# Patient Record
Sex: FEMALE | Race: WHITE | NOT HISPANIC OR LATINO | Employment: OTHER | URBAN - METROPOLITAN AREA
[De-identification: names, ages, dates, MRNs, and addresses within clinical notes are randomized per-mention and may not be internally consistent; named-entity substitution may affect disease eponyms.]

---

## 2017-03-06 ENCOUNTER — PATIENT MESSAGE (OUTPATIENT)
Dept: INTERNAL MEDICINE | Facility: CLINIC | Age: 82
End: 2017-03-06

## 2017-03-06 DIAGNOSIS — F02.80 LATE ONSET ALZHEIMER'S DISEASE WITHOUT BEHAVIORAL DISTURBANCE: ICD-10-CM

## 2017-03-06 DIAGNOSIS — G30.1 LATE ONSET ALZHEIMER'S DISEASE WITHOUT BEHAVIORAL DISTURBANCE: ICD-10-CM

## 2017-03-06 DIAGNOSIS — G47.00 INSOMNIA, UNSPECIFIED TYPE: ICD-10-CM

## 2017-03-06 RX ORDER — QUETIAPINE FUMARATE 25 MG/1
TABLET, FILM COATED ORAL
Qty: 90 TABLET | Refills: 1 | Status: SHIPPED | OUTPATIENT
Start: 2017-03-06 | End: 2017-04-13 | Stop reason: SDUPTHER

## 2017-04-12 ENCOUNTER — PATIENT MESSAGE (OUTPATIENT)
Dept: INTERNAL MEDICINE | Facility: CLINIC | Age: 82
End: 2017-04-12

## 2017-04-12 DIAGNOSIS — G47.00 INSOMNIA, UNSPECIFIED TYPE: ICD-10-CM

## 2017-04-12 DIAGNOSIS — F02.80 LATE ONSET ALZHEIMER'S DISEASE WITHOUT BEHAVIORAL DISTURBANCE: ICD-10-CM

## 2017-04-12 DIAGNOSIS — E03.9 HYPOTHYROIDISM, UNSPECIFIED TYPE: Primary | ICD-10-CM

## 2017-04-12 DIAGNOSIS — G30.1 LATE ONSET ALZHEIMER'S DISEASE WITHOUT BEHAVIORAL DISTURBANCE: ICD-10-CM

## 2017-04-13 RX ORDER — QUETIAPINE FUMARATE 25 MG/1
TABLET, FILM COATED ORAL
Qty: 90 TABLET | Refills: 1 | Status: SHIPPED | OUTPATIENT
Start: 2017-04-13 | End: 2017-04-26 | Stop reason: SDUPTHER

## 2017-04-20 ENCOUNTER — LAB VISIT (OUTPATIENT)
Dept: LAB | Facility: HOSPITAL | Age: 82
End: 2017-04-20
Attending: INTERNAL MEDICINE
Payer: MEDICARE

## 2017-04-20 DIAGNOSIS — E03.9 HYPOTHYROIDISM, UNSPECIFIED TYPE: ICD-10-CM

## 2017-04-20 LAB
T4 FREE SERPL-MCNC: 1.06 NG/DL
TSH SERPL DL<=0.005 MIU/L-ACNC: 1.37 UIU/ML

## 2017-04-20 PROCEDURE — 36415 COLL VENOUS BLD VENIPUNCTURE: CPT

## 2017-04-20 PROCEDURE — 84443 ASSAY THYROID STIM HORMONE: CPT

## 2017-04-20 PROCEDURE — 84439 ASSAY OF FREE THYROXINE: CPT

## 2017-04-25 ENCOUNTER — PATIENT MESSAGE (OUTPATIENT)
Dept: INTERNAL MEDICINE | Facility: CLINIC | Age: 82
End: 2017-04-25

## 2017-04-25 DIAGNOSIS — G47.00 INSOMNIA, UNSPECIFIED TYPE: ICD-10-CM

## 2017-04-25 DIAGNOSIS — F02.80 LATE ONSET ALZHEIMER'S DISEASE WITHOUT BEHAVIORAL DISTURBANCE: ICD-10-CM

## 2017-04-25 DIAGNOSIS — G30.1 LATE ONSET ALZHEIMER'S DISEASE WITHOUT BEHAVIORAL DISTURBANCE: ICD-10-CM

## 2017-04-26 RX ORDER — QUETIAPINE FUMARATE 25 MG/1
TABLET, FILM COATED ORAL
Qty: 90 TABLET | Refills: 1 | Status: SHIPPED | OUTPATIENT
Start: 2017-04-26 | End: 2017-09-04 | Stop reason: SDUPTHER

## 2017-04-26 RX ORDER — LEVOTHYROXINE SODIUM 100 UG/1
TABLET ORAL
Qty: 90 TABLET | Refills: 3 | Status: SHIPPED | OUTPATIENT
Start: 2017-04-26 | End: 2017-11-14 | Stop reason: SDUPTHER

## 2017-05-03 ENCOUNTER — OFFICE VISIT (OUTPATIENT)
Dept: INTERNAL MEDICINE | Facility: CLINIC | Age: 82
End: 2017-05-03
Payer: MEDICARE

## 2017-05-03 VITALS
TEMPERATURE: 98 F | HEIGHT: 62 IN | BODY MASS INDEX: 18.38 KG/M2 | HEART RATE: 90 BPM | RESPIRATION RATE: 16 BRPM | WEIGHT: 99.88 LBS | SYSTOLIC BLOOD PRESSURE: 128 MMHG | DIASTOLIC BLOOD PRESSURE: 75 MMHG

## 2017-05-03 DIAGNOSIS — G30.9 ALZHEIMER'S DEMENTIA WITHOUT BEHAVIORAL DISTURBANCE, UNSPECIFIED TIMING OF DEMENTIA ONSET: ICD-10-CM

## 2017-05-03 DIAGNOSIS — F41.9 ANXIETY: ICD-10-CM

## 2017-05-03 DIAGNOSIS — E03.9 HYPOTHYROIDISM, UNSPECIFIED TYPE: Primary | ICD-10-CM

## 2017-05-03 DIAGNOSIS — G47.00 INSOMNIA, UNSPECIFIED TYPE: ICD-10-CM

## 2017-05-03 DIAGNOSIS — R26.81 UNSTEADY GAIT: ICD-10-CM

## 2017-05-03 DIAGNOSIS — I77.9 BILATERAL CAROTID ARTERY DISEASE: ICD-10-CM

## 2017-05-03 DIAGNOSIS — F02.80 ALZHEIMER'S DEMENTIA WITHOUT BEHAVIORAL DISTURBANCE, UNSPECIFIED TIMING OF DEMENTIA ONSET: ICD-10-CM

## 2017-05-03 PROCEDURE — 1159F MED LIST DOCD IN RCRD: CPT | Mod: S$GLB,,, | Performed by: INTERNAL MEDICINE

## 2017-05-03 PROCEDURE — 99499 UNLISTED E&M SERVICE: CPT | Mod: S$GLB,,, | Performed by: INTERNAL MEDICINE

## 2017-05-03 PROCEDURE — 1160F RVW MEDS BY RX/DR IN RCRD: CPT | Mod: S$GLB,,, | Performed by: INTERNAL MEDICINE

## 2017-05-03 PROCEDURE — 99214 OFFICE O/P EST MOD 30 MIN: CPT | Mod: S$GLB,,, | Performed by: INTERNAL MEDICINE

## 2017-05-03 PROCEDURE — 1126F AMNT PAIN NOTED NONE PRSNT: CPT | Mod: S$GLB,,, | Performed by: INTERNAL MEDICINE

## 2017-05-03 PROCEDURE — 1157F ADVNC CARE PLAN IN RCRD: CPT | Mod: S$GLB,,, | Performed by: INTERNAL MEDICINE

## 2017-05-03 PROCEDURE — 99999 PR PBB SHADOW E&M-EST. PATIENT-LVL III: CPT | Mod: PBBFAC,,, | Performed by: INTERNAL MEDICINE

## 2017-05-03 RX ORDER — IMIQUIMOD 12.5 MG/.25G
CREAM TOPICAL
Refills: 2 | COMMUNITY
Start: 2017-02-11 | End: 2018-02-08

## 2017-05-03 RX ORDER — SERTRALINE HYDROCHLORIDE 25 MG/1
25 TABLET, FILM COATED ORAL DAILY
Qty: 90 TABLET | Refills: 3 | Status: SHIPPED | OUTPATIENT
Start: 2017-05-03 | End: 2017-10-18

## 2017-05-03 NOTE — MR AVS SNAPSHOT
[facility]  [address]  [facility] [address]  Phone: [phone]  Fax: [phone]                  Arina Adame   5/3/2017 1:00 PM   Office Visit    Description:  Female : 11/15/1928   Provider:  Zeinab Meier MD   Department:  [facility]           Reason for Visit     Follow-up           Diagnoses this Visit        Comments    Hypothyroidism, unspecified type    -  Primary     Bilateral carotid artery disease         Insomnia, unspecified type         Alzheimer's dementia without behavioral disturbance, unspecified timing of dementia onset         Unsteady gait                To Do List           Goals (5 Years of Data)     None      Follow-Up and Disposition     Return in about 6 months (around 11/3/2017).       These Medications        Disp Refills Start End    sertraline (ZOLOFT) 25 MG tablet 90 tablet 3 5/3/2017 5/3/2018    Take 1 tablet (25 mg total) by mouth once daily. - Oral    [facility] [address] [place] [address] Viktor Oneil 90 Ph #: [phone]         [facility]     [facility] On [facility] Nurse Care Line -  Assistance  Unless otherwise directed by your provider, please contact [facility], our nurse care line that is available for  assistance.     Registered nurses in the [facility] provide: appointment scheduling, clinical advisement, health education, and other advisory services.  Call: [phone] (toll free)               Medications           Message regarding Medications     Verify the changes and/or additions to your medication regime listed below are the same as discussed with your clinician today.  If any of these changes or additions are incorrect, please notify your healthcare provider.        START taking these NEW medications        Refills    sertraline (ZOLOFT) 25 MG tablet 3    Sig: Take 1 tablet (25 mg total) by mouth once daily.    Class:  "Normal    Route: Oral           Verify that the below list of medications is an accurate representation of the medications you are currently taking.  If none reported, the list may be blank. If incorrect, please contact your healthcare provider. Carry this list with you in case of emergency.           Current Medications     atorvastatin (LIPITOR) 40 MG tablet Take 1 tablet (40 mg total) by mouth once daily.    CALCIUM CITRATE/VITAMIN D3 (CITRACAL + D MAXIMUM ORAL) Take 4 tablets by mouth once daily.      cholecalciferol, vitamin D3, (VITAMIN D3) 1,000 unit capsule Take 1,000 Units by mouth once daily.      coenzyme Q10 200 mg capsule Take 200 mg by mouth once daily.    cyanocobalamin (VITAMIN B-12) 500 MCG tablet Take 1,000 mcg by mouth once daily.      donepezil (ARICEPT) 10 MG tablet Take 1 tablet (10 mg total) by mouth every evening.    fish oil-omega-3 fatty acids 300-1,000 mg capsule Take 2 g by mouth once daily.      levothyroxine (SYNTHROID) 100 MCG tablet 1 Tablet Oral Every day except 1/2 tablet on Sundays.    quetiapine (SEROQUEL) 25 MG Tab TAKE 1 TABLET (25 MG TOTAL) BY MOUTH NIGHTLY AS NEEDED.    walker (ULTRA-LIGHT ROLLATOR) Misc 1 each by Misc.(Non-Drug; Combo Route) route once daily.    ZOSTAVAX, PF, 19,400 unit/0.65 mL injection     aspirin (ECOTRIN) 325 MG EC tablet Take 1 tablet (325 mg total) by mouth once daily.    imiquimod (ALDARA) 5 % cream APPLY TO THE AFFECTED AREA  HS FIVE TIMES PER WEEK FOR FOUR WEEKS UTD    sertraline (ZOLOFT) 25 MG tablet Take 1 tablet (25 mg total) by mouth once daily.           Clinical Reference Information           Your Vitals Were     BP Pulse Temp Resp Height Weight    128/75 90 98.1 °F (36.7 °C) 16 5' 2" (1.575 m) 45.3 kg (99 lb 13.9 oz)    BMI                18.27 kg/m2          Blood Pressure          Most Recent Value    BP  128/75      Allergies as of 5/3/2017     Codeine    Iodine    Penicillins      Immunizations Administered on Date of Encounter - " 5/3/2017     None      Orders Placed During Today's Visit      Normal Orders This Visit    SUBSEQUENT HOME HEALTH ORDERS       Instructions    Stop vitamin D, Vitamin E, B12, fish oil. Take 1 multivitamin daily.        Language Assistance Services     ATTENTION: Language assistance services are available, free of charge. Please call [phone].      ATENCIÓN: Si paul carrasquillo, tiene a nagy disposición servicios gratuitos de asistencia lingüística. Llame al [phone].     CHÚ Ý: N?u b?n nói Ti?ng Vi?t, có các d?ch v? h? tr? ngôn ng? mi?n phí dành cho b?n. G?i s? [phone].         [facility] complies with applicable Federal civil rights laws and does not discriminate on the basis of race, color, national origin, age, disability, or sex.

## 2017-05-03 NOTE — PROGRESS NOTES
"Subjective:       Patient ID: Arina Adame is a 88 y.o. female.    Chief Complaint: follow up    HPI   B carotid artery disease - on asa 81 and atorva 40.   US 10/2014 - R 40-59% and L 1-39%  LDL 70.4.    Hypothyroidism - on synthroid 100mcg daily  Insomnia started on seroquel. Pt had some confusion and took an extra synthroid instead of seroquel per daughter. However a few weeks after taking the correct medicine, TFT WNL.   Some memory loss and on aricept 10mg daily.    Increased anxiety. Pt is fidgety. Seroquel 25mg nightly works very well. No falls. No depression. No crying. Lost a lot friends due to age.     Daughter and son in law lives w/ pt. Supposed to walk w/ walker. Unstable on her feet. No falls. She's having issues w/ walking w/ walker.     Urinates ok. Some incontinence. Normal bowel movements. No constipation/diarrhea. No abdominal pain.    Review of Systems  as Above in HPI.     Objective:      Physical Exam    /75  Pulse 90  Temp 98.1 °F (36.7 °C)  Resp 16  Ht 5' 2" (1.575 m)  Wt 45.3 kg (99 lb 13.9 oz)  BMI 18.27 kg/m2    Gen - A+O to self, NAD  HEENT - PERRL, OP clear. MMM.   NECK - no LAD  CV - RRR, no m/r  Chest - CTAB, no wheezing/rhonchi/crackles  Abd - S/NT/ND/+BS  Ext -2 + B radial and 1+ B DP pulses. No LE edema.   MSK - 4-5/5 BUE and BLE m strength. Antalgic gait and walking towards the R. High steps.    Labs reviewed.     Assessment/Plan     Arina was seen today for follow-up.    Diagnoses and all orders for this visit:    Hypothyroidism, unspecified type - cont synthroid. TSH WNL.     Bilateral carotid artery disease - cont atorva 40mg daily.     Insomnia, unspecified type - cont seroquel nightly.     Alzheimer's dementia without behavioral disturbance, unspecified timing of dementia onset - cont aricept 10mg dialy.   -     SUBSEQUENT HOME HEALTH ORDERS    Unsteady gait  -     SUBSEQUENT HOME HEALTH ORDERS    Anxiety   -     sertraline (ZOLOFT) 25 MG tablet; Take 1 tablet " (25 mg total) by mouth once daily.    Stop vitamin D, Vitamin E, B12, fish oil. Take 1 multivitamin daily.   Return in about 6 months (around 11/3/2017).      Zeinab Meier MD  Department of Internal Medicine - [facility]  12:48 PM

## 2017-05-15 ENCOUNTER — TELEPHONE (OUTPATIENT)
Dept: INTERNAL MEDICINE | Facility: CLINIC | Age: 82
End: 2017-05-15

## 2017-05-15 DIAGNOSIS — I10 ESSENTIAL HYPERTENSION, BENIGN: Primary | ICD-10-CM

## 2017-05-15 NOTE — TELEPHONE ENCOUNTER
Received notification of possible interaction for seroquel and donepezil - PT prolongation. Can you call pt to set up an EKG to ensure that her heart rhythm is ok?

## 2017-05-17 ENCOUNTER — HOSPITAL ENCOUNTER (OUTPATIENT)
Dept: CARDIOLOGY | Facility: CLINIC | Age: 82
Discharge: HOME | End: 2017-05-17
Payer: MEDICARE

## 2017-05-17 DIAGNOSIS — I10 ESSENTIAL HYPERTENSION, BENIGN: ICD-10-CM

## 2017-05-17 PROCEDURE — 93000 ELECTROCARDIOGRAM COMPLETE: CPT | Mod: S$GLB,,, | Performed by: INTERNAL MEDICINE

## 2017-06-29 ENCOUNTER — OFFICE VISIT (OUTPATIENT)
Dept: ENDOCRINOLOGY | Facility: CLINIC | Age: 82
End: 2017-06-29
Payer: MEDICARE

## 2017-06-29 VITALS
BODY MASS INDEX: 19.02 KG/M2 | DIASTOLIC BLOOD PRESSURE: 79 MMHG | SYSTOLIC BLOOD PRESSURE: 134 MMHG | HEIGHT: 62 IN | HEART RATE: 98 BPM | WEIGHT: 103.38 LBS

## 2017-06-29 DIAGNOSIS — R73.02 IGT (IMPAIRED GLUCOSE TOLERANCE): ICD-10-CM

## 2017-06-29 DIAGNOSIS — M81.0 SENILE OSTEOPOROSIS: Primary | ICD-10-CM

## 2017-06-29 DIAGNOSIS — E55.9 VITAMIN D DEFICIENCY DISEASE: ICD-10-CM

## 2017-06-29 DIAGNOSIS — E03.9 HYPOTHYROIDISM, UNSPECIFIED TYPE: ICD-10-CM

## 2017-06-29 PROCEDURE — 1126F AMNT PAIN NOTED NONE PRSNT: CPT | Mod: S$GLB,,, | Performed by: INTERNAL MEDICINE

## 2017-06-29 PROCEDURE — 1157F ADVNC CARE PLAN IN RCRD: CPT | Mod: S$GLB,,, | Performed by: INTERNAL MEDICINE

## 2017-06-29 PROCEDURE — 99214 OFFICE O/P EST MOD 30 MIN: CPT | Mod: S$GLB,,, | Performed by: INTERNAL MEDICINE

## 2017-06-29 PROCEDURE — 1159F MED LIST DOCD IN RCRD: CPT | Mod: S$GLB,,, | Performed by: INTERNAL MEDICINE

## 2017-06-29 PROCEDURE — 99999 PR PBB SHADOW E&M-EST. PATIENT-LVL IV: CPT | Mod: PBBFAC,,, | Performed by: INTERNAL MEDICINE

## 2017-06-29 NOTE — PROGRESS NOTES
Chief Complaint: Osteoporosis      HPI:  Arina Adame is 88 y.o. female with Hypothyroidism, Dementia, HLD and IGT here today for Osteoporosis follow up     She is a prior patient of Dr. Coronado and BETY Andrade with last documented office visit in 02/2016  This is the patient's initial visit with me today     She has known Osteoporosis  Treated in the past with Fosamax   Most recently on Reclast  Received infusions in 07/2013, 03/2015 and 03/2016  Tolerated well, without side effects or complications     She denies recent falls or fractures     Taking calcium and vitamin D as prescribed     Denies history of kidney stones or kidney disease   Denies diarrhea or malabsorption     Last BMD from 02/2015:   Impression     Osteoporosis of the total hip. There is no significant change in BMD when compared to previous study done in 2012.     For her Hypothyroidism:   She is taking Levothyroxine 100 mcg daily   Reports taking every morning with water. She waits approx 30 minutes before eating or taking other medications     Denies constipation, palpitations, dry skin, heat or cold intolerance       Review of Systems   Constitutional: Negative for unexpected weight change.   Eyes: Negative for visual disturbance.   Respiratory: Negative for shortness of breath.    Cardiovascular: Negative for chest pain.   Gastrointestinal: Negative for abdominal pain.   Musculoskeletal: Negative for arthralgias.   Skin: Negative for wound.   Neurological: Negative for headaches.   Hematological: Does not bruise/bleed easily.   Psychiatric/Behavioral: Negative for sleep disturbance.     Physical Exam   Constitutional: No distress.   Thin female    Neck: No thyromegaly present.   Cardiovascular: Normal rate.    Pulmonary/Chest: Effort normal.   Abdominal: Soft.   Musculoskeletal: She exhibits no edema.   Lymphadenopathy:     She has no cervical adenopathy.   Neurological: She is alert.   Psychiatric: She has a normal mood and affect.  Her behavior is normal.   Nursing note and vitals reviewed.    Labs:  Lab Results   Component Value Date     11/30/2016    K 3.5 11/30/2016     11/30/2016    CO2 31 (H) 11/30/2016     (H) 11/30/2016    BUN 12 11/30/2016    CREATININE 0.6 11/30/2016    CALCIUM 9.5 11/30/2016    PROT 7.2 11/30/2016    ALBUMIN 3.4 (L) 11/30/2016    BILITOT 0.7 11/30/2016    ALKPHOS 115 11/30/2016    AST 15 11/30/2016    ALT 8 (L) 11/30/2016    ANIONGAP 6 (L) 11/30/2016    ESTGFRAFRICA >60.0 11/30/2016    EGFRNONAA >60.0 11/30/2016     Lab Results   Component Value Date    GHGEQUZF66NT 62 11/30/2016     Assessment and Plan:  1. Senile osteoporosis  -Stressed the importance of weight bearing exercises to help prevent fractures   -Patient is encouraged to continue exercise program as tolerated  -Initiate fall safety and fall precautions   -Continue calcium and vitamin D supplements ( 2758-5820 mg daily of calcium and 800-1000 units daily of vitamin D ) - dietary sources of calcium are preferred  -Pharmacological therapy is recommended for patients with osteopenia if the 10 year probability of a hip fracture is >3% and 10 year probability of other major osteoporotic fractures is >20%.   -Repeat DEXA due   -     DXA Bone Density Spine And Hip; Future; Expected date: 06/29/2017    2. Hypothyroidism, unspecified type  - clinically and biochemically euthyroid   - continue Levothyroxine 100 mcg daily   - avoid exogenous hyperthyroidism as this can accelerate bone loss and increase risk of CV complications     3. IGT (impaired glucose tolerance)  - stable   - continue dietary and lifestyle modifications   - recommend periodic A1c     4. Vitamin D deficiency disease  - stable   - continue supplements

## 2017-08-04 ENCOUNTER — OFFICE VISIT (OUTPATIENT)
Dept: OPHTHALMOLOGY | Facility: CLINIC | Age: 82
End: 2017-08-04
Payer: MEDICARE

## 2017-08-04 DIAGNOSIS — H04.123 DRY EYE SYNDROME, BILATERAL: ICD-10-CM

## 2017-08-04 DIAGNOSIS — H43.812 VITREOUS DETACHMENT, LEFT: ICD-10-CM

## 2017-08-04 DIAGNOSIS — H35.372 EPIRETINAL MEMBRANE, LEFT: Primary | ICD-10-CM

## 2017-08-04 DIAGNOSIS — I10 ESSENTIAL HYPERTENSION: ICD-10-CM

## 2017-08-04 DIAGNOSIS — H52.7 REFRACTIVE ERROR: ICD-10-CM

## 2017-08-04 DIAGNOSIS — Z96.1 PSEUDOPHAKIA: ICD-10-CM

## 2017-08-04 DIAGNOSIS — D31.31 CHOROIDAL NEVUS OF RIGHT EYE: ICD-10-CM

## 2017-08-04 PROCEDURE — 92014 COMPRE OPH EXAM EST PT 1/>: CPT | Mod: S$GLB,,, | Performed by: OPHTHALMOLOGY

## 2017-08-04 PROCEDURE — 99999 PR PBB SHADOW E&M-EST. PATIENT-LVL II: CPT | Mod: PBBFAC,,, | Performed by: OPHTHALMOLOGY

## 2017-08-04 PROCEDURE — 99499 UNLISTED E&M SERVICE: CPT | Mod: S$GLB,,, | Performed by: OPHTHALMOLOGY

## 2017-08-04 NOTE — PROGRESS NOTES
Subjective:       Patient ID: Arina Adame is a 88 y.o. female.    Chief Complaint: Epiretinal membrane (ERM, left )    HPI  Review of Systems    Objective:      Physical Exam    Assessment:       1. 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 Dry eye syndrome, bilateral    4. Vitreous detachment, left    5. Essential hypertension    6. Refractive error - Both Eyes    7. Pseudophakia - Both Eyes        Plan:       ERM OS-Mild, stable.  C. Nevus OD-Stable.  TAMMI-Doing well.  PVD OS-Stable.  HTN-No retinopathy OU.  RE-Pt does not want MRx.        AT's.  Control HTN.  RTC 1 yr.

## 2017-09-04 DIAGNOSIS — G30.1 LATE ONSET ALZHEIMER'S DISEASE WITHOUT BEHAVIORAL DISTURBANCE: ICD-10-CM

## 2017-09-04 DIAGNOSIS — G47.00 INSOMNIA, UNSPECIFIED TYPE: ICD-10-CM

## 2017-09-04 DIAGNOSIS — F02.80 LATE ONSET ALZHEIMER'S DISEASE WITHOUT BEHAVIORAL DISTURBANCE: ICD-10-CM

## 2017-09-06 RX ORDER — QUETIAPINE FUMARATE 25 MG/1
TABLET, FILM COATED ORAL
Qty: 90 TABLET | Refills: 0 | Status: SHIPPED | OUTPATIENT
Start: 2017-09-06 | End: 2017-11-08 | Stop reason: SDUPTHER

## 2017-10-04 ENCOUNTER — TELEPHONE (OUTPATIENT)
Dept: OPHTHALMOLOGY | Facility: CLINIC | Age: 82
End: 2017-10-04

## 2017-10-06 ENCOUNTER — OFFICE VISIT (OUTPATIENT)
Dept: OPHTHALMOLOGY | Facility: CLINIC | Age: 82
End: 2017-10-06
Payer: MEDICARE

## 2017-10-06 DIAGNOSIS — H35.61 SUBRETINAL HEMORRHAGE OF RIGHT EYE: Primary | ICD-10-CM

## 2017-10-06 DIAGNOSIS — H43.11 VITREOUS HEMORRHAGE OF RIGHT EYE: ICD-10-CM

## 2017-10-06 PROCEDURE — 92014 COMPRE OPH EXAM EST PT 1/>: CPT | Mod: S$GLB,,, | Performed by: OPHTHALMOLOGY

## 2017-10-06 PROCEDURE — 99499 UNLISTED E&M SERVICE: CPT | Mod: S$GLB,,, | Performed by: OPHTHALMOLOGY

## 2017-10-06 PROCEDURE — 99999 PR PBB SHADOW E&M-EST. PATIENT-LVL II: CPT | Mod: PBBFAC,,, | Performed by: OPHTHALMOLOGY

## 2017-10-06 NOTE — PROGRESS NOTES
Subjective:       Patient ID: Arina Adame is a 88 y.o. female.    Chief Complaint: Spots and/or Floaters    HPI  Review of Systems    Objective:      Physical Exam    Assessment:       1. Subretinal hemorrhage of right eye    2. Vitreous hemorrhage of right eye        Plan:       Subretinal/vitreous hem OD-Causing floaters & red streak across vision OD. Needs Retina eval/tx.          Refer to Dr Coronel for eval/tx on Monday, 10/9/17.

## 2017-10-09 ENCOUNTER — INITIAL CONSULT (OUTPATIENT)
Dept: OPHTHALMOLOGY | Facility: CLINIC | Age: 82
End: 2017-10-09
Payer: MEDICARE

## 2017-10-09 ENCOUNTER — OFFICE VISIT (OUTPATIENT)
Dept: FAMILY MEDICINE | Facility: CLINIC | Age: 82
End: 2017-10-09
Payer: MEDICARE

## 2017-10-09 ENCOUNTER — HOSPITAL ENCOUNTER (OUTPATIENT)
Dept: RADIOLOGY | Facility: HOSPITAL | Age: 82
Discharge: HOME | End: 2017-10-09
Attending: NURSE PRACTITIONER
Payer: MEDICARE

## 2017-10-09 ENCOUNTER — PATIENT MESSAGE (OUTPATIENT)
Dept: FAMILY MEDICINE | Facility: CLINIC | Age: 82
End: 2017-10-09

## 2017-10-09 VITALS — DIASTOLIC BLOOD PRESSURE: 75 MMHG | HEART RATE: 66 BPM | SYSTOLIC BLOOD PRESSURE: 173 MMHG

## 2017-10-09 VITALS — HEIGHT: 62 IN | BODY MASS INDEX: 19.02 KG/M2 | WEIGHT: 103.38 LBS

## 2017-10-09 DIAGNOSIS — H35.372 EPIRETINAL MEMBRANE (ERM) OF LEFT EYE: ICD-10-CM

## 2017-10-09 DIAGNOSIS — H35.3211 EXUDATIVE AGE-RELATED MACULAR DEGENERATION OF RIGHT EYE WITH ACTIVE CHOROIDAL NEOVASCULARIZATION: Primary | ICD-10-CM
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
## 2017-10-09 DIAGNOSIS — H43.11 VITREOUS HEMORRHAGE OF RIGHT EYE: ICD-10-CM

## 2017-10-09 DIAGNOSIS — M25.551 ACUTE RIGHT HIP PAIN: Primary | ICD-10-CM

## 2017-10-09 DIAGNOSIS — M25.551 PAIN OF RIGHT HIP JOINT: ICD-10-CM

## 2017-10-09 DIAGNOSIS — H43.812 POSTERIOR VITREOUS DETACHMENT OF LEFT EYE: ICD-10-CM

## 2017-10-09 PROCEDURE — 92134 CPTRZ OPH DX IMG PST SGM RTA: CPT | Mod: S$GLB,,, | Performed by: OPHTHALMOLOGY

## 2017-10-09 PROCEDURE — 99999 PR PBB SHADOW E&M-EST. PATIENT-LVL II: CPT | Mod: PBBFAC,,, | Performed by: OPHTHALMOLOGY

## 2017-10-09 PROCEDURE — 92014 COMPRE OPH EXAM EST PT 1/>: CPT | Mod: 25,S$GLB,, | Performed by: OPHTHALMOLOGY

## 2017-10-09 PROCEDURE — 99214 OFFICE O/P EST MOD 30 MIN: CPT | Mod: S$GLB,,, | Performed by: NURSE PRACTITIONER

## 2017-10-09 PROCEDURE — 73502 X-RAY EXAM HIP UNI 2-3 VIEWS: CPT | Mod: TC,PO,RT

## 2017-10-09 PROCEDURE — 73502 X-RAY EXAM HIP UNI 2-3 VIEWS: CPT | Mod: 26,RT,, | Performed by: RADIOLOGY

## 2017-10-09 PROCEDURE — 67028 INJECTION EYE DRUG: CPT | Mod: RT,S$GLB,, | Performed by: OPHTHALMOLOGY

## 2017-10-09 PROCEDURE — 99499 UNLISTED E&M SERVICE: CPT | Mod: S$GLB,,, | Performed by: OPHTHALMOLOGY

## 2017-10-09 PROCEDURE — 92225 PR SPECIAL EYE EXAM, INITIAL: CPT | Mod: 25,LT,S$GLB, | Performed by: OPHTHALMOLOGY

## 2017-10-09 PROCEDURE — 99999 PR PBB SHADOW E&M-EST. PATIENT-LVL III: CPT | Mod: PBBFAC,,, | Performed by: NURSE PRACTITIONER

## 2017-10-09 RX ADMIN — Medication 1.25 MG: at 11:10

## 2017-10-09 NOTE — PATIENT INSTRUCTIONS
POST INTRAVITREAL INJECTION PATIENT INSTRUCTIONS   Below are some guidelines for what to expect after your treatment and additional care instructions.    You may experience mild discomfort in your eye after the treatment. Usually your eye feels better within 24 to 48 hours after the procedure.      You have been given drops that numb the surface of your eye. DO NOT rub or touch your eye. DO NOT wear contacts until numbness goes away.      You may experience small spots that appear in your field of vision. These are usually caused by an air bubble or from the medication. It takes a few hours or days for these to go away.      The use of sunglasses will help reduce light sensitivity and glare.      DO NOT swim or put sink water (tap water) in your eyes for at least 4 hours after the injection.      You may get a gritty, burning, irritating or stinging feeling in the injected eye as a result of the antiseptic used. Use artificial tears or eye lubricant to reduce the sensation. These are available over-the-counter from your local pharmacy. If you already have some at home, be sure to check the expiration date and to avoid contaminating your eye. A cool pack over your eye brow above the injected eye may also relieve discomfort.     Call us right away or go to the Emergency Department if you have a dramatic decrease in vision or extreme pain in the eye.   [facility] [phone]   Item: [id number]   Revised: 09/2015         Controlling High Blood Pressure  High blood pressure (hypertension) is often called the silent killer. This is because many people who have it dont know it. High blood pressure is defined as 140/90 mm Hg or higher. Know your blood pressure and remember to check it regularly. Doing so can save your life. Here are some things you can do to help control your blood pressure.    Choose heart-healthy foods  · Select low-salt, low-fat foods. Limit sodium intake to 2,400 mg per day or  the amount suggested by your healthcare provider.  · Limit canned, dried, cured, packaged, and fast foods. These can contain a lot of salt.  · Eat 8 to 10 servings of fruits and vegetables every day.  · Choose lean meats, fish, or chicken.  · Eat whole-grain pasta, brown rice, and beans.  · Eat 2 to 3 servings of low-fat or fat-free dairy products.  · Ask your doctor about the DASH eating plan. This plan helps reduce blood pressure.  · When you go to a restaurant, ask that your meal be prepared with no added salt.  Maintain a healthy weight  · Ask your healthcare provider how many calories to eat a day. Then stick to that number.  · Ask your healthcare provider what weight range is healthiest for you. If you are overweight, a weight loss of only 3% to 5% of your body weight can help lower blood pressure. Generally, a good weight loss goal is to lose 10% of your body weight in a year.  · Limit snacks and sweets.  · Get regular exercise.  Get up and get active  · Choose activities you enjoy. Find ones you can do with friends or family. This includes bicycling, dancing, walking, and jogging.  · Park farther away from building entrances.  · Use stairs instead of the elevator.  · When you can, walk or bike instead of driving.  · [place] leaves, garden, or do household repairs.  · Be active at a moderate to vigorous level of physical activity for at least 40 minutes for a minimum of 3 to 4 days a week.   Manage stress  · Make time to relax and enjoy life. Find time to laugh.  · Communicate your concerns with your loved ones and your healthcare provider.  · Visit with family and friends, and keep up with hobbies.  Limit alcohol and quit smoking  · Men should have no more than 2 drinks per day.  · Women should have no more than 1 drink per day.  · Talk with your healthcare provider about quitting smoking. Smoking significantly increases your risk for heart disease and stroke. Ask your healthcare provider about community  smoking cessation programs and other options.  Medicines  If lifestyle changes arent enough, your healthcare provider may prescribe high blood pressure medicine. Take all medicines as prescribed. If you have any questions about your medicines, ask your healthcare provider before stopping or changing them.

## 2017-10-09 NOTE — PROGRESS NOTES
HPI     Subretinal/vitreous hem OD-Causing floaters & red streak across vision   OD. per Dr. Pastor.  Pt notes onset of floaters and spot OD last week.    Floaters are reddish.  No flashes.  Va normal OS.  No pain/redness/f/f        OTC gtts PRN   (-)Flashes (+)Floaters black at night and red in bright lights  (+)Photophobia  (+)Glare    Hemoglobin A1C       Date                     Value               Ref Range             Status                11/30/2016               5.4                 4.5 - 6.2 %           Final                 02/09/2015               5.9                 4.5 - 6.2 %           Final                 06/24/2013               5.9                 4.0 - 6.2 %           Final            ----------      Last edited by Boone Coronel MD on 10/10/2017  8:25 PM. (History)        [facility] SDOCT:   OD: good quality, SRH in superior macula  OS: good quality, ERM with min distoriton, no fluid      Assessment /Plan     For exam results, see Encounter Report.    Exudative age-related macular degeneration of right eye with active choroidal neovascularization  -     Prior Authorization Order  -     Posterior Segment OCT Retina-Both eyes  -     Posterior Segment OCT Retina-Both eyes; Future    Epiretinal membrane (ERM) of left eye    Other orders  -     bevacizumab (AVASTIN) 2.5 mg/0.10 mL 1.25 mg; Inject 0.05 mLs (1.25 mg total) into the eye one time.    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  Heme too dense to get useful FA today  Recommend Avastin OD today  RBA discussed with patient and daughter.  They agree    Risks, benefits, and alternatives to treatment were discussed in detail with the patient, including bleeding/infection (endophthalmitis)/etc.  The patient voiced understanding and wished to proceed with the procedure.  See separate consent form.    Injection Procedure Note:  Diagnosis: Wet AMD Right Eye    Topical Proparacaine drop placed then topical 10% Betadine swabs to lids, lashes, and conjunctiva.  Sterile  gloves used, and sterile lid speculum placed.  10% Betadine swabbed at injection site again prior to injection.  Avastin 1.25mg in 0.05cc Injected at 7:00 position 3.5-4mm posterior to the limbus.  Complications: None  Va at least CF at 5 feet post injection.  Retina, ONH, IOP normal after injection.    Return to clinic in 1 month for reevaluation, OCT, and possible injection depending upon findings.  Patient should return immediately PRN.  Retinal Detachment and Endophthalmitis precautions given.    Will get FA when blood clearing

## 2017-10-09 NOTE — PROGRESS NOTES
History of Present Illness   Arina Adame is a 88 y.o. woman with medical history as listed below who presents today for evaluation after a trip and fall which occurred two days prior to appointment. She states she got tangled in the bed skirt and fell in to the mattress. She did not hit her head and denies LOC. She complains of right hip pain. Pain has improved over the past two days with rest, ice, and pain medication. She is able to bear weight without difficulty. She would like an x-ray to ensure there is no fracture present. She has no additional complaints and is otherwise healthy on today's visit.      Past Medical History:   Diagnosis Date    Alzheimer disease     Arthritis     B12 nutritional deficiency 8/6/2012    Carotid arterial disease 8/27/2013    Cataract     Colon cancer     Elevated blood pressure (not hypertension) 3/17/2014    Epiretinal membrane     Essential hypertension 3/16/2016    Hearing loss 3/17/2014    History of colon cancer 5/28/2015    [place] (hard of hearing)     Hypothyroidism 11/21/2012    IGT (impaired glucose tolerance) 2/11/2015    Macrocytosis 11/21/2012    Nevus of choroid     Osteoporosis, 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       Past Surgical History:   Procedure Laterality Date    BREAST SURGERY      CATARACT EXTRACTION W/  INTRAOCULAR LENS IMPLANT Bilateral n/a    OU    COLECTOMY      DENTAL SURGERY      right ankle surgery         Social History     Social History    Marital status:      Spouse name: N/A    Number of children: N/A    Years of education: N/A     Social History Main Topics    Smoking status: Former Smoker     Quit date: 6/24/1970    Smokeless tobacco: Former User    Alcohol use No    Drug use: No    Sexual activity: Not Asked     Other Topics Concern    None     Social History Narrative    Daughter lives with her.        Family History   Problem Relation Age of Onset  "   Hip fracture Mother     Thyroid disease Sister      Thyroidectomy    Diabetes Neg Hx     Amblyopia Neg Hx     Blindness Neg Hx     Cataracts Neg Hx     Glaucoma Neg Hx     Macular degeneration Neg Hx     Retinal detachment Neg Hx     Strabismus Neg Hx        Review of Systems  Review of Systems   Musculoskeletal: Positive for falls and joint pain. Negative for back pain.   Neurological: Negative for dizziness and loss of consciousness.     A complete review of systems was otherwise negative.    Physical Exam  Ht 5' 2" (1.575 m)   Wt 46.9 kg (103 lb 6.3 oz)   BMI 18.91 kg/m²   General appearance: alert, appears stated age, cooperative and no distress  Extremities: extremities normal, atraumatic, no cyanosis or edema  Pulses: 2+ and symmetric  Skin: Skin color, texture, turgor normal. No rashes or lesions  Neurologic: Grossly normal  Musculoskeletal: There is full ROM to right hip, knee, and ankle. There is no obvious deformity, shortening, or rotation or RLE. There is no joint crepitus or tenderness to palpation.    Assessment/Plan  Fall, initial encounter  Will obtain x-ray for further evaluation.  Continue to rest and use Tylenol for pain.  RTC PRN for worsening or persistent symptoms.  Will refer to orthopedic as necessary.  Follow-up with PCP as scheduled in one month.  -     X-Ray Hip 2 View Right; Future; Expected date: 10/09/2017    Pain of right hip joint  As above.  -     X-Ray Hip 2 View Right; Future; Expected date: 10/09/2017    Patient and daughter have verbalized understanding and are in agreement with plan of care.  Return if symptoms worsen or fail to improve.  "

## 2017-10-09 NOTE — LETTER
October 10, 2017      Edmar Pastor MD  [address] [place] [address]           [facility]  [address]  [place] [address]  Phone: [phone]  Fax: [phone]          Patient: Arina Adame   MR Number: [id number]   YOB: 1928   Date of Visit: 10/9/2017       Dear Dr. Edmar Pastor:    Thank you for referring Arina Adame to me for evaluation. Attached you will find relevant portions of my assessment and plan of care.    If you have questions, please do not hesitate to call me. I look forward to following Arina Adame along with you.    Sincerely,    Boone Coronel MD    Enclosure  CC:  No Recipients    If you would like to receive this communication electronically, please contact [email or web address] or [phone] to request more information on [employer] access.    For providers and/or their staff who would like to refer a patient to [facility], please contact us through our one-stop-shop provider referral line, [facility], at [phone].    If you feel you have received this communication in error or would no longer like to receive these types of communications, please e-mail [email or web address]

## 2017-10-09 NOTE — PATIENT INSTRUCTIONS
"  Preventing Falls: Are You At Risk of Falling?     Ask for help to reduce risk of falling in your home.     As you get older, you're not as steady on your feet as you once were. And you may have health problems you didn't have when you were younger. So, it's not surprising that older people are more likely to trip and fall. Falling can be very serious. It can change your overall health and quality of life. That's why it's important to be aware of your own risk of falling.  The dangers of falling  Falls are one of the main causes of injury in people over age 65. An older person who falls may take longer to get better than a younger person. And, after a fall, an older person is more likely to have problems that don't go away. So, preventing falls can help you avoid serious health problems.  Are you at risk of falling?  Answer these questions to rate your level of risk.  · Are you a woman?  · Have you fallen or stumbled in the last year?  · Are you over age 65?  · Are you ever dizzy or lightheaded with standing?  · Do you have a hard time getting in and out of the bathtub or on and off the toilet?  · Do you lean on objects to help you get around? Or do you use a cane or walker?  · Do you have vision or hearing problems? For example, do you need new glasses or hearing aids?  · Do you have 2 or more long-lasting (chronic) medical conditions?  · Do you take 3 or more medicines?  · Have you felt depressed recently?  · Have you had more trouble with your memory in recent months?  · Are there hazards in your home that might cause you to fall, such as loose rugs or poor lighting?  · Do you have a pet that jumps on you or might trip you?  · Have you stopped getting regular exercise?  · Do you have diabetes?   · Do you have a neurologic disease, such as Parkinson or Alzheimer disease?   · Do you drink alcohol?  · Do you wear athletic shoes or slippers, or go barefoot at home?  You can help prevent falls  If you answered "yes" " to any of the above questions, you should take steps to reduce your risk of a fall. Monitoring health conditions and keeping walkways in your home free of clutter are just 2 ways. Changing is sometimes easier said than done. But keep in mind that even small changes can make you less likely to fall.  The fear of falling  It's normal to be scared of falling, especially if you've fallen before. But being afraid can actually make you more likely to fall. This is because:  · Fear might cause you to become less active. Being less active can lead to a loss of strength and balance.  · Fear can lead to isolation from others, depression, or the use of more medicines or alcohol. And all these things make falling even more likely.  To break the cycle, learn more about ways to avoid falling. As you take control, you may find yourself feeling less afraid.   Date Last Reviewed: 6/12/2015  © 6969-9006 [employer]. [address] [place] [address], [place] [address]. All rights reserved. This information is not intended as a substitute for professional medical care. Always follow your healthcare professional's instructions.

## 2017-10-10 ENCOUNTER — TELEPHONE (OUTPATIENT)
Dept: FAMILY MEDICINE | Facility: CLINIC | Age: 82
End: 2017-10-10

## 2017-10-16 ENCOUNTER — PATIENT MESSAGE (OUTPATIENT)
Dept: INTERNAL MEDICINE | Facility: CLINIC | Age: 82
End: 2017-10-16

## 2017-10-17 ENCOUNTER — PATIENT MESSAGE (OUTPATIENT)
Dept: INTERNAL MEDICINE | Facility: CLINIC | Age: 82
End: 2017-10-17

## 2017-10-17 NOTE — TELEPHONE ENCOUNTER
Please call patient to see if she can come in tomorrow on Wednesday at 10 AM.  She is tentatively scheduled.  If she cannot make it, please cancel appointment and schedule another with patient.

## 2017-10-18 ENCOUNTER — HOSPITAL ENCOUNTER (OUTPATIENT)
Dept: RADIOLOGY | Facility: HOSPITAL | Age: 82
Discharge: HOME | End: 2017-10-18
Attending: INTERNAL MEDICINE
Payer: MEDICARE

## 2017-10-18 ENCOUNTER — IMMUNIZATION (OUTPATIENT)
Dept: INTERNAL MEDICINE | Facility: CLINIC | Age: 82
End: 2017-10-18
Payer: MEDICARE

## 2017-10-18 ENCOUNTER — OFFICE VISIT (OUTPATIENT)
Dept: INTERNAL MEDICINE | Facility: CLINIC | Age: 82
End: 2017-10-18
Payer: MEDICARE

## 2017-10-18 VITALS
SYSTOLIC BLOOD PRESSURE: 126 MMHG | TEMPERATURE: 98 F | HEIGHT: 62 IN | RESPIRATION RATE: 15 BRPM | HEART RATE: 103 BPM | DIASTOLIC BLOOD PRESSURE: 78 MMHG

## 2017-10-18 DIAGNOSIS — M25.551 PAIN OF BOTH HIP JOINTS: ICD-10-CM

## 2017-10-18 DIAGNOSIS — F03.90 DEMENTIA WITHOUT BEHAVIORAL DISTURBANCE, UNSPECIFIED DEMENTIA TYPE: ICD-10-CM

## 2017-10-18 DIAGNOSIS — E03.9 HYPOTHYROIDISM, UNSPECIFIED TYPE: ICD-10-CM

## 2017-10-18 DIAGNOSIS — M25.552 PAIN OF BOTH HIP JOINTS: ICD-10-CM

## 2017-10-18 DIAGNOSIS — R29.6 MULTIPLE FALLS: ICD-10-CM

## 2017-10-18 DIAGNOSIS — R32 URINARY INCONTINENCE, UNSPECIFIED TYPE: ICD-10-CM

## 2017-10-18 DIAGNOSIS — R82.90 FOUL SMELLING URINE: ICD-10-CM

## 2017-10-18 DIAGNOSIS — M25.561 ACUTE PAIN OF RIGHT KNEE: ICD-10-CM

## 2017-10-18 DIAGNOSIS — S32.599A OTHER SPECIFIED FRACTURE OF UNSPECIFIED PUBIS, INITIAL ENCOUNTER FOR CLOSED FRACTURE: ICD-10-CM

## 2017-10-18 DIAGNOSIS — R29.6 MULTIPLE FALLS: Primary | ICD-10-CM

## 2017-10-18 DIAGNOSIS — F41.9 ANXIETY: ICD-10-CM

## 2017-10-18 LAB
BACTERIA #/AREA URNS AUTO: ABNORMAL /HPF
BILIRUB UR QL STRIP: NEGATIVE
CLARITY UR REFRACT.AUTO: ABNORMAL
COLOR UR AUTO: ABNORMAL
GLUCOSE UR QL STRIP: NEGATIVE
HGB UR QL STRIP: NEGATIVE
HYALINE CASTS UR QL AUTO: 0 /LPF
KETONES UR QL STRIP: ABNORMAL
LEUKOCYTE ESTERASE UR QL STRIP: ABNORMAL
MICROSCOPIC COMMENT: ABNORMAL
NITRITE UR QL STRIP: NEGATIVE
PH UR STRIP: 6 [PH] (ref 5–8)
PROT UR QL STRIP: ABNORMAL
RBC #/AREA URNS AUTO: 0 /HPF (ref 0–4)
SP GR UR STRIP: 1.02 (ref 1–1.03)
SQUAMOUS #/AREA URNS AUTO: 0 /HPF
URN SPEC COLLECT METH UR: ABNORMAL
UROBILINOGEN UR STRIP-ACNC: 2 EU/DL
WBC #/AREA URNS AUTO: 42 /HPF (ref 0–5)

## 2017-10-18 PROCEDURE — 72080 X-RAY EXAM THORACOLMB 2/> VW: CPT | Mod: 26,,, | Performed by: RADIOLOGY

## 2017-10-18 PROCEDURE — 90662 IIV NO PRSV INCREASED AG IM: CPT | Mod: S$GLB,,, | Performed by: INTERNAL MEDICINE

## 2017-10-18 PROCEDURE — 72050 X-RAY EXAM NECK SPINE 4/5VWS: CPT | Mod: 26,,, | Performed by: RADIOLOGY

## 2017-10-18 PROCEDURE — 73562 X-RAY EXAM OF KNEE 3: CPT | Mod: TC,50

## 2017-10-18 PROCEDURE — 87086 URINE CULTURE/COLONY COUNT: CPT

## 2017-10-18 PROCEDURE — 99999 PR PBB SHADOW E&M-EST. PATIENT-LVL V: CPT | Mod: PBBFAC,,, | Performed by: INTERNAL MEDICINE

## 2017-10-18 PROCEDURE — 73521 X-RAY EXAM HIPS BI 2 VIEWS: CPT | Mod: TC

## 2017-10-18 PROCEDURE — 72220 X-RAY EXAM SACRUM TAILBONE: CPT | Mod: TC

## 2017-10-18 PROCEDURE — 81001 URINALYSIS AUTO W/SCOPE: CPT

## 2017-10-18 PROCEDURE — 99499 UNLISTED E&M SERVICE: CPT | Mod: S$GLB,,, | Performed by: INTERNAL MEDICINE

## 2017-10-18 PROCEDURE — 72050 X-RAY EXAM NECK SPINE 4/5VWS: CPT | Mod: TC

## 2017-10-18 PROCEDURE — 73562 X-RAY EXAM OF KNEE 3: CPT | Mod: 26,50,, | Performed by: RADIOLOGY

## 2017-10-18 PROCEDURE — 72220 X-RAY EXAM SACRUM TAILBONE: CPT | Mod: 26,,, | Performed by: RADIOLOGY

## 2017-10-18 PROCEDURE — 72080 X-RAY EXAM THORACOLMB 2/> VW: CPT | Mod: TC

## 2017-10-18 PROCEDURE — G0008 ADMIN INFLUENZA VIRUS VAC: HCPCS | Mod: S$GLB,,, | Performed by: INTERNAL MEDICINE

## 2017-10-18 PROCEDURE — 87088 URINE BACTERIA CULTURE: CPT

## 2017-10-18 PROCEDURE — 73521 X-RAY EXAM HIPS BI 2 VIEWS: CPT | Mod: 26,,, | Performed by: RADIOLOGY

## 2017-10-18 PROCEDURE — 99215 OFFICE O/P EST HI 40 MIN: CPT | Mod: S$GLB,,, | Performed by: INTERNAL MEDICINE

## 2017-10-18 RX ORDER — SERTRALINE HYDROCHLORIDE 50 MG/1
50 TABLET, FILM COATED ORAL DAILY
Qty: 90 TABLET | Refills: 3 | Status: SHIPPED | OUTPATIENT
Start: 2017-10-18 | End: 2018-03-05

## 2017-10-18 NOTE — PROGRESS NOTES
"Subjective:       Patient ID: Arina Adame is a 88 y.o. female.    Chief Complaint: Extremity Weakness and Fall (several, last night back and tail bone pain)    HPI urgent  1.5 weeks ago, pt became tangled in the bed skirt, tripped and fell into the mattress and then slowly slid down 10/6/17.   Went to get eye exam and found to have subretinal hemorrhage and vitreous hemorrhage of R eye.   Sent to  to be evaluated.   Hip xr 10/9/17 - osteophytes, nondisplaced fx difficult to exclude in this setting. Age-indeterminate fractures of the R sup and inf pubic rami. Possible involvement of the medial R acetabulum. Advanced degenerative changes at the hip joints.   Saw Dr. 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  Wants to be evaluated for HH.     Still has floaters on right side but better.     Heater got tangled in toilet paper yesterday and fell right onto the tailbone and lower back. This was painful.   No SOB/palpitations/lightheadedness/CP and none prior to fall. Reports just tripped.     Pain throughout spine and the R knee, hip. Able to bare a little bit of weight. Walks w/ walker.    Has dementia and does not drive or get out of the house on her own. No changes in personality or mentation since fall but over declining over long periods of time.     Strong odor in urine per daughter.    Has some anxiety per daughter in law.     Review of Systems  as above in HPI.     Objective:      Physical Exam    /78   Pulse 103   Temp 97.8 °F (36.6 °C) (Oral)   Resp 15   Ht 5' 2" (1.575 m)     Gen - A+O to self, NAD  HEENT - PERRL, Op clear. MMM. NCAT.   Neck - no LAD.   CV - RRR  Chest - CTAB, no wheezing  Abd - S/NT/ND/+BS  Ext - 2+ B radial pulses. No LE edema.   MSK - pain on palpation throughout her entire spine and hips and R knee. Small bruise by the T spine and R knee. Bruise by the L hand. Full hand . Unable to flex R hip due to pain. 5/5 B knee flexion and " extension and L hip flexion.   Skin - as above. No open lesions noted on exam.    XR reviewed myself and w/ pt.     Assessment/Plan     Arina was seen today for extremity weakness and fall.    Diagnoses and all orders for this visit:    Multiple falls  -     X-Ray Hips Bilateral 2 View Incl AP Pelvis; Future  -     X-Ray Cervical Spine Complete 5 view; Future  -     X-Ray Thoracolumbar Spine AP Lateral; Future  -     SUBSEQUENT HOME HEALTH ORDERS  -     Ambulatory referral to Orthopedics  -     X-ray Knee Ortho Bilateral; Future  -     X-Ray Sacrum And Coccyx; Future    Pain of both hip joints  -     X-Ray Hips Bilateral 2 View Incl AP Pelvis; Future  -     SUBSEQUENT HOME HEALTH ORDERS  -     Ambulatory referral to Orthopedics    Dementia without behavioral disturbance, unspecified dementia type  -     SUBSEQUENT HOME HEALTH ORDERS  -     Comprehensive metabolic panel; Future  -     TSH; Future    Urinary incontinence, unspecified type  -     Urinalysis Microscopic  -     Urinalysis  -     Urine culture    Foul smelling urine  -     Comprehensive metabolic panel; Future  -     Urinalysis Microscopic  -     Urinalysis  -     Urine culture    Hypothyroidism, unspecified type - cont synthroid.  -     TSH; Future    Other specified fracture of unspecified pubis, initial encounter for closed fracture  -     Ambulatory referral to Orthopedics    BMI less than 19,adult  -     CBC auto differential; Future    Acute pain of right knee  -     X-ray Knee Ortho Bilateral; Future    Anxiety - inc zoloft to 50mg daily.   -     sertraline (ZOLOFT) 50 MG tablet; Take 1 tablet (50 mg total) by mouth once daily.    flu vaccine today.   Return if symptoms worsen or fail to improve.      Zeinab Meier MD  Department of Internal Medicine - [facility]  10:07 AM    Subsequent Home Health Orders    Current Medications:  Current Outpatient Prescriptions:  atorvastatin (LIPITOR) 40 MG tablet, Take 1 tablet (40 mg total) by mouth once  daily., Disp: 90 tablet, Rfl: 3  CALCIUM CITRATE/VITAMIN D3 (CITRACAL + D MAXIMUM ORAL), Take 4 tablets by mouth once daily.  , Disp: , Rfl:   donepezil (ARICEPT) 10 MG tablet, Take 1 tablet (10 mg total) by mouth every evening., Disp: 90 tablet, Rfl: 3  imiquimod (ALDARA) 5 % cream, APPLY TO THE AFFECTED AREA  HS FIVE TIMES PER WEEK FOR FOUR WEEKS UTD, Disp: , Rfl: 2  levothyroxine (SYNTHROID) 100 MCG tablet, 1 Tablet Oral Every day except 1/2 tablet on Sundays., Disp: 90 tablet, Rfl: 3  quetiapine (SEROQUEL) 25 MG Tab, TAKE 1 TABLET (25 MG TOTAL) BY MOUTH NIGHTLY AS NEEDED., Disp: 90 tablet, Rfl: 0  sertraline (ZOLOFT) 25 MG tablet, Take 1 tablet (25 mg total) by mouth once daily., Disp: 90 tablet, Rfl: 3  walker (ULTRA-LIGHT ROLLATOR) Misc, 1 each by Misc.(Non-Drug; Combo Route) route once daily., Disp: 1 each, Rfl: 0  aspirin (ECOTRIN) 325 MG EC tablet, Take 1 tablet (325 mg total) by mouth once daily., Disp: 30 tablet, Rfl: 11  cholecalciferol, vitamin D3, (VITAMIN D3) 1,000 unit capsule, Take 1,000 Units by mouth once daily.  , Disp: , Rfl:   cyanocobalamin (VITAMIN B-12) 500 MCG tablet, Take 1,000 mcg by mouth once daily.  , Disp: , Rfl:     No current facility-administered medications for this visit.       Nursing:   N/A    Diet:   cardiac diet    Activities:   activity as tolerated    Labs:  N/A    Referrals/ Consults  Physical Therapy to evaluate and treat. Evaluate for home safety and equipment needs; Establish/upgrade home exercise program. Perform / instruct on therapeutic exercises, gait training, transfer training, and Range of Motion.    Home Health Aide:  Nursing Weekly and Physical Therapy Twice weekly

## 2017-10-19 DIAGNOSIS — N30.00 ACUTE CYSTITIS WITHOUT HEMATURIA: Primary | ICD-10-CM

## 2017-10-19 DIAGNOSIS — R74.8 ELEVATED ALKALINE PHOSPHATASE LEVEL: ICD-10-CM

## 2017-10-19 LAB — BACTERIA UR CULT: NORMAL

## 2017-10-19 RX ORDER — DOXYCYCLINE 100 MG/1
100 CAPSULE ORAL 2 TIMES DAILY
Qty: 14 CAPSULE | Refills: 0 | Status: SHIPPED | OUTPATIENT
Start: 2017-10-19 | End: 2017-10-26

## 2017-10-19 RX ORDER — CIPROFLOXACIN 500 MG/1
500 TABLET ORAL EVERY 12 HOURS
Qty: 14 TABLET | Refills: 0 | Status: SHIPPED | OUTPATIENT
Start: 2017-10-19 | End: 2017-10-19 | Stop reason: SDUPTHER

## 2017-10-19 RX ORDER — CIPROFLOXACIN 500 MG/1
500 TABLET ORAL EVERY 12 HOURS
Qty: 14 TABLET | Refills: 0 | Status: SHIPPED | OUTPATIENT
Start: 2017-10-19 | End: 2017-10-19

## 2017-10-19 NOTE — TELEPHONE ENCOUNTER
Informed pt results and recommendations. Pt verbalized understanding. Labs scheduled. Pt would like rx sent [facility].

## 2017-10-19 NOTE — TELEPHONE ENCOUNTER
Please call and the patient know that her potassium is mildly low again.  I recommend a high potassium diet.  Alkaline phosphatase, one of the enzymes that is reflective of bone or liver dysfunction, is mildly elevated.  I would like to recheck this along with another enzyme called GGT to further evaluate this.  TSH is mildly high, which usually reflects that she may not be on a high enough dosage of Synthroid.  However as it is just very mildly off, I am not going to change the dosage of Synthroid at this time but recheck it at the next visit.    White blood cell count is mildly elevated.  Urine does show a sign of urinary tract infection.  I am going to send in Cipro 500 mg twice daily.  Culture is still pending and antibiotics may change depending on that.  After the course of antibiotics, let's recheck the blood count in a week to make sure it returns to normal and not keep increasing.    All the labs can be done next week. Please help in scheduling. Thanks!

## 2017-10-27 ENCOUNTER — LAB VISIT (OUTPATIENT)
Dept: LAB | Facility: HOSPITAL | Age: 82
End: 2017-10-27
Attending: INTERNAL MEDICINE
Payer: MEDICARE

## 2017-10-27 ENCOUNTER — OFFICE VISIT (OUTPATIENT)
Dept: ORTHOPEDICS | Facility: CLINIC | Age: 82
End: 2017-10-27
Payer: MEDICARE

## 2017-10-27 VITALS
DIASTOLIC BLOOD PRESSURE: 83 MMHG | HEART RATE: 83 BPM | HEIGHT: 62 IN | WEIGHT: 103.38 LBS | SYSTOLIC BLOOD PRESSURE: 122 MMHG | BODY MASS INDEX: 19.02 KG/M2

## 2017-10-27 DIAGNOSIS — N30.00 ACUTE CYSTITIS WITHOUT HEMATURIA: ICD-10-CM

## 2017-10-27 DIAGNOSIS — R74.8 ELEVATED ALKALINE PHOSPHATASE LEVEL: ICD-10-CM

## 2017-10-27 DIAGNOSIS — S32.810A CLOSED PELVIC RING FRACTURE, INITIAL ENCOUNTER: Primary | ICD-10-CM

## 2017-10-27 LAB
ALBUMIN SERPL BCP-MCNC: 3.1 G/DL
ALP SERPL-CCNC: 220 U/L
ALT SERPL W/O P-5'-P-CCNC: 19 U/L
AST SERPL-CCNC: 25 U/L
BASOPHILS # BLD AUTO: 0.03 K/UL
BASOPHILS NFR BLD: 0.2 %
BILIRUB DIRECT SERPL-MCNC: 0.2 MG/DL
BILIRUB SERPL-MCNC: 0.5 MG/DL
DIFFERENTIAL METHOD: ABNORMAL
EOSINOPHIL # BLD AUTO: 0.2 K/UL
EOSINOPHIL NFR BLD: 1.5 %
ERYTHROCYTE [DISTWIDTH] IN BLOOD BY AUTOMATED COUNT: 13.6 %
GGT SERPL-CCNC: 21 U/L
HCT VFR BLD AUTO: 37.9 %
HGB BLD-MCNC: 12.3 G/DL
IMM GRANULOCYTES # BLD AUTO: 0.07 K/UL
IMM GRANULOCYTES NFR BLD AUTO: 0.6 %
LYMPHOCYTES # BLD AUTO: 1.8 K/UL
LYMPHOCYTES NFR BLD: 14.1 %
MCH RBC QN AUTO: 31.5 PG
MCHC RBC AUTO-ENTMCNC: 32.5 G/DL
MCV RBC AUTO: 97 FL
MONOCYTES # BLD AUTO: 0.9 K/UL
MONOCYTES NFR BLD: 7.5 %
NEUTROPHILS # BLD AUTO: 9.4 K/UL
NEUTROPHILS NFR BLD: 76.1 %
NRBC BLD-RTO: 0 /100 WBC
PLATELET # BLD AUTO: 300 K/UL
PMV BLD AUTO: 10 FL
PROT SERPL-MCNC: 7.2 G/DL
RBC # BLD AUTO: 3.91 M/UL
WBC # BLD AUTO: 12.4 K/UL

## 2017-10-27 PROCEDURE — 85025 COMPLETE CBC W/AUTO DIFF WBC: CPT

## 2017-10-27 PROCEDURE — 99203 OFFICE O/P NEW LOW 30 MIN: CPT | Mod: S$GLB,,, | Performed by: PHYSICIAN ASSISTANT

## 2017-10-27 PROCEDURE — 99999 PR PBB SHADOW E&M-EST. PATIENT-LVL III: CPT | Mod: PBBFAC,,, | Performed by: PHYSICIAN ASSISTANT

## 2017-10-27 PROCEDURE — 80076 HEPATIC FUNCTION PANEL: CPT

## 2017-10-27 PROCEDURE — 82977 ASSAY OF GGT: CPT

## 2017-10-27 PROCEDURE — 36415 COLL VENOUS BLD VENIPUNCTURE: CPT

## 2017-10-27 PROCEDURE — 99499 UNLISTED E&M SERVICE: CPT | Mod: S$GLB,,, | Performed by: PHYSICIAN ASSISTANT

## 2017-10-27 NOTE — PROGRESS NOTES
SUBJECTIVE:     Chief Complaint & History of Present Illness:  Arina Adame is a New patient 88 y.o. female who is seen here today with a complaint of    Chief Complaint   Patient presents with    Right Hip - Pain    .  Patient's here today for continuing care and evaluation of a nondisplaced right pelvic ring fracture initially suffered approximately 10/18/2017.  She is currently receiving physical therapy at home for ambulation core strengthening and gait stability.  She has suffered multiple falls in the past several weeks and there was some concern for her stability.  Review of all x-rays of the hips knees lumbar and cervical spines demonstrate no other evidence of fracture she does have a significant degenerative joint disease in multiple joints as well as demineralization consistent with osteoporosis  On a scale of 1-10, with 10 being worst pain imaginable, he rates this pain as 3 on good days and 4 on bad days.  she describes the pain as sore and aching.      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       Past Surgical History:   Procedure Laterality Date    BREAST SURGERY      CATARACT EXTRACTION W/  INTRAOCULAR LENS IMPLANT Bilateral n/a    OU    COLECTOMY      DENTAL SURGERY      right ankle surgery         Family History   Problem Relation Age of Onset    Hip fracture Mother     Thyroid disease Sister       Thyroidectomy    Diabetes Neg Hx     Amblyopia Neg Hx     Blindness Neg Hx     Cataracts Neg Hx     Glaucoma Neg Hx     Macular degeneration Neg Hx     Retinal detachment Neg Hx     Strabismus Neg Hx        Review of patient's allergies indicates:   Allergen Reactions    Codeine      Other reaction(s): Unknown    Iodine      Other reaction(s): Unknown    Penicillins      Other reaction(s): Unknown         Current Outpatient Prescriptions:     aspirin (ECOTRIN) 325 MG EC tablet, Take 1 tablet (325 mg total) by mouth once daily., Disp: 30 tablet, Rfl: 11    atorvastatin (LIPITOR) 40 MG tablet, Take 1 tablet (40 mg total) by mouth once daily., Disp: 90 tablet, Rfl: 3    CALCIUM CITRATE/VITAMIN D3 (CITRACAL + D MAXIMUM ORAL), Take 4 tablets by mouth once daily.  , Disp: , Rfl:     cholecalciferol, vitamin D3, (VITAMIN D3) 1,000 unit capsule, Take 1,000 Units by mouth once daily.  , Disp: , Rfl:     cyanocobalamin (VITAMIN B-12) 500 MCG tablet, Take 1,000 mcg by mouth once daily.  , Disp: , Rfl:     donepezil (ARICEPT) 10 MG tablet, Take 1 tablet (10 mg total) by mouth every evening., Disp: 90 tablet, Rfl: 3    imiquimod (ALDARA) 5 % cream, APPLY TO THE AFFECTED AREA  HS FIVE TIMES PER WEEK FOR FOUR WEEKS UTD, Disp: , Rfl: 2    levothyroxine (SYNTHROID) 100 MCG tablet, 1 Tablet Oral Every day except 1/2 tablet on Sundays., Disp: 90 tablet, Rfl: 3    quetiapine (SEROQUEL) 25 MG Tab, TAKE 1 TABLET (25 MG TOTAL) BY MOUTH NIGHTLY AS NEEDED., Disp: 90 tablet, Rfl: 0    sertraline (ZOLOFT) 50 MG tablet, Take 1 tablet (50 mg total) by mouth once daily., Disp: 90 tablet, Rfl: 3    walker (ULTRA-LIGHT ROLLATOR) Misc, 1 each by Misc.(Non-Drug; Combo Route) route once daily., Disp: 1 each, Rfl: 0    Review of Systems:  ROS:  Constitutional: no fever or chills  Eyes: no visual changes  ENT: no nasal congestion or sore throat  Respiratory: no cough or shortness of breath  Cardiovascular: no chest pain or  "palpitations, Positive CAD  Gastrointestinal: no nausea or vomiting, tolerating diet  Genitourinary: no hematuria or dysuria  Integument/Breast: no rash or pruritis  Hematologic/Lymphatic: no easy bruising or lymphadenopathy  Musculoskeletal: no arthralgias or myalgias  Neurological: no seizures or tremors, Positive for Alzheimer's, dementia  Behavioral/Psych: no auditory or visual hallucinations  Endocrine: no heat or cold intolerance      PE:  /83 (BP Location: Right arm, Patient Position: Sitting)   Pulse 83   Ht 5' 2" (1.575 m)   Wt 46.9 kg (103 lb 6.3 oz)   BMI 18.91 kg/m²   General: Pleasant, cooperative, NAD   HEENT: NCAT, sclera nonicteric   Lungs: Respirations are equal and unlabored.   Abdomen: Soft and non-tender.  CV: 2+ bilateral upper and lower extremity pulses.   Skin: Intact throughout LE with no rashes, erythema, or lesions  Extremities: No LE edema, NVI lower extremities        Hip Exam:   rightpositives: decreased ROM and pain with rotation and negatives: no tenderness  no pain with heel impact    RADIOGRAPHS:  X-rays from previous visit reviewed by me today demonstrate nondisplaced fracture of the right pelvic ring inferior in good alignment H difficult to determine.  Review of x-rays from hips and knees demonstrate moderate arthritic changes throughout the joint with significant joint space loss osteophytic spurring and sclerotic changes throughout    ASSESSMENT/PLAN:     Plan: We discussed with the patient at length all the different treatment options available for arthrosis of the hip including anti-inflammatories, acetaminophen, rest, ice, lower extremity strengthening exercise, occasional cortisone injections for temporary relief, and finally total hip arthroplasty.   Continue her home physical therapy with emphasis on gait stability and core strengthening  Weightbearing as tolerated  Follow-up when necessary  "

## 2017-10-27 NOTE — LETTER
October 27, 2017      Zeinab Mieer MD  [address]  [facility] [address]           [facility]  [address], [address]  [facility] [address]  Phone: [phone]          Patient: Arina Adame   MR Number: [id number]   YOB: 1928   Date of Visit: 10/27/2017       Dear Dr. Zeinab Meier:    Thank you for referring Arina Adame to me for evaluation. Attached you will find relevant portions of my assessment and plan of care.    If you have questions, please do not hesitate to call me. I look forward to following Arina Adame along with you.    Sincerely,    Abundio Davenport PA-C    Enclosure  CC:  No Recipients    If you would like to receive this communication electronically, please contact [email or web address] or [phone] to request more information on [facility] access.    For providers and/or their staff who would like to refer a patient to [facility], please contact us through our one-stop-shop provider referral line, [facility], at [phone].    If you feel you have received this communication in error or would no longer like to receive these types of communications, please e-mail [email or web address]

## 2017-10-30 ENCOUNTER — PATIENT MESSAGE (OUTPATIENT)
Dept: INTERNAL MEDICINE | Facility: CLINIC | Age: 82
End: 2017-10-30

## 2017-10-30 ENCOUNTER — TELEPHONE (OUTPATIENT)
Dept: INTERNAL MEDICINE | Facility: CLINIC | Age: 82
End: 2017-10-30

## 2017-10-30 DIAGNOSIS — F41.9 ANXIETY: Primary | ICD-10-CM

## 2017-10-30 RX ORDER — ALPRAZOLAM 0.25 MG/1
TABLET ORAL
Qty: 15 TABLET | Refills: 0 | Status: SHIPPED | OUTPATIENT
Start: 2017-10-30 | End: 2017-11-08

## 2017-10-31 ENCOUNTER — TELEPHONE (OUTPATIENT)
Dept: INTERNAL MEDICINE | Facility: CLINIC | Age: 82
End: 2017-10-31

## 2017-10-31 NOTE — TELEPHONE ENCOUNTER
----- Message from Eleuterio Martinez sent at 10/31/2017  9:02 AM CDT -----  Contact: Teresita/ [facility]/ [phone] ph  The nurse states that she was given verbal wound care orders on yesterday and needs to know the name of the person that gave the verbal orders.  Please call and advise.    Thank you

## 2017-11-07 ENCOUNTER — TELEPHONE (OUTPATIENT)
Dept: INTERNAL MEDICINE | Facility: CLINIC | Age: 82
End: 2017-11-07

## 2017-11-07 NOTE — TELEPHONE ENCOUNTER
----- Message from Teri Martins sent at 11/7/2017 11:07 AM CST -----  Contact: [facility]/Mark [phone]  Mark with [facility] is requesting to put Tegaderm over the Mepore.    Thank you

## 2017-11-07 NOTE — TELEPHONE ENCOUNTER
I'm a little confused. Why is tegaderm needed over mepore dressing? Is the mepore dressing not an adhesive also?

## 2017-11-08 ENCOUNTER — OFFICE VISIT (OUTPATIENT)
Dept: INTERNAL MEDICINE | Facility: CLINIC | Age: 82
End: 2017-11-08
Payer: MEDICARE

## 2017-11-08 VITALS
SYSTOLIC BLOOD PRESSURE: 116 MMHG | RESPIRATION RATE: 16 BRPM | TEMPERATURE: 98 F | HEIGHT: 62 IN | DIASTOLIC BLOOD PRESSURE: 80 MMHG | HEART RATE: 69 BPM

## 2017-11-08 DIAGNOSIS — F41.9 ANXIETY: ICD-10-CM

## 2017-11-08 DIAGNOSIS — E03.9 HYPOTHYROIDISM, UNSPECIFIED TYPE: ICD-10-CM

## 2017-11-08 DIAGNOSIS — R82.90 FOUL SMELLING URINE: ICD-10-CM

## 2017-11-08 DIAGNOSIS — R29.6 MULTIPLE FALLS: ICD-10-CM

## 2017-11-08 DIAGNOSIS — R06.09 DOE (DYSPNEA ON EXERTION): Primary | ICD-10-CM

## 2017-11-08 DIAGNOSIS — E87.6 HYPOKALEMIA: ICD-10-CM

## 2017-11-08 DIAGNOSIS — G23.8 BASAL GANGLIA DEGENERATION WITH CALCIFICATION: ICD-10-CM

## 2017-11-08 DIAGNOSIS — L89.151 PRESSURE ULCER OF SACRAL REGION, STAGE 1: ICD-10-CM

## 2017-11-08 DIAGNOSIS — B35.1 ONYCHOMYCOSIS: ICD-10-CM

## 2017-11-08 DIAGNOSIS — F02.80 LATE ONSET ALZHEIMER'S DISEASE WITHOUT BEHAVIORAL DISTURBANCE: ICD-10-CM

## 2017-11-08 DIAGNOSIS — G30.1 LATE ONSET ALZHEIMER'S DISEASE WITHOUT BEHAVIORAL DISTURBANCE: ICD-10-CM

## 2017-11-08 DIAGNOSIS — J34.89 RHINORRHEA: ICD-10-CM

## 2017-11-08 DIAGNOSIS — G47.00 INSOMNIA, UNSPECIFIED TYPE: ICD-10-CM

## 2017-11-08 LAB
BACTERIA #/AREA URNS AUTO: ABNORMAL /HPF
BILIRUB UR QL STRIP: NEGATIVE
CLARITY UR REFRACT.AUTO: ABNORMAL
COLOR UR AUTO: ABNORMAL
GLUCOSE UR QL STRIP: NEGATIVE
HGB UR QL STRIP: NEGATIVE
KETONES UR QL STRIP: NEGATIVE
LEUKOCYTE ESTERASE UR QL STRIP: ABNORMAL
MICROSCOPIC COMMENT: ABNORMAL
NITRITE UR QL STRIP: POSITIVE
PH UR STRIP: 7 [PH] (ref 5–8)
PROT UR QL STRIP: NEGATIVE
RBC #/AREA URNS AUTO: 3 /HPF (ref 0–4)
SP GR UR STRIP: 1.02 (ref 1–1.03)
SQUAMOUS #/AREA URNS AUTO: 1 /HPF
URN SPEC COLLECT METH UR: ABNORMAL
UROBILINOGEN UR STRIP-ACNC: NEGATIVE EU/DL
WBC #/AREA URNS AUTO: 66 /HPF (ref 0–5)
YEAST UR QL AUTO: ABNORMAL

## 2017-11-08 PROCEDURE — 87088 URINE BACTERIA CULTURE: CPT

## 2017-11-08 PROCEDURE — 87077 CULTURE AEROBIC IDENTIFY: CPT

## 2017-11-08 PROCEDURE — 87186 SC STD MICRODIL/AGAR DIL: CPT

## 2017-11-08 PROCEDURE — 87086 URINE CULTURE/COLONY COUNT: CPT

## 2017-11-08 PROCEDURE — 99214 OFFICE O/P EST MOD 30 MIN: CPT | Mod: S$GLB,,, | Performed by: INTERNAL MEDICINE

## 2017-11-08 PROCEDURE — 99499 UNLISTED E&M SERVICE: CPT | Mod: S$GLB,,, | Performed by: INTERNAL MEDICINE

## 2017-11-08 PROCEDURE — 99999 PR PBB SHADOW E&M-EST. PATIENT-LVL IV: CPT | Mod: PBBFAC,,, | Performed by: INTERNAL MEDICINE

## 2017-11-08 PROCEDURE — 81001 URINALYSIS AUTO W/SCOPE: CPT

## 2017-11-08 RX ORDER — AZELASTINE 1 MG/ML
1 SPRAY, METERED NASAL 2 TIMES DAILY
Qty: 30 ML | Refills: 0 | Status: ON HOLD | OUTPATIENT
Start: 2017-11-08 | End: 2018-05-28

## 2017-11-08 RX ORDER — QUETIAPINE FUMARATE 25 MG/1
TABLET, FILM COATED ORAL
Qty: 90 TABLET | Refills: 0
Start: 2017-11-08 | End: 2018-02-14 | Stop reason: SDUPTHER

## 2017-11-08 NOTE — PROGRESS NOTES
"Subjective:       Patient ID: Arina Adame is a 88 y.o. female.    Chief Complaint: Hip Pain; Dementia; and Fall    HPI  Anxiety - started on zoloft 25mg daily in May. When seen in Oct, increased zoloft to 50mg daily and occasional xanax 1/2 of 0.25mg daily prn - plan is not to continue xanax but only while zoloft is uptitrated. Discussed risks of falls. Would not recommend for long term due to h/o falls. Insomnia and on seroquel 25mg nightly.  Didn't do well on xanax - 1/2 tab made her too "loopy".  Has a bedside commode, which is improving her using the restroom.     Had several falls in Oct and w/ multiple joint pains. Seen in ortho. Cont home PT.  W/ HH, she's getting some SOB w/ little exertion. New w/in the last yr per daughter.  Pt and daughter declines stress test or further imaging since they wouldn't not want any kind of procedures including cath done.     Insomnia - on seroquel 25mg nightly, which made her sleep through urination. Had incontinence at night. 12.5mg allows her to sleep at night but able to wake up to urinate.     Hypothyroidism. TSH 10/18/17 was 4.152. On synthroid 100mcg daily.    CT head 4/8/16 - Bilateral basal ganglia calcifications are identified    Review of Systems   Constitutional: Positive for activity change (getting HH). Negative for unexpected weight change.   HENT: Positive for rhinorrhea. Negative for hearing loss and trouble swallowing.    Eyes: Negative for discharge and visual disturbance.   Respiratory: Negative for chest tightness and wheezing.    Cardiovascular: Negative for chest pain and palpitations.   Gastrointestinal: Negative for blood in stool, constipation, diarrhea and vomiting.   Endocrine: Positive for polyuria. Negative for polydipsia.   Genitourinary: Negative for difficulty urinating, dysuria, hematuria and menstrual problem.   Musculoskeletal: Positive for arthralgias. Negative for joint swelling and neck pain.   Neurological: Positive for weakness. " "Negative for headaches.   Psychiatric/Behavioral: Positive for dysphoric mood. Negative for confusion.         Objective:      Physical Exam    /80   Pulse 69   Temp 97.7 °F (36.5 °C) (Oral)   Resp 16   Ht 5' 2" (1.575 m)     Gen - A+O, NAD  HEENT- PERRL, OP clear. MMM.   Neck - no LAD   CV - RRR  CTAB - occ crackles of bases.  Abd - S/NT/ND/+BS  Ext - 2+ B radial and DP pulses. No LE edema. Onychomycosis/ingrown toenails.  Skin - scab under the L nostril. Stage 1 sacral pressure ulcer B buttocks by crevice - L scabbed dime size and R w/ redness. No drainage.   MSK - gait improved though still w/ some weakness when walking.     Labs reviewed.    Assessment/Plan     Arina was seen today for hip pain, dementia and fall.    Diagnoses and all orders for this visit:    LAIRD (dyspnea on exertion) - discussed further work up. Does not want CXR or stress test. Does not want any procedures done including no LHC even if there's a blockage. Knows risks including death assoc w/ CAD.     Anxiety - cont zoloft 50mg daily. Stop xanax. Couldn't luisa 1/2 of 0.25mg-->confusion. seroquel nightly to help w/ sleep    Multiple falls - cont working w/ PT at home. Daughter feels pt is making progress.    Late onset Alzheimer's disease without behavioral disturbance  -     QUEtiapine (SEROQUEL) 25 MG Tab; TAKE 1/2 TABLET (12.5 MG TOTAL) BY MOUTH NIGHTLY AS NEEDED.    Insomnia, unspecified type  -     QUEtiapine (SEROQUEL) 25 MG Tab; TAKE 1/2 TABLET (12.5 MG TOTAL) BY MOUTH NIGHTLY AS NEEDED.    Onychomycosis  -     Ambulatory Referral to Podiatry    Foul smelling urine  -     Urinalysis  -     Urinalysis Microscopic  -     Urine culture    Rhinorrhea  -     azelastine (ASTELIN) 137 mcg (0.1 %) nasal spray; 1 spray (137 mcg total) by Nasal route 2 (two) times daily.    Pressure ulcer of sacral region, stage 1 - cont mepore and tegaderm (tegaderm b/c the mepore was not staying in place and other dressings pull on the skin. "     Hypokalemia  -     Basic metabolic panel; Future    Hypothyroidism, unspecified type  -     TSH; Future    Basal ganglia degeneration with calcification - home PT as discussed.     Return in about 3 months (around 2/8/2018).      Zeinab Meier MD  Department of Internal Medicine - [facility]  10:21 AM

## 2017-11-08 NOTE — TELEPHONE ENCOUNTER
duoderm is tearing the skin and mepore is lifting and not adhering to pt skin, tegaderm is placed over mepore to keep in place and is not causing skin tear or lifting , per Dr Meier verbal ok given to CHANTE regalado/ [facility]

## 2017-11-09 ENCOUNTER — TELEPHONE (OUTPATIENT)
Dept: INTERNAL MEDICINE | Facility: CLINIC | Age: 82
End: 2017-11-09

## 2017-11-09 DIAGNOSIS — I10 BENIGN ESSENTIAL HYPERTENSION: Primary | ICD-10-CM

## 2017-11-09 NOTE — TELEPHONE ENCOUNTER
Please call and let patient know that her urinalysis does show a urinary tract infection.  Urine culture is pending.  As she is not having any symptoms, I am going to wait till the urine culture returns to see if there is a predominant bacteria first before treating.

## 2017-11-09 NOTE — TELEPHONE ENCOUNTER
Spoke with pt's daughter and informed of results. Also need new order for BMP and TSH, pt's daughter stated that they waited a while before finding out about checking in and decided to leave after waiting 45 minutes. Appt was checked in and not allowing me to reschedule from it. Please advise.

## 2017-11-10 ENCOUNTER — TELEPHONE (OUTPATIENT)
Dept: INTERNAL MEDICINE | Facility: CLINIC | Age: 82
End: 2017-11-10

## 2017-11-10 DIAGNOSIS — N30.00 ACUTE CYSTITIS WITHOUT HEMATURIA: Primary | ICD-10-CM

## 2017-11-10 RX ORDER — CIPROFLOXACIN 500 MG/1
500 TABLET ORAL EVERY 12 HOURS
Qty: 14 TABLET | Refills: 0 | Status: SHIPPED | OUTPATIENT
Start: 2017-11-10 | End: 2017-11-17

## 2017-11-10 NOTE — TELEPHONE ENCOUNTER
Please call and let pt know: Urine is growing out bacteria. Will send in cipro 500mg BID x 7 days. Will wait for final culture. She can hold her cholesterol medicine, atorvastatin, while on cipro.

## 2017-11-11 LAB — BACTERIA UR CULT: NORMAL

## 2017-11-13 ENCOUNTER — LAB VISIT (OUTPATIENT)
Dept: LAB | Facility: HOSPITAL | Age: 82
End: 2017-11-13
Attending: INTERNAL MEDICINE
Payer: MEDICARE

## 2017-11-13 DIAGNOSIS — I10 BENIGN ESSENTIAL HYPERTENSION: ICD-10-CM

## 2017-11-13 LAB
ANION GAP SERPL CALC-SCNC: 6 MMOL/L
BUN SERPL-MCNC: 16 MG/DL
CALCIUM SERPL-MCNC: 9.4 MG/DL
CHLORIDE SERPL-SCNC: 106 MMOL/L
CO2 SERPL-SCNC: 30 MMOL/L
CREAT SERPL-MCNC: 0.8 MG/DL
EST. GFR  (AFRICAN AMERICAN): >60 ML/MIN/1.73 M^2
EST. GFR  (NON AFRICAN AMERICAN): >60 ML/MIN/1.73 M^2
GLUCOSE SERPL-MCNC: 112 MG/DL
POTASSIUM SERPL-SCNC: 4.1 MMOL/L
SODIUM SERPL-SCNC: 142 MMOL/L
T4 FREE SERPL-MCNC: 0.95 NG/DL
TSH SERPL DL<=0.005 MIU/L-ACNC: 7.49 UIU/ML

## 2017-11-13 PROCEDURE — 84443 ASSAY THYROID STIM HORMONE: CPT

## 2017-11-13 PROCEDURE — 84439 ASSAY OF FREE THYROXINE: CPT

## 2017-11-13 PROCEDURE — 80048 BASIC METABOLIC PNL TOTAL CA: CPT

## 2017-11-13 PROCEDURE — 36415 COLL VENOUS BLD VENIPUNCTURE: CPT

## 2017-11-14 ENCOUNTER — TELEPHONE (OUTPATIENT)
Dept: INTERNAL MEDICINE | Facility: CLINIC | Age: 82
End: 2017-11-14

## 2017-11-14 RX ORDER — LEVOTHYROXINE SODIUM 100 UG/1
TABLET ORAL
Qty: 90 TABLET | Refills: 3
Start: 2017-11-14 | End: 2018-02-08 | Stop reason: SDUPTHER

## 2017-11-14 NOTE — TELEPHONE ENCOUNTER
Please call and let her know that her thyroid level is still off. Please resume taking synthroid 100mcg daily and do not 1/2 tab on Sundays. Potassium is sufficient at this time.

## 2017-11-16 ENCOUNTER — PROCEDURE VISIT (OUTPATIENT)
Dept: OPHTHALMOLOGY | Facility: CLINIC | Age: 82
End: 2017-11-16
Payer: MEDICARE

## 2017-11-16 VITALS — SYSTOLIC BLOOD PRESSURE: 126 MMHG | DIASTOLIC BLOOD PRESSURE: 71 MMHG

## 2017-11-16 DIAGNOSIS — H35.3211 EXUDATIVE AGE-RELATED MACULAR DEGENERATION OF RIGHT EYE WITH ACTIVE CHOROIDAL NEOVASCULARIZATION: Primary | ICD-10-CM

## 2017-11-16 PROCEDURE — 99499 UNLISTED E&M SERVICE: CPT | Mod: S$GLB,,, | Performed by: OPHTHALMOLOGY

## 2017-11-16 PROCEDURE — 92134 CPTRZ OPH DX IMG PST SGM RTA: CPT | Mod: S$GLB,,, | Performed by: OPHTHALMOLOGY

## 2017-11-16 PROCEDURE — 67028 INJECTION EYE DRUG: CPT | Mod: RT,S$GLB,, | Performed by: OPHTHALMOLOGY

## 2017-11-16 RX ADMIN — Medication 1.25 MG: at 12:11

## 2017-11-16 NOTE — PROGRESS NOTES
"HPI     DLS 10/9/17 Dr. Coronel    Pt states she is still seeing "stuff flying and she can't quite catch the   spot" OD. Sees a light like an oil lamp at night when she turns out the   lights.      Acuity overall stable.  No pain/redness.  No retina-type f/f.           Last edited by Boone Coronel MD on 11/16/2017  6:08 PM. (History)        Fovea nl    Assessment /Plan     For exam results, see Encounter Report.    Exudative age-related macular degeneration of right eye with active choroidal neovascularization  -     Prior Authorization Order  -     Posterior Segment OCT Retina-Both eyes; Future  -     Posterior Segment OCT Retina-Both eyes  -     Prior Authorization Order  -     Cancel: Posterior Segment OCT Retina-Both eyes  -     Posterior Segment OCT Retina-Both eyes; Future    Other orders  -     bevacizumab (AVASTIN) 2.5 mg/0.10 mL 1.25 mg; Inject 0.05 mLs (1.25 mg total) into the eye one time.  -     bevacizumab (AVASTIN) 2.5 mg/0.10 mL 1.25 mg; Inject 0.05 mLs (1.25 mg total) into the eye one time.  -     Flourescein Angiography - OU - Both Eyes; Future      [facility] SDOCT:   OD: good quality, SRH in superior macula-retreating, improved minimally since October scan.  No fluid in central macula  OS: good quality, ERM with min distoriton, no fluid, stable since October scan      Assessment /Plan     For exam results, see Encounter Report.    Exudative age-related macular degeneration of right eye with active choroidal neovascularization  -     Prior Authorization Order  -     Posterior Segment OCT Retina-Both eyes  -     Posterior Segment OCT Retina-Both eyes; Future    Epiretinal membrane (ERM) of left eye    Other orders  -     bevacizumab (AVASTIN) 2.5 mg/0.10 mL 1.25 mg; Inject 0.05 mLs (1.25 mg total) into the eye one time.    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  Heme too dense to get useful FA again today  Recommend Avastin OD today  RBA discussed with patient and daughter.  They agree    Risks, benefits, and " alternatives to treatment were discussed in detail with the patient, including bleeding/infection (endophthalmitis)/etc.  The patient voiced understanding and wished to proceed with the procedure.  See separate consent form.    Injection Procedure Note:  Diagnosis: Wet AMD Right Eye    Topical Proparacaine drop placed then topical 10% Betadine swabs to lids, lashes, and conjunctiva.  Sterile gloves used, and sterile lid speculum placed.  10% Betadine swabbed at injection site again prior to injection.  Avastin 1.25mg in 0.05cc Injected at 7:00 position 3.5-4mm posterior to the limbus.  Complications: None  Va at least CF at 5 feet post injection.  Retina, ONH, IOP normal after injection.    Return to clinic in 1 month for reevaluation, OCT, and possible injection depending upon findings.  Patient should return immediately PRN.  Retinal Detachment and Endophthalmitis precautions given.    RTC in one month for FA transit OD to r/o JAYLA if blood clears enough

## 2017-11-16 NOTE — PATIENT INSTRUCTIONS
POST INTRAVITREAL INJECTION PATIENT INSTRUCTIONS   Below are some guidelines for what to expect after your treatment and additional care instructions.    You may experience mild discomfort in your eye after the treatment. Usually your eye feels better within 24 to 48 hours after the procedure.      You have been given drops that numb the surface of your eye. DO NOT rub or touch your eye. DO NOT wear contacts until numbness goes away.      You may experience small spots that appear in your field of vision. These are usually caused by an air bubble or from the medication. It takes a few hours or days for these to go away.      The use of sunglasses will help reduce light sensitivity and glare.      DO NOT swim or put sink water (tap water) in your eyes for at least 4 hours after the injection.      You may get a gritty, burning, irritating or stinging feeling in the injected eye as a result of the antiseptic used. Use artificial tears or eye lubricant to reduce the sensation. These are available over-the-counter from your local pharmacy. If you already have some at home, be sure to check the expiration date and to avoid contaminating your eye. A cool pack over your eye brow above the injected eye may also relieve discomfort.     Call us right away or go to the Emergency Department if you have a dramatic decrease in vision or extreme pain in the eye.   [facility] [phone]   Item: [id number]   Revised: 09/2015

## 2017-12-18 ENCOUNTER — PROCEDURE VISIT (OUTPATIENT)
Dept: OPHTHALMOLOGY | Facility: CLINIC | Age: 82
End: 2017-12-18
Payer: MEDICARE

## 2017-12-18 VITALS — SYSTOLIC BLOOD PRESSURE: 131 MMHG | HEART RATE: 85 BPM | DIASTOLIC BLOOD PRESSURE: 79 MMHG

## 2017-12-18 DIAGNOSIS — H35.3211 EXUDATIVE AGE-RELATED MACULAR DEGENERATION OF RIGHT EYE WITH ACTIVE CHOROIDAL NEOVASCULARIZATION: ICD-10-CM

## 2017-12-18 PROCEDURE — 99499 UNLISTED E&M SERVICE: CPT | Mod: S$GLB,,, | Performed by: OPHTHALMOLOGY

## 2017-12-18 PROCEDURE — 92235 FLUORESCEIN ANGRPH MLTIFRAME: CPT | Mod: S$GLB,,, | Performed by: OPHTHALMOLOGY

## 2017-12-18 PROCEDURE — 92012 INTRM OPH EXAM EST PATIENT: CPT | Mod: S$GLB,,, | Performed by: OPHTHALMOLOGY

## 2017-12-18 PROCEDURE — 92134 CPTRZ OPH DX IMG PST SGM RTA: CPT | Mod: S$GLB,,, | Performed by: OPHTHALMOLOGY

## 2017-12-18 NOTE — PATIENT INSTRUCTIONS
Fluorescein Angiogram procedure explained to pt. FA handout attached to AVS below.    Fluorescein Angiography  Fluorescein angiography is an eye test. It is done to look at the back of your eye, including:  · The blood vessels in your eye  · The layer of tissue at the back of your eye (the retina)  · The center of your retina (the macula)  · The optic nerve  This test can diagnose diseases found in these areas. It can also diagnose other conditions that affect these areas. To do this test, a dye called fluorescein is shot (injected) into your arm. The dye goes into your bloodstream and up into the blood vessels in your eyes. A special camera is then used to take images (angiograms) of your eyes.     A fluorescein angiogram of the retina   Getting ready for your test  Tell your healthcare provider if you:  · Are pregnant or think you may be pregnant  · Are breastfeeding  · Have a history of severe allergic reactions, including to X-ray dye or other medicines  · Have kidney problems  Tell your provider about any medicines you are taking. You may need to stop taking all or some of these before the test. This includes:  · All prescription medicines  · Over-the-counter medicines such as aspirin or ibuprofen  · Street drugs  · Herbs, vitamins, and other supplements  You should arrange for an adult family member or friend to drive you home after your test. Your vision will be blurry for up to 12 hours.  Follow any other instructions from your healthcare provider.  During your test  · You are given eye drops to enlarge (dilate) your pupils.  · You then sit in front of a special camera. You place your chin on the chin rest and look into the camera.  · Images are taken of your eyes, one eye at a time.  · Fluorescein dye is then injected into your arm. The lights in the room are turned off. You may have mild nausea. You may have a warm feeling in your arm or upper body. Tell your provider if your skin feels itchy or if you  are having trouble breathing. If so, you could be having an allergic reaction to the dye.  · More pictures of your eyes are taken over 15 to 30 minutes. The camera shines a bright light into your eyes. Try to keep your head still and your eyes open.  · When enough images have been taken, the test is over.  After your test  Your vision will be blurry for up to 4 to 12 hours. This is because of your dilated pupils. Your eye will be more sensitive to light for up to 12 hours. You may want to wear sunglasses during this time. Do not drive if your vision is very blurry. You may also find it uncomfortable to read. Your skin may look yellow for a few hours. This is from the dye. Your urine will be bright yellow or orange for 24 to 48 hours after the test.     Risks and possible complications  All procedures have some risks. Possible risks of fluorescein angiography include:  · Upset stomach (nausea) and vomiting  · Leaking dye around the injection site that causes pain and swelling  · Metallic taste in your mouth  · Infection at injection site  · Allergic reaction to the dye  · Dry mouth or too much saliva  · Faster heart rate  · Sweating  · Lower back pain

## 2017-12-18 NOTE — PROGRESS NOTES
Fundus Photos  OD: clear media, ONH s/p, vessels atten and tortuous superiorly, ST arcade SRH (thin red and thicker ochre)  OS: clear media, ONH s/p, vessels sl atten, ERM    FA:  OD: brisk, symmetric transit, ST blockage by SR heme, hyperfl spots ST arcade, SN vessel tortuosity, few scattered aneurysm-like hyperfl  OS:  few scattered aneurysm-like hyperfl  Nl ONH fl OU      Assessment /Plan     For exam results, see Encounter Report.    Exudative age-related macular degeneration of right eye with active choroidal neovascularization  -     Flourescein Angiography - OU - Both Eyes  -     Posterior Segment OCT Retina-Both eyes      No definite CNVM leakage on FA.  Has significant heme still.  Features of FA and location and minimal other AMD findings are more suggestive of JAYLA at this point.  Recommend close observation.  RBA vs inj discussed.   Pt agrees.    Amsler given and instructed.  RTC 3 wks, sooner PRN

## 2017-12-19 DIAGNOSIS — E78.2 MIXED HYPERLIPIDEMIA: ICD-10-CM

## 2017-12-19 RX ORDER — ATORVASTATIN CALCIUM 40 MG/1
40 TABLET, FILM COATED ORAL DAILY
Qty: 90 TABLET | Refills: 3 | Status: SHIPPED | OUTPATIENT
Start: 2017-12-19

## 2018-01-03 ENCOUNTER — TELEPHONE (OUTPATIENT)
Dept: INTERNAL MEDICINE | Facility: CLINIC | Age: 83
End: 2018-01-03

## 2018-01-03 NOTE — TELEPHONE ENCOUNTER
She can take the 9:20 slot on 1/10/18, which is a hosp follow up slot. That's the only time I have available.

## 2018-01-03 NOTE — TELEPHONE ENCOUNTER
----- Message from Ирина Miller sent at 1/3/2018  3:03 PM CST -----  Contact: Obdulia, pts daughter  Obdulia has an appt next week on Jan 10th.  She would like for her mother to be seen on the same day for a fall.  Pt is bruised on her shoulder and chest.      Obdulia can be reached at [phone]

## 2018-01-04 NOTE — TELEPHONE ENCOUNTER
Looks like that slot's already taken. Let Ms. Steiner know I don't have anything on that day but I have something on the 11th at 9:20am.

## 2018-01-04 NOTE — TELEPHONE ENCOUNTER
Spoke to pt's daughter she said her mother is doing fine   Everything is good right now appreciates the call but will follow up if anything else is needed

## 2018-01-11 ENCOUNTER — OFFICE VISIT (OUTPATIENT)
Dept: OPHTHALMOLOGY | Facility: CLINIC | Age: 83
End: 2018-01-11
Payer: MEDICARE

## 2018-01-11 VITALS — SYSTOLIC BLOOD PRESSURE: 145 MMHG | HEART RATE: 86 BPM | DIASTOLIC BLOOD PRESSURE: 96 MMHG

## 2018-01-11 DIAGNOSIS — D31.31 CHOROIDAL NEVUS OF RIGHT EYE: ICD-10-CM

## 2018-01-11 DIAGNOSIS — H43.813 PVD (POSTERIOR VITREOUS DETACHMENT), BOTH EYES: Primary | ICD-10-CM

## 2018-01-11 DIAGNOSIS — H35.372 EPIRETINAL MEMBRANE (ERM) OF LEFT EYE: ICD-10-CM

## 2018-01-11 DIAGNOSIS — H35.3211 EXUDATIVE AGE-RELATED MACULAR DEGENERATION OF RIGHT EYE WITH ACTIVE CHOROIDAL NEOVASCULARIZATION: ICD-10-CM

## 2018-01-11 PROCEDURE — 92134 CPTRZ OPH DX IMG PST SGM RTA: CPT | Mod: S$GLB,,, | Performed by: OPHTHALMOLOGY

## 2018-01-11 PROCEDURE — 92226 PR SPECIAL EYE EXAM, SUBSEQUENT: CPT | Mod: 50,S$GLB,, | Performed by: OPHTHALMOLOGY

## 2018-01-11 PROCEDURE — 92014 COMPRE OPH EXAM EST PT 1/>: CPT | Mod: S$GLB,,, | Performed by: OPHTHALMOLOGY

## 2018-01-11 PROCEDURE — 99999 PR PBB SHADOW E&M-EST. PATIENT-LVL III: CPT | Mod: PBBFAC,,, | Performed by: OPHTHALMOLOGY

## 2018-01-11 PROCEDURE — 99499 UNLISTED E&M SERVICE: CPT | Mod: S$GLB,,, | Performed by: OPHTHALMOLOGY

## 2018-01-11 RX ORDER — SERTRALINE HYDROCHLORIDE 25 MG/1
TABLET, FILM COATED ORAL
COMMUNITY
Start: 2018-01-05 | End: 2018-02-08

## 2018-01-16 NOTE — PROGRESS NOTES
HPI     DLS- 12/18/2017 Dr. Coronel    Pt states vision about the same (good OU) , still has floaters flying   around and occ flashes OD also.  No pain OU.  OS without symptoms.  Pt   started having a nose bleed while in the waiting area.  Pressure and cool   compress applied. Stopped after 5 mins.    Exudative age-related macular degeneration of OD with active choroidal   neovascularization   OD Avastin x 3 (12/18/2017)     Epiretinal membrane (ERM) of left eye    Last edited by Boone Coronel MD on 1/16/2018  5:45 AM. (History)        OD: good quality, SRH in superior macula-retreating, improved minimally since 12/18/17 scan.  No fluid in central macula  OS: good quality, ERM with min distoriton, no fluid, stable since October scan      Assessment [place] SDOCT:     /Plan     For exam results, see Encounter Report.    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  SRH is decreasing in size and consolidating.  Features of FA and location and minimal other AMD findings are more suggestive of JAYLA at this point.  Recommend close observation.  RBA vs inj discussed.    ERM OS with min traction and good Va -observe    Symptoms of f/f OD with PVD OU.  No ret breaks.  RD precautions and follow    CN OD-appears stable with benign features-follow    RTC 2 months, sooner PRN

## 2018-02-01 ENCOUNTER — TELEPHONE (OUTPATIENT)
Dept: INTERNAL MEDICINE | Facility: CLINIC | Age: 83
End: 2018-02-01

## 2018-02-01 NOTE — TELEPHONE ENCOUNTER
----- Message from Kayleigh Sheikh sent at 2/1/2018  3:48 PM CST -----  Contact: Patient [phone]  Patient as an appt on 02/08/2018. Wants to know if you have something later on in the day. Takes her awhile together in the morning.    Please call and advise.    Thank You

## 2018-02-08 ENCOUNTER — OFFICE VISIT (OUTPATIENT)
Dept: INTERNAL MEDICINE | Facility: CLINIC | Age: 83
End: 2018-02-08
Payer: MEDICARE

## 2018-02-08 ENCOUNTER — LAB VISIT (OUTPATIENT)
Dept: LAB | Facility: HOSPITAL | Age: 83
End: 2018-02-08
Attending: INTERNAL MEDICINE
Payer: MEDICARE

## 2018-02-08 ENCOUNTER — TELEPHONE (OUTPATIENT)
Dept: INTERNAL MEDICINE | Facility: CLINIC | Age: 83
End: 2018-02-08

## 2018-02-08 VITALS
DIASTOLIC BLOOD PRESSURE: 76 MMHG | BODY MASS INDEX: 20.8 KG/M2 | SYSTOLIC BLOOD PRESSURE: 120 MMHG | HEIGHT: 62 IN | RESPIRATION RATE: 16 BRPM | TEMPERATURE: 98 F | HEART RATE: 76 BPM | WEIGHT: 113 LBS

## 2018-02-08 DIAGNOSIS — D72.828 GRANULOCYTOSIS: ICD-10-CM

## 2018-02-08 DIAGNOSIS — R19.7 DIARRHEA, UNSPECIFIED TYPE: Primary | ICD-10-CM

## 2018-02-08 DIAGNOSIS — D72.828 GRANULOCYTOSIS: Primary | ICD-10-CM

## 2018-02-08 DIAGNOSIS — R29.6 FREQUENT FALLS: ICD-10-CM

## 2018-02-08 DIAGNOSIS — E03.9 HYPOTHYROIDISM, UNSPECIFIED TYPE: ICD-10-CM

## 2018-02-08 DIAGNOSIS — I77.9 BILATERAL CAROTID ARTERY DISEASE: ICD-10-CM

## 2018-02-08 DIAGNOSIS — R82.90 FOUL SMELLING URINE: ICD-10-CM

## 2018-02-08 PROBLEM — H43.11 VITREOUS HEMORRHAGE OF RIGHT EYE: Status: RESOLVED | Noted: 2017-10-06 | Resolved: 2018-02-08

## 2018-02-08 LAB
BASOPHILS # BLD AUTO: 0.02 K/UL
BASOPHILS NFR BLD: 0.2 %
DIFFERENTIAL METHOD: ABNORMAL
EOSINOPHIL # BLD AUTO: 0.2 K/UL
EOSINOPHIL NFR BLD: 1.2 %
ERYTHROCYTE [DISTWIDTH] IN BLOOD BY AUTOMATED COUNT: 14.2 %
HCT VFR BLD AUTO: 41 %
HGB BLD-MCNC: 12.9 G/DL
LYMPHOCYTES # BLD AUTO: 1.7 K/UL
LYMPHOCYTES NFR BLD: 12.9 %
MCH RBC QN AUTO: 31.1 PG
MCHC RBC AUTO-ENTMCNC: 31.5 G/DL
MCV RBC AUTO: 99 FL
MONOCYTES # BLD AUTO: 1 K/UL
MONOCYTES NFR BLD: 7.7 %
NEUTROPHILS # BLD AUTO: 10 K/UL
NEUTROPHILS NFR BLD: 77.8 %
PLATELET # BLD AUTO: 239 K/UL
PMV BLD AUTO: 11.3 FL
RBC # BLD AUTO: 4.15 M/UL
T4 FREE SERPL-MCNC: 0.89 NG/DL
TSH SERPL DL<=0.005 MIU/L-ACNC: 5.79 UIU/ML
WBC # BLD AUTO: 12.87 K/UL

## 2018-02-08 PROCEDURE — 36415 COLL VENOUS BLD VENIPUNCTURE: CPT

## 2018-02-08 PROCEDURE — 1159F MED LIST DOCD IN RCRD: CPT | Mod: S$GLB,,, | Performed by: INTERNAL MEDICINE

## 2018-02-08 PROCEDURE — 84439 ASSAY OF FREE THYROXINE: CPT

## 2018-02-08 PROCEDURE — 84443 ASSAY THYROID STIM HORMONE: CPT

## 2018-02-08 PROCEDURE — 99214 OFFICE O/P EST MOD 30 MIN: CPT | Mod: S$GLB,,, | Performed by: INTERNAL MEDICINE

## 2018-02-08 PROCEDURE — 99499 UNLISTED E&M SERVICE: CPT | Mod: S$GLB,,, | Performed by: INTERNAL MEDICINE

## 2018-02-08 PROCEDURE — 1126F AMNT PAIN NOTED NONE PRSNT: CPT | Mod: S$GLB,,, | Performed by: INTERNAL MEDICINE

## 2018-02-08 PROCEDURE — 85025 COMPLETE CBC W/AUTO DIFF WBC: CPT

## 2018-02-08 PROCEDURE — 99999 PR PBB SHADOW E&M-EST. PATIENT-LVL III: CPT | Mod: PBBFAC,,, | Performed by: INTERNAL MEDICINE

## 2018-02-08 PROCEDURE — 3008F BODY MASS INDEX DOCD: CPT | Mod: S$GLB,,, | Performed by: INTERNAL MEDICINE

## 2018-02-08 PROCEDURE — 99213 OFFICE O/P EST LOW 20 MIN: CPT | Performed by: INTERNAL MEDICINE

## 2018-02-08 RX ORDER — LEVOTHYROXINE SODIUM 100 UG/1
TABLET ORAL
Qty: 90 TABLET | Refills: 3
Start: 2018-02-08 | End: 2018-02-14 | Stop reason: SDUPTHER

## 2018-02-08 NOTE — TELEPHONE ENCOUNTER
Please call and let pt know that TSH is still mildly elevated. I'm going to increase her synthroid from 100mcg daily to 100mcg daily except 1.5 pills on Sundays.     Also her white blood cell count is still elevated and the number of granulocytes, a type of white blood cell, is elevated. I'd like for her to be evaluated in hematology (Dr. Enrique Fry) to see if further work up is warranted.

## 2018-02-08 NOTE — PROGRESS NOTES
"Subjective:       Patient ID: Arina Adame is a 89 y.o. female.    Chief Complaint: Dementia    HPI   Accompanied by daughter, Obdulia  Constipation-->metamucil-->loose bowels (soft and mushy but not watery). Diet consist of home cooked meals - starch, protein, carb but not a lot of veggies. Takes Lactaid if she eats ice cream. Off of metamucil for a week but she still had loose bowels this morning. Has BM about 4-5 times a day. No abdominal pain, nausea/vomiting. Good appetite. Weight gain from last visit. No SOB/CP.    Sacral pressure ulcer has resolved.     Insomnia - reports seroquel 1/2 pill is makes her sleep too much. She doesn't wake up to use the bathroom if she needs to so she ends up going on herself. Reports the 1/4 does not work for her and she stays awake. Wears diaper at night.     B Carotid artery disease - 40-50% R and 1-39% L. On atorvastatin 40mg daily.     C/O her urine is having a strong odor again. Pt sometimes sits in her full diaper for a while. Daughter encourages pt to go use the restroom but pt sometimes doesn't do it. No fevers/chills.     Had a fall a mo ago, hit the night stand. Currently has home PT. Denies any pain currently.     Chronically w/ dementia and on aricept 10mg nightly. Not helping. Stable. Daughter seem overwhelmed but multiple discussions already about possibly putting pt in nursing home but daughter reports that she nor pt would want to do that. She does have POA. Recommended pt to find POA and bring it to the next visit so that we can have it on file.  Anxiety is better controlled on zoloft 50mg daily. No longer on benzo 2/2 history of frequent falls. Walks w/ walker allt he time. Has bedside commode.     Review of Systems  as above in HPI.     Objective:      Physical Exam    /76   Pulse 76   Temp 97.8 °F (36.6 °C) (Oral)   Resp 16   Ht 5' 2" (1.575 m)   Wt 51.2 kg (112 lb 15.8 oz)   BMI 20.67 kg/m²     Gen - A+O, NAD  HEENT - PERRL, OP clear. Dry mucous " membrane. TM normal.   Neck - no LAD  CV - RRR  Chest - CTAB, no wheezing/rhonchi  Abd - S/NT/ND/+BS  Ext -2 + B radial pulses. Palp B DP. No LE edema.   MSK - no spinal tenderness to palpation. Pt c/o pain on palpation of the R hip however on second attempt, no pain. Pt is able to stand from sitting in wheelchair and antalgic gait w/ help/walker. No pain in hips when walking.     Previous labs reviewed.    Assessment/Plan     Arina was seen today for dementia.    Diagnoses and all orders for this visit:    Diarrhea, unspecified type - loose stools (not watery) 4-5x/day. Daughter feels metamucil worse (previously was working). Recommended to avoid dairy products, artificial sweeteners and fatty foods. High fiber diet. Try to keep a food diary.  -     Occult blood x 1, stool; Future  -     CULTURE, STOOL; Future  -     WBC, Stool; Future    Hypothyroidism, unspecified type - cont synthroid. Given increased loose stools, will repeat TSH.   -     TSH; Future    Bilateral carotid artery disease - cont atorvastatin 40mg daily.    Foul smelling urine  -     Urinalysis Microscopic; Future  -     Urine culture; Future  -     Urinalysis; Future    Granulocytosis  -     CBC auto differential; Future    Frequent falls - has shower chair, bedside commode and walks w/ walker (although sometimes forget). Cont home PT. Daughter feels is helping.     Follow-up in about 4 months (around 6/8/2018).      Zeinab Meier MD  Department of Internal Medicine - [facility]  10:06 AM

## 2018-02-14 ENCOUNTER — LAB VISIT (OUTPATIENT)
Dept: LAB | Facility: HOSPITAL | Age: 83
End: 2018-02-14
Attending: INTERNAL MEDICINE
Payer: MEDICARE

## 2018-02-14 DIAGNOSIS — R19.7 DIARRHEA, UNSPECIFIED TYPE: ICD-10-CM

## 2018-02-14 DIAGNOSIS — N30.00 ACUTE CYSTITIS WITHOUT HEMATURIA: Primary | ICD-10-CM

## 2018-02-14 DIAGNOSIS — G30.1 LATE ONSET ALZHEIMER'S DISEASE WITHOUT BEHAVIORAL DISTURBANCE: ICD-10-CM

## 2018-02-14 DIAGNOSIS — F02.80 LATE ONSET ALZHEIMER'S DISEASE WITHOUT BEHAVIORAL DISTURBANCE: ICD-10-CM

## 2018-02-14 DIAGNOSIS — G47.00 INSOMNIA, UNSPECIFIED TYPE: ICD-10-CM

## 2018-02-14 LAB — WBC #/AREA STL HPF: NORMAL /[HPF]

## 2018-02-14 PROCEDURE — 87045 FECES CULTURE AEROBIC BACT: CPT

## 2018-02-14 PROCEDURE — 87427 SHIGA-LIKE TOXIN AG IA: CPT | Mod: 59

## 2018-02-14 PROCEDURE — 82272 OCCULT BLD FECES 1-3 TESTS: CPT

## 2018-02-14 PROCEDURE — 89055 LEUKOCYTE ASSESSMENT FECAL: CPT

## 2018-02-14 PROCEDURE — 87046 STOOL CULTR AEROBIC BACT EA: CPT | Mod: 59

## 2018-02-14 RX ORDER — QUETIAPINE FUMARATE 25 MG/1
TABLET, FILM COATED ORAL
Qty: 90 TABLET | Refills: 0 | Status: SHIPPED | OUTPATIENT
Start: 2018-02-14 | End: 2018-03-05 | Stop reason: SDUPTHER

## 2018-02-14 RX ORDER — CIPROFLOXACIN 250 MG/1
250 TABLET, FILM COATED ORAL 2 TIMES DAILY
Qty: 10 TABLET | Refills: 0 | Status: SHIPPED | OUTPATIENT
Start: 2018-02-14 | End: 2018-02-19

## 2018-02-14 RX ORDER — SERTRALINE HYDROCHLORIDE 25 MG/1
25 TABLET, FILM COATED ORAL DAILY
Qty: 90 TABLET | Refills: 0 | Status: SHIPPED | OUTPATIENT
Start: 2018-02-14 | End: 2018-03-05 | Stop reason: SDUPTHER

## 2018-02-14 RX ORDER — LEVOTHYROXINE SODIUM 100 UG/1
TABLET ORAL
Qty: 90 TABLET | Refills: 0 | Status: SHIPPED | OUTPATIENT
Start: 2018-02-14 | End: 2018-03-05 | Stop reason: SDUPTHER

## 2018-02-14 RX ORDER — CIPROFLOXACIN 250 MG/1
250 TABLET, FILM COATED ORAL 2 TIMES DAILY
Qty: 10 TABLET | Refills: 0 | Status: SHIPPED | OUTPATIENT
Start: 2018-02-14 | End: 2018-02-14 | Stop reason: SDUPTHER

## 2018-02-14 RX ORDER — DONEPEZIL HYDROCHLORIDE 10 MG/1
10 TABLET, FILM COATED ORAL NIGHTLY
Qty: 90 TABLET | Refills: 0 | Status: SHIPPED | OUTPATIENT
Start: 2018-02-14 | End: 2018-03-05 | Stop reason: SDUPTHER

## 2018-02-14 NOTE — TELEPHONE ENCOUNTER
Please call pt's caregiver. Her urine shows a likely UTI. I would like to start her on an antibiotic. Will prescribe ciprofloxacin twice a day x 5 days. Sent to [facility].   Please hold the donepezil while she is on this medication as they can have a possible interaction. Resume the donepezil once the antibiotic is finished.

## 2018-02-15 LAB
E COLI SXT1 STL QL IA: NEGATIVE
E COLI SXT2 STL QL IA: NEGATIVE
OB PNL STL: NEGATIVE

## 2018-02-17 LAB — BACTERIA STL CULT: NORMAL

## 2018-02-20 ENCOUNTER — TELEPHONE (OUTPATIENT)
Dept: HEMATOLOGY/ONCOLOGY | Facility: CLINIC | Age: 83
End: 2018-02-20

## 2018-03-05 ENCOUNTER — PATIENT MESSAGE (OUTPATIENT)
Dept: INTERNAL MEDICINE | Facility: CLINIC | Age: 83
End: 2018-03-05

## 2018-03-05 DIAGNOSIS — G30.1 LATE ONSET ALZHEIMER'S DISEASE WITHOUT BEHAVIORAL DISTURBANCE: ICD-10-CM

## 2018-03-05 DIAGNOSIS — F02.80 LATE ONSET ALZHEIMER'S DISEASE WITHOUT BEHAVIORAL DISTURBANCE: ICD-10-CM

## 2018-03-05 DIAGNOSIS — G47.00 INSOMNIA, UNSPECIFIED TYPE: ICD-10-CM

## 2018-03-05 RX ORDER — LEVOTHYROXINE SODIUM 100 UG/1
TABLET ORAL
Qty: 90 TABLET | Refills: 3 | Status: ON HOLD | OUTPATIENT
Start: 2018-03-05 | End: 2018-05-29 | Stop reason: SDUPTHER

## 2018-03-05 RX ORDER — SERTRALINE HYDROCHLORIDE 25 MG/1
25 TABLET, FILM COATED ORAL DAILY
Qty: 90 TABLET | Refills: 3 | Status: ON HOLD | OUTPATIENT
Start: 2018-03-05 | End: 2018-05-28

## 2018-03-05 RX ORDER — QUETIAPINE FUMARATE 25 MG/1
TABLET, FILM COATED ORAL
Qty: 90 TABLET | Refills: 3 | Status: SHIPPED | OUTPATIENT
Start: 2018-03-05 | End: 2018-04-30 | Stop reason: SDUPTHER

## 2018-03-05 RX ORDER — DONEPEZIL HYDROCHLORIDE 10 MG/1
10 TABLET, FILM COATED ORAL NIGHTLY
Qty: 90 TABLET | Refills: 3 | Status: ON HOLD | OUTPATIENT
Start: 2018-03-05 | End: 2018-05-29 | Stop reason: SDUPTHER

## 2018-03-05 NOTE — TELEPHONE ENCOUNTER
This one is the correct message:  Dear Dr. Meier,     Below is a list of prescription that I will need refills on for Mom.   Thank You,    Obdulia Renteria     All of these go to [facility] except the last one goes to [facility].     Prescription #               Name of Prescription              Refills Left    Tell Date   [id number]                   Donepezil 10 MG TAB                     0             [id number]                  Quetiapine 25 MG TAB.                  1             02/14/2019   [id number]                   Levothyroxine 100 MCG TAB       0       [facility]   [id number]            Sertraline 25MG TAB.                    0

## 2018-03-06 ENCOUNTER — INITIAL CONSULT (OUTPATIENT)
Dept: HEMATOLOGY/ONCOLOGY | Facility: CLINIC | Age: 83
End: 2018-03-06
Payer: MEDICARE

## 2018-03-06 ENCOUNTER — LAB VISIT (OUTPATIENT)
Dept: LAB | Facility: HOSPITAL | Age: 83
End: 2018-03-06
Attending: INTERNAL MEDICINE
Payer: MEDICARE

## 2018-03-06 VITALS
HEART RATE: 99 BPM | TEMPERATURE: 98 F | RESPIRATION RATE: 16 BRPM | DIASTOLIC BLOOD PRESSURE: 74 MMHG | SYSTOLIC BLOOD PRESSURE: 135 MMHG | OXYGEN SATURATION: 96 % | HEIGHT: 62 IN

## 2018-03-06 DIAGNOSIS — L89.151 DECUBITUS ULCER OF SACRAL REGION, STAGE 1: ICD-10-CM

## 2018-03-06 DIAGNOSIS — N39.0 RECURRENT UTI: ICD-10-CM

## 2018-03-06 DIAGNOSIS — D72.829 LEUKOCYTOSIS, UNSPECIFIED TYPE: ICD-10-CM

## 2018-03-06 DIAGNOSIS — D72.829 LEUKOCYTOSIS, UNSPECIFIED TYPE: Primary | ICD-10-CM

## 2018-03-06 LAB
BASOPHILS # BLD AUTO: 0.04 K/UL
BASOPHILS NFR BLD: 0.3 %
DIFFERENTIAL METHOD: ABNORMAL
EOSINOPHIL # BLD AUTO: 0.3 K/UL
EOSINOPHIL NFR BLD: 1.8 %
ERYTHROCYTE [DISTWIDTH] IN BLOOD BY AUTOMATED COUNT: 13.5 %
HCT VFR BLD AUTO: 39.1 %
HGB BLD-MCNC: 12.7 G/DL
IMM GRANULOCYTES # BLD AUTO: 0.09 K/UL
IMM GRANULOCYTES NFR BLD AUTO: 0.6 %
LYMPHOCYTES # BLD AUTO: 1.9 K/UL
LYMPHOCYTES NFR BLD: 13.4 %
MCH RBC QN AUTO: 31.4 PG
MCHC RBC AUTO-ENTMCNC: 32.5 G/DL
MCV RBC AUTO: 97 FL
MONOCYTES # BLD AUTO: 1.3 K/UL
MONOCYTES NFR BLD: 9.3 %
NEUTROPHILS # BLD AUTO: 10.6 K/UL
NEUTROPHILS NFR BLD: 74.6 %
NRBC BLD-RTO: 0 /100 WBC
PLATELET # BLD AUTO: 252 K/UL
PMV BLD AUTO: 10.2 FL
RBC # BLD AUTO: 4.04 M/UL
WBC # BLD AUTO: 14.22 K/UL

## 2018-03-06 PROCEDURE — 99499 UNLISTED E&M SERVICE: CPT | Mod: S$GLB,,, | Performed by: INTERNAL MEDICINE

## 2018-03-06 PROCEDURE — 85025 COMPLETE CBC W/AUTO DIFF WBC: CPT

## 2018-03-06 PROCEDURE — 85060 BLOOD SMEAR INTERPRETATION: CPT | Mod: ,,, | Performed by: PATHOLOGY

## 2018-03-06 PROCEDURE — 99999 PR PBB SHADOW E&M-EST. PATIENT-LVL III: CPT | Mod: PBBFAC,,, | Performed by: INTERNAL MEDICINE

## 2018-03-06 PROCEDURE — 36415 COLL VENOUS BLD VENIPUNCTURE: CPT

## 2018-03-06 PROCEDURE — 99204 OFFICE O/P NEW MOD 45 MIN: CPT | Mod: S$GLB,,, | Performed by: INTERNAL MEDICINE

## 2018-03-06 NOTE — LETTER
March 8, 2018      Zeinab Mieer MD  [address]  [facility] [address]           [facility]  [address]  [facility] [address]  Phone: [phone]          Patient: Arina Adame   MR Number: [id number]   YOB: 1928   Date of Visit: 3/6/2018       Dear Dr. Zeinab Meier:    Thank you for referring Arina Adame to me for evaluation. Attached you will find relevant portions of my assessment and plan of care.    If you have questions, please do not hesitate to call me. I look forward to following Arina Adame along with you.    Sincerely,    Enrique Fry MD    Enclosure  CC:  No Recipients    If you would like to receive this communication electronically, please contact [email or web address] or [phone] to request more information on [employer] Link access.    For providers and/or their staff who would like to refer a patient to [facility], please contact us through our one-stop-shop provider referral line, [facility], at [phone].    If you feel you have received this communication in error or would no longer like to receive these types of communications, please e-mail [email or web address]

## 2018-03-07 LAB
PATH REV BLD -IMP: NORMAL
PATH REV BLD -IMP: NORMAL

## 2018-03-08 PROBLEM — D72.829 LEUKOCYTOSIS: Status: ACTIVE | Noted: 2018-03-08

## 2018-03-08 NOTE — ASSESSMENT & PLAN NOTE
Ms. Adame has leukocytosis that appears to be an inflammatory response to any of a number of potential inflammatory causes for her. She has had recurrent issues with UTIs lately that may have caused leukocytosis. She additionally has a current Stage I sacral ulcer, with redness over the sacrum. She has had issues with this in the past.    She has a history of colon cancer, but that was resected long ago. There is no current indication for additional evaluation.     Her peripheral blood smear was reviewed by pathology and was normal. This is unlikely to be a myeloproliferative process. Bone marrow biopsy is not indicated (and likely would be inconsistent with patient and her family's preferences, which is not to be aggressive in evaluations).     Other causes are considered; however, these are less likely, particularly as other hematologic indices are normal.

## 2018-03-08 NOTE — PROGRESS NOTES
HEMATOLOGIC MALIGNANCIES CONSULT NOTE    IDENTIFYING STATEMENT   Arina Melton) is a 89 y.o. female with a  of 11/15/1928 from [place], referred by Dr. Zeinab Meier for evaluation of neutrophilia.     HISTORY OF PRESENT ILLNESS:      Ms. Adame is 89, has dementia, carotid artery disease, HTN, history of colon CA, hypothyroidism, is wheelchair bound, and recurrent UTIs and decubitus ulcers, and is referred for evaluation of neutrophilia on recent CBCs.    Ms. Adame is chronically ill and disabled. She is basically always in a wheelchair. Her daughter brings her in and cares for much of her needs at home. She was last seen by her PCP, Dr. Meier, on 18, where she was found to have UTI and recently completed a course of antibiotics for this. During that visit, her CBC was checked. She had absolute leukocytosis of 12.87 with neutrophilia of 10. Upon review of her CBC, she had leukocytosis on two out of the three most recent lab draws and neutrophilia for all of the three most recent draws. This was a change in comparison with prior CBCs from  and earlier. She was therefore referred to hematology clinic for further evaluation.    Ms. Adame feels okay. She has difficulty recalling details of specific events due to her dementia. Her daughter provides most of the history. She states that she is well, but that she has a lot of trouble with recurrent UTIs. She also has had sacral ulcers in the past, which resolved, but she is getting erythema again on the sacrum. She otherwise has no fever or chills. No lymphadenopathy. No night sweats.     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     Social History Main Topics    Smoking status: Former Smoker     Quit date: 6/24/1970    Smokeless tobacco: Former User    Alcohol use No    Drug use: No    Sexual activity: Not on file     Other Topics Concern    Not on file     Social History Narrative    Daughter lives with her.          MEDICATIONS:     Current Outpatient Prescriptions on File Prior to Visit   Medication Sig Dispense Refill    atorvastatin (LIPITOR) 40 MG tablet TAKE 1 TABLET (40 MG TOTAL) BY MOUTH ONCE DAILY. 90 tablet 3    azelastine (ASTELIN) 137 mcg (0.1 %) nasal spray 1 spray (137 mcg total) by Nasal route 2 (two) times daily. 30 mL 0    CALCIUM CITRATE/VITAMIN D3 (CITRACAL + D MAXIMUM ORAL) Take 1 tablet by mouth once daily.       cholecalciferol, vitamin D3, (VITAMIN D3) 1,000 unit capsule Take 1,000 Units by mouth once daily.        cyanocobalamin (VITAMIN B-12) 500 MCG tablet Take 1,000 mcg by mouth once daily.        donepezil (ARICEPT) 10 MG tablet Take 1 tablet (10 mg total) by mouth every evening. 90 tablet 3    levothyroxine (SYNTHROID) 100 MCG tablet 1 Tablet Oral every morning except 1.5 pills on Sundays. 90 tablet 3    QUEtiapine (SEROQUEL) 25 MG Tab TAKE 1/2  TABLET (12.5 MG TOTAL) BY MOUTH NIGHTLY AS NEEDED. 90 tablet 3    sertraline (ZOLOFT) 25 MG tablet Take 1 tablet (25 mg total) by mouth once daily. 90 tablet 3    walker (ULTRA-LIGHT ROLLATOR) Misc 1 each by Misc.(Non-Drug; Combo Route) route once daily. 1 each 0    aspirin (ECOTRIN) 325 MG EC tablet Take 1 tablet (325 mg total) by mouth once daily. 30 tablet 11     Current Facility-Administered Medications on File Prior to Visit   Medication Dose Route Frequency Provider Last Rate Last Dose    bevacizumab (AVASTIN) 2.5 mg/0.10 mL 1.25 mg  1.25 mg Intraocular 1 time in Clinic/[facility] Faheem Ferguson MD           ALLERGIES:   Review of patient's allergies indicates:   Allergen Reactions    Codeine      Other reaction(s): Unknown    Iodine      Other reaction(s): Unknown    Penicillins      Other reaction(s): Unknown        ROS:       Review of Systems   Constitutional: Positive for fatigue. Negative for diaphoresis, fever and unexpected weight change.        Overall weakness and wheelchair bound. Also has insomnia.   HENT:   Negative for lump/mass and sore throat.    Eyes: Negative for icterus.   Respiratory: Negative for cough and shortness of breath.    Cardiovascular: Negative for chest pain and palpitations.   Gastrointestinal: Negative for abdominal distention, constipation, diarrhea, nausea and vomiting.   Genitourinary: Positive for dysuria. Negative for frequency.         Foul smelling urine frequently though not currently   Musculoskeletal: Positive for gait problem. Negative for arthralgias and myalgias.   Skin: Negative for rash.        Developing redness on the site of prior pressure ulcers   Neurological: Positive for gait problem. Negative for dizziness and headaches.        Dementia   Hematological: Negative for adenopathy. Does not bruise/bleed easily.   Psychiatric/Behavioral: The patient is not nervous/anxious.        PHYSICAL EXAM:  Vitals:    03/06/18 1253   BP: 135/74   Pulse: 99   Resp: 16  "  Temp: 98.1 °F (36.7 °C)   TempSrc: Oral   SpO2: 96%   Height: 5' 2" (1.575 m)   PainSc: 0-No pain       Physical Exam   Constitutional: She is oriented to person, place, and time. She appears well-developed and well-nourished. No distress.   Comfortable. Seated in a wheelchair.    HENT:   Head: Normocephalic and atraumatic.   Mouth/Throat: Mucous membranes are normal. No oral lesions.   Eyes: Conjunctivae are normal.   Neck: No thyromegaly present.   Cardiovascular: Normal rate, regular rhythm and normal heart sounds.    No murmur heard.  Pulmonary/Chest: Breath sounds normal. She has no wheezes. She has no rales.   Abdominal: Soft. She exhibits no distension and no mass. There is no splenomegaly or hepatomegaly. There is no tenderness.   Genitourinary:   Genitourinary Comments: There is redness and erythema over the sacrum. No ulceration.    Lymphadenopathy:     She has no cervical adenopathy.        Right cervical: No deep cervical adenopathy present.       Left cervical: No deep cervical adenopathy present.     She has no axillary adenopathy.        Right: No inguinal adenopathy present.        Left: No inguinal adenopathy present.   Neurological: She is alert and oriented to person, place, and time. She has normal strength and normal reflexes. No cranial nerve deficit. Coordination normal.   Skin: No rash noted.       LAB:   Results for orders placed or performed in visit on 02/14/18   Urine culture   Result Value Ref Range    Urine Culture, Routine KLEBSIELLA PNEUMONIAE  >100,000 cfu/ml          Susceptibility    Klebsiella pneumoniae - CULTURE, URINE     Amp/Sulbactam <=8/4 Sensitive mcg/mL     Amox/K Clav'ate <=8/4 Sensitive mcg/mL     Ceftriaxone <=8 Sensitive mcg/mL     Cefazolin <=8 Sensitive mcg/mL     Ciprofloxacin <=1 Sensitive mcg/mL     Cefepime <=8 Sensitive mcg/mL     Ertapenem <=2 Sensitive mcg/mL     Nitrofurantoin 64 Intermediate mcg/mL     Gentamicin <=4 Sensitive mcg/mL     Meropenem <=4 " Sensitive mcg/mL     Piperacillin/Tazo <=16 Sensitive mcg/mL     Trimeth/Sulfa <=2/38 Sensitive mcg/mL     Tetracycline <=4 Sensitive mcg/mL     Tobramycin <=4 Sensitive mcg/mL   Urinalysis Microscopic   Result Value Ref Range    RBC, UA 3 0 - 4 /hpf    WBC, UA 87 (H) 0 - 5 /hpf    Bacteria, UA Moderate (A) None-Occ /hpf    Squam Epithel, UA 7 /hpf    Ca Oxalate [place], UA Many (A) None-Moderate    Microscopic Comment SEE COMMENT    Urinalysis   Result Value Ref Range    Specimen UA Urine, Clean Catch     Color, UA Danika Yellow, Straw, Danika    Appearance, UA Cloudy (A) Clear    pH, UA 5.0 5.0 - 8.0    Specific Gravity, UA 1.030 1.005 - 1.030    Protein, UA Negative Negative    Glucose, UA Negative Negative    Ketones, UA Negative Negative    Bilirubin (UA) Negative Negative    Occult Blood UA Negative Negative    Nitrite, UA Positive (A) Negative    Urobilinogen, UA Negative <2.0 EU/dL    Leukocytes, UA 3+ (A) Negative       PROBLEMS ASSESSED THIS VISIT:    1. Leukocytosis, unspecified type    2. Decubitus ulcer of sacral region, stage 1    3. Recurrent UTI        IMPRESSION:    1. Leukocytosis, likely secondary to inflammatory or infectious response    2. Stage I sacral ulcer  3. Recurrent UTI  4. Dementia  5. Hearing loss  6. Carotid artery disease  7. HTN  8. Hypothyroidism  9. Osteoporosis    PLAN:       Leukocytosis  Ms. Adame has leukocytosis that appears to be an inflammatory response to any of a number of potential inflammatory causes for her. She has had recurrent issues with UTIs lately that may have caused leukocytosis. She additionally has a current Stage I sacral ulcer, with redness over the sacrum. She has had issues with this in the past.    She has a history of colon cancer, but that was resected long ago. There is no current indication for additional evaluation.     Her peripheral blood smear was reviewed by pathology and was normal. This is unlikely to be a myeloproliferative process. Bone marrow  biopsy is not indicated (and likely would be inconsistent with patient and her family's preferences, which is not to be aggressive in evaluations).     Other causes are considered; however, these are less likely, particularly as other hematologic indices are normal.    Follow-up as needed only. She requires no specific follow-up in hematology clinic.     Enrique Fry MD  Hematology and Stem Cell Transplant

## 2018-03-14 ENCOUNTER — OFFICE VISIT (OUTPATIENT)
Dept: OPHTHALMOLOGY | Facility: CLINIC | Age: 83
End: 2018-03-14
Payer: MEDICARE

## 2018-03-14 DIAGNOSIS — D31.31 CHOROIDAL NEVUS OF RIGHT EYE: ICD-10-CM

## 2018-03-14 DIAGNOSIS — H35.372 EPIRETINAL MEMBRANE (ERM) OF LEFT EYE: ICD-10-CM

## 2018-03-14 DIAGNOSIS — H43.813 PVD (POSTERIOR VITREOUS DETACHMENT), BOTH EYES: ICD-10-CM

## 2018-03-14 DIAGNOSIS — H35.61 SUBRETINAL HEMORRHAGE OF RIGHT EYE: Primary | ICD-10-CM

## 2018-03-14 PROCEDURE — 92012 INTRM OPH EXAM EST PATIENT: CPT | Mod: S$GLB,,, | Performed by: OPHTHALMOLOGY

## 2018-03-14 PROCEDURE — 92134 CPTRZ OPH DX IMG PST SGM RTA: CPT | Mod: S$GLB,,, | Performed by: OPHTHALMOLOGY

## 2018-03-14 PROCEDURE — 99999 PR PBB SHADOW E&M-EST. PATIENT-LVL II: CPT | Mod: PBBFAC,,, | Performed by: OPHTHALMOLOGY

## 2018-03-14 PROCEDURE — 92226 PR SPECIAL EYE EXAM, SUBSEQUENT: CPT | Mod: 50,S$GLB,, | Performed by: OPHTHALMOLOGY

## 2018-03-14 PROCEDURE — 99499 UNLISTED E&M SERVICE: CPT | Mod: S$GLB,,, | Performed by: OPHTHALMOLOGY

## 2018-03-14 NOTE — PROGRESS NOTES
HPI     2 mo f/u / OCT  DLS- 01/11/2018 Dr. 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 (History)       [place] SDOCT:     OD: good quality, no IRF/SRF, improved since 1/11/18 scan.  No fluid in central macula  OS: good quality, ERM with min distoriton, no fluid, stable since 1/11/18 scan      Assessment /Plan     For exam results, see Encounter Report.    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  SRH is decreasing in size and consolidating.  Features of FA and location and minimal other AMD findings are more suggestive of JAYLA at this point.  Has now evolved into atrophic scar..    ERM OS with min traction and good Va -observe    Symptoms of f/f OD with PVD OU.  No ret breaks.  RD precautions and follow    CN OD-appears stable with benign features-follow    F/u Dr Montemayor as planned (yearly), RTC with me PRN

## 2018-03-19 ENCOUNTER — PES CALL (OUTPATIENT)
Dept: ADMINISTRATIVE | Facility: CLINIC | Age: 83
End: 2018-03-19

## 2018-04-26 ENCOUNTER — PATIENT MESSAGE (OUTPATIENT)
Dept: INTERNAL MEDICINE | Facility: CLINIC | Age: 83
End: 2018-04-26

## 2018-04-29 ENCOUNTER — PATIENT MESSAGE (OUTPATIENT)
Dept: INTERNAL MEDICINE | Facility: CLINIC | Age: 83
End: 2018-04-29

## 2018-04-29 DIAGNOSIS — G30.1 LATE ONSET ALZHEIMER'S DISEASE WITHOUT BEHAVIORAL DISTURBANCE: ICD-10-CM

## 2018-04-29 DIAGNOSIS — F02.80 LATE ONSET ALZHEIMER'S DISEASE WITHOUT BEHAVIORAL DISTURBANCE: ICD-10-CM

## 2018-04-29 DIAGNOSIS — G47.00 INSOMNIA, UNSPECIFIED TYPE: ICD-10-CM

## 2018-04-30 ENCOUNTER — PATIENT MESSAGE (OUTPATIENT)
Dept: INTERNAL MEDICINE | Facility: CLINIC | Age: 83
End: 2018-04-30

## 2018-04-30 RX ORDER — QUETIAPINE FUMARATE 25 MG/1
TABLET, FILM COATED ORAL
Qty: 90 TABLET | Refills: 3 | Status: SHIPPED | OUTPATIENT
Start: 2018-04-30

## 2018-05-24 ENCOUNTER — PATIENT MESSAGE (OUTPATIENT)
Dept: INTERNAL MEDICINE | Facility: CLINIC | Age: 83
End: 2018-05-24

## 2018-05-26 ENCOUNTER — HOSPITAL ENCOUNTER (INPATIENT)
Facility: HOSPITAL | Age: 83
LOS: 6 days | Discharge: HOME HEALTH | DRG: 373 | End: 2018-06-01
Attending: EMERGENCY MEDICINE | Admitting: HOSPITALIST
Payer: MEDICARE

## 2018-05-26 ENCOUNTER — NURSE TRIAGE (OUTPATIENT)
Dept: ADMINISTRATIVE | Facility: CLINIC | Age: 83
End: 2018-05-26

## 2018-05-26 DIAGNOSIS — A49.8 CLOSTRIDIUM DIFFICILE INFECTION: Primary | ICD-10-CM

## 2018-05-26 DIAGNOSIS — D72.829 LEUKOCYTOSIS, UNSPECIFIED TYPE: ICD-10-CM

## 2018-05-26 DIAGNOSIS — R19.7 DIARRHEA OF PRESUMED INFECTIOUS ORIGIN: ICD-10-CM

## 2018-05-26 PROBLEM — R23.9 ALTERATION IN SKIN INTEGRITY: Status: ACTIVE | Noted: 2018-05-26

## 2018-05-26 PROBLEM — F41.9 ANXIETY: Status: ACTIVE | Noted: 2018-05-26

## 2018-05-26 LAB
ALBUMIN SERPL BCP-MCNC: 3 G/DL
ALP SERPL-CCNC: 108 U/L
ALT SERPL W/O P-5'-P-CCNC: 10 U/L
ANION GAP SERPL CALC-SCNC: 11 MMOL/L
AST SERPL-CCNC: 18 U/L
BASOPHILS # BLD AUTO: 0.05 K/UL
BASOPHILS NFR BLD: 0.2 %
BILIRUB SERPL-MCNC: 1 MG/DL
BUN SERPL-MCNC: 16 MG/DL
C DIFF GDH STL QL: POSITIVE
C DIFF TOX A+B STL QL IA: POSITIVE
CALCIUM SERPL-MCNC: 9.4 MG/DL
CHLORIDE SERPL-SCNC: 105 MMOL/L
CO2 SERPL-SCNC: 27 MMOL/L
CREAT SERPL-MCNC: 0.8 MG/DL
DIFFERENTIAL METHOD: ABNORMAL
EOSINOPHIL # BLD AUTO: 0.3 K/UL
EOSINOPHIL NFR BLD: 1.6 %
ERYTHROCYTE [DISTWIDTH] IN BLOOD BY AUTOMATED COUNT: 14.2 %
EST. GFR  (AFRICAN AMERICAN): >60 ML/MIN/1.73 M^2
EST. GFR  (NON AFRICAN AMERICAN): >60 ML/MIN/1.73 M^2
ESTIMATED AVG GLUCOSE: 105 MG/DL
GLUCOSE SERPL-MCNC: 88 MG/DL
HBA1C MFR BLD HPLC: 5.3 %
HCT VFR BLD AUTO: 40.1 %
HGB BLD-MCNC: 12.5 G/DL
IMM GRANULOCYTES # BLD AUTO: 0.12 K/UL
IMM GRANULOCYTES NFR BLD AUTO: 0.6 %
LACTATE SERPL-SCNC: 1.3 MMOL/L
LIPASE SERPL-CCNC: 6 U/L
LYMPHOCYTES # BLD AUTO: 1.8 K/UL
LYMPHOCYTES NFR BLD: 8.8 %
MAGNESIUM SERPL-MCNC: 2 MG/DL
MCH RBC QN AUTO: 30.6 PG
MCHC RBC AUTO-ENTMCNC: 31.2 G/DL
MCV RBC AUTO: 98 FL
MONOCYTES # BLD AUTO: 1.6 K/UL
MONOCYTES NFR BLD: 7.7 %
NEUTROPHILS # BLD AUTO: 16.4 K/UL
NEUTROPHILS NFR BLD: 81.1 %
NRBC BLD-RTO: 0 /100 WBC
PHOSPHATE SERPL-MCNC: 2.5 MG/DL
PLATELET # BLD AUTO: 234 K/UL
PMV BLD AUTO: 10.8 FL
POTASSIUM SERPL-SCNC: 3.2 MMOL/L
PROT SERPL-MCNC: 7 G/DL
RBC # BLD AUTO: 4.08 M/UL
SODIUM SERPL-SCNC: 143 MMOL/L
T4 FREE SERPL-MCNC: 0.98 NG/DL
TSH SERPL DL<=0.005 MIU/L-ACNC: 7.63 UIU/ML
WBC # BLD AUTO: 20.22 K/UL

## 2018-05-26 PROCEDURE — 99222 1ST HOSP IP/OBS MODERATE 55: CPT | Mod: AI,GC,, | Performed by: HOSPITALIST

## 2018-05-26 PROCEDURE — 87046 STOOL CULTR AEROBIC BACT EA: CPT

## 2018-05-26 PROCEDURE — 87045 FECES CULTURE AEROBIC BACT: CPT

## 2018-05-26 PROCEDURE — 84439 ASSAY OF FREE THYROXINE: CPT

## 2018-05-26 PROCEDURE — 96360 HYDRATION IV INFUSION INIT: CPT

## 2018-05-26 PROCEDURE — 63600175 PHARM REV CODE 636 W HCPCS: Performed by: STUDENT IN AN ORGANIZED HEALTH CARE EDUCATION/TRAINING PROGRAM

## 2018-05-26 PROCEDURE — 87040 BLOOD CULTURE FOR BACTERIA: CPT | Mod: 59

## 2018-05-26 PROCEDURE — 83735 ASSAY OF MAGNESIUM: CPT

## 2018-05-26 PROCEDURE — 25000003 PHARM REV CODE 250: Performed by: STUDENT IN AN ORGANIZED HEALTH CARE EDUCATION/TRAINING PROGRAM

## 2018-05-26 PROCEDURE — 87449 NOS EACH ORGANISM AG IA: CPT

## 2018-05-26 PROCEDURE — 83036 HEMOGLOBIN GLYCOSYLATED A1C: CPT

## 2018-05-26 PROCEDURE — 84443 ASSAY THYROID STIM HORMONE: CPT

## 2018-05-26 PROCEDURE — 85025 COMPLETE CBC W/AUTO DIFF WBC: CPT

## 2018-05-26 PROCEDURE — 84100 ASSAY OF PHOSPHORUS: CPT

## 2018-05-26 PROCEDURE — 83605 ASSAY OF LACTIC ACID: CPT

## 2018-05-26 PROCEDURE — 80053 COMPREHEN METABOLIC PANEL: CPT

## 2018-05-26 PROCEDURE — 83690 ASSAY OF LIPASE: CPT

## 2018-05-26 PROCEDURE — 99283 EMERGENCY DEPT VISIT LOW MDM: CPT | Mod: ,,,

## 2018-05-26 PROCEDURE — 11000001 HC ACUTE MED/SURG PRIVATE ROOM

## 2018-05-26 PROCEDURE — 96372 THER/PROPH/DIAG INJ SC/IM: CPT

## 2018-05-26 PROCEDURE — 99285 EMERGENCY DEPT VISIT HI MDM: CPT | Mod: 25

## 2018-05-26 PROCEDURE — 25000003 PHARM REV CODE 250

## 2018-05-26 PROCEDURE — 94761 N-INVAS EAR/PLS OXIMETRY MLT: CPT

## 2018-05-26 PROCEDURE — 87427 SHIGA-LIKE TOXIN AG IA: CPT | Mod: 59

## 2018-05-26 RX ORDER — QUETIAPINE FUMARATE 25 MG/1
25 TABLET, FILM COATED ORAL NIGHTLY PRN
Status: DISCONTINUED | OUTPATIENT
Start: 2018-05-26 | End: 2018-06-01 | Stop reason: HOSPADM

## 2018-05-26 RX ORDER — ACETAMINOPHEN 325 MG/1
650 TABLET ORAL EVERY 8 HOURS PRN
Status: DISCONTINUED | OUTPATIENT
Start: 2018-05-26 | End: 2018-06-01 | Stop reason: HOSPADM

## 2018-05-26 RX ORDER — ASPIRIN 325 MG
325 TABLET, DELAYED RELEASE (ENTERIC COATED) ORAL DAILY
Status: DISCONTINUED | OUTPATIENT
Start: 2018-05-27 | End: 2018-06-01 | Stop reason: HOSPADM

## 2018-05-26 RX ORDER — CALCIUM CARBONATE/VITAMIN D3 250-3.125
1 TABLET ORAL
Status: DISCONTINUED | OUTPATIENT
Start: 2018-05-27 | End: 2018-05-26

## 2018-05-26 RX ORDER — CHOLECALCIFEROL (VITAMIN D3) 25 MCG
1000 TABLET ORAL DAILY
Status: DISCONTINUED | OUTPATIENT
Start: 2018-05-27 | End: 2018-06-01 | Stop reason: HOSPADM

## 2018-05-26 RX ORDER — SERTRALINE HYDROCHLORIDE 25 MG/1
25 TABLET, FILM COATED ORAL DAILY
Status: DISCONTINUED | OUTPATIENT
Start: 2018-05-27 | End: 2018-05-28

## 2018-05-26 RX ORDER — GLUCAGON 1 MG
1 KIT INJECTION
Status: DISCONTINUED | OUTPATIENT
Start: 2018-05-26 | End: 2018-06-01 | Stop reason: HOSPADM

## 2018-05-26 RX ORDER — IBUPROFEN 200 MG
24 TABLET ORAL
Status: DISCONTINUED | OUTPATIENT
Start: 2018-05-26 | End: 2018-06-01 | Stop reason: HOSPADM

## 2018-05-26 RX ORDER — SODIUM CHLORIDE 0.9 % (FLUSH) 0.9 %
5 SYRINGE (ML) INJECTION
Status: DISCONTINUED | OUTPATIENT
Start: 2018-05-26 | End: 2018-06-01 | Stop reason: HOSPADM

## 2018-05-26 RX ORDER — METRONIDAZOLE 500 MG/1
500 TABLET ORAL EVERY 8 HOURS
Status: DISCONTINUED | OUTPATIENT
Start: 2018-05-26 | End: 2018-05-26

## 2018-05-26 RX ORDER — AZELASTINE 1 MG/ML
1 SPRAY, METERED NASAL 2 TIMES DAILY
Status: DISCONTINUED | OUTPATIENT
Start: 2018-05-26 | End: 2018-05-28

## 2018-05-26 RX ORDER — ENOXAPARIN SODIUM 100 MG/ML
40 INJECTION SUBCUTANEOUS EVERY 24 HOURS
Status: DISCONTINUED | OUTPATIENT
Start: 2018-05-26 | End: 2018-06-01 | Stop reason: HOSPADM

## 2018-05-26 RX ORDER — HYDROCODONE BITARTRATE AND ACETAMINOPHEN 5; 325 MG/1; MG/1
1 TABLET ORAL EVERY 4 HOURS PRN
Status: DISCONTINUED | OUTPATIENT
Start: 2018-05-26 | End: 2018-05-27

## 2018-05-26 RX ORDER — OXYCODONE HYDROCHLORIDE 5 MG/1
15 TABLET ORAL EVERY 4 HOURS PRN
Status: DISCONTINUED | OUTPATIENT
Start: 2018-05-26 | End: 2018-05-27

## 2018-05-26 RX ORDER — ONDANSETRON 8 MG/1
8 TABLET, ORALLY DISINTEGRATING ORAL EVERY 8 HOURS PRN
Status: DISCONTINUED | OUTPATIENT
Start: 2018-05-26 | End: 2018-05-26 | Stop reason: SDUPTHER

## 2018-05-26 RX ORDER — ACETAMINOPHEN 325 MG/1
650 TABLET ORAL EVERY 4 HOURS PRN
Status: DISCONTINUED | OUTPATIENT
Start: 2018-05-26 | End: 2018-06-01 | Stop reason: HOSPADM

## 2018-05-26 RX ORDER — CYANOCOBALAMIN (VITAMIN B-12) 250 MCG
1000 TABLET ORAL DAILY
Status: DISCONTINUED | OUTPATIENT
Start: 2018-05-27 | End: 2018-06-01 | Stop reason: HOSPADM

## 2018-05-26 RX ORDER — RAMELTEON 8 MG/1
8 TABLET ORAL NIGHTLY PRN
Status: DISCONTINUED | OUTPATIENT
Start: 2018-05-26 | End: 2018-06-01 | Stop reason: HOSPADM

## 2018-05-26 RX ORDER — LEVOTHYROXINE SODIUM 100 UG/1
100 TABLET ORAL
Status: DISCONTINUED | OUTPATIENT
Start: 2018-05-27 | End: 2018-06-01 | Stop reason: HOSPADM

## 2018-05-26 RX ORDER — DONEPEZIL HYDROCHLORIDE 5 MG/1
10 TABLET, FILM COATED ORAL NIGHTLY
Status: DISCONTINUED | OUTPATIENT
Start: 2018-05-26 | End: 2018-06-01 | Stop reason: HOSPADM

## 2018-05-26 RX ORDER — ATORVASTATIN CALCIUM 20 MG/1
40 TABLET, FILM COATED ORAL DAILY
Status: DISCONTINUED | OUTPATIENT
Start: 2018-05-27 | End: 2018-06-01 | Stop reason: HOSPADM

## 2018-05-26 RX ORDER — ONDANSETRON 8 MG/1
8 TABLET, ORALLY DISINTEGRATING ORAL EVERY 8 HOURS PRN
Status: DISCONTINUED | OUTPATIENT
Start: 2018-05-26 | End: 2018-06-01 | Stop reason: HOSPADM

## 2018-05-26 RX ORDER — IBUPROFEN 200 MG
16 TABLET ORAL
Status: DISCONTINUED | OUTPATIENT
Start: 2018-05-26 | End: 2018-06-01 | Stop reason: HOSPADM

## 2018-05-26 RX ORDER — INSULIN ASPART 100 [IU]/ML
0-5 INJECTION, SOLUTION INTRAVENOUS; SUBCUTANEOUS
Status: DISCONTINUED | OUTPATIENT
Start: 2018-05-26 | End: 2018-06-01 | Stop reason: HOSPADM

## 2018-05-26 RX ADMIN — DONEPEZIL HYDROCHLORIDE 10 MG: 10 TABLET ORAL at 08:05

## 2018-05-26 RX ADMIN — Medication 125 MG: at 11:05

## 2018-05-26 RX ADMIN — METRONIDAZOLE 500 MG: 500 TABLET ORAL at 05:05

## 2018-05-26 RX ADMIN — SODIUM CHLORIDE 1000 ML: 0.9 INJECTION, SOLUTION INTRAVENOUS at 03:05

## 2018-05-26 RX ADMIN — QUETIAPINE FUMARATE 25 MG: 25 TABLET, FILM COATED ORAL at 11:05

## 2018-05-26 RX ADMIN — AZELASTINE HYDROCHLORIDE 137 MCG: 137 SPRAY, METERED NASAL at 11:05

## 2018-05-26 RX ADMIN — Medication 125 MG: at 05:05

## 2018-05-26 RX ADMIN — ENOXAPARIN SODIUM 40 MG: 100 INJECTION SUBCUTANEOUS at 08:05

## 2018-05-26 NOTE — HOSPITAL COURSE
Patient admitted to IM-1 for new onset diarrhea and leukocytosis w/ presumed Dx of C.Diff. Patient w/ positive C.diff Tox and antigen; started Tx w/ PO vancomycin solution Q6 125; diarrhea and leukocytosis are both improving. Patient w/ worsening diarrhea on 05/28 so will delay discharge until improved and bulked stools.  Morning of 5/29, patient doing much better with no BM.  Patient was ready for discharge but daughter stated that she cannot care for her at home at this moment, thus consult to [facility] SNF placed and CM aware. Patient was denied by [facility] for SNF placement on 06/01 and will be discharged home w/ H/H PT/OT/Wound care as well as a hospital bed per daughter's request.

## 2018-05-26 NOTE — ED PROVIDER NOTES
Encounter Date: 5/26/2018       History     Chief Complaint   Patient presents with    Diarrhea     watery diarrhea x 2-3 days and vomiting this morning.  Blood in stool this morning.       HPI  Review of patient's allergies indicates:   Allergen Reactions    Codeine      Other reaction(s): Unknown    Iodine      Other reaction(s): Unknown    Penicillins      Other reaction(s): Unknown     Past Medical History:   Diagnosis Date    Alzheimer disease     Arthritis     B12 nutritional deficiency 8/6/2012    Carotid arterial disease 8/27/2013    Cataract     Colon cancer     Elevated blood pressure (not hypertension) 3/17/2014    Epiretinal membrane     Essential hypertension 3/16/2016    Hearing loss 3/17/2014    History of colon cancer 5/28/2015    [place] (hard of hearing)     Hypothyroidism 11/21/2012    IGT (impaired glucose tolerance) 2/11/2015    Macrocytosis 11/21/2012    Nevus of choroid     Osteoporosis, 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     Past Surgical History:   Procedure Laterality Date    BREAST SURGERY      CATARACT EXTRACTION W/  INTRAOCULAR LENS IMPLANT Bilateral n/a    OU    COLECTOMY      DENTAL SURGERY      right ankle surgery       Family History   Problem Relation Age of Onset    Hip fracture Mother     Thyroid disease Sister         Thyroidectomy    Diabetes Neg Hx     Amblyopia Neg Hx     Blindness Neg Hx     Cataracts Neg Hx     Glaucoma Neg Hx     Macular degeneration Neg Hx     Retinal detachment Neg Hx     Strabismus Neg Hx      Social History   Substance Use Topics    Smoking status: Former Smoker     Quit date: 6/24/1970    Smokeless tobacco: Former User    Alcohol use No     Review of Systems    Physical Exam     Initial Vitals [05/26/18 1435]   BP Pulse Resp Temp SpO2   (!) 142/79 101 18 97.2 °F (36.2 °C) (!) 94 %      MAP       100         Physical Exam    ED Course   Procedures  Labs Reviewed    CBC W/ AUTO DIFFERENTIAL - Abnormal; Notable for the following:        Result Value    WBC 20.22 (*)     MCHC 31.2 (*)     Immature Granulocytes 0.6 (*)     Gran # (ANC) 16.4 (*)     Immature Grans (Abs) 0.12 (*)     Mono # 1.6 (*)     Gran% 81.1 (*)     Lymph% 8.8 (*)     All other components within normal limits   COMPREHENSIVE METABOLIC PANEL - Abnormal; Notable for the following:     Potassium 3.2 (*)     Albumin 3.0 (*)     All other components within normal limits   PHOSPHORUS - Abnormal; Notable for the following:     Phosphorus 2.5 (*)     All other components within normal limits   CLOSTRIDIUM DIFFICILE   CULTURE, STOOL   ENTEROHEMORRHAGIC E.COLI   CULTURE, BLOOD   CULTURE, BLOOD   LIPASE   MAGNESIUM   LACTIC ACID, PLASMA   STOOL EXAM-OVA,CYSTS,PARASITES                          Attending Attestation:   Physician Attestation Statement for Resident:  As the supervising MD   Physician Attestation Statement: I have personally seen and examined this patient.   I agree with the above history. -: Daughter with hx of cdif. Had 2-3 days of copious diarrhea. Reduced po intake.   -: Nontender abd.   -: Sx better with ivf. Unspecified allergy to iodine. Noted pancolitis. Will admit with possible colitis and to observe the marked leukocytosis and consider repeat ct with contrast if sx fail to improve. Pending cdif results.                        Clinical Impression:   The encounter diagnosis was Diarrhea of presumed infectious origin.

## 2018-05-26 NOTE — HPI
Patient is an 89 y.o. W/ past medical history significant for dementia, carotid artery disease, HTN, remote Hx of colon CA s/p partial distal colectomy and reanastomosis( Klebsiella, E.coli, Strep.Viridans) , hypothyroidism, recurrent UTIs and decubitus ulcers as well as recent evaluation by Heme/Onc for neutrophilia presents to the ED w/ daughter for evaluation of new onset loose BM w/ streaks of blood. Per daughter the symptoms started on 23rd, when she noted patient having brown bowel movements w/ some noted mucous and blood in the toilet bowl. The stools proceeded to become more loose and she had been having about 5-7 BM for about 2 days. Denies any abdominal pain or discomfort, although per daughter, the patient has decreased pain awareness.  Patient also had an episode of NBNB emesis x 1 time 2 days PTA after consuming some pizza and a cookie before bed.   Patient is primarily wheelchair bound. Her daughter brings her in and cares for much of her needs at home.  Patient without lymphadenopathy, fevers/chills at home, denies chest pain, palpitations, dysuria, recent travel, changes in her diet, consumption of raw/uncooked foods or recent Abx use. Patient's daughter states that she has completed a course of ciprofloxacin for UTI/malodorous urine about 3 months ago (Klebsiella).

## 2018-05-26 NOTE — ED NOTES
Pt lying in bed comfortably with no complaints at present. Caretaker/daughter bedside and aware of plan of care. Beto Francois bedside. Will continue to monitor.

## 2018-05-26 NOTE — ED PROVIDER NOTES
Encounter Date: 5/26/2018       History     Chief Complaint   Patient presents with    Diarrhea     watery diarrhea x 2-3 days and vomiting this morning.  Blood in stool this morning.       89 y.o. female with medical history of osteoporosis, hypothyroidism, Alzheimer's and HTN presents to the ED for diarrhea x 2-3 days. Reports 5-7 episodes/day. Patient's daughter states that it smells similar to when she had C diff. She had one episode of vomiting and currently denies being nauseated. Denies recent hospitalization, antibiotic use. Lives at home with daughter and son in law.          Review of patient's allergies indicates:   Allergen Reactions    Codeine      Other reaction(s): Unknown    Iodine      Other reaction(s): Unknown    Penicillins      Other reaction(s): Unknown     Past Medical History:   Diagnosis Date    Alzheimer disease     Arthritis     B12 nutritional deficiency 8/6/2012    Carotid arterial disease 8/27/2013    Cataract     Colon cancer     Elevated blood pressure (not hypertension) 3/17/2014    Epiretinal membrane     Essential hypertension 3/16/2016    Hearing loss 3/17/2014    History of colon cancer 5/28/2015    [place] (hard of hearing)     Hypothyroidism 11/21/2012    IGT (impaired glucose tolerance) 2/11/2015    Macrocytosis 11/21/2012    Nevus of choroid     Osteoporosis, 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     Past Surgical History:   Procedure Laterality Date    BREAST SURGERY      CATARACT EXTRACTION W/  INTRAOCULAR LENS IMPLANT Bilateral n/a    OU    COLECTOMY      DENTAL SURGERY      right ankle surgery       Family History   Problem Relation Age of Onset    Hip fracture Mother     Thyroid disease Sister         Thyroidectomy    Diabetes Neg Hx     Amblyopia Neg Hx     Blindness Neg Hx     Cataracts Neg Hx     Glaucoma Neg Hx     Macular degeneration Neg Hx     Retinal detachment Neg Hx     Strabismus  Neg Hx      Social History   Substance Use Topics    Smoking status: Former Smoker     Quit date: 6/24/1970    Smokeless tobacco: Former User    Alcohol use No     Review of Systems   Constitutional: Positive for fatigue. Negative for chills, diaphoresis and fever.   HENT: Negative for congestion and sore throat.    Eyes: Negative for visual disturbance.   Respiratory: Negative for cough and shortness of breath.    Cardiovascular: Negative for chest pain.   Gastrointestinal: Positive for diarrhea and vomiting. Negative for abdominal pain and nausea.   Genitourinary: Negative for dysuria and flank pain.   Musculoskeletal: Negative for back pain and myalgias.   Skin: Negative for rash.   Neurological: Negative for syncope, weakness, light-headedness and headaches.   Hematological: Does not bruise/bleed easily.   Psychiatric/Behavioral: The patient is not nervous/anxious.        Physical Exam     Initial Vitals [05/26/18 1435]   BP Pulse Resp Temp SpO2   (!) 142/79 101 18 97.2 °F (36.2 °C) (!) 94 %      MAP       100         Physical Exam    Vitals reviewed.  Constitutional: Vital signs are normal. She appears well-developed and well-nourished. She is not diaphoretic. No distress.   HENT:   Head: Normocephalic and atraumatic.   Nose: Nose normal.   Mouth/Throat: Oropharynx is clear and moist.   Eyes: Conjunctivae and lids are normal. Pupils are equal, round, and reactive to light. Lids are everted and swept, no foreign bodies found.   Neck: Trachea normal and normal range of motion. Neck supple.   Cardiovascular: Normal rate, intact distal pulses and normal pulses.   Pulmonary/Chest: She has no wheezes. She has no rales.   Abdominal: Soft. Normal appearance and bowel sounds are normal. There is no tenderness. There is no rebound and no guarding.   Genitourinary:   Genitourinary Comments: Light yellow/ brown, foul smelling stool   Musculoskeletal: She exhibits no edema.   Neurological: She is alert and oriented to  person, place, and time. No sensory deficit.   Skin: Skin is warm. Capillary refill takes less than 2 seconds. No cyanosis.   Psychiatric: She has a normal mood and affect.         ED Course   Procedures  Labs Reviewed   CBC W/ AUTO DIFFERENTIAL - Abnormal; Notable for the following:        Result Value    WBC 20.22 (*)     MCHC 31.2 (*)     Immature Granulocytes 0.6 (*)     Gran # (ANC) 16.4 (*)     Immature Grans (Abs) 0.12 (*)     Mono # 1.6 (*)     Gran% 81.1 (*)     Lymph% 8.8 (*)     All other components within normal limits   COMPREHENSIVE METABOLIC PANEL - Abnormal; Notable for the following:     Potassium 3.2 (*)     Albumin 3.0 (*)     All other components within normal limits   PHOSPHORUS - Abnormal; Notable for the following:     Phosphorus 2.5 (*)     All other components within normal limits   CLOSTRIDIUM DIFFICILE   CULTURE, STOOL   ENTEROHEMORRHAGIC E.COLI   CULTURE, BLOOD   CULTURE, BLOOD   LIPASE   MAGNESIUM   LACTIC ACID, PLASMA   STOOL EXAM-OVA,CYSTS,PARASITES        Imaging Results          CT Abdomen Pelvis  Without Contrast (In process)  Result time 05/26/18 17:03:28   Procedure changed from CT Abdomen Pelvis With Contrast                    Medical Decision Making:   History:   Old Medical Records: I decided to obtain old medical records.  Old Records Summarized: records from clinic visits.  Initial Assessment:   89 y.o. female with medical history of osteoporosis, hypothyroidism, Alzheimer's and HTN presents to the ED for diarrhea x 2-3 days. 5-8 episodes/day. +nausea, vomiting  Differential Diagnosis:   DDX includes but is not limited to C diff, dehydration, AMADA, gastroenteritis, UTI, electrolyte abnormality.   Clinical Tests:   Lab Tests: Ordered and Reviewed  ED Management:  Will get labs, do C diff isolation precautions and give IVF.     WBC 20.22, will add on lactic acid and cultures. Will hold off on starting antibiotics until confirm that she has C diff. Adding on CT abdomen pelvis,  unable to do contrast 2/2 unknown allergy. Lactate 1.3. Will admit patient for suspected C diff and give oral flagyl 500mg TID. Will continue to hydrate patient.     I have discussed the treatment and management of this patient with my supervisory physician, and we agree on the plan of care.                         Clinical Impression:   The encounter diagnosis was Diarrhea of presumed infectious origin.    Disposition:   Disposition: Admitted  Condition: Stable                        Priscila Luna PA-C  05/26/18 2156

## 2018-05-26 NOTE — ED TRIAGE NOTES
Pt came in via personal transportation with family. Per daughter pt had 5 episodes of diarrhea within the last 3 days. Pt denies any dizziness, cp or being SOB. Per daughter pt have not been able to keep anything down since last night, 1 episode of vomiting.

## 2018-05-26 NOTE — ASSESSMENT & PLAN NOTE
- Patient w/ new onset diarrhea ( 6-7x/day) for about  3-4 days  - Noted blood streaks per daughter  - On admission pt w/ leukocytosis of 20.22  - Afebrile at home and on admission  - Stool studies as well as C.diff ordered and pending  - Contact precautions in place  - Vancomycin PO started  - CT abdomen/pelvis w/ noted pancolitis

## 2018-05-26 NOTE — ED NOTES
Pt resting in bed. No distress noted. RR even and unlabored. Bed in low locked position. Family bedside.  Side rails up x2. Call bell within reach.

## 2018-05-26 NOTE — ED NOTES
Patient identifiers verified and correct for Arina Adame    C/C: Diarrhea  APPEARANCE: awake and alert in NAD. Pt have hx of alzheimer's. Disoriented to time, place, situation.   SKIN: warm, dry and intact. Redness noted to sacral area. Bandages to back of both heels (skin tears from shoes, per daughter)  MUSCULOSKELETAL: Patient moving all extremities spontaneously, no obvious swelling or deformities noted. Ambulates with walker.  RESPIRATORY: no shortness of breathe.  CARDIAC: heart tones normal Pt is tachycardic.; 2+ distal pulses; no peripheral edema  ABDOMEN: S/ND/NT, normoactive bowel sounds present in all four quadrants. Yellow/mucousy stool.   : Incontinence of urine and stool. Denies nausea.  Neurologic: AAO x 2; follows commands equal strength in all extremities; denies numbness/tingling.

## 2018-05-26 NOTE — SUBJECTIVE & OBJECTIVE
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       Past Surgical History:   Procedure Laterality Date    BREAST SURGERY      CATARACT EXTRACTION W/  INTRAOCULAR LENS IMPLANT Bilateral n/a    OU    COLECTOMY      DENTAL SURGERY      right ankle surgery         Review of patient's allergies indicates:   Allergen Reactions    Codeine      Other reaction(s): Unknown    Iodine      Other reaction(s): Unknown    Penicillins      Other reaction(s): Unknown       Current Facility-Administered Medications on File Prior to Encounter   Medication    bevacizumab (AVASTIN) 2.5 mg/0.10 mL 1.25 mg     Current Outpatient Prescriptions on File Prior to Encounter   Medication Sig    aspirin (ECOTRIN) 325 MG EC tablet Take 1 tablet (325 mg total) by mouth once daily.    atorvastatin (LIPITOR) 40 MG tablet TAKE 1 TABLET (40 MG TOTAL) BY MOUTH ONCE DAILY.    CALCIUM CITRATE/VITAMIN D3 (CITRACAL + D MAXIMUM ORAL) Take 1 tablet by mouth once daily.     cholecalciferol, vitamin D3, (VITAMIN D3) 1,000 unit capsule Take 1,000 Units by mouth once daily.      cyanocobalamin (VITAMIN B-12) 500 MCG tablet Take 1,000 mcg by mouth once daily.      levothyroxine (SYNTHROID) 100 MCG tablet 1 Tablet Oral every morning except 1.5 pills on Sundays.    sertraline (ZOLOFT) 25 MG tablet Take 1 tablet (25 mg total) by mouth once daily.    azelastine (ASTELIN) 137  mcg (0.1 %) nasal spray 1 spray (137 mcg total) by Nasal route 2 (two) times daily.    donepezil (ARICEPT) 10 MG tablet Take 1 tablet (10 mg total) by mouth every evening.    QUEtiapine (SEROQUEL) 25 MG Tab TAKE 1 TABLET (25 MG TOTAL) BY MOUTH NIGHTLY AS NEEDED.    walker (ULTRA-LIGHT ROLLATOR) Misc 1 each by Misc.(Non-Drug; Combo Route) route once daily.     Family History     Problem Relation (Age of Onset)    Hip fracture Mother    Thyroid disease Sister        Social History Main Topics    Smoking status: Former Smoker     Quit date: 6/24/1970    Smokeless tobacco: Former User    Alcohol use No    Drug use: No    Sexual activity: Not on file     Review of Systems   Constitutional: Negative for activity change, appetite change, chills, diaphoresis, fatigue and fever.   HENT: Positive for rhinorrhea.    Respiratory: Negative for cough, choking, shortness of breath, wheezing and stridor.    Cardiovascular: Negative for chest pain, palpitations and leg swelling.   Gastrointestinal: Positive for blood in stool, diarrhea and vomiting. Negative for abdominal distention and abdominal pain.   Endocrine: Negative.    Genitourinary: Negative.    Musculoskeletal: Negative.    Skin: Positive for pallor and wound.   Allergic/Immunologic: Negative.    Neurological: Negative.    Hematological: Negative.    Psychiatric/Behavioral: Positive for confusion (2/2 Dementia ).     Objective:     Vital Signs (Most Recent):  Temp: 97.4 °F (36.3 °C) (05/26/18 1632)  Pulse: 97 (05/26/18 1744)  Resp: 16 (05/26/18 1742)  BP: (!) 140/81 (05/26/18 1747)  SpO2: 95 % (05/26/18 1804) Vital Signs (24h Range):  Temp:  [97.2 °F (36.2 °C)-97.4 °F (36.3 °C)] 97.4 °F (36.3 °C)  Pulse:  [] 97  Resp:  [16-18] 16  SpO2:  [94 %-95 %] 95 %  BP: (132-161)/(76-85) 140/81     Weight: 49.9 kg (110 lb)  Body mass index is 20.12 kg/m².    Physical Exam   Constitutional: She appears well-developed and well-nourished.   Pleasant, mildly confused  elderly female in no acute distress   HENT:   Head: Normocephalic and atraumatic.   Mouth/Throat: Oropharynx is clear and moist.   Dental work noted   Eyes: Conjunctivae and EOM are normal.   Neck: Normal range of motion. Neck supple. No JVD present.   Cardiovascular: Normal rate and regular rhythm.    No murmur heard.  Pulmonary/Chest: Effort normal and breath sounds normal. She has no wheezes. She has no rales.   Abdominal: Soft. Bowel sounds are normal. She exhibits no distension. There is no tenderness.   Musculoskeletal: Normal range of motion. She exhibits no edema, tenderness or deformity.   Neurological: She is alert.   Aware that she is in the hospital and what year it is. Recognizes family at bedside and converses appropriately w/ some mild delay and word finding difficulties. Patient aware that she is here for episodes of diarrhea but denies any other symptoms.    Skin: Skin is warm. Capillary refill takes less than 2 seconds. No rash noted. There is pallor.   Wounds noted on b/l heels, s/p friction injury on the back of shoes as described by daughter   Psychiatric: She has a normal mood and affect.         CRANIAL NERVES     CN III, IV, VI   Extraocular motions are normal.        Significant Labs:   A1C: No results for input(s): HGBA1C in the last 4320 hours.  ABGs: No results for input(s): PH, PCO2, HCO3, POCSATURATED, BE, TOTALHB, COHB, METHB, O2HB, POCFIO2 in the last 48 hours.  Bilirubin:   Recent Labs  Lab 05/26/18  1512   BILITOT 1.0     Blood Culture: No results for input(s): LABBLOO in the last 48 hours.  BMP:   Recent Labs  Lab 05/26/18  1512   GLU 88      K 3.2*      CO2 27   BUN 16   CREATININE 0.8   CALCIUM 9.4   MG 2.0     CBC:   Recent Labs  Lab 05/26/18  1512   WBC 20.22*   HGB 12.5   HCT 40.1        CMP:   Recent Labs  Lab 05/26/18  1512      K 3.2*      CO2 27   GLU 88   BUN 16   CREATININE 0.8   CALCIUM 9.4   PROT 7.0   ALBUMIN 3.0*   BILITOT 1.0   ALKPHOS  108   AST 18   ALT 10   ANIONGAP 11   EGFRNONAA >60.0     Cardiac Markers: No results for input(s): CKMB, MYOGLOBIN, BNP, TROPISTAT in the last 48 hours.  Coagulation: No results for input(s): PT, INR, APTT in the last 48 hours.  Lactic Acid:   Recent Labs  Lab 05/26/18  1539   LACTATE 1.3     Lipase:   Recent Labs  Lab 05/26/18  1512   LIPASE 6     Lipid Panel: No results for input(s): CHOL, HDL, LDLCALC, TRIG, CHOLHDL in the last 48 hours.  Magnesium:   Recent Labs  Lab 05/26/18  1512   MG 2.0     Pathology Results  (Last 10 years)    None        POCT Glucose: No results for input(s): POCTGLUCOSE in the last 48 hours.  Prealbumin: No results for input(s): PREALBUMIN in the last 48 hours.  Respiratory Culture: No results for input(s): GSRESP, RESPIRATORYC in the last 48 hours.  Troponin: No results for input(s): TROPONINI in the last 48 hours.  TSH:   Recent Labs  Lab 02/08/18  1050   TSH 5.791*     Urine Culture: No results for input(s): LABURIN in the last 48 hours.  Urine Studies: No results for input(s): COLORU, APPEARANCEUA, PHUR, SPECGRAV, PROTEINUA, GLUCUA, KETONESU, BILIRUBINUA, OCCULTUA, NITRITE, UROBILINOGEN, LEUKOCYTESUR, RBCUA, WBCUA, BACTERIA, SQUAMEPITHEL, HYALINECASTS in the last 48 hours.    Invalid input(s): WRIGHTSUR  All pertinent labs within the past 24 hours have been reviewed.    Significant Imaging: I have reviewed all pertinent imaging results/findings within the past 24 hours.   CT ABDOMEN AND PELVIS  In this patient with a history of colon cancer status post partial distal colectomy and reanastomosis, CT findings are compatible with a moderate severity pancolitis.  5.5 cm left adnexal cyst.  Further evaluation with pelvic ultrasound is recommended.  Cholelithiasis.  Compression deformity of the T11 vertebral body, unchanged.  Remote fracture of the right inferior pubic ramus.  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

## 2018-05-27 PROBLEM — E87.6 HYPOKALEMIA: Status: ACTIVE | Noted: 2018-05-27

## 2018-05-27 LAB
ALBUMIN SERPL BCP-MCNC: 2.6 G/DL
ALP SERPL-CCNC: 102 U/L
ALT SERPL W/O P-5'-P-CCNC: 9 U/L
ANION GAP SERPL CALC-SCNC: 11 MMOL/L
AST SERPL-CCNC: 19 U/L
BASOPHILS # BLD AUTO: 0.06 K/UL
BASOPHILS NFR BLD: 0.4 %
BILIRUB SERPL-MCNC: 0.8 MG/DL
BUN SERPL-MCNC: 9 MG/DL
CALCIUM SERPL-MCNC: 8.6 MG/DL
CHLORIDE SERPL-SCNC: 105 MMOL/L
CO2 SERPL-SCNC: 24 MMOL/L
CREAT SERPL-MCNC: 0.7 MG/DL
DIFFERENTIAL METHOD: ABNORMAL
EOSINOPHIL # BLD AUTO: 0.4 K/UL
EOSINOPHIL NFR BLD: 2.6 %
ERYTHROCYTE [DISTWIDTH] IN BLOOD BY AUTOMATED COUNT: 13.9 %
EST. GFR  (AFRICAN AMERICAN): >60 ML/MIN/1.73 M^2
EST. GFR  (NON AFRICAN AMERICAN): >60 ML/MIN/1.73 M^2
GLUCOSE SERPL-MCNC: 79 MG/DL
HCT VFR BLD AUTO: 36.2 %
HGB BLD-MCNC: 11.6 G/DL
IMM GRANULOCYTES # BLD AUTO: 0.13 K/UL
IMM GRANULOCYTES NFR BLD AUTO: 0.8 %
LYMPHOCYTES # BLD AUTO: 1.7 K/UL
LYMPHOCYTES NFR BLD: 10.2 %
MAGNESIUM SERPL-MCNC: 1.7 MG/DL
MCH RBC QN AUTO: 30.9 PG
MCHC RBC AUTO-ENTMCNC: 32 G/DL
MCV RBC AUTO: 96 FL
MONOCYTES # BLD AUTO: 1.2 K/UL
MONOCYTES NFR BLD: 7.3 %
NEUTROPHILS # BLD AUTO: 13.2 K/UL
NEUTROPHILS NFR BLD: 78.7 %
NRBC BLD-RTO: 0 /100 WBC
PHOSPHATE SERPL-MCNC: 2.8 MG/DL
PLATELET # BLD AUTO: 239 K/UL
PMV BLD AUTO: 10.9 FL
POTASSIUM SERPL-SCNC: 2.7 MMOL/L
PROT SERPL-MCNC: 6.1 G/DL
RBC # BLD AUTO: 3.76 M/UL
SODIUM SERPL-SCNC: 140 MMOL/L
VIT B12 SERPL-MCNC: 990 PG/ML
WBC # BLD AUTO: 16.71 K/UL

## 2018-05-27 PROCEDURE — 84100 ASSAY OF PHOSPHORUS: CPT

## 2018-05-27 PROCEDURE — 87209 SMEAR COMPLEX STAIN: CPT

## 2018-05-27 PROCEDURE — 63600175 PHARM REV CODE 636 W HCPCS: Performed by: STUDENT IN AN ORGANIZED HEALTH CARE EDUCATION/TRAINING PROGRAM

## 2018-05-27 PROCEDURE — 83735 ASSAY OF MAGNESIUM: CPT

## 2018-05-27 PROCEDURE — 85025 COMPLETE CBC W/AUTO DIFF WBC: CPT

## 2018-05-27 PROCEDURE — 36415 COLL VENOUS BLD VENIPUNCTURE: CPT

## 2018-05-27 PROCEDURE — 25000003 PHARM REV CODE 250: Performed by: STUDENT IN AN ORGANIZED HEALTH CARE EDUCATION/TRAINING PROGRAM

## 2018-05-27 PROCEDURE — 11000001 HC ACUTE MED/SURG PRIVATE ROOM

## 2018-05-27 PROCEDURE — 94761 N-INVAS EAR/PLS OXIMETRY MLT: CPT

## 2018-05-27 PROCEDURE — 80053 COMPREHEN METABOLIC PANEL: CPT

## 2018-05-27 PROCEDURE — 82607 VITAMIN B-12: CPT

## 2018-05-27 PROCEDURE — 97161 PT EVAL LOW COMPLEX 20 MIN: CPT

## 2018-05-27 RX ORDER — POTASSIUM CHLORIDE 7.45 MG/ML
10 INJECTION INTRAVENOUS
Status: COMPLETED | OUTPATIENT
Start: 2018-05-27 | End: 2018-05-27

## 2018-05-27 RX ADMIN — ATORVASTATIN CALCIUM 40 MG: 20 TABLET, FILM COATED ORAL at 08:05

## 2018-05-27 RX ADMIN — POTASSIUM BICARBONATE 50 MEQ: 25 TABLET, EFFERVESCENT ORAL at 11:05

## 2018-05-27 RX ADMIN — Medication 125 MG: at 05:05

## 2018-05-27 RX ADMIN — AZELASTINE HYDROCHLORIDE 137 MCG: 137 SPRAY, METERED NASAL at 08:05

## 2018-05-27 RX ADMIN — POTASSIUM CHLORIDE 10 MEQ: 7.46 INJECTION, SOLUTION INTRAVENOUS at 08:05

## 2018-05-27 RX ADMIN — Medication 125 MG: at 11:05

## 2018-05-27 RX ADMIN — ASPIRIN 325 MG: 325 TABLET, DELAYED RELEASE ORAL at 08:05

## 2018-05-27 RX ADMIN — POTASSIUM BICARBONATE 50 MEQ: 25 TABLET, EFFERVESCENT ORAL at 08:05

## 2018-05-27 RX ADMIN — POTASSIUM CHLORIDE 10 MEQ: 7.46 INJECTION, SOLUTION INTRAVENOUS at 11:05

## 2018-05-27 RX ADMIN — SERTRALINE HYDROCHLORIDE 25 MG: 25 TABLET ORAL at 08:05

## 2018-05-27 RX ADMIN — Medication 1000 UNITS: at 08:05

## 2018-05-27 RX ADMIN — LEVOTHYROXINE SODIUM 100 MCG: 100 TABLET ORAL at 05:05

## 2018-05-27 RX ADMIN — ENOXAPARIN SODIUM 40 MG: 100 INJECTION SUBCUTANEOUS at 05:05

## 2018-05-27 RX ADMIN — CYANOCOBALAMIN TAB 250 MCG 1000 MCG: 250 TAB at 08:05

## 2018-05-27 RX ADMIN — QUETIAPINE FUMARATE 25 MG: 25 TABLET, FILM COATED ORAL at 08:05

## 2018-05-27 RX ADMIN — DONEPEZIL HYDROCHLORIDE 10 MG: 10 TABLET ORAL at 08:05

## 2018-05-27 NOTE — ASSESSMENT & PLAN NOTE
- PT/OT consulted  - Patient mostly wheelchair bound although, per daughter, she is able to ambulate w/ balance belt at times  - Previously w/ [facility] PT/OT

## 2018-05-27 NOTE — PT/OT/SLP EVAL
Physical Therapy Evaluation    Patient Name:  Arina Adame   MRN:  [id number]    Recommendations:     Discharge Recommendations:  home with home health (24hr A)   Discharge Equipment Recommendations: none   Barriers to discharge: Decreased caregiver support; pt will require 24hr assist upon d/c     Assessment:     Arina Adame is a 89 y.o. female admitted with a medical diagnosis of Diarrhea of presumed infectious origin.  She presents with the following impairments/functional limitations:  weakness, gait instability, impaired endurance, impaired balance, decreased lower extremity function, decreased safety awareness, impaired functional mobilty. Pt performed bed mobility mod A and transfers min A. Pt took 1 side step EOB mod A without AD. Pt will benefit from skilled PT to improve deficits and increase overall functional mobility.     Rehab Prognosis:  Good; patient would benefit from acute skilled PT services to address these deficits and reach maximum level of function.      Recent Surgery: * No surgery found *      Plan:     During this hospitalization, patient to be seen 4 x/week to address the above listed problems via gait training, therapeutic activities, therapeutic exercises, neuromuscular re-education, wheelchair management/training  · Plan of Care Expires:  06/27/18   Plan of Care Reviewed with: patient    Subjective     Communicated with RN prior to session.  Patient found supine in bed upon PT entry to room, agreeable to evaluation.      Chief Complaint: NA  Patient comments/goals: return home  Pain/Comfort:  · Pain Rating 1: 0/10  · Pain Rating Post-Intervention 1: 0/10    Living Environment:  Pt questionable historian. Pt lives with daughter and son-in-law in 1-story house without JUSTINE. Pt reports amb with RW with S and assist with ADLs. Pt reports she is rarely home alone. Pt reports her daughter and son-in-law are able to provide assist.   Prior to admission, patients level of function was  mod (I) with amb.  Patient has the following equipment: walker, rolling, wheelchair, shower chair.  DME owned (not currently used): none.  Upon discharge, patient will have assistance from family.    Objective:     Patient found with: peripheral IV, telemetry     General Precautions: Standard, fall, special contact   Orthopedic Precautions:N/A   Braces: N/A     Exams:  · Cognitive Exam:  Patient is oriented to Person and follows 100% of 1-story commands   · Gross Motor Coordination:  WFL  · Postural Exam:  Patient presented with the following abnormalities:    · -       Rounded shoulders  · Sensation:    · -       Intact  light/touch B LE  · Skin Integrity/Edema:      · -       Skin integrity: Visible skin intact  · RLE ROM: WFL  · RLE Strength: WFL  · LLE ROM: WFL  · LLE Strength: WFL    Functional Mobility:  Bed Mobility:     · Supine to Sit: moderate assistance  · Sit to Supine: moderate assistance    Transfers:     · Sit to Stand:  minimum assistance with no AD; x2 trials    Gait: 1 side step EOB mod A without AD    AM-PAC 6 CLICK MOBILITY  Total Score:12       Therapeutic Activities and Exercises:  Pt sat EOB with CGA/min A with post lean.  Pt performed seated scooting with min A.   Pt educated on:  -role of PT/POC    Patient left with bed in chair position with all lines intact, call button in reach and RN' notified.    GOALS:    Physical Therapy Goals        Problem: Physical Therapy Goal    Goal Priority Disciplines Outcome Goal Variances Interventions   Physical Therapy Goal     PT/OT, PT Ongoing (interventions implemented as appropriate)     Description:  Goals to be met by: 2018     Patient will increase functional independence with mobility by performin. Supine to sit with Contact Guard Assistance  2. Sit to supine with Contact Guard Assistance  3. Sit to stand transfer with Contact Guard Assistance  4. Bed to chair transfer with Minimal Assistance  5. Gait  x 20 feet with Minimal Assistance  using Rolling Walker.   6. Lower extremity exercise program x15 reps per handout, with supervision                      History:     Past Medical History:   Diagnosis Date    Alzheimer disease     Arthritis     B12 nutritional deficiency 8/6/2012    Carotid arterial disease 8/27/2013    Cataract     Colon cancer     Elevated blood pressure (not hypertension) 3/17/2014    Epiretinal membrane     Essential hypertension 3/16/2016    Hearing loss 3/17/2014    History of colon cancer 5/28/2015    [place] (hard of hearing)     Hypothyroidism 11/21/2012    IGT (impaired glucose tolerance) 2/11/2015    Macrocytosis 11/21/2012    Nevus of choroid     Osteoporosis, 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       Past Surgical History:   Procedure Laterality Date    BREAST SURGERY      CATARACT EXTRACTION W/  INTRAOCULAR LENS IMPLANT Bilateral n/a    OU    COLECTOMY      DENTAL SURGERY      right ankle surgery         Clinical Decision Making:     History  Co-morbidities and personal factors that may impact the plan of care Examination  Body Structures and Functions, activity limitations and participation restrictions that may impact the plan of care Clinical Presentation   Decision Making/ Complexity Score   Co-morbidities:   [] Time since onset of injury / illness / exacerbation  [x] Status of current condition  [x]Patient's cognitive status and safety concerns    [] Multiple Medical Problems (see med hx)  Personal Factors:   [] Patient's age  [] Prior Level of function   [x] Patient's home situation (environment and family support)  [] Patient's level of motivation  [] Expected progression of patient      HISTORY:(criteria)    [] 99022 - no personal factors/history    [] 61098 - has 1-2 personal factor/comorbidity     [x] 93934 - has >3 personal factor/comorbidity     Body Regions:  [x] Objective examination findings  [] Head     []  Neck  [x] Trunk   [] Upper  Extremity  [x] Lower Extremity    Body Systems:  [x] For communication ability, affect, cognition, language, and learning style: the assessment of the ability to make needs known, consciousness, orientation (person, place, and time), expected emotional /behavioral responses, and learning preferences (eg, learning barriers, education  needs)  [x] For the neuromuscular system: a general assessment of gross coordinated movement (eg, balance, gait, locomotion, transfers, and transitions) and motor function  (motor control and motor learning)  [x] For the musculoskeletal system: the assessment of gross symmetry, gross range of motion, gross strength, height, and weight  [] For the integumentary system: the assessment of pliability(texture), presence of scar formation, skin color, and skin integrity  [] For cardiovascular/pulmonary system: the assessment of heart rate, respiratory rate, blood pressure, and edema     Activity limitations:    [] Patient's cognitive status and saf ety concerns          [] Status of current condition      [] Weight bearing restriction  [] Cardiopulmunary Restriction    Participation Restrictions:   [] Goals and goal agreement with the patient     [] Rehab potential (prognosis) and probable outcome      Examination of Body System: (criteria)    [] 71616 - addressing 1-2 elements    [] 07444 - addressing a total of 3 or more elements     [x] 81973 -  Addressing a total of 4 or more elements         Clinical Presentation: (criteria)  Stable - 13207     On examination of body system using standardized tests and measures patient presents with 1-2 elements from any of the following: body structures and functions, activity limitations, and/or participation restrictions.  Leading to a clinical presentation that is considered stable and/or uncomplicated                              Clinical Decision Making  (Eval Complexity):  Low- 22979     Time Tracking:     PT Received On: 05/27/18  PT Start Time:  1027     PT Stop Time: 1045  PT Total Time (min): 18 min     Billable Minutes: Evaluation 18      CHELSEY MATOS, PT  05/27/2018

## 2018-05-27 NOTE — PROGRESS NOTES
[facility] Hospital Medicine  Progress Note    Patient Name: Arina Adame  MRN: [id number]  Patient Class: IP- Inpatient   Admission Date: 5/26/2018  Length of Stay: 1 days  Attending Physician: Carine Beaver MD  Primary Care Provider: Zeinab Meier MD    [facility] Medicine Team: [facility]OSP MED 1 Cris Calvo MD    Subjective:     Principal Problem:Diarrhea of presumed infectious origin    HPI:  Patient is an 89 y.o. W/ past medical history significant for dementia, carotid artery disease, HTN, remote Hx of colon CA s/p partial distal colectomy and reanastomosis( Klebsiella, E.coli, Strep.Viridans) , hypothyroidism, recurrent UTIs and decubitus ulcers as well as recent evaluation by Heme/Onc for neutrophilia presents to the ED w/ daughter for evaluation of new onset loose BM w/ streaks of blood. Per daughter the symptoms started on 23rd, when she noted patient having brown bowel movements w/ some noted mucous and blood in the toilet bowl. The stools proceeded to become more loose and she had been having about 5-7 BM for about 2 days. Denies any abdominal pain or discomfort, although per daughter, the patient has decreased pain awareness.  Patient also had an episode of NBNB emesis x 1 time 2 days PTA after consuming some pizza and a cookie before bed.   Patient is primarily wheelchair bound. Her daughter brings her in and cares for much of her needs at home.  Patient without lymphadenopathy, fevers/chills at home, denies chest pain, palpitations, dysuria, recent travel, changes in her diet, consumption of raw/uncooked foods or recent Abx use. Patient's daughter states that she has completed a course of ciprofloxacin for UTI/malodorous urine about 3 months ago (Klebsiella).     Hospital Course:  Patient admitted to -1 for new onset diarrhea and leukocytosis w/ presumed Dx of C.Diff. Patient w/ positive C.diff Tox and antigen; started Tx w/ PO vancomycin solution Q6 125; diarrhea and  leukocytosis are both improving     Interval History: Patient doing well this morning, essentially without any complaints. Responding to vancomycin well, with only 1 episode of charted loose BM overnight. Tolerating PO diet well and does not appear dehydrated.   No other complaints at this time.     Review of Systems   Constitutional: Negative for activity change, appetite change, chills, diaphoresis, fatigue and fever.   HENT: Positive for rhinorrhea.    Respiratory: Negative for cough, choking, shortness of breath, wheezing and stridor.    Cardiovascular: Negative for chest pain, palpitations and leg swelling.   Gastrointestinal: Positive for blood in stool, diarrhea and vomiting. Negative for abdominal distention and abdominal pain.   Endocrine: Negative.    Genitourinary: Negative.    Musculoskeletal: Negative.    Skin: Positive for pallor and wound.   Allergic/Immunologic: Negative.    Neurological: Negative.    Hematological: Negative.    Psychiatric/Behavioral: Positive for confusion (2/2 Dementia ).     Objective:     Vital Signs (Most Recent):  Temp: 98.5 °F (36.9 °C) (05/27/18 1243)  Pulse: 94 (05/27/18 1243)  Resp: 20 (05/27/18 1243)  BP: 129/87 (05/27/18 1243)  SpO2: 95 % (05/27/18 1243) Vital Signs (24h Range):  Temp:  [97.4 °F (36.3 °C)-98.5 °F (36.9 °C)] 98.5 °F (36.9 °C)  Pulse:  [] 94  Resp:  [13-20] 20  SpO2:  [93 %-97 %] 95 %  BP: (112-161)/(66-88) 129/87     Weight: 48.9 kg (107 lb 14.4 oz)  Body mass index is 19.74 kg/m².    Physical Exam   Constitutional: She appears well-developed and well-nourished.   Pleasant, mildly confused elderly female in no acute distress   HENT:   Head: Normocephalic and atraumatic.   Mouth/Throat: Oropharynx is clear and moist.   Dental work noted   Eyes: Conjunctivae and EOM are normal.   Neck: Normal range of motion. Neck supple. No JVD present.   Cardiovascular: Normal rate and regular rhythm.    No murmur heard.  Pulmonary/Chest: Effort normal and breath  sounds normal. She has no wheezes. She has no rales.   Abdominal: Soft. Bowel sounds are normal. She exhibits no distension. There is no tenderness.   Musculoskeletal: Normal range of motion. She exhibits no edema, tenderness or deformity.   Neurological: She is alert.   Aware that she is in the hospital and what year it is. Recognizes family at bedside and converses appropriately w/ some mild delay and word finding difficulties. Patient aware that she is here for episodes of diarrhea but denies any other symptoms.    Skin: Skin is warm. Capillary refill takes less than 2 seconds. No rash noted. There is pallor.   Wounds noted on b/l heels, s/p friction injury on the back of shoes as described by daughter   Psychiatric: She has a normal mood and affect.         CRANIAL NERVES     CN III, IV, VI   Extraocular motions are normal.        Significant Labs:   A1C:     Recent Labs  Lab 05/26/18  1512   HGBA1C 5.3     ABGs: No results for input(s): PH, PCO2, HCO3, POCSATURATED, BE, TOTALHB, COHB, METHB, O2HB, POCFIO2 in the last 48 hours.  Bilirubin:     Recent Labs  Lab 05/26/18  1512 05/27/18  0651   BILITOT 1.0 0.8     Blood Culture:     Recent Labs  Lab 05/26/18  1539 05/26/18  1634   LABBLOO No Growth to date No Growth to date     BMP:     Recent Labs  Lab 05/27/18  0651   GLU 79      K 2.7*      CO2 24   BUN 9   CREATININE 0.7   CALCIUM 8.6*   MG 1.7     CBC:     Recent Labs  Lab 05/26/18  1512 05/27/18  0651   WBC 20.22* 16.71*   HGB 12.5 11.6*   HCT 40.1 36.2*    239     CMP:     Recent Labs  Lab 05/26/18  1512 05/27/18  0651    140   K 3.2* 2.7*    105   CO2 27 24   GLU 88 79   BUN 16 9   CREATININE 0.8 0.7   CALCIUM 9.4 8.6*   PROT 7.0 6.1   ALBUMIN 3.0* 2.6*   BILITOT 1.0 0.8   ALKPHOS 108 102   AST 18 19   ALT 10 9*   ANIONGAP 11 11   EGFRNONAA >60.0 >60.0     Cardiac Markers: No results for input(s): CKMB, MYOGLOBIN, BNP, TROPISTAT in the last 48 hours.  Coagulation: No results  for input(s): PT, INR, APTT in the last 48 hours.  Lactic Acid:     Recent Labs  Lab 05/26/18  1539   LACTATE 1.3     Lipase:     Recent Labs  Lab 05/26/18  1512   LIPASE 6     Lipid Panel: No results for input(s): CHOL, HDL, LDLCALC, TRIG, CHOLHDL in the last 48 hours.  Magnesium:     Recent Labs  Lab 05/26/18  1512 05/27/18  0651   MG 2.0 1.7     Pathology Results  (Last 10 years)    None        POCT Glucose: No results for input(s): POCTGLUCOSE in the last 48 hours.  Prealbumin: No results for input(s): PREALBUMIN in the last 48 hours.  Respiratory Culture: No results for input(s): GSRESP, RESPIRATORYC in the last 48 hours.  Troponin: No results for input(s): TROPONINI in the last 48 hours.  TSH:     Recent Labs  Lab 05/26/18  1512   TSH 7.626*     Urine Culture: No results for input(s): LABURIN in the last 48 hours.  Urine Studies: No results for input(s): COLORU, APPEARANCEUA, PHUR, SPECGRAV, PROTEINUA, GLUCUA, KETONESU, BILIRUBINUA, OCCULTUA, NITRITE, UROBILINOGEN, LEUKOCYTESUR, RBCUA, WBCUA, BACTERIA, SQUAMEPITHEL, HYALINECASTS in the last 48 hours.    Invalid input(s): WRIGHTSUR  All pertinent labs within the past 24 hours have been reviewed.    Significant Imaging: I have reviewed all pertinent imaging results/findings within the past 24 hours.   CT ABDOMEN AND PELVIS  In this patient with a history of colon cancer status post partial distal colectomy and reanastomosis, CT findings are compatible with a moderate severity pancolitis.  5.5 cm left adnexal cyst.  Further evaluation with pelvic ultrasound is recommended.  Cholelithiasis.  Compression deformity of the T11 vertebral body, unchanged.  Remote fracture of the right inferior pubic ramus.  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    Assessment/Plan:      * Diarrhea of presumed infectious origin    - Patient w/ new onset diarrhea ( 6-7x/day) for about  3-4 days  - Noted blood streaks per daughter  - On admission pt w/  leukocytosis of 20.22  - Afebrile at home and on admission  - Cdiff toxin and antigen positive  - Stool Cx and E.coli 0157 in process  - Bcx w/ NGTD  - Contact precautions in place  - Vancomycin PO started; responding well   - CT abdomen/pelvis w/ noted pancolitis          Hypokalemia    - K 2.7 this AM  - Replaced w/ 20 mEq IV and 50 mEq of K-lyte x 2 PO  - Will monitor w/ daily labs  - Patient asymptomatic           Alteration in skin integrity    - Sacral pressure injury noted  - b/l Heel wounds likely 2/2 friction w/ footwear           Anxiety    - Continue Zoloft           Leukocytosis    - WBC 20.22 on admission; improving on current therapy to 16.71 this AM  - Has been evaluated for Leukocytosi by Dr. Fry in the past w/ low suspicion of myeloproliferative disorder  - Patient w/ new onset diarrhea  2/2 C.diff  - See Diarrhea         Decreased mobility    - PT/OT consulted  - Patient mostly wheelchair bound although, per daughter, she is able to ambulate w/ balance belt at times  - Previously w/ H/H PT/OT           History of colon cancer    - Remote Hx of Colon cancer s/p resection  - Stable         Dementia    - Alzheimer per daughter at bedside  - Continue home Donepezil  - Delirium precautions  - Fall precautions in place  - Seroquel QHS PRN          Carotid arterial disease    - 9/2014 b/l US carotids - 40-59% stenosis of the right internal carotid artery with homogeneous plaque   and 1 - 39% stenosis of the left internal carotid artery with mild homogeneous plaque  - Continue ASA and Statin for stroke prevention         Hypothyroidism    - Continue home synthroid  - TSH 7,626        Vitamin D deficiency disease    - Continue home Vit D supplementation           Memory loss    - See dementia             VTE Risk Mitigation         Ordered     enoxaparin injection 40 mg  Daily      05/26/18 1904     IP VTE HIGH RISK PATIENT  Once      05/26/18 1958     Place GERARD hose  Until discontinued      05/26/18 1958      Place sequential compression device  Until discontinued      05/26/18 8338              Cris Calvo MD  Department of Hospital Medicine   [facility]

## 2018-05-27 NOTE — ED NOTES
Called to give report and was told by  that nurse was in the middle of report and I should be expecting a call back shortly.

## 2018-05-27 NOTE — ASSESSMENT & PLAN NOTE
- Alzheimer per daughter at bedside  - Continue home Donepezil  - Delirium precautions  - Fall precautions in place  - Seroquel QHS PRN

## 2018-05-27 NOTE — ED NOTES
Pt. Resting in bed in NAD. RR e/u. Continuous O2 monitoring in progress. VS being monitoring continuously per MD orders. Explanation of care/wait provided. Bed in low, locked position with rails up and call bell in reach. Will continue to monitor.

## 2018-05-27 NOTE — H&P
[facility] Hospital Medicine  History & Physical    Patient Name: Arina Adame  MRN: [id number]  Admission Date: 5/26/2018  Attending Physician: Carine Beaver   Primary Care Provider: Zeinab Meier MD    [facility] Medicine Team: [facility] MED 1 Cris Calvo MD     Patient information was obtained from patient, relative(s), past medical records and ER records.     Subjective:     Principal Problem:Diarrhea of presumed infectious origin    Chief Complaint:   Chief Complaint   Patient presents with    Diarrhea     watery diarrhea x 2-3 days and vomiting this morning.  Blood in stool this morning.          HPI: Patient is an 89 y.o. W/ past medical history significant for dementia, carotid artery disease, HTN, remote Hx of colon CA s/p partial distal colectomy and reanastomosis( Klebsiella, E.coli, Strep.Viridans) , hypothyroidism, recurrent UTIs and decubitus ulcers as well as recent evaluation by Heme/Onc for neutrophilia presents to the ED w/ daughter for evaluation of new onset loose BM w/ streaks of blood. Per daughter the symptoms started on 23rd, when she noted patient having brown bowel movements w/ some noted mucous and blood in the toilet bowl. The stools proceeded to become more loose and she had been having about 5-7 BM for about 2 days. Denies any abdominal pain or discomfort, although per daughter, the patient has decreased pain awareness.  Patient also had an episode of NBNB emesis x 1 time 2 days PTA after consuming some pizza and a cookie before bed.   Patient is primarily wheelchair bound. Her daughter brings her in and cares for much of her needs at home.  Patient without lymphadenopathy, fevers/chills at home, denies chest pain, palpitations, dysuria, recent travel, changes in her diet, consumption of raw/uncooked foods or recent Abx use. Patient's daughter states that she has completed a course of ciprofloxacin for UTI/malodorous urine about 3 months ago (Klebsiella).     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       Past Surgical History:   Procedure Laterality Date    BREAST SURGERY      CATARACT EXTRACTION W/  INTRAOCULAR LENS IMPLANT Bilateral n/a    OU    COLECTOMY      DENTAL SURGERY      right ankle surgery         Review of patient's allergies indicates:   Allergen Reactions    Codeine      Other reaction(s): Unknown    Iodine      Other reaction(s): Unknown    Penicillins      Other reaction(s): Unknown       Current Facility-Administered Medications on File Prior to Encounter   Medication    bevacizumab (AVASTIN) 2.5 mg/0.10 mL 1.25 mg     Current Outpatient Prescriptions on File Prior to Encounter   Medication Sig    aspirin (ECOTRIN) 325 MG EC tablet Take 1 tablet (325 mg total) by mouth once daily.    atorvastatin (LIPITOR) 40 MG tablet TAKE 1 TABLET (40 MG TOTAL) BY MOUTH ONCE DAILY.    CALCIUM CITRATE/VITAMIN D3 (CITRACAL + D MAXIMUM ORAL) Take 1 tablet by mouth once daily.     cholecalciferol, vitamin D3, (VITAMIN D3) 1,000 unit capsule Take 1,000 Units by mouth once daily.      cyanocobalamin (VITAMIN B-12) 500 MCG tablet Take 1,000 mcg by mouth once daily.      levothyroxine (SYNTHROID) 100 MCG tablet 1 Tablet Oral every morning except 1.5 pills on Sundays.    sertraline (ZOLOFT) 25 MG tablet Take 1 tablet (25 mg total) by mouth once daily.    azelastine (ASTELIN) 137 mcg  (0.1 %) nasal spray 1 spray (137 mcg total) by Nasal route 2 (two) times daily.    donepezil (ARICEPT) 10 MG tablet Take 1 tablet (10 mg total) by mouth every evening.    QUEtiapine (SEROQUEL) 25 MG Tab TAKE 1 TABLET (25 MG TOTAL) BY MOUTH NIGHTLY AS NEEDED.    walker (ULTRA-LIGHT ROLLATOR) Misc 1 each by Misc.(Non-Drug; Combo Route) route once daily.     Family History     Problem Relation (Age of Onset)    Hip fracture Mother    Thyroid disease Sister        Social History Main Topics    Smoking status: Former Smoker     Quit date: 6/24/1970    Smokeless tobacco: Former User    Alcohol use No    Drug use: No    Sexual activity: Not on file     Review of Systems   Constitutional: Negative for activity change, appetite change, chills, diaphoresis, fatigue and fever.   HENT: Positive for rhinorrhea.    Respiratory: Negative for cough, choking, shortness of breath, wheezing and stridor.    Cardiovascular: Negative for chest pain, palpitations and leg swelling.   Gastrointestinal: Positive for blood in stool, diarrhea and vomiting. Negative for abdominal distention and abdominal pain.   Endocrine: Negative.    Genitourinary: Negative.    Musculoskeletal: Negative.    Skin: Positive for pallor and wound.   Allergic/Immunologic: Negative.    Neurological: Negative.    Hematological: Negative.    Psychiatric/Behavioral: Positive for confusion (2/2 Dementia ).     Objective:     Vital Signs (Most Recent):  Temp: 97.4 °F (36.3 °C) (05/26/18 1632)  Pulse: 97 (05/26/18 1744)  Resp: 16 (05/26/18 1742)  BP: (!) 140/81 (05/26/18 1747)  SpO2: 95 % (05/26/18 1804) Vital Signs (24h Range):  Temp:  [97.2 °F (36.2 °C)-97.4 °F (36.3 °C)] 97.4 °F (36.3 °C)  Pulse:  [] 97  Resp:  [16-18] 16  SpO2:  [94 %-95 %] 95 %  BP: (132-161)/(76-85) 140/81     Weight: 49.9 kg (110 lb)  Body mass index is 20.12 kg/m².    Physical Exam   Constitutional: She appears well-developed and well-nourished.   Pleasant, mildly confused elderly  female in no acute distress   HENT:   Head: Normocephalic and atraumatic.   Mouth/Throat: Oropharynx is clear and moist.   Dental work noted   Eyes: Conjunctivae and EOM are normal.   Neck: Normal range of motion. Neck supple. No JVD present.   Cardiovascular: Normal rate and regular rhythm.    No murmur heard.  Pulmonary/Chest: Effort normal and breath sounds normal. She has no wheezes. She has no rales.   Abdominal: Soft. Bowel sounds are normal. She exhibits no distension. There is no tenderness.   Musculoskeletal: Normal range of motion. She exhibits no edema, tenderness or deformity.   Neurological: She is alert.   Aware that she is in the hospital and what year it is. Recognizes family at bedside and converses appropriately w/ some mild delay and word finding difficulties. Patient aware that she is here for episodes of diarrhea but denies any other symptoms.    Skin: Skin is warm. Capillary refill takes less than 2 seconds. No rash noted. There is pallor.   Wounds noted on b/l heels, s/p friction injury on the back of shoes as described by daughter   Psychiatric: She has a normal mood and affect.         CRANIAL NERVES     CN III, IV, VI   Extraocular motions are normal.        Significant Labs:   A1C: No results for input(s): HGBA1C in the last 4320 hours.  ABGs: No results for input(s): PH, PCO2, HCO3, POCSATURATED, BE, TOTALHB, COHB, METHB, O2HB, POCFIO2 in the last 48 hours.  Bilirubin:   Recent Labs  Lab 05/26/18  1512   BILITOT 1.0     Blood Culture: No results for input(s): LABBLOO in the last 48 hours.  BMP:   Recent Labs  Lab 05/26/18  1512   GLU 88      K 3.2*      CO2 27   BUN 16   CREATININE 0.8   CALCIUM 9.4   MG 2.0     CBC:   Recent Labs  Lab 05/26/18  1512   WBC 20.22*   HGB 12.5   HCT 40.1        CMP:   Recent Labs  Lab 05/26/18  1512      K 3.2*      CO2 27   GLU 88   BUN 16   CREATININE 0.8   CALCIUM 9.4   PROT 7.0   ALBUMIN 3.0*   BILITOT 1.0   ALKPHOS 108    AST 18   ALT 10   ANIONGAP 11   EGFRNONAA >60.0     Cardiac Markers: No results for input(s): CKMB, MYOGLOBIN, BNP, TROPISTAT in the last 48 hours.  Coagulation: No results for input(s): PT, INR, APTT in the last 48 hours.  Lactic Acid:   Recent Labs  Lab 05/26/18  1539   LACTATE 1.3     Lipase:   Recent Labs  Lab 05/26/18  1512   LIPASE 6     Lipid Panel: No results for input(s): CHOL, HDL, LDLCALC, TRIG, CHOLHDL in the last 48 hours.  Magnesium:   Recent Labs  Lab 05/26/18  1512   MG 2.0     Pathology Results  (Last 10 years)    None        POCT Glucose: No results for input(s): POCTGLUCOSE in the last 48 hours.  Prealbumin: No results for input(s): PREALBUMIN in the last 48 hours.  Respiratory Culture: No results for input(s): GSRESP, RESPIRATORYC in the last 48 hours.  Troponin: No results for input(s): TROPONINI in the last 48 hours.  TSH:   Recent Labs  Lab 02/08/18  1050   TSH 5.791*     Urine Culture: No results for input(s): LABURIN in the last 48 hours.  Urine Studies: No results for input(s): COLORU, APPEARANCEUA, PHUR, SPECGRAV, PROTEINUA, GLUCUA, KETONESU, BILIRUBINUA, OCCULTUA, NITRITE, UROBILINOGEN, LEUKOCYTESUR, RBCUA, WBCUA, BACTERIA, SQUAMEPITHEL, HYALINECASTS in the last 48 hours.    Invalid input(s): WRIGHTSUR  All pertinent labs within the past 24 hours have been reviewed.    Significant Imaging: I have reviewed all pertinent imaging results/findings within the past 24 hours.   CT ABDOMEN AND PELVIS  In this patient with a history of colon cancer status post partial distal colectomy and reanastomosis, CT findings are compatible with a moderate severity pancolitis.  5.5 cm left adnexal cyst.  Further evaluation with pelvic ultrasound is recommended.  Cholelithiasis.  Compression deformity of the T11 vertebral body, unchanged.  Remote fracture of the right inferior pubic ramus.  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    Assessment/Plan:     * Diarrhea of presumed  infectious origin    - Patient w/ new onset diarrhea ( 6-7x/day) for about  3-4 days  - Noted blood streaks per daughter  - On admission pt w/ leukocytosis of 20.22  - Afebrile at home and on admission  - Stool studies as well as C.diff ordered and pending  - Contact precautions in place  - Vancomycin PO started  - CT abdomen/pelvis w/ noted pancolitis          Alteration in skin integrity    - Sacral pressure injury noted  - b/l Heel wounds likely 2/2 friction w/ footwear           Anxiety    - Continue Zoloft           Leukocytosis    - WBC 20.22 on admission  - Has been evaluated for Leukocytosi by Dr. Fry in the past w/ low suspicion of myeloproliferative disorder  - Patient w/ new onset diarrhea so likely the elevation is 2/2 infectious etiology  - See Diarrhea         Decreased mobility    - PT/OT consulted  - Patient mostly wheelchair bound although, per daughter, she is able to ambulate w/ balance belt at times  - Previously w/ H/H PT/OT           History of colon cancer    - Remote Hx of Colon cancer s/p resection  - Stable         Dementia    - Alzheimer per daughter at bedside  - Continue home Donepezil  - Delirium precautions  - Fall precautions in place  - Seroquel QHS PRN          Carotid arterial disease    - 9/2014 b/l US carotids - 40-59% stenosis of the right internal carotid artery with homogeneous plaque   and 1 - 39% stenosis of the left internal carotid artery with mild homogeneous plaque  - Continue ASA and Statin for stroke prevention         Hypothyroidism    - Continue home synthroid  - TSH/T4 ordered         Vitamin D deficiency disease    - Continue home Vit D supplementation           Memory loss    - See dementia             VTE Risk Mitigation         Ordered     enoxaparin injection 40 mg  Daily      05/26/18 1904     Place sequential compression device  Until discontinued      05/26/18 1904     IP VTE HIGH RISK PATIENT  Once      05/26/18 1904             Cris Calvo  MD  Department of Hospital Medicine   [facility]

## 2018-05-27 NOTE — PLAN OF CARE
Problem: Physical Therapy Goal  Goal: Physical Therapy Goal  Goals to be met by: 2018     Patient will increase functional independence with mobility by performin. Supine to sit with Contact Guard Assistance  2. Sit to supine with Contact Guard Assistance  3. Sit to stand transfer with Contact Guard Assistance  4. Bed to chair transfer with Minimal Assistance  5. Gait  x 20 feet with Minimal Assistance using Rolling Walker.   6. Lower extremity exercise program x15 reps per handout, with supervision    Outcome: Ongoing (interventions implemented as appropriate)  Pt evaluation complete.     CHELSEY MATOS, PT  2018

## 2018-05-27 NOTE — ASSESSMENT & PLAN NOTE
- WBC 20.22 on admission; improving on current therapy to 16.71 this AM  - Has been evaluated for Leukocytosi by Dr. Fyr in the past w/ low suspicion of myeloproliferative disorder  - Patient w/ new onset diarrhea  2/2 C.diff  - See Diarrhea

## 2018-05-27 NOTE — ASSESSMENT & PLAN NOTE
- K 2.7 this AM  - Replaced w/ 20 mEq IV and 50 mEq of K-lyte x 2 PO  - Will monitor w/ daily labs  - Patient asymptomatic

## 2018-05-27 NOTE — SUBJECTIVE & OBJECTIVE
Interval History: Patient doing well this morning, essentially without any complaints. Responding to vancomycin well, with only 1 episode of charted loose BM overnight. Tolerating PO diet well and does not appear dehydrated.   No other complaints at this time.     Review of Systems   Constitutional: Negative for activity change, appetite change, chills, diaphoresis, fatigue and fever.   HENT: Positive for rhinorrhea.    Respiratory: Negative for cough, choking, shortness of breath, wheezing and stridor.    Cardiovascular: Negative for chest pain, palpitations and leg swelling.   Gastrointestinal: Positive for blood in stool, diarrhea and vomiting. Negative for abdominal distention and abdominal pain.   Endocrine: Negative.    Genitourinary: Negative.    Musculoskeletal: Negative.    Skin: Positive for pallor and wound.   Allergic/Immunologic: Negative.    Neurological: Negative.    Hematological: Negative.    Psychiatric/Behavioral: Positive for confusion (2/2 Dementia ).     Objective:     Vital Signs (Most Recent):  Temp: 98.5 °F (36.9 °C) (05/27/18 1243)  Pulse: 94 (05/27/18 1243)  Resp: 20 (05/27/18 1243)  BP: 129/87 (05/27/18 1243)  SpO2: 95 % (05/27/18 1243) Vital Signs (24h Range):  Temp:  [97.4 °F (36.3 °C)-98.5 °F (36.9 °C)] 98.5 °F (36.9 °C)  Pulse:  [] 94  Resp:  [13-20] 20  SpO2:  [93 %-97 %] 95 %  BP: (112-161)/(66-88) 129/87     Weight: 48.9 kg (107 lb 14.4 oz)  Body mass index is 19.74 kg/m².    Physical Exam   Constitutional: She appears well-developed and well-nourished.   Pleasant, mildly confused elderly female in no acute distress   HENT:   Head: Normocephalic and atraumatic.   Mouth/Throat: Oropharynx is clear and moist.   Dental work noted   Eyes: Conjunctivae and EOM are normal.   Neck: Normal range of motion. Neck supple. No JVD present.   Cardiovascular: Normal rate and regular rhythm.    No murmur heard.  Pulmonary/Chest: Effort normal and breath sounds normal. She has no wheezes. She  has no rales.   Abdominal: Soft. Bowel sounds are normal. She exhibits no distension. There is no tenderness.   Musculoskeletal: Normal range of motion. She exhibits no edema, tenderness or deformity.   Neurological: She is alert.   Aware that she is in the hospital and what year it is. Recognizes family at bedside and converses appropriately w/ some mild delay and word finding difficulties. Patient aware that she is here for episodes of diarrhea but denies any other symptoms.    Skin: Skin is warm. Capillary refill takes less than 2 seconds. No rash noted. There is pallor.   Wounds noted on b/l heels, s/p friction injury on the back of shoes as described by daughter   Psychiatric: She has a normal mood and affect.         CRANIAL NERVES     CN III, IV, VI   Extraocular motions are normal.        Significant Labs:   A1C:     Recent Labs  Lab 05/26/18  1512   HGBA1C 5.3     ABGs: No results for input(s): PH, PCO2, HCO3, POCSATURATED, BE, TOTALHB, COHB, METHB, O2HB, POCFIO2 in the last 48 hours.  Bilirubin:     Recent Labs  Lab 05/26/18  1512 05/27/18  0651   BILITOT 1.0 0.8     Blood Culture:     Recent Labs  Lab 05/26/18  1539 05/26/18  1634   LABBLOO No Growth to date No Growth to date     BMP:     Recent Labs  Lab 05/27/18  0651   GLU 79      K 2.7*      CO2 24   BUN 9   CREATININE 0.7   CALCIUM 8.6*   MG 1.7     CBC:     Recent Labs  Lab 05/26/18  1512 05/27/18  0651   WBC 20.22* 16.71*   HGB 12.5 11.6*   HCT 40.1 36.2*    239     CMP:     Recent Labs  Lab 05/26/18  1512 05/27/18  0651    140   K 3.2* 2.7*    105   CO2 27 24   GLU 88 79   BUN 16 9   CREATININE 0.8 0.7   CALCIUM 9.4 8.6*   PROT 7.0 6.1   ALBUMIN 3.0* 2.6*   BILITOT 1.0 0.8   ALKPHOS 108 102   AST 18 19   ALT 10 9*   ANIONGAP 11 11   EGFRNONAA >60.0 >60.0     Cardiac Markers: No results for input(s): CKMB, MYOGLOBIN, BNP, TROPISTAT in the last 48 hours.  Coagulation: No results for input(s): PT, INR, APTT in the last  48 hours.  Lactic Acid:     Recent Labs  Lab 05/26/18  1539   LACTATE 1.3     Lipase:     Recent Labs  Lab 05/26/18  1512   LIPASE 6     Lipid Panel: No results for input(s): CHOL, HDL, LDLCALC, TRIG, CHOLHDL in the last 48 hours.  Magnesium:     Recent Labs  Lab 05/26/18  1512 05/27/18  0651   MG 2.0 1.7     Pathology Results  (Last 10 years)    None        POCT Glucose: No results for input(s): POCTGLUCOSE in the last 48 hours.  Prealbumin: No results for input(s): PREALBUMIN in the last 48 hours.  Respiratory Culture: No results for input(s): GSRESP, RESPIRATORYC in the last 48 hours.  Troponin: No results for input(s): TROPONINI in the last 48 hours.  TSH:     Recent Labs  Lab 05/26/18  1512   TSH 7.626*     Urine Culture: No results for input(s): LABURIN in the last 48 hours.  Urine Studies: No results for input(s): COLORU, APPEARANCEUA, PHUR, SPECGRAV, PROTEINUA, GLUCUA, KETONESU, BILIRUBINUA, OCCULTUA, NITRITE, UROBILINOGEN, LEUKOCYTESUR, RBCUA, WBCUA, BACTERIA, SQUAMEPITHEL, HYALINECASTS in the last 48 hours.    Invalid input(s): WRIGHTSUR  All pertinent labs within the past 24 hours have been reviewed.    Significant Imaging: I have reviewed all pertinent imaging results/findings within the past 24 hours.   CT ABDOMEN AND PELVIS  In this patient with a history of colon cancer status post partial distal colectomy and reanastomosis, CT findings are compatible with a moderate severity pancolitis.  5.5 cm left adnexal cyst.  Further evaluation with pelvic ultrasound is recommended.  Cholelithiasis.  Compression deformity of the T11 vertebral body, unchanged.  Remote fracture of the right inferior pubic ramus.  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

## 2018-05-27 NOTE — ASSESSMENT & PLAN NOTE
- 9/2014 b/l US carotids - 40-59% stenosis of the right internal carotid artery with homogeneous plaque   and 1 - 39% stenosis of the left internal carotid artery with mild homogeneous plaque  - Continue ASA and Statin for stroke prevention

## 2018-05-27 NOTE — ASSESSMENT & PLAN NOTE
- Patient w/ new onset diarrhea ( 6-7x/day) for about  3-4 days  - Noted blood streaks per daughter  - On admission pt w/ leukocytosis of 20.22  - Afebrile at home and on admission  - Cdiff toxin and antigen positive  - Stool Cx and E.coli 0157 in process  - Bcx w/ NGTD  - Contact precautions in place  - Vancomycin PO started; responding well   - CT abdomen/pelvis w/ noted pancolitis

## 2018-05-27 NOTE — ASSESSMENT & PLAN NOTE
- WBC 20.22 on admission  - Has been evaluated for Leukocytosi by Dr. Fry in the past w/ low suspicion of myeloproliferative disorder  - Patient w/ new onset diarrhea so likely the elevation is 2/2 infectious etiology  - See Diarrhea

## 2018-05-28 PROBLEM — Z29.9 PREVENTIVE MEASURE: Status: ACTIVE | Noted: 2018-05-28

## 2018-05-28 PROBLEM — A49.8 CLOSTRIDIUM DIFFICILE INFECTION: Status: ACTIVE | Noted: 2018-05-28

## 2018-05-28 LAB
ALBUMIN SERPL BCP-MCNC: 2.6 G/DL
ALP SERPL-CCNC: 103 U/L
ALT SERPL W/O P-5'-P-CCNC: 10 U/L
ANION GAP SERPL CALC-SCNC: 9 MMOL/L
AST SERPL-CCNC: 17 U/L
BACTERIA STL CULT: NORMAL
BASOPHILS # BLD AUTO: 0.06 K/UL
BASOPHILS NFR BLD: 0.4 %
BILIRUB SERPL-MCNC: 0.7 MG/DL
BUN SERPL-MCNC: 12 MG/DL
CALCIUM SERPL-MCNC: 9.2 MG/DL
CHLORIDE SERPL-SCNC: 106 MMOL/L
CO2 SERPL-SCNC: 27 MMOL/L
CREAT SERPL-MCNC: 0.8 MG/DL
DIFFERENTIAL METHOD: ABNORMAL
E COLI SXT1 STL QL IA: NEGATIVE
E COLI SXT2 STL QL IA: NEGATIVE
EOSINOPHIL # BLD AUTO: 0.4 K/UL
EOSINOPHIL NFR BLD: 3.2 %
ERYTHROCYTE [DISTWIDTH] IN BLOOD BY AUTOMATED COUNT: 13.9 %
EST. GFR  (AFRICAN AMERICAN): >60 ML/MIN/1.73 M^2
EST. GFR  (NON AFRICAN AMERICAN): >60 ML/MIN/1.73 M^2
GLUCOSE SERPL-MCNC: 90 MG/DL
HCT VFR BLD AUTO: 36.8 %
HGB BLD-MCNC: 11.8 G/DL
IMM GRANULOCYTES # BLD AUTO: 0.19 K/UL
IMM GRANULOCYTES NFR BLD AUTO: 1.4 %
LYMPHOCYTES # BLD AUTO: 1.5 K/UL
LYMPHOCYTES NFR BLD: 11.1 %
MAGNESIUM SERPL-MCNC: 1.7 MG/DL
MCH RBC QN AUTO: 30.7 PG
MCHC RBC AUTO-ENTMCNC: 32.1 G/DL
MCV RBC AUTO: 96 FL
MONOCYTES # BLD AUTO: 1.2 K/UL
MONOCYTES NFR BLD: 8.7 %
NEUTROPHILS # BLD AUTO: 10.3 K/UL
NEUTROPHILS NFR BLD: 75.2 %
NRBC BLD-RTO: 0 /100 WBC
PHOSPHATE SERPL-MCNC: 2.5 MG/DL
PLATELET # BLD AUTO: 257 K/UL
PMV BLD AUTO: 11.1 FL
POTASSIUM SERPL-SCNC: 3.6 MMOL/L
PROT SERPL-MCNC: 6 G/DL
RBC # BLD AUTO: 3.84 M/UL
SODIUM SERPL-SCNC: 142 MMOL/L
WBC # BLD AUTO: 13.72 K/UL

## 2018-05-28 PROCEDURE — 85025 COMPLETE CBC W/AUTO DIFF WBC: CPT

## 2018-05-28 PROCEDURE — 25000003 PHARM REV CODE 250: Performed by: STUDENT IN AN ORGANIZED HEALTH CARE EDUCATION/TRAINING PROGRAM

## 2018-05-28 PROCEDURE — 97166 OT EVAL MOD COMPLEX 45 MIN: CPT

## 2018-05-28 PROCEDURE — 63600175 PHARM REV CODE 636 W HCPCS: Performed by: STUDENT IN AN ORGANIZED HEALTH CARE EDUCATION/TRAINING PROGRAM

## 2018-05-28 PROCEDURE — 99232 SBSQ HOSP IP/OBS MODERATE 35: CPT | Mod: GC,,, | Performed by: HOSPITALIST

## 2018-05-28 PROCEDURE — 36415 COLL VENOUS BLD VENIPUNCTURE: CPT

## 2018-05-28 PROCEDURE — 80053 COMPREHEN METABOLIC PANEL: CPT

## 2018-05-28 PROCEDURE — 84100 ASSAY OF PHOSPHORUS: CPT

## 2018-05-28 PROCEDURE — 11000001 HC ACUTE MED/SURG PRIVATE ROOM

## 2018-05-28 PROCEDURE — 83735 ASSAY OF MAGNESIUM: CPT

## 2018-05-28 RX ORDER — ALPRAZOLAM 0.25 MG/1
TABLET ORAL
COMMUNITY
End: 2018-06-22

## 2018-05-28 RX ORDER — VANCOMYCIN HYDROCHLORIDE 125 MG/1
125 CAPSULE ORAL 4 TIMES DAILY
Qty: 32 CAPSULE | Refills: 0 | Status: SHIPPED | OUTPATIENT
Start: 2018-05-28 | End: 2018-05-28 | Stop reason: HOSPADM

## 2018-05-28 RX ORDER — SODIUM,POTASSIUM PHOSPHATES 280-250MG
1 POWDER IN PACKET (EA) ORAL ONCE
Status: COMPLETED | OUTPATIENT
Start: 2018-05-28 | End: 2018-05-28

## 2018-05-28 RX ORDER — MULTIVITAMIN
1 TABLET ORAL DAILY
COMMUNITY

## 2018-05-28 RX ADMIN — Medication 125 MG: at 01:05

## 2018-05-28 RX ADMIN — Medication 125 MG: at 12:05

## 2018-05-28 RX ADMIN — ASPIRIN 325 MG: 325 TABLET, DELAYED RELEASE ORAL at 09:05

## 2018-05-28 RX ADMIN — ENOXAPARIN SODIUM 40 MG: 100 INJECTION SUBCUTANEOUS at 06:05

## 2018-05-28 RX ADMIN — POTASSIUM & SODIUM PHOSPHATES POWDER PACK 280-160-250 MG 1 PACKET: 280-160-250 PACK at 12:05

## 2018-05-28 RX ADMIN — Medication 125 MG: at 11:05

## 2018-05-28 RX ADMIN — RAMELTEON 8 MG: 8 TABLET, FILM COATED ORAL at 09:05

## 2018-05-28 RX ADMIN — LEVOTHYROXINE SODIUM 100 MCG: 100 TABLET ORAL at 05:05

## 2018-05-28 RX ADMIN — SERTRALINE HYDROCHLORIDE 25 MG: 25 TABLET ORAL at 09:05

## 2018-05-28 RX ADMIN — Medication 125 MG: at 05:05

## 2018-05-28 RX ADMIN — ATORVASTATIN CALCIUM 40 MG: 20 TABLET, FILM COATED ORAL at 09:05

## 2018-05-28 RX ADMIN — CYANOCOBALAMIN TAB 250 MCG 1000 MCG: 250 TAB at 09:05

## 2018-05-28 RX ADMIN — DONEPEZIL HYDROCHLORIDE 10 MG: 10 TABLET ORAL at 09:05

## 2018-05-28 RX ADMIN — Medication 125 MG: at 06:05

## 2018-05-28 RX ADMIN — QUETIAPINE FUMARATE 25 MG: 25 TABLET, FILM COATED ORAL at 09:05

## 2018-05-28 RX ADMIN — Medication 1000 UNITS: at 09:05

## 2018-05-28 NOTE — CONSULTS
Wound care consult received for skin assessment of sacrum and heels.  Pt presents with OT at bedside who assisted with pt care.  Noted bilateral heels and sacrum/right buttocks with blanchable redness that was relieved when offloaded. Siddhartha score 13    Recommendations:  Pressure injury prevention interventions to include-   -NO diapers  -Critic-aid Clear to sacrum/buttocks  -JOSR bed surface  -Repositioning every 2 hours   -Heel foam dressings    Wound care nurse to follow prn  [id number]

## 2018-05-28 NOTE — ASSESSMENT & PLAN NOTE
- K 2.7 this AM; 3.6 this AM; resolved  - Replaced w/ 20 mEq IV and 50 mEq of K-lyte x 2 PO  - Will monitor w/ daily labs  - Patient asymptomatic

## 2018-05-28 NOTE — SUBJECTIVE & OBJECTIVE
Interval History: Patient doing well in the morning w/ no complaints, improved diarrhea and no abdominal discomfort. Planned for discharge w/ H/H orders. Patient w/ worsening, perfuse diarrhea in the PM and patient's daughter is uncomfortable w/patient's discharge this evening as symptoms are worsening. Pt will remain inpatient until improvement/resolution of symptoms.    Review of Systems   Constitutional: Negative for activity change, appetite change, chills, diaphoresis, fatigue and fever.   HENT: Positive for rhinorrhea.    Respiratory: Negative for cough, choking, shortness of breath, wheezing and stridor.    Cardiovascular: Negative for chest pain, palpitations and leg swelling.   Gastrointestinal: Positive for blood in stool, diarrhea and vomiting. Negative for abdominal distention and abdominal pain.   Endocrine: Negative.    Genitourinary: Negative.    Musculoskeletal: Negative.    Skin: Positive for pallor and wound.   Allergic/Immunologic: Negative.    Neurological: Negative.    Hematological: Negative.    Psychiatric/Behavioral: Positive for confusion (2/2 Dementia ).     Objective:     Vital Signs (Most Recent):  Temp: 98.2 °F (36.8 °C) (05/28/18 1642)  Pulse: 90 (05/28/18 1150)  Resp: 18 (05/28/18 1150)  BP: 132/82 (05/28/18 1150)  SpO2: (!) 94 % (05/28/18 1150) Vital Signs (24h Range):  Temp:  [97.3 °F (36.3 °C)-98.5 °F (36.9 °C)] 98.2 °F (36.8 °C)  Pulse:  [] 90  Resp:  [13-23] 18  SpO2:  [94 %-95 %] 94 %  BP: (105-147)/(72-99) 132/82     Weight: 48.9 kg (107 lb 14.4 oz)  Body mass index is 19.74 kg/m².    Physical Exam   Constitutional: She appears well-developed and well-nourished.   Pleasant, mildly confused elderly female in no acute distress   HENT:   Head: Normocephalic and atraumatic.   Mouth/Throat: Oropharynx is clear and moist.   Dental work noted   Eyes: Conjunctivae and EOM are normal.   Neck: Normal range of motion. Neck supple. No JVD present.   Cardiovascular: Normal rate and  regular rhythm.    No murmur heard.  Pulmonary/Chest: Effort normal and breath sounds normal. She has no wheezes. She has no rales.   Abdominal: Soft. Bowel sounds are normal. She exhibits no distension. There is no tenderness.   Musculoskeletal: Normal range of motion. She exhibits no edema, tenderness or deformity.   Neurological: She is alert.   Aware that she is in the hospital and what year it is. Recognizes family at bedside and converses appropriately w/ some mild delay and word finding difficulties. Patient aware that she is here for episodes of diarrhea but denies any other symptoms.    Skin: Skin is warm. Capillary refill takes less than 2 seconds. No rash noted. There is pallor.   Wounds noted on b/l heels, s/p friction injury on the back of shoes as described by daughter   Psychiatric: She has a normal mood and affect.         CRANIAL NERVES     CN III, IV, VI   Extraocular motions are normal.        Significant Labs:   A1C:     Recent Labs  Lab 05/26/18  1512   HGBA1C 5.3     ABGs: No results for input(s): PH, PCO2, HCO3, POCSATURATED, BE, TOTALHB, COHB, METHB, O2HB, POCFIO2 in the last 48 hours.  Bilirubin:     Recent Labs  Lab 05/26/18  1512 05/27/18  0651 05/28/18  0635   BILITOT 1.0 0.8 0.7     Blood Culture:   No results for input(s): LABBLOO in the last 48 hours.  BMP:     Recent Labs  Lab 05/28/18  0635   GLU 90      K 3.6      CO2 27   BUN 12   CREATININE 0.8   CALCIUM 9.2   MG 1.7     CBC:     Recent Labs  Lab 05/27/18  0651 05/28/18  0635   WBC 16.71* 13.72*   HGB 11.6* 11.8*   HCT 36.2* 36.8*    257     CMP:     Recent Labs  Lab 05/27/18  0651 05/28/18  0635    142   K 2.7* 3.6    106   CO2 24 27   GLU 79 90   BUN 9 12   CREATININE 0.7 0.8   CALCIUM 8.6* 9.2   PROT 6.1 6.0   ALBUMIN 2.6* 2.6*   BILITOT 0.8 0.7   ALKPHOS 102 103   AST 19 17   ALT 9* 10   ANIONGAP 11 9   EGFRNONAA >60.0 >60.0     Cardiac Markers: No results for input(s): CKMB, MYOGLOBIN, BNP,  TROPISTAT in the last 48 hours.  Coagulation: No results for input(s): PT, INR, APTT in the last 48 hours.  Lactic Acid:   No results for input(s): LACTATE in the last 48 hours.  Lipase:   No results for input(s): LIPASE in the last 48 hours.  Lipid Panel: No results for input(s): CHOL, HDL, LDLCALC, TRIG, CHOLHDL in the last 48 hours.  Magnesium:     Recent Labs  Lab 05/27/18  0651 05/28/18  0635   MG 1.7 1.7     Pathology Results  (Last 10 years)    None        POCT Glucose: No results for input(s): POCTGLUCOSE in the last 48 hours.  Prealbumin: No results for input(s): PREALBUMIN in the last 48 hours.  Respiratory Culture: No results for input(s): GSRESP, RESPIRATORYC in the last 48 hours.  Troponin: No results for input(s): TROPONINI in the last 48 hours.  TSH:     Recent Labs  Lab 05/26/18  1512   TSH 7.626*     Urine Culture: No results for input(s): LABURIN in the last 48 hours.  Urine Studies: No results for input(s): COLORU, APPEARANCEUA, PHUR, SPECGRAV, PROTEINUA, GLUCUA, KETONESU, BILIRUBINUA, OCCULTUA, NITRITE, UROBILINOGEN, LEUKOCYTESUR, RBCUA, WBCUA, BACTERIA, SQUAMEPITHEL, HYALINECASTS in the last 48 hours.    Invalid input(s): WRIGHTSUR  All pertinent labs within the past 24 hours have been reviewed.    Significant Imaging: I have reviewed all pertinent imaging results/findings within the past 24 hours.   CT ABDOMEN AND PELVIS  In this patient with a history of colon cancer status post partial distal colectomy and reanastomosis, CT findings are compatible with a moderate severity pancolitis.  5.5 cm left adnexal cyst.  Further evaluation with pelvic ultrasound is recommended.  Cholelithiasis.  Compression deformity of the T11 vertebral body, unchanged.  Remote fracture of the right inferior pubic ramus.  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

## 2018-05-28 NOTE — ASSESSMENT & PLAN NOTE
- WBC 20.22 on admission; improving on current therapy to 13.72 this AM  - Has been evaluated for Leukocytosi by Dr. Fry in the past w/ low suspicion of myeloproliferative disorder  - Patient w/ new onset diarrhea  2/2 C.diff  - See Diarrhea

## 2018-05-28 NOTE — PROGRESS NOTES
[facility] Hospital Medicine  Progress Note    Patient Name: Arina Adame  MRN: [id number]  Patient Class: IP- Inpatient   Admission Date: 5/26/2018  Length of Stay: 2 days  Attending Physician: Carine Beaver MD  Primary Care Provider: Zeinab Meier MD    [facility] Medicine Team: [facility] HOSP MED 1 Cris Calvo MD    Subjective:     Principal Problem:Clostridium difficile infection    HPI:  Patient is an 89 y.o. W/ past medical history significant for dementia, carotid artery disease, HTN, remote Hx of colon CA s/p partial distal colectomy and reanastomosis( Klebsiella, E.coli, Strep.Viridans) , hypothyroidism, recurrent UTIs and decubitus ulcers as well as recent evaluation by Heme/Onc for neutrophilia presents to the ED w/ daughter for evaluation of new onset loose BM w/ streaks of blood. Per daughter the symptoms started on 23rd, when she noted patient having brown bowel movements w/ some noted mucous and blood in the toilet bowl. The stools proceeded to become more loose and she had been having about 5-7 BM for about 2 days. Denies any abdominal pain or discomfort, although per daughter, the patient has decreased pain awareness.  Patient also had an episode of NBNB emesis x 1 time 2 days PTA after consuming some pizza and a cookie before bed.   Patient is primarily wheelchair bound. Her daughter brings her in and cares for much of her needs at home.  Patient without lymphadenopathy, fevers/chills at home, denies chest pain, palpitations, dysuria, recent travel, changes in her diet, consumption of raw/uncooked foods or recent Abx use. Patient's daughter states that she has completed a course of ciprofloxacin for UTI/malodorous urine about 3 months ago (Klebsiella).     Hospital Course:  Patient admitted to IM-1 for new onset diarrhea and leukocytosis w/ presumed Dx of C.Diff. Patient w/ positive C.diff Tox and antigen; started Tx w/ PO vancomycin solution Q6 125; diarrhea and leukocytosis are  both improving. Patient w/ worsening diarrhea on 05/28 so will delay discharge until improved and bulked stools.    Interval History: Patient doing well in the morning w/ no complaints, improved diarrhea and no abdominal discomfort. Planned for discharge w/ H/H orders. Patient w/ worsening, perfuse diarrhea in the PM and patient's daughter is uncomfortable w/patient's discharge this evening as symptoms are worsening. Pt will remain inpatient until improvement/resolution of symptoms.    Review of Systems   Constitutional: Negative for activity change, appetite change, chills, diaphoresis, fatigue and fever.   HENT: Positive for rhinorrhea.    Respiratory: Negative for cough, choking, shortness of breath, wheezing and stridor.    Cardiovascular: Negative for chest pain, palpitations and leg swelling.   Gastrointestinal: Positive for blood in stool, diarrhea and vomiting. Negative for abdominal distention and abdominal pain.   Endocrine: Negative.    Genitourinary: Negative.    Musculoskeletal: Negative.    Skin: Positive for pallor and wound.   Allergic/Immunologic: Negative.    Neurological: Negative.    Hematological: Negative.    Psychiatric/Behavioral: Positive for confusion (2/2 Dementia ).     Objective:     Vital Signs (Most Recent):  Temp: 98.2 °F (36.8 °C) (05/28/18 1642)  Pulse: 90 (05/28/18 1150)  Resp: 18 (05/28/18 1150)  BP: 132/82 (05/28/18 1150)  SpO2: (!) 94 % (05/28/18 1150) Vital Signs (24h Range):  Temp:  [97.3 °F (36.3 °C)-98.5 °F (36.9 °C)] 98.2 °F (36.8 °C)  Pulse:  [] 90  Resp:  [13-23] 18  SpO2:  [94 %-95 %] 94 %  BP: (105-147)/(72-99) 132/82     Weight: 48.9 kg (107 lb 14.4 oz)  Body mass index is 19.74 kg/m².    Physical Exam   Constitutional: She appears well-developed and well-nourished.   Pleasant, mildly confused elderly female in no acute distress   HENT:   Head: Normocephalic and atraumatic.   Mouth/Throat: Oropharynx is clear and moist.   Dental work noted   Eyes: Conjunctivae  and EOM are normal.   Neck: Normal range of motion. Neck supple. No JVD present.   Cardiovascular: Normal rate and regular rhythm.    No murmur heard.  Pulmonary/Chest: Effort normal and breath sounds normal. She has no wheezes. She has no rales.   Abdominal: Soft. Bowel sounds are normal. She exhibits no distension. There is no tenderness.   Musculoskeletal: Normal range of motion. She exhibits no edema, tenderness or deformity.   Neurological: She is alert.   Aware that she is in the hospital and what year it is. Recognizes family at bedside and converses appropriately w/ some mild delay and word finding difficulties. Patient aware that she is here for episodes of diarrhea but denies any other symptoms.    Skin: Skin is warm. Capillary refill takes less than 2 seconds. No rash noted. There is pallor.   Wounds noted on b/l heels, s/p friction injury on the back of shoes as described by daughter   Psychiatric: She has a normal mood and affect.         CRANIAL NERVES     CN III, IV, VI   Extraocular motions are normal.        Significant Labs:   A1C:     Recent Labs  Lab 05/26/18  1512   HGBA1C 5.3     ABGs: No results for input(s): PH, PCO2, HCO3, POCSATURATED, BE, TOTALHB, COHB, METHB, O2HB, POCFIO2 in the last 48 hours.  Bilirubin:     Recent Labs  Lab 05/26/18  1512 05/27/18  0651 05/28/18  0635   BILITOT 1.0 0.8 0.7     Blood Culture:   No results for input(s): LABBLOO in the last 48 hours.  BMP:     Recent Labs  Lab 05/28/18  0635   GLU 90      K 3.6      CO2 27   BUN 12   CREATININE 0.8   CALCIUM 9.2   MG 1.7     CBC:     Recent Labs  Lab 05/27/18  0651 05/28/18  0635   WBC 16.71* 13.72*   HGB 11.6* 11.8*   HCT 36.2* 36.8*    257     CMP:     Recent Labs  Lab 05/27/18  0651 05/28/18  0635    142   K 2.7* 3.6    106   CO2 24 27   GLU 79 90   BUN 9 12   CREATININE 0.7 0.8   CALCIUM 8.6* 9.2   PROT 6.1 6.0   ALBUMIN 2.6* 2.6*   BILITOT 0.8 0.7   ALKPHOS 102 103   AST 19 17   ALT 9*  10   ANIONGAP 11 9   EGFRNONAA >60.0 >60.0     Cardiac Markers: No results for input(s): CKMB, MYOGLOBIN, BNP, TROPISTAT in the last 48 hours.  Coagulation: No results for input(s): PT, INR, APTT in the last 48 hours.  Lactic Acid:   No results for input(s): LACTATE in the last 48 hours.  Lipase:   No results for input(s): LIPASE in the last 48 hours.  Lipid Panel: No results for input(s): CHOL, HDL, LDLCALC, TRIG, CHOLHDL in the last 48 hours.  Magnesium:     Recent Labs  Lab 05/27/18  0651 05/28/18  0635   MG 1.7 1.7     Pathology Results  (Last 10 years)    None        POCT Glucose: No results for input(s): POCTGLUCOSE in the last 48 hours.  Prealbumin: No results for input(s): PREALBUMIN in the last 48 hours.  Respiratory Culture: No results for input(s): GSRESP, RESPIRATORYC in the last 48 hours.  Troponin: No results for input(s): TROPONINI in the last 48 hours.  TSH:     Recent Labs  Lab 05/26/18  1512   TSH 7.626*     Urine Culture: No results for input(s): LABURIN in the last 48 hours.  Urine Studies: No results for input(s): COLORU, APPEARANCEUA, PHUR, SPECGRAV, PROTEINUA, GLUCUA, KETONESU, BILIRUBINUA, OCCULTUA, NITRITE, UROBILINOGEN, LEUKOCYTESUR, RBCUA, WBCUA, BACTERIA, SQUAMEPITHEL, HYALINECASTS in the last 48 hours.    Invalid input(s): WRIGHTSUR  All pertinent labs within the past 24 hours have been reviewed.    Significant Imaging: I have reviewed all pertinent imaging results/findings within the past 24 hours.   CT ABDOMEN AND PELVIS  In this patient with a history of colon cancer status post partial distal colectomy and reanastomosis, CT findings are compatible with a moderate severity pancolitis.  5.5 cm left adnexal cyst.  Further evaluation with pelvic ultrasound is recommended.  Cholelithiasis.  Compression deformity of the T11 vertebral body, unchanged.  Remote fracture of the right inferior pubic ramus.  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    Assessment/Plan:       * Clostridium difficile infection    - Patient w/ new onset diarrhea ( 6-7x/day) for about  3-4 days  - Noted blood streaks per daughter  - On admission pt w/ leukocytosis of 20.22  - Afebrile at home and on admission  - Cdiff toxin and antigen positive  - Stool Cx and E.coli 0157 in process  - Bcx w/ NGTD  - Contact precautions in place  - Vancomycin PO started; responding well   - CT abdomen/pelvis w/ noted pancolitis  - Worsening diarrhea noted at the end of the shift by the nursing staff; patient is not ready for discharge at this time; will re-evaluate patient for later discharge date          Preventive measure    PER WOUND CARE TEAM   Pressure injury prevention interventions to include:  -NO diapers  -Critic-aid Clear to sacrum/buttocks  -JOSR bed surface  -Repositioning every 2 hours  -Heel foam dressings          Hypokalemia    - K 2.7 this AM; 3.6 this AM; resolved  - Replaced w/ 20 mEq IV and 50 mEq of K-lyte x 2 PO  - Will monitor w/ daily labs  - Patient asymptomatic         Alteration in skin integrity    - Sacral pressure injury noted  - b/l Heel wounds likely 2/2 friction w/ footwear   - Wound care consulted; appreciate recommendations         Anxiety    - Continue Zoloft           Diarrhea of presumed infectious origin    - See C.Diff          Leukocytosis    - WBC 20.22 on admission; improving on current therapy to 13.72 this AM  - Has been evaluated for Leukocytosi by Dr. Fyr in the past w/ low suspicion of myeloproliferative disorder  - Patient w/ new onset diarrhea  2/2 C.diff  - See Diarrhea         Decreased mobility    - PT/OT consulted - recommendation for H/H  - Patient mostly wheelchair bound although, per daughter, she is able to ambulate w/ balance belt at times  - Previously w/ H/H PT/OT         History of colon cancer    - Remote Hx of Colon cancer s/p resection  - Stable         Dementia    - Alzheimer per daughter at bedside  - Continue home Donepezil  - Delirium precautions  -  Fall precautions in place  - [facility] PRN        Carotid arterial disease    - 9/2014 b/l US carotids - 40-59% stenosis of the right internal carotid artery with homogeneous plaque   and 1 - 39% stenosis of the left internal carotid artery with mild homogeneous plaque  - Continue ASA and Statin for stroke prevention         Hypothyroidism    - Continue home synthroid  - TSH 7,626        Vitamin D deficiency disease    - Continue home Vit D supplementation         Memory loss    - See dementia           VTE Risk Mitigation         Ordered     enoxaparin injection 40 mg  Daily      05/26/18 1904     IP VTE HIGH RISK PATIENT  Once      05/26/18 1958     Place GERARD hose  Until discontinued      05/26/18 1958     Place sequential compression device  Until discontinued      05/26/18 1904              Cris Calvo MD  Department of Hospital Medicine   [facility]

## 2018-05-28 NOTE — ASSESSMENT & PLAN NOTE
- PT/OT consulted - recommendation for H/H  - Patient mostly wheelchair bound although, per daughter, she is able to ambulate w/ balance belt at times  - Previously w/ H/H PT/OT

## 2018-05-28 NOTE — PHARMACY MED REC
"Admission Medication Reconciliation - Pharmacy Consult Note    The home medication history was taken by Jelly Samson, Pharmacy Technician.  Based on information gathered and subsequent review by the clinical pharmacist, the items below may need attention.     You may go to "Admission" then "Reconcile Home Medications" tabs to review and/or act upon these items.     Potentially problematic discrepancies with current MAR  o Patient IS NOT taking the following which was ordered upon admit  o Azelastine 137 mcg nasal spray   o Patient is taking a drug DIFFERENTLY than how ordered upon admit  o Sertraline 75 mg PO daily (home regimen); inpatient order- 25 mg PO daily     Please address this information as you see fit.  Feel free to contact us if you have any questions or require assistance.    Tamiko Ambrose, PharmD  [id number]     Medications prior to admission:  Medication Sig    ALPRAZolam (XANAX) 0.25 MG tablet Take 1/4 tablet by mouth as needed for anxiety    aspirin (ECOTRIN) 325 MG EC tablet Take 1 tablet (325 mg total) by mouth once daily.    atorvastatin (LIPITOR) 40 MG tablet TAKE 1 TABLET (40 MG TOTAL) BY MOUTH ONCE DAILY.    CALCIUM CITRATE/VITAMIN D3 (CITRACAL + D MAXIMUM ORAL) Take 1 tablet by mouth once daily.     cholecalciferol, vitamin D3, (VITAMIN D3) 1,000 unit capsule Take 1,000 Units by mouth once daily.      cyanocobalamin (VITAMIN B-12) 500 MCG tablet Take 500 mcg by mouth once daily.     donepezil (ARICEPT) 10 MG tablet Take 1 tablet (10 mg total) by mouth every evening.    levothyroxine (SYNTHROID) 100 MCG tablet 1 Tablet Oral every morning except 1.5 pills on Sundays.    multivitamin (ONE DAILY MULTIVITAMIN) per tablet Take 1 tablet by mouth once daily.    QUEtiapine (SEROQUEL) 25 MG Tab TAKE 1 TABLET (25 MG TOTAL) BY MOUTH NIGHTLY AS NEEDED. (Patient taking differently: Take 25 mg by mouth every evening. )    sertraline HCl (ZOLOFT ORAL) Take 75 mg by mouth once daily.    walker " (ULTRA-LIGHT ROLLATOR) Misc 1 each by Misc.(Non-Drug; Combo Route) route once daily.      .    .

## 2018-05-28 NOTE — PT/OT/SLP EVAL
Occupational Therapy   Evaluation    Name: Arina Adame  MRN: [id number]  Admitting Diagnosis:  Diarrhea of presumed infectious origin      Recommendations:     Discharge Recommendations: home with home health  Discharge Equipment Recommendations:  none  Barriers to discharge:  None    History:     Occupational Profile:  Living Environment: Pt lives in her home with daughter and son in law living with her to assist  Previous level of function: Pt with overall min A and supervision for safety and completion of self care tasks  Equipment Owned:  walker, rolling, shower chair, wheelchair  Assistance upon Discharge: daughter and son in law available to assist after DC     Past Medical History:   Diagnosis Date    Alzheimer disease     Arthritis     B12 nutritional deficiency 8/6/2012    Carotid arterial disease 8/27/2013    Cataract     Colon cancer     Elevated blood pressure (not hypertension) 3/17/2014    Epiretinal membrane     Essential hypertension 3/16/2016    Hearing loss 3/17/2014    History of colon cancer 5/28/2015    [place] (hard of hearing)     Hypothyroidism 11/21/2012    IGT (impaired glucose tolerance) 2/11/2015    Macrocytosis 11/21/2012    Nevus of choroid     Osteoporosis, 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  Pain/Comfort:  · Pain Rating 1: 0/10    Patients cultural, spiritual, Yarsanism conflicts given the current situation: none stated     Objective:     Patient found with:  (all lines intact )    General Precautions: Standard, fall   Orthopedic Precautions:N/A   Braces: N/A      Occupational Performance:    Bed Mobility:    · Patient completed Rolling/Turning to Right with minimum assistance  · Patient completed Scooting/Bridging with minimum assistance  · Patient completed Supine to Sit with moderate assistance  · Patient completed Sit to Supine with moderate assistance    Functional Mobility/Transfers:  · Patient completed Sit <> Stand Transfer with moderate assistance  with  no assistive device   · Functional Mobility: Pt able to stand at edge of bed with moderate A     Activities of Daily Living:  · Pt mod A and verbal cues for self care completion     Cognitive/Visual Perceptual:  Cognitive/Psychosocial Skills:     -       Oriented to: Person and Place   -       Follows Commands/attention:Follows one-step commands  -       Communication: clear/fluent  -       Memory: Impaired LTM  -       Safety awareness/insight to disability: impaired   -       Mood/Affect/Coping skills/emotional control: Appropriate to situation    Physical Exam:  Upper Extremity Range of Motion:     -       Right Upper Extremity: WFL  -       Left Upper Extremity: WFL  Upper Extremity Strength:    -       Right Upper Extremity: WFL  -       Left Upper Extremity: WFL    Patient left supine with all lines intact    AMPAC 6 Click:  AMPAC Total Score: 18    Treatment & Education:  Evaluation complete.  Pt educated on safety, role of OT, importance of increased participation in self care for gains , expectations for participation, expectations for gains, POC, energy conservation, caregiver strain. White board updated.     Education:    Assessment:     Arina Adame is a 89 y.o. female with a medical diagnosis of Diarrhea of presumed infectious origin.  She presents with pleasant confusion and williningness to participate.  Pt desiring to return to home.  Pt with potential for improved performance but with dementia and approaching baseline performance   Performance deficits affecting function are impaired  "endurance, impaired self care skills, weakness, impaired functional mobilty.      Rehab Prognosis:  Good ; patient would benefit from acute skilled OT services to address these deficits and reach maximum level of function.         Clinical Decision Makin.  OT Low:  "Pt evaluation falls under low complexity for evaluation coding due to performance deficits noted in 1-3 areas as stated above and 0 co-morbities affecting current functional status. Data obtained from problem focused assessments. No modifications or assistance was required for completion of evaluation. Only brief occupational profile and history review completed."     Plan:     Patient to be seen 3 x/week to address the above listed problems via self-care/home management, therapeutic activities, therapeutic exercises  · Plan of Care Expires: 18  · Plan of Care Reviewed with: patient    This Plan of care has been discussed with the patient who was involved in its development and understands and is in agreement with the identified goals and treatment plan    GOALS:    Occupational Therapy Goals     Not on file                Time Tracking:     OT Date of Treatment: 18  OT Start Time: 0945  OT Stop Time: 1000  OT Total Time (min): 15 min    Billable Minutes:Evaluation 15    Ruth Pina, OT  2018    "

## 2018-05-28 NOTE — ASSESSMENT & PLAN NOTE
- Patient w/ new onset diarrhea ( 6-7x/day) for about  3-4 days  - Noted blood streaks per daughter  - On admission pt w/ leukocytosis of 20.22  - Afebrile at home and on admission  - Cdiff toxin and antigen positive  - Stool Cx and E.coli 0157 in process  - Bcx w/ NGTD  - Contact precautions in place  - Vancomycin PO started; responding well   - CT abdomen/pelvis w/ noted pancolitis  - Worsening diarrhea noted at the end of the shift by the nursing staff; patient is not ready for discharge at this time; will re-evaluate patient for later discharge date

## 2018-05-28 NOTE — SUBJECTIVE & OBJECTIVE
Review of Systems   Constitutional: Negative for activity change, appetite change, chills, diaphoresis, fatigue and fever.   HENT: Positive for rhinorrhea.    Respiratory: Negative for cough, choking, shortness of breath, wheezing and stridor.    Cardiovascular: Negative for chest pain, palpitations and leg swelling.   Gastrointestinal: Positive for blood in stool, diarrhea and vomiting. Negative for abdominal distention and abdominal pain.   Endocrine: Negative.    Genitourinary: Negative.    Musculoskeletal: Negative.    Skin: Positive for pallor and wound.   Allergic/Immunologic: Negative.    Neurological: Negative.    Hematological: Negative.    Psychiatric/Behavioral: Positive for confusion (2/2 Dementia ).     Objective:     Vital Signs (Most Recent):  Temp: 97.3 °F (36.3 °C) (05/28/18 1402)  Pulse: 90 (05/28/18 1150)  Resp: 18 (05/28/18 1150)  BP: 132/82 (05/28/18 1150)  SpO2: (!) 94 % (05/28/18 1150) Vital Signs (24h Range):  Temp:  [97.3 °F (36.3 °C)-98.5 °F (36.9 °C)] 97.3 °F (36.3 °C)  Pulse:  [] 90  Resp:  [13-23] 18  SpO2:  [94 %-95 %] 94 %  BP: (105-147)/(72-99) 132/82     Weight: 48.9 kg (107 lb 14.4 oz)  Body mass index is 19.74 kg/m².    Physical Exam   Constitutional: She appears well-developed and well-nourished.   Pleasant, mildly confused elderly female in no acute distress   HENT:   Head: Normocephalic and atraumatic.   Mouth/Throat: Oropharynx is clear and moist.   Dental work noted   Eyes: Conjunctivae and EOM are normal.   Neck: Normal range of motion. Neck supple. No JVD present.   Cardiovascular: Normal rate and regular rhythm.    No murmur heard.  Pulmonary/Chest: Effort normal and breath sounds normal. She has no wheezes. She has no rales.   Abdominal: Soft. Bowel sounds are normal. She exhibits no distension. There is no tenderness.   Musculoskeletal: Normal range of motion. She exhibits no edema, tenderness or deformity.   Neurological: She is alert.   Aware that she is in the  hospital and what year it is. Recognizes family at bedside and converses appropriately w/ some mild delay and word finding difficulties. Patient aware that she is here for episodes of diarrhea but denies any other symptoms.    Skin: Skin is warm. Capillary refill takes less than 2 seconds. No rash noted. There is pallor.   Wounds noted on b/l heels, s/p friction injury on the back of shoes as described by daughter   Psychiatric: She has a normal mood and affect.         CRANIAL NERVES     CN III, IV, VI   Extraocular motions are normal.        Significant Labs:   A1C:     Recent Labs  Lab 05/26/18  1512   HGBA1C 5.3     ABGs: No results for input(s): PH, PCO2, HCO3, POCSATURATED, BE, TOTALHB, COHB, METHB, O2HB, POCFIO2 in the last 48 hours.  Bilirubin:     Recent Labs  Lab 05/26/18  1512 05/27/18  0651 05/28/18  0635   BILITOT 1.0 0.8 0.7     Blood Culture:     Recent Labs  Lab 05/26/18  1634   LABBLOO No Growth to date  No Growth to date     BMP:     Recent Labs  Lab 05/28/18  0635   GLU 90      K 3.6      CO2 27   BUN 12   CREATININE 0.8   CALCIUM 9.2   MG 1.7     CBC:     Recent Labs  Lab 05/27/18  0651 05/28/18  0635   WBC 16.71* 13.72*   HGB 11.6* 11.8*   HCT 36.2* 36.8*    257     CMP:     Recent Labs  Lab 05/27/18  0651 05/28/18  0635    142   K 2.7* 3.6    106   CO2 24 27   GLU 79 90   BUN 9 12   CREATININE 0.7 0.8   CALCIUM 8.6* 9.2   PROT 6.1 6.0   ALBUMIN 2.6* 2.6*   BILITOT 0.8 0.7   ALKPHOS 102 103   AST 19 17   ALT 9* 10   ANIONGAP 11 9   EGFRNONAA >60.0 >60.0     Cardiac Markers: No results for input(s): CKMB, MYOGLOBIN, BNP, TROPISTAT in the last 48 hours.  Coagulation: No results for input(s): PT, INR, APTT in the last 48 hours.  Lactic Acid:   No results for input(s): LACTATE in the last 48 hours.  Lipase:   No results for input(s): LIPASE in the last 48 hours.  Lipid Panel: No results for input(s): CHOL, HDL, LDLCALC, TRIG, CHOLHDL in the last 48 hours.  Magnesium:      Recent Labs  Lab 05/27/18  0651 05/28/18  0635   MG 1.7 1.7     Pathology Results  (Last 10 years)    None        POCT Glucose: No results for input(s): POCTGLUCOSE in the last 48 hours.  Prealbumin: No results for input(s): PREALBUMIN in the last 48 hours.  Respiratory Culture: No results for input(s): GSRESP, RESPIRATORYC in the last 48 hours.  Troponin: No results for input(s): TROPONINI in the last 48 hours.  TSH:     Recent Labs  Lab 05/26/18  1512   TSH 7.626*     Urine Culture: No results for input(s): LABURIN in the last 48 hours.  Urine Studies: No results for input(s): COLORU, APPEARANCEUA, PHUR, SPECGRAV, PROTEINUA, GLUCUA, KETONESU, BILIRUBINUA, OCCULTUA, NITRITE, UROBILINOGEN, LEUKOCYTESUR, RBCUA, WBCUA, BACTERIA, SQUAMEPITHEL, HYALINECASTS in the last 48 hours.    Invalid input(s): WRIGHTSUR  All pertinent labs within the past 24 hours have been reviewed.    Significant Imaging: I have reviewed all pertinent imaging results/findings within the past 24 hours.   CT ABDOMEN AND PELVIS  In this patient with a history of colon cancer status post partial distal colectomy and reanastomosis, CT findings are compatible with a moderate severity pancolitis.  5.5 cm left adnexal cyst.  Further evaluation with pelvic ultrasound is recommended.  Cholelithiasis.  Compression deformity of the T11 vertebral body, unchanged.  Remote fracture of the right inferior pubic ramus.  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

## 2018-05-28 NOTE — PLAN OF CARE
[facility]    HOME HEALTH ORDERS  FACE TO FACE ENCOUNTER    Patient Name: Arina Adame  YOB: 1928    PCP: Zeinab Meier MD   PCP Address: [address] / [place] [address]  PCP Phone Number: [phone]  PCP Fax: [phone]    Encounter Date: 06/01/2018    Admit to Home Health    Diagnoses:  Active Hospital Problems    Diagnosis  POA    *Clostridium difficile infection [B96.89]  Unknown    Preventive measure [Z29.9]  Not Applicable    Hypokalemia [E87.6]  Unknown    Diarrhea of presumed infectious origin [R19.7]  Yes    Anxiety [F41.9]  Unknown    Alteration in skin integrity [R23.9]  Unknown    Leukocytosis [D72.829]  Yes    Decreased mobility [R26.89]  Yes    History of colon cancer [Z85.038]  Yes    Dementia [F03.90]  Yes    Carotid arterial disease [I77.9]  Yes    Hypothyroidism [E03.9]  Yes    Vitamin D deficiency disease [E55.9]  Yes    Memory loss [R41.3]  Yes      Resolved Hospital Problems    Diagnosis Date Resolved POA   No resolved problems to display.       Future Appointments  Date Time Provider Department Center   6/20/2018 9:20 AM Zeinab Meier MD [facility] Daniel Mitchell PCW     Follow-up Information     Zeinab Meier MD In 2 weeks.    Specialty:  Internal Medicine  Contact information:  [address]  [facility] [address]  [phone]                     I have seen and examined this patient face to face today. My clinical findings that support the need for the home health skilled services and home bound status are the following:  Weakness/numbness causing balance and gait disturbance due to Weakness/Debility making it taxing to leave home.  Requiring assistive device to leave home due to unsteady gait caused by  Weakness/Debility.  Patient with medication mismanagement issues requiring home bound status as evidenced by  Poor adherence to medication regimen/dosage.  Medical restrictions requiring assistance of another human to leave home due to   Unstable ambulation.  Mental confusion making it unsafe for patient to leave home alone due to  Dementia.    Allergies:  Review of patient's allergies indicates:   Allergen Reactions    Codeine      Other reaction(s): Unknown    Iodine      Other reaction(s): Unknown    Penicillins      Other reaction(s): Unknown       Diet: regular diet    Activities: activity as tolerated    Nursing:   SN to complete comprehensive assessment including routine vital signs. Instruct on disease process and s/s of complications to report to MD. Review/verify medication list sent home with the patient at time of discharge  and instruct patient/caregiver as needed. Frequency may be adjusted depending on start of care date.    Notify MD if SBP > 160 or < 90; DBP > 90 or < 50; HR > 120 or < 50; Temp > 101      CONSULTS:    Physical Therapy to evaluate and treat. Evaluate for home safety and equipment needs; Establish/upgrade home exercise program. Perform / instruct on therapeutic exercises, gait training, transfer training, and Range of Motion.  Occupational Therapy to evaluate and treat. Evaluate home environment for safety and equipment needs. Perform/Instruct on transfers, ADL training, ROM, and therapeutic exercises.  Speech Therapy  to evaluate and treat for  Cognition.   to evaluate for community resources/long-range planning.  Aide to provide assistance with personal care, ADLs, and vital signs.    MISCELLANEOUS CARE:  N/A    WOUND CARE ORDERS  Bilateral heels and sacrum/right buttocks with blanchable redness that was relieved when offloaded. Siddhartha score 13  Recommendations:  Pressure injury prevention interventions to include-   -NO diapers  -Critic-aid Clear to sacrum/buttocks  -JOSR bed surface  -Repositioning every 2 hours   -Heel foam dressings      Medications: Review discharge medications with patient and family and provide education.      Current Discharge Medication List      START taking these medications     Details   vancomycin 25 mg/mL solution Take 5 mLs (125 mg total) by mouth every 6 hours  Qty: 160 mL, Refills: 0         CONTINUE these medications which have CHANGED    Details   donepezil (ARICEPT) 10 MG tablet Take 1 tablet (10 mg total) by mouth every evening.  Qty: 90 tablet, Refills: 3    Associated Diagnoses: Late onset Alzheimer's disease without behavioral disturbance      levothyroxine (SYNTHROID) 100 MCG tablet 1 Tablet Oral every morning except 1.5 pills on Sundays.  Qty: 90 tablet, Refills: 3         CONTINUE these medications which have NOT CHANGED    Details   ALPRAZolam (XANAX) 0.25 MG tablet Take 1/4 tablet by mouth as needed for anxiety      aspirin (ECOTRIN) 325 MG EC tablet Take 1 tablet (325 mg total) by mouth once daily.  Qty: 30 tablet, Refills: 11      atorvastatin (LIPITOR) 40 MG tablet TAKE 1 TABLET (40 MG TOTAL) BY MOUTH ONCE DAILY.  Qty: 90 tablet, Refills: 3    Associated Diagnoses: Mixed hyperlipidemia      CALCIUM CITRATE/VITAMIN D3 (CITRACAL + D MAXIMUM ORAL) Take 1 tablet by mouth once daily.       cholecalciferol, vitamin D3, (VITAMIN D3) 1,000 unit capsule Take 1,000 Units by mouth once daily.        cyanocobalamin (VITAMIN B-12) 500 MCG tablet Take 500 mcg by mouth once daily.       multivitamin (ONE DAILY MULTIVITAMIN) per tablet Take 1 tablet by mouth once daily.      QUEtiapine (SEROQUEL) 25 MG Tab TAKE 1 TABLET (25 MG TOTAL) BY MOUTH NIGHTLY AS NEEDED.  Qty: 90 tablet, Refills: 3    Associated Diagnoses: Late onset Alzheimer's disease without behavioral disturbance; Insomnia, unspecified type      sertraline HCl (ZOLOFT ORAL) Take 75 mg by mouth once daily.      walker (ULTRA-LIGHT ROLLATOR) Misc 1 each by Misc.(Non-Drug; Combo Route) route once daily.  Qty: 1 each, Refills: 0    Associated Diagnoses: Debility; Unsteady gait             I certify that this patient is confined to her home and needs intermittent skilled nursing care, physical therapy, speech therapy and  occupational therapy.

## 2018-05-28 NOTE — ASSESSMENT & PLAN NOTE
PER WOUND CARE TEAM   Pressure injury prevention interventions to include:  -NO diapers  -Critic-aid Clear to sacrum/buttocks  -JOSR bed surface  -Repositioning every 2 hours  -Heel foam dressings

## 2018-05-28 NOTE — PLAN OF CARE
Extended Emergency Contact Information  Primary Emergency Contact: Obdulia Renetria  Address: [address]           [place] [address] [place]  Home Phone: [phone]  Mobile Phone: [phone]  Relation: Daughter    Zeinab Meier MD  [address] / [place] [address]    Future Appointments  Date Time Provider Department Center   6/20/2018 9:20 AM Zeinab Meier MD [facility] Daniel Arndt PCW     Payor: [employer] MANAGED MEDICARE / Plan: [employer] MEDICARE HMO / Product Type: Capitation /       [facility] - [place], [place] - [address] [facility]  [address] [facility] [address]  Phone: [phone] Fax: [phone]    [facility] [id number] [place] [address] [address] [facility] [address]  [address]  [facility] [address]  Phone: [phone] Fax: [phone]       05/28/18 1626   Discharge Assessment   Assessment Type Discharge Planning Assessment   Confirmed/corrected address and phone number on facesheet? Yes   Assessment information obtained from? Patient;Caregiver;Medical Record   Expected Length of Stay (days) 2   Communicated expected length of stay with patient/caregiver yes   Prior to hospitilization cognitive status: Alert/Oriented   Prior to hospitalization functional status: Assistive Equipment   Current cognitive status: Alert/Oriented   Current Functional Status: Assistive Equipment   Lives With child(rosi), adult   Able to Return to Prior Arrangements yes   Is patient able to care for self after discharge? No   Patient's perception of discharge disposition home health   Readmission Within The Last 30 Days no previous admission in last 30 days   Patient currently being followed by outpatient case management? No   Patient currently receives any other outside agency services? No   Equipment Currently Used at Home walker, rolling;shower chair;wheelchair   Do you have any problems affording any of your prescribed medications? No   Is the patient  taking medications as prescribed? yes   Does the patient have transportation home? Yes   Transportation Available family or friend will provide   Does the patient receive services at the [facility]? No   Discharge Plan A Home with family;Home Health   Discharge Plan B Home with family   Patient/Family In Agreement With Plan yes

## 2018-05-28 NOTE — ASSESSMENT & PLAN NOTE
- Sacral pressure injury noted  - b/l Heel wounds likely 2/2 friction w/ footwear   - Wound care consulted; appreciate recommendations

## 2018-05-28 NOTE — DISCHARGE SUMMARY
[facility] Hospital Medicine  Discharge Summary      Patient Name: Arina Adame  MRN: [id number]  Admission Date: 5/26/2018  Hospital Length of Stay: 3 days  Discharge Date and Time:  05/29/2018 7:32AM  Attending Physician: Abdoulaye Giles MD   Discharging Provider: Cris Calvo MD  Primary Care Provider: Zeinab Meier MD  [place] [facility] Medicine Team: [facility] HOSP MED 1 Cris Calvo MD    HPI:   Patient is an 89 y.o. W/ past medical history significant for dementia, carotid artery disease, HTN, remote Hx of colon CA s/p partial distal colectomy and reanastomosis( Klebsiella, E.coli, Strep.Viridans) , hypothyroidism, recurrent UTIs and decubitus ulcers as well as recent evaluation by Heme/Onc for neutrophilia presents to the ED w/ daughter for evaluation of new onset loose BM w/ streaks of blood. Per daughter the symptoms started on 23rd, when she noted patient having brown bowel movements w/ some noted mucous and blood in the toilet bowl. The stools proceeded to become more loose and she had been having about 5-7 BM for about 2 days. Denies any abdominal pain or discomfort, although per daughter, the patient has decreased pain awareness.  Patient also had an episode of NBNB emesis x 1 time 2 days PTA after consuming some pizza and a cookie before bed.   Patient is primarily wheelchair bound. Her daughter brings her in and cares for much of her needs at home.  Patient without lymphadenopathy, fevers/chills at home, denies chest pain, palpitations, dysuria, recent travel, changes in her diet, consumption of raw/uncooked foods or recent Abx use. Patient's daughter states that she has completed a course of ciprofloxacin for UTI/malodorous urine about 3 months ago (Klebsiella).     * No surgery found *      Hospital Course:   Patient admitted to -1 for new onset diarrhea and leukocytosis w/ presumed Dx of C.Diff. Patient w/ positive C.diff Tox and antigen; started Tx w/ PO vancomycin solution Q6 125; diarrhea  and leukocytosis are both improving. Patient w/ worsening diarrhea on 05/28 so will delay discharge until improved and bulked stools.  Morning of 5/29, patient doing much better with no BM.  Patient was ready for discharge but daughter stated that she cannot care for her at home at this moment, thus consult to [place] [facility] placed and CM aware. Patient was denied by [employer] for SNF placement on 06/01 and will be discharged home w/ H/H PT/OT/Wound care as well as a hospital bed per daughter's request.  Follow-up w/ PCP scheduled.     Consults:   Consults         Status Ordering Provider     Inpatient consult to [facility]  Once     Provider:  (Not yet assigned)    Acknowledged SEJAL LIU          Final Active Diagnoses:    Diagnosis Date Noted POA    PRINCIPAL PROBLEM:  Clostridium difficile infection [B96.89] 05/28/2018 Unknown    Preventive measure [Z29.9] 05/28/2018 Not Applicable    Hypokalemia [E87.6] 05/27/2018 Unknown    Diarrhea of presumed infectious origin [R19.7] 05/26/2018 Yes    Anxiety [F41.9] 05/26/2018 Unknown    Alteration in skin integrity [R23.9] 05/26/2018 Unknown    Leukocytosis [D72.829] 03/08/2018 Yes    Decreased mobility [R26.89] 10/28/2016 Yes    History of colon cancer [Z85.038] 05/28/2015 Yes    Dementia [F03.90] 09/24/2014 Yes    Carotid arterial disease [I77.9] 08/27/2013 Yes    Hypothyroidism [E03.9] 11/21/2012 Yes    Vitamin D deficiency disease [E55.9] 08/06/2012 Yes    Memory loss [R41.3] 08/06/2012 Yes      Problems Resolved During this Admission:    Diagnosis Date Noted Date Resolved POA     Interval History: NAEON. Minimal loose stools at this time, and symptoms continue to improve on appropriate therapy. Home w/ H/H today. Patient w/o any additional complaints at this time and is tolerating PO Vanc well.      Review of Systems   Constitutional: Negative for activity change, appetite change, chills, diaphoresis, fatigue and fever.   Respiratory: Negative for  cough, choking, shortness of breath, wheezing and stridor.    Cardiovascular: Negative for chest pain, palpitations and leg swelling.   Gastrointestinal: Positive for diarrhea and vomiting. Negative for abdominal distention and abdominal pain.   Endocrine: Negative.    Genitourinary: Negative.    Musculoskeletal: Negative.    Skin: Positive for pallor and wound.   Allergic/Immunologic: Negative.    Neurological: Negative.    Hematological: Negative.    Psychiatric/Behavioral: Positive for confusion (2/2 Dementia ).      Objective:      Vital Signs (Most Recent):  Temp: 97.5 °F (36.4 °C) (06/01/18 1328)  Pulse: 109 (06/01/18 1300)  Resp: 18 (06/01/18 1300)  BP: 116/79 (06/01/18 1300)  SpO2: 95 % (06/01/18 1300) Vital Signs (24h Range):  Temp:  [97.5 °F (36.4 °C)-98.4 °F (36.9 °C)] 97.5 °F (36.4 °C)  Pulse:  [] 109  Resp:  [14-20] 18  SpO2:  [92 %-97 %] 95 %  BP: (116-145)/(71-94) 116/79      Weight: 48.9 kg (107 lb 14.4 oz)  Body mass index is 19.74 kg/m².     Physical Exam   Constitutional: She appears well-developed and well-nourished.   Pleasant, mildly confused elderly female in no acute distress   HENT:   Head: Normocephalic and atraumatic.   Mouth/Throat: Oropharynx is clear and moist.   Dental work noted   Eyes: Conjunctivae and EOM are normal.   Neck: Normal range of motion. Neck supple. No JVD present.   Cardiovascular: Normal rate and regular rhythm.    No murmur heard.  Pulmonary/Chest: Effort normal and breath sounds normal. She has no wheezes. She has no rales.   Abdominal: Soft. Bowel sounds are normal. She exhibits no distension. There is no tenderness.   Musculoskeletal: Normal range of motion. She exhibits no edema, tenderness or deformity.   Neurological: She is alert.   Very pleasant.  Orientated to person, place but not time. Unsure if she has had BM   Skin: Skin is warm. Capillary refill takes less than 2 seconds. No rash noted. There is pallor.   Wounds noted on b/l heels, s/p friction  "injury on the back of shoes as described by daughter   Psychiatric: She has a normal mood and affect.           CRANIAL NERVES      CN III, IV, VI   Extraocular motions are normal.        Discharged Condition: stable    Disposition: Home-Health Care [facility]    Follow Up:  Follow-up Information     Zeinab Meier MD On 6/20/2018.    Specialty:  Internal Medicine  Why:  At 09:20 AM, Hospital Follow-up with PCP  Contact information:  [address]  [place] [address]  [phone]             [facility].    Specialties:  Home Health Services, Hospice and Palliative Medicine  Why:  Home Health  Contact information:  [address] [address]  [phone]                 Patient Instructions:     HOSPITAL BED FOR HOME USE   Order Specific Question Answer Comments   Type: Semi-electric    Length of need (1-99 months): 99    Does patient have medical equipment at home? walker, rolling    Does patient have medical equipment at home? shower chair    Does patient have medical equipment at home? wheelchair    Height: 5' 2" (1.575 m)    Weight: 48.9 kg (107 lb 14.4 oz)    Please check all that apply: Patient requires, for the alleviation of pain, positioning of the body in ways not feasible in an ordinary bed.      Diet Adult Regular     Diet Adult Regular     Activity as tolerated     Notify your health care provider if you experience any of the following:  temperature >100.4     Notify your health care provider if you experience any of the following:  persistent nausea and vomiting or diarrhea     Notify your health care provider if you experience any of the following:  severe uncontrolled pain     Notify your health care provider if you experience any of the following:  redness, tenderness, or signs of infection (pain, swelling, redness, odor or green/yellow discharge around incision site)     Notify your health care provider if you experience any of the following:  difficulty breathing or increased " cough     Notify your health care provider if you experience any of the following:  severe persistent headache     Notify your health care provider if you experience any of the following:  worsening rash     Notify your health care provider if you experience any of the following:  persistent dizziness, light-headedness, or visual disturbances     Notify your health care provider if you experience any of the following:  increased confusion or weakness     No dressing needed     Activity as tolerated     Notify your health care provider if you experience any of the following:  temperature >100.4     Notify your health care provider if you experience any of the following:  persistent nausea and vomiting or diarrhea     Notify your health care provider if you experience any of the following:  severe uncontrolled pain     Notify your health care provider if you experience any of the following:  persistent dizziness, light-headedness, or visual disturbances     Notify your health care provider if you experience any of the following:  increased confusion or weakness         Significant Diagnostic Studies: Labs:   BMP: No results for input(s): GLU, NA, K, CL, CO2, BUN, CREATININE, CALCIUM, MG in the last 48 hours., CMP No results for input(s): NA, K, CL, CO2, GLU, BUN, CREATININE, CALCIUM, PROT, ALBUMIN, BILITOT, ALKPHOS, AST, ALT, ANIONGAP, ESTGFRAFRICA, EGFRNONAA in the last 48 hours., CBC No results for input(s): WBC, HGB, HCT, PLT in the last 48 hours., INR No results found for: INR, PROTIME, Lipid Panel   Lab Results   Component Value Date    CHOL 125 11/30/2016    HDL 41 11/30/2016    LDLCALC 70.4 11/30/2016    TRIG 68 11/30/2016    CHOLHDL 32.8 11/30/2016   , Troponin No results for input(s): TROPONINI in the last 168 hours., A1C:     Recent Labs  Lab 05/26/18  1512   HGBA1C 5.3    and All labs within the past 24 hours have been reviewed  Microbiology:   C. diff Antigen Negative Positive     C difficile Toxins A+B,  EIA Negative Positive       Blood Culture   Lab Results   Component Value Date    LABBLOO No growth after 5 days. 05/26/2018    and   Radiology: CT scan: CT ABDOMEN PELVIS WITHOUT CONTRAST:   Results for orders placed or performed during the hospital encounter of 05/26/18   CT Abdomen Pelvis  Without Contrast    Narrative    EXAMINATION:  CT ABDOMEN PELVIS WITHOUT CONTRAST    CLINICAL HISTORY:  diarrhea,;    TECHNIQUE:  Low dose axial images, sagittal and coronal reformations were obtained from the lung bases to the pubic symphysis.    COMPARISON:  Radiographs thoracic a lumbar spine 10/18/2017    FINDINGS:  Heart: Normal in size. No pericardial effusion.  Coronary arterial atherosclerotic calcification.    Lung Bases: Lung bases demonstrate peripheral reticulation and mild dependent atelectasis.  No pleural fluid.    Liver: Normal in size and attenuation, with no focal hepatic lesions.  Note is made of a calcified granuloma in the liver.    Gallbladder: Cholelithiasis.    Bile Ducts: No evidence of dilated ducts.    Pancreas: Age expected fatty atrophic change of the pancreas.    Spleen: Splenic granulomas.    Adrenals: Unremarkable.    Kidneys/ Ureters: Unremarkable.    Bladder: No evidence of wall thickening.    Reproductive organs: Calcified uterine fibroids.  There is a 5.5 x 3.4 cm left adnexal cyst.  Further evaluation with pelvic ultrasound is recommended.    GI Tract/Mesentery: There is a moderate-sized hiatal hernia.  Postoperative changes in keeping with partial distal colectomy with anastomotic line in the rectum..  Fluid filled colon with abnormal colonic wall thickening with surrounding pericolonic inflammatory changes involving the entire colon, suggestive for moderate pancolitis.    Peritoneal Space: No ascites. No free air.    Retroperitoneum: No significant adenopathy.    Abdominal wall: Unremarkable.    Vasculature: There is significant atherosclerotic calcification of the aorta and its branch  vessels.  Fusiform aneurysmal dilatation of the infrarenal abdominal aorta with a maximum dimension of 3.5 cm (series 2, image 31).    Bones: Compression deformity of the T11 vertebral body with superimposed superior endplate Schmorl's node, unchanged.  There are age-appropriate degenerative changes to the thoracolumbar spine and bilateral hip joints.  Remote fracture of the right inferior pubic ramus.      Impression    In this patient with a history of colon cancer status post partial distal colectomy and reanastomosis, CT findings are compatible with a moderate severity pancolitis.    5.5 cm left adnexal cyst.  Further evaluation with pelvic ultrasound is recommended.    Cholelithiasis.    Compression deformity of the T11 vertebral body, unchanged.    Remote fracture of the right inferior pubic ramus.    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    This report was flagged in Epic as abnormal.    Electronically signed by resident: Juliet Lehman  Date:    05/26/2018  Time:    16:42    Electronically signed by: Delon Carrera MD  Date:    05/26/2018  Time:    17:11       Pending Diagnostic Studies:     None         Medications:  Reconciled Home Medications:      Medication List      START taking these medications    vancomycin 25 mg/mL solution  Take 5 mLs (125 mg total) by mouth every 6 hours        CHANGE how you take these medications    QUEtiapine 25 MG Tab  Commonly known as:  SEROQUEL  TAKE 1 TABLET (25 MG TOTAL) BY MOUTH NIGHTLY AS NEEDED.  What changed:  · how much to take  · how to take this  · when to take this  · additional instructions        CONTINUE taking these medications    ALPRAZolam 0.25 MG tablet  Commonly known as:  XANAX  Take 1/4 tablet by mouth as needed for anxiety     aspirin 325 MG EC tablet  Commonly known as:  ECOTRIN  Take 1 tablet (325 mg total) by mouth once daily.     atorvastatin 40 MG tablet  Commonly known as:  LIPITOR  TAKE 1 TABLET (40 MG TOTAL) BY MOUTH  ONCE DAILY.     CITRACAL + D MAXIMUM ORAL  Take 1 tablet by mouth once daily.     donepezil 10 MG tablet  Commonly known as:  ARICEPT  Take 1 tablet (10 mg total) by mouth every evening.     levothyroxine 100 MCG tablet  Commonly known as:  SYNTHROID  1 Tablet Oral every morning except 1.5 pills on Sundays.     ONE DAILY MULTIVITAMIN per tablet  Generic drug:  multivitamin  Take 1 tablet by mouth once daily.     VITAMIN B-12 500 MCG tablet  Generic drug:  cyanocobalamin  Take 500 mcg by mouth once daily.     VITAMIN D3 1,000 unit capsule  Generic drug:  cholecalciferol (vitamin D3)  Take 1,000 Units by mouth once daily.     walker Misc  Commonly known as:  ULTRA-LIGHT ROLLATOR  1 each by Misc.(Non-Drug; Combo Route) route once daily.     ZOLOFT ORAL  Take 75 mg by mouth once daily.            Indwelling Lines/Drains at time of discharge:   Lines/Drains/Airways          No matching active lines, drains, or airways          Time spent on the discharge of patient: 35 minutes  Patient was seen and examined on the date of discharge and determined to be suitable for discharge.         Cris Calvo MD  Department of Hospital Medicine  [facility]

## 2018-05-29 DIAGNOSIS — F02.80 LATE ONSET ALZHEIMER'S DISEASE WITHOUT BEHAVIORAL DISTURBANCE: ICD-10-CM

## 2018-05-29 DIAGNOSIS — G30.1 LATE ONSET ALZHEIMER'S DISEASE WITHOUT BEHAVIORAL DISTURBANCE: ICD-10-CM

## 2018-05-29 LAB
ALBUMIN SERPL BCP-MCNC: 2.7 G/DL
ALP SERPL-CCNC: 96 U/L
ALT SERPL W/O P-5'-P-CCNC: 12 U/L
ANION GAP SERPL CALC-SCNC: 7 MMOL/L
AST SERPL-CCNC: 21 U/L
BASOPHILS # BLD AUTO: 0.06 K/UL
BASOPHILS NFR BLD: 0.5 %
BILIRUB SERPL-MCNC: 0.5 MG/DL
BUN SERPL-MCNC: 12 MG/DL
CALCIUM SERPL-MCNC: 9.4 MG/DL
CHLORIDE SERPL-SCNC: 103 MMOL/L
CO2 SERPL-SCNC: 28 MMOL/L
CREAT SERPL-MCNC: 0.8 MG/DL
DIFFERENTIAL METHOD: ABNORMAL
EOSINOPHIL # BLD AUTO: 0.4 K/UL
EOSINOPHIL NFR BLD: 2.8 %
ERYTHROCYTE [DISTWIDTH] IN BLOOD BY AUTOMATED COUNT: 14.1 %
EST. GFR  (AFRICAN AMERICAN): >60 ML/MIN/1.73 M^2
EST. GFR  (NON AFRICAN AMERICAN): >60 ML/MIN/1.73 M^2
GLUCOSE SERPL-MCNC: 118 MG/DL
HCT VFR BLD AUTO: 39.8 %
HGB BLD-MCNC: 12.6 G/DL
IMM GRANULOCYTES # BLD AUTO: 0.22 K/UL
IMM GRANULOCYTES NFR BLD AUTO: 1.8 %
LYMPHOCYTES # BLD AUTO: 1.7 K/UL
LYMPHOCYTES NFR BLD: 13.8 %
MAGNESIUM SERPL-MCNC: 1.9 MG/DL
MCH RBC QN AUTO: 30.7 PG
MCHC RBC AUTO-ENTMCNC: 31.7 G/DL
MCV RBC AUTO: 97 FL
MONOCYTES # BLD AUTO: 1.1 K/UL
MONOCYTES NFR BLD: 8.5 %
NEUTROPHILS # BLD AUTO: 9 K/UL
NEUTROPHILS NFR BLD: 72.6 %
NRBC BLD-RTO: 0 /100 WBC
O+P STL TRI STN: NORMAL
PHOSPHATE SERPL-MCNC: 3.8 MG/DL
PLATELET # BLD AUTO: 280 K/UL
PMV BLD AUTO: 10.7 FL
POCT GLUCOSE: 115 MG/DL (ref 70–110)
POTASSIUM SERPL-SCNC: 3.6 MMOL/L
PROT SERPL-MCNC: 6.3 G/DL
RBC # BLD AUTO: 4.11 M/UL
SODIUM SERPL-SCNC: 138 MMOL/L
WBC # BLD AUTO: 12.36 K/UL

## 2018-05-29 PROCEDURE — 63600175 PHARM REV CODE 636 W HCPCS: Performed by: STUDENT IN AN ORGANIZED HEALTH CARE EDUCATION/TRAINING PROGRAM

## 2018-05-29 PROCEDURE — 99232 SBSQ HOSP IP/OBS MODERATE 35: CPT | Mod: GC,,, | Performed by: HOSPITALIST

## 2018-05-29 PROCEDURE — 25000003 PHARM REV CODE 250: Performed by: STUDENT IN AN ORGANIZED HEALTH CARE EDUCATION/TRAINING PROGRAM

## 2018-05-29 PROCEDURE — 80053 COMPREHEN METABOLIC PANEL: CPT

## 2018-05-29 PROCEDURE — 11000001 HC ACUTE MED/SURG PRIVATE ROOM

## 2018-05-29 PROCEDURE — 84100 ASSAY OF PHOSPHORUS: CPT

## 2018-05-29 PROCEDURE — 36415 COLL VENOUS BLD VENIPUNCTURE: CPT

## 2018-05-29 PROCEDURE — 83735 ASSAY OF MAGNESIUM: CPT

## 2018-05-29 PROCEDURE — 85025 COMPLETE CBC W/AUTO DIFF WBC: CPT

## 2018-05-29 RX ORDER — LEVOTHYROXINE SODIUM 100 UG/1
TABLET ORAL
Qty: 90 TABLET | Refills: 3 | Status: SHIPPED | OUTPATIENT
Start: 2018-05-29

## 2018-05-29 RX ORDER — DONEPEZIL HYDROCHLORIDE 10 MG/1
10 TABLET, FILM COATED ORAL NIGHTLY
Qty: 90 TABLET | Refills: 3 | Status: SHIPPED | OUTPATIENT
Start: 2018-05-29

## 2018-05-29 RX ADMIN — LEVOTHYROXINE SODIUM 100 MCG: 100 TABLET ORAL at 06:05

## 2018-05-29 RX ADMIN — RAMELTEON 8 MG: 8 TABLET, FILM COATED ORAL at 09:05

## 2018-05-29 RX ADMIN — SERTRALINE HYDROCHLORIDE 75 MG: 50 TABLET ORAL at 10:05

## 2018-05-29 RX ADMIN — ATORVASTATIN CALCIUM 40 MG: 20 TABLET, FILM COATED ORAL at 10:05

## 2018-05-29 RX ADMIN — ENOXAPARIN SODIUM 40 MG: 100 INJECTION SUBCUTANEOUS at 06:05

## 2018-05-29 RX ADMIN — Medication 1000 UNITS: at 01:05

## 2018-05-29 RX ADMIN — ASPIRIN 325 MG: 325 TABLET, DELAYED RELEASE ORAL at 10:05

## 2018-05-29 RX ADMIN — Medication 125 MG: at 06:05

## 2018-05-29 RX ADMIN — QUETIAPINE FUMARATE 25 MG: 25 TABLET, FILM COATED ORAL at 09:05

## 2018-05-29 RX ADMIN — CYANOCOBALAMIN TAB 250 MCG 1000 MCG: 250 TAB at 10:05

## 2018-05-29 RX ADMIN — DONEPEZIL HYDROCHLORIDE 10 MG: 10 TABLET ORAL at 09:05

## 2018-05-29 RX ADMIN — Medication 125 MG: at 01:05

## 2018-05-29 NOTE — SUBJECTIVE & OBJECTIVE
Interval History: patient doing much better with no BM.  Patient was ready for discharge but daughter stated that she cannot care for her at home at this moment, thus consult to [facility] placed and CM aware.    Review of Systems   Constitutional: Negative for activity change, appetite change, chills, diaphoresis, fatigue and fever.   HENT: Positive for rhinorrhea.    Respiratory: Negative for cough, choking, shortness of breath, wheezing and stridor.    Cardiovascular: Negative for chest pain, palpitations and leg swelling.   Gastrointestinal: Positive for blood in stool, diarrhea and vomiting. Negative for abdominal distention and abdominal pain.   Endocrine: Negative.    Genitourinary: Negative.    Musculoskeletal: Negative.    Skin: Positive for pallor and wound.   Allergic/Immunologic: Negative.    Neurological: Negative.    Hematological: Negative.    Psychiatric/Behavioral: Positive for confusion (2/2 Dementia ).     Objective:     Vital Signs (Most Recent):  Temp: 97.7 °F (36.5 °C) (05/29/18 1055)  Pulse: 73 (05/29/18 1055)  Resp: 15 (05/29/18 1055)  BP: 119/75 (05/29/18 1055)  SpO2: (!) 91 % (05/29/18 1055) Vital Signs (24h Range):  Temp:  [97.3 °F (36.3 °C)-98.9 °F (37.2 °C)] 97.7 °F (36.5 °C)  Pulse:  [] 73  Resp:  [13-18] 15  SpO2:  [91 %-94 %] 91 %  BP: (119-145)/(75-91) 119/75     Weight: 48.9 kg (107 lb 14.4 oz)  Body mass index is 19.74 kg/m².    Physical Exam   Constitutional: She appears well-developed and well-nourished.   Pleasant, mildly confused elderly female in no acute distress   HENT:   Head: Normocephalic and atraumatic.   Mouth/Throat: Oropharynx is clear and moist.   Dental work noted   Eyes: Conjunctivae and EOM are normal.   Neck: Normal range of motion. Neck supple. No JVD present.   Cardiovascular: Normal rate and regular rhythm.    No murmur heard.  Pulmonary/Chest: Effort normal and breath sounds normal. She has no wheezes. She has no rales.   Abdominal: Soft. Bowel sounds  are normal. She exhibits no distension. There is no tenderness.   Musculoskeletal: Normal range of motion. She exhibits no edema, tenderness or deformity.   Neurological: She is alert.   Aware that she is in the hospital and what year it is. Recognizes family at bedside and converses appropriately w/ some mild delay and word finding difficulties. Patient aware that she is here for episodes of diarrhea but denies any other symptoms.    Skin: Skin is warm. Capillary refill takes less than 2 seconds. No rash noted. There is pallor.   Wounds noted on b/l heels, s/p friction injury on the back of shoes as described by daughter   Psychiatric: She has a normal mood and affect.         CRANIAL NERVES     CN III, IV, VI   Extraocular motions are normal.        Significant Labs:   A1C:     Recent Labs  Lab 05/26/18  1512   HGBA1C 5.3     ABGs: No results for input(s): PH, PCO2, HCO3, POCSATURATED, BE, TOTALHB, COHB, METHB, O2HB, POCFIO2 in the last 48 hours.  Bilirubin:     Recent Labs  Lab 05/26/18  1512 05/27/18  0651 05/28/18  0635 05/29/18  0642   BILITOT 1.0 0.8 0.7 0.5     Blood Culture:   No results for input(s): LABBLOO in the last 48 hours.  BMP:     Recent Labs  Lab 05/29/18  0642   *      K 3.6      CO2 28   BUN 12   CREATININE 0.8   CALCIUM 9.4   MG 1.9     CBC:     Recent Labs  Lab 05/28/18  0635 05/29/18  0642   WBC 13.72* 12.36   HGB 11.8* 12.6   HCT 36.8* 39.8    280     CMP:     Recent Labs  Lab 05/28/18  0635 05/29/18  0642    138   K 3.6 3.6    103   CO2 27 28   GLU 90 118*   BUN 12 12   CREATININE 0.8 0.8   CALCIUM 9.2 9.4   PROT 6.0 6.3   ALBUMIN 2.6* 2.7*   BILITOT 0.7 0.5   ALKPHOS 103 96   AST 17 21   ALT 10 12   ANIONGAP 9 7*   EGFRNONAA >60.0 >60.0     Cardiac Markers: No results for input(s): CKMB, MYOGLOBIN, BNP, TROPISTAT in the last 48 hours.  Coagulation: No results for input(s): PT, INR, APTT in the last 48 hours.  Lactic Acid:   No results for input(s):  LACTATE in the last 48 hours.  Lipase:   No results for input(s): LIPASE in the last 48 hours.  Lipid Panel: No results for input(s): CHOL, HDL, LDLCALC, TRIG, CHOLHDL in the last 48 hours.  Magnesium:     Recent Labs  Lab 05/28/18  0635 05/29/18  0642   MG 1.7 1.9     Pathology Results  (Last 10 years)    None        POCT Glucose: No results for input(s): POCTGLUCOSE in the last 48 hours.  Prealbumin: No results for input(s): PREALBUMIN in the last 48 hours.  Respiratory Culture: No results for input(s): GSRESP, RESPIRATORYC in the last 48 hours.  Troponin: No results for input(s): TROPONINI in the last 48 hours.  TSH:     Recent Labs  Lab 05/26/18  1512   TSH 7.626*     Urine Culture: No results for input(s): LABURIN in the last 48 hours.  Urine Studies: No results for input(s): COLORU, APPEARANCEUA, PHUR, SPECGRAV, PROTEINUA, GLUCUA, KETONESU, BILIRUBINUA, OCCULTUA, NITRITE, UROBILINOGEN, LEUKOCYTESUR, RBCUA, WBCUA, BACTERIA, SQUAMEPITHEL, HYALINECASTS in the last 48 hours.    Invalid input(s): WRIGHTSUR  All pertinent labs within the past 24 hours have been reviewed.    Significant Imaging: I have reviewed all pertinent imaging results/findings within the past 24 hours.   CT ABDOMEN AND PELVIS  In this patient with a history of colon cancer status post partial distal colectomy and reanastomosis, CT findings are compatible with a moderate severity pancolitis.  5.5 cm left adnexal cyst.  Further evaluation with pelvic ultrasound is recommended.  Cholelithiasis.  Compression deformity of the T11 vertebral body, unchanged.  Remote fracture of the right inferior pubic ramus.  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

## 2018-05-29 NOTE — PLAN OF CARE
Problem: Occupational Therapy Goal  Goal: Occupational Therapy Goal  Goals to be met by:6/15/18    Patient will increase functional independence with ADLs by performing:    UE Dressing with Contact Guard Assistance.  LE Dressing with Contact Guard Assistance.  Grooming while seated with Minimal Assistance.  Toileting from bedside commode with Minimal Assistance for hygiene and clothing management.     Outcome: Ongoing (interventions implemented as appropriate)  Evaluation complete and goals set.  Cont with POC  Ruth Pina, OT  5/29/2018

## 2018-05-29 NOTE — ASSESSMENT & PLAN NOTE
- Patient w/ new onset diarrhea ( 6-7x/day) for about  3-4 days  - Noted blood streaks per daughter  - On admission pt w/ leukocytosis of 20.22  - Afebrile at home and on admission  - Cdiff toxin and antigen positive  - Stool Cx and E.coli 0157 in process  - Bcx w/ NGTD  - Contact precautions in place  - Vancomycin PO started; responding well   - CT abdomen/pelvis w/ noted pancolitis  - Worsening diarrhea noted at the end of the shift on 5/28 by the nursing staff; patient is not ready for discharge at this time  -on 5/29 patients diarrhea improved and is clear for discharge but daughter stated that she cannot care for her at home at this moment, thus consult to [facility] placed and CM aware.

## 2018-05-29 NOTE — ASSESSMENT & PLAN NOTE
- PT/OT consulted - recommendation for H/H  - Patient mostly wheelchair bound although, per daughter, she is able to ambulate w/ balance belt at times  -on 5/29 patients diarrhea improved and is clear for discharge but daughter stated that she cannot care for her at home at this moment, thus consult to [place] [facility] placed and CM aware.

## 2018-05-29 NOTE — ASSESSMENT & PLAN NOTE
- WBC 20.22 on admission; improving on current therapy to 13.72 5/28 and now 12.36 today  - Has been evaluated for Leukocytosi by Dr. Fry in the past w/ low suspicion of myeloproliferative disorder  - Patient w/ new onset diarrhea  2/2 C.diff  - See Diarrhea

## 2018-05-29 NOTE — CARE UPDATE
I spoke with Obduliatrevor Renteria (Patients daughter at [phone]).  I explained that her mother is doing better, with no BM this AM and thus ready for discharge with PO vancomycin.  Daughter stated she is unable to care for her mother at home.  She is overwhelmed with a new cancer diagnosis of her  and her own medical issues.  She asked that her mother go to SNF at [place] for further care.      I spoke with Carlos Alberto (CM [id number]) to update him on the plan.  I placed a consult to [facility].  Pending acceptance and insurance approval.     I called Obdulia back and explained that we have placed the consult and will keep her updated.    Chioma Vincent MD  Internal Medicine, PGY2  Pager [phone]

## 2018-05-29 NOTE — PROGRESS NOTES
[facility] Hospital Medicine  Progress Note    Patient Name: Arina Adame  MRN: [id number]  Patient Class: IP- Inpatient   Admission Date: 5/26/2018  Length of Stay: 3 days  Attending Physician: Abdoulaye Giles MD  Primary Care Provider: Zeinab Meier MD    [facility] Medicine Team: [facility] HOSP MED 1 Chioma Vincent MD    Subjective:     Principal Problem:Clostridium difficile infection    HPI:  Patient is an 89 y.o. W/ past medical history significant for dementia, carotid artery disease, HTN, remote Hx of colon CA s/p partial distal colectomy and reanastomosis( Klebsiella, E.coli, Strep.Viridans) , hypothyroidism, recurrent UTIs and decubitus ulcers as well as recent evaluation by Heme/Onc for neutrophilia presents to the ED w/ daughter for evaluation of new onset loose BM w/ streaks of blood. Per daughter the symptoms started on 23rd, when she noted patient having brown bowel movements w/ some noted mucous and blood in the toilet bowl. The stools proceeded to become more loose and she had been having about 5-7 BM for about 2 days. Denies any abdominal pain or discomfort, although per daughter, the patient has decreased pain awareness.  Patient also had an episode of NBNB emesis x 1 time 2 days PTA after consuming some pizza and a cookie before bed.   Patient is primarily wheelchair bound. Her daughter brings her in and cares for much of her needs at home.  Patient without lymphadenopathy, fevers/chills at home, denies chest pain, palpitations, dysuria, recent travel, changes in her diet, consumption of raw/uncooked foods or recent Abx use. Patient's daughter states that she has completed a course of ciprofloxacin for UTI/malodorous urine about 3 months ago (Klebsiella).     Hospital Course:  Patient admitted to IM-1 for new onset diarrhea and leukocytosis w/ presumed Dx of C.Diff. Patient w/ positive C.diff Tox and antigen; started Tx w/ PO vancomycin solution Q6 125; diarrhea and leukocytosis are  both improving. Patient w/ worsening diarrhea on 05/28 so will delay discharge until improved and bulked stools.  Morning of 5/29, patient doing much better with no BM.  Patient was ready for discharge but daughter stated that she cannot care for her at home at this moment, thus consult to [facility] placed and CM aware.    Interval History: patient doing much better with no BM.  Patient was ready for discharge but daughter stated that she cannot care for her at home at this moment, thus consult to [facility] placed and CM aware.    Review of Systems   Constitutional: Negative for activity change, appetite change, chills, diaphoresis, fatigue and fever.   HENT: Positive for rhinorrhea.    Respiratory: Negative for cough, choking, shortness of breath, wheezing and stridor.    Cardiovascular: Negative for chest pain, palpitations and leg swelling.   Gastrointestinal: Positive for blood in stool, diarrhea and vomiting. Negative for abdominal distention and abdominal pain.   Endocrine: Negative.    Genitourinary: Negative.    Musculoskeletal: Negative.    Skin: Positive for pallor and wound.   Allergic/Immunologic: Negative.    Neurological: Negative.    Hematological: Negative.    Psychiatric/Behavioral: Positive for confusion (2/2 Dementia ).     Objective:     Vital Signs (Most Recent):  Temp: 97.7 °F (36.5 °C) (05/29/18 1055)  Pulse: 73 (05/29/18 1055)  Resp: 15 (05/29/18 1055)  BP: 119/75 (05/29/18 1055)  SpO2: (!) 91 % (05/29/18 1055) Vital Signs (24h Range):  Temp:  [97.3 °F (36.3 °C)-98.9 °F (37.2 °C)] 97.7 °F (36.5 °C)  Pulse:  [] 73  Resp:  [13-18] 15  SpO2:  [91 %-94 %] 91 %  BP: (119-145)/(75-91) 119/75     Weight: 48.9 kg (107 lb 14.4 oz)  Body mass index is 19.74 kg/m².    Physical Exam   Constitutional: She appears well-developed and well-nourished.   Pleasant, mildly confused elderly female in no acute distress   HENT:   Head: Normocephalic and atraumatic.   Mouth/Throat: Oropharynx is clear and  moist.   Dental work noted   Eyes: Conjunctivae and EOM are normal.   Neck: Normal range of motion. Neck supple. No JVD present.   Cardiovascular: Normal rate and regular rhythm.    No murmur heard.  Pulmonary/Chest: Effort normal and breath sounds normal. She has no wheezes. She has no rales.   Abdominal: Soft. Bowel sounds are normal. She exhibits no distension. There is no tenderness.   Musculoskeletal: Normal range of motion. She exhibits no edema, tenderness or deformity.   Neurological: She is alert.   Aware that she is in the hospital and what year it is. Recognizes family at bedside and converses appropriately w/ some mild delay and word finding difficulties. Patient aware that she is here for episodes of diarrhea but denies any other symptoms.    Skin: Skin is warm. Capillary refill takes less than 2 seconds. No rash noted. There is pallor.   Wounds noted on b/l heels, s/p friction injury on the back of shoes as described by daughter   Psychiatric: She has a normal mood and affect.         CRANIAL NERVES     CN III, IV, VI   Extraocular motions are normal.        Significant Labs:   A1C:     Recent Labs  Lab 05/26/18  1512   HGBA1C 5.3     ABGs: No results for input(s): PH, PCO2, HCO3, POCSATURATED, BE, TOTALHB, COHB, METHB, O2HB, POCFIO2 in the last 48 hours.  Bilirubin:     Recent Labs  Lab 05/26/18  1512 05/27/18  0651 05/28/18  0635 05/29/18  0642   BILITOT 1.0 0.8 0.7 0.5     Blood Culture:   No results for input(s): LABBLOO in the last 48 hours.  BMP:     Recent Labs  Lab 05/29/18  0642   *      K 3.6      CO2 28   BUN 12   CREATININE 0.8   CALCIUM 9.4   MG 1.9     CBC:     Recent Labs  Lab 05/28/18  0635 05/29/18  0642   WBC 13.72* 12.36   HGB 11.8* 12.6   HCT 36.8* 39.8    280     CMP:     Recent Labs  Lab 05/28/18  0635 05/29/18  0642    138   K 3.6 3.6    103   CO2 27 28   GLU 90 118*   BUN 12 12   CREATININE 0.8 0.8   CALCIUM 9.2 9.4   PROT 6.0 6.3   ALBUMIN  2.6* 2.7*   BILITOT 0.7 0.5   ALKPHOS 103 96   AST 17 21   ALT 10 12   ANIONGAP 9 7*   EGFRNONAA >60.0 >60.0     Cardiac Markers: No results for input(s): CKMB, MYOGLOBIN, BNP, TROPISTAT in the last 48 hours.  Coagulation: No results for input(s): PT, INR, APTT in the last 48 hours.  Lactic Acid:   No results for input(s): LACTATE in the last 48 hours.  Lipase:   No results for input(s): LIPASE in the last 48 hours.  Lipid Panel: No results for input(s): CHOL, HDL, LDLCALC, TRIG, CHOLHDL in the last 48 hours.  Magnesium:     Recent Labs  Lab 05/28/18  0635 05/29/18  0642   MG 1.7 1.9     Pathology Results  (Last 10 years)    None        POCT Glucose: No results for input(s): POCTGLUCOSE in the last 48 hours.  Prealbumin: No results for input(s): PREALBUMIN in the last 48 hours.  Respiratory Culture: No results for input(s): GSRESP, RESPIRATORYC in the last 48 hours.  Troponin: No results for input(s): TROPONINI in the last 48 hours.  TSH:     Recent Labs  Lab 05/26/18  1512   TSH 7.626*     Urine Culture: No results for input(s): LABURIN in the last 48 hours.  Urine Studies: No results for input(s): COLORU, APPEARANCEUA, PHUR, SPECGRAV, PROTEINUA, GLUCUA, KETONESU, BILIRUBINUA, OCCULTUA, NITRITE, UROBILINOGEN, LEUKOCYTESUR, RBCUA, WBCUA, BACTERIA, SQUAMEPITHEL, HYALINECASTS in the last 48 hours.    Invalid input(s): WRIGHTSUR  All pertinent labs within the past 24 hours have been reviewed.    Significant Imaging: I have reviewed all pertinent imaging results/findings within the past 24 hours.   CT ABDOMEN AND PELVIS  In this patient with a history of colon cancer status post partial distal colectomy and reanastomosis, CT findings are compatible with a moderate severity pancolitis.  5.5 cm left adnexal cyst.  Further evaluation with pelvic ultrasound is recommended.  Cholelithiasis.  Compression deformity of the T11 vertebral body, unchanged.  Remote fracture of the right inferior pubic ramus.  Fusiform aneurysmal  dilatation of the infrarenal abdominal aorta.    Assessment/Plan:      * Clostridium difficile infection    - Patient w/ new onset diarrhea ( 6-7x/day) for about  3-4 days  - Noted blood streaks per daughter  - On admission pt w/ leukocytosis of 20.22  - Afebrile at home and on admission  - Cdiff toxin and antigen positive  - Stool Cx and E.coli 0157 in process  - Bcx w/ NGTD  - Contact precautions in place  - Vancomycin PO started; responding well   - CT abdomen/pelvis w/ noted pancolitis  - Worsening diarrhea noted at the end of the shift on 5/28 by the nursing staff; patient is not ready for discharge at this time  -on 5/29 patients diarrhea improved and is clear for discharge but daughter stated that she cannot care for her at home at this moment, thus consult to [facility] placed and CM aware.          Preventive measure    PER WOUND CARE TEAM   Pressure injury prevention interventions to include:  -NO diapers  -Critic-aid Clear to sacrum/buttocks  -JOSR bed surface  -Repositioning every 2 hours  -Heel foam dressings          Hypokalemia    - K 2.7 this AM; 3.6 this AM; resolved  - Replaced w/ 20 mEq IV and 50 mEq of K-lyte x 2 PO  - Will monitor w/ daily labs  - Patient asymptomatic         Alteration in skin integrity    - Sacral pressure injury noted  - b/l Heel wounds likely 2/2 friction w/ footwear   - Wound care consulted; appreciate recommendations         Anxiety    - Continue Zoloft           Diarrhea of presumed infectious origin    - See C.Diff          Leukocytosis    - WBC 20.22 on admission; improving on current therapy to 13.72 5/28 and now 12.36 today  - Has been evaluated for Leukocytosi by Dr. Fry in the past w/ low suspicion of myeloproliferative disorder  - Patient w/ new onset diarrhea  2/2 C.diff  - See Diarrhea         Decreased mobility    - PT/OT consulted - recommendation for H/H  - Patient mostly wheelchair bound although, per daughter, she is able to ambulate w/ balance belt at  times  -on 5/29 patients diarrhea improved and is clear for discharge but daughter stated that she cannot care for her at home at this moment, thus consult to [facility] placed and CM aware.        History of colon cancer    - Remote Hx of Colon cancer s/p resection  - Stable         Dementia    - Alzheimer per daughter at bedside  - Continue home Donepezil  - Delirium precautions  - Fall precautions in place  - Seroquel QHS PRN        Carotid arterial disease    - 9/2014 b/l US carotids - 40-59% stenosis of the right internal carotid artery with homogeneous plaque   and 1 - 39% stenosis of the left internal carotid artery with mild homogeneous plaque  - Continue ASA and Statin for stroke prevention         Hypothyroidism    - Continue home synthroid  - TSH 7,626        Vitamin D deficiency disease    - Continue home Vit D supplementation         Memory loss    - See dementia           VTE Risk Mitigation         Ordered     enoxaparin injection 40 mg  Daily      05/26/18 1904     IP VTE HIGH RISK PATIENT  Once      05/26/18 1958     Place GERARD hose  Until discontinued      05/26/18 1958     Place sequential compression device  Until discontinued      05/26/18 1904              Chioma Vincent MD  Department of Hospital Medicine   [facility]

## 2018-05-29 NOTE — PLAN OF CARE
ELLIOT spoke with pt's daughter, Obdulia Renteria ([phone]) and was told that she had spoken with Dr. Vincent earlier today and would like to continue with their original idea for [facility].  Obdulia stated that she will bring pt back to her home once she has completed time in [facility] and that she is in no way even considering outside Long term SNF for pt.  ELLIOT spoke with Dr. Rodas and was told that he would place the [facility].  Obdulia reiterated that [facility] is her 1st and only choice.          Naz Lezama LMSW

## 2018-05-30 LAB
ALBUMIN SERPL BCP-MCNC: 2.6 G/DL
ALP SERPL-CCNC: 92 U/L
ALT SERPL W/O P-5'-P-CCNC: 25 U/L
ANION GAP SERPL CALC-SCNC: 8 MMOL/L
AST SERPL-CCNC: 44 U/L
BASOPHILS # BLD AUTO: 0.09 K/UL
BASOPHILS NFR BLD: 0.7 %
BILIRUB SERPL-MCNC: 0.5 MG/DL
BUN SERPL-MCNC: 13 MG/DL
CALCIUM SERPL-MCNC: 9.1 MG/DL
CHLORIDE SERPL-SCNC: 104 MMOL/L
CO2 SERPL-SCNC: 26 MMOL/L
CREAT SERPL-MCNC: 0.8 MG/DL
DIFFERENTIAL METHOD: ABNORMAL
EOSINOPHIL # BLD AUTO: 0.4 K/UL
EOSINOPHIL NFR BLD: 2.9 %
ERYTHROCYTE [DISTWIDTH] IN BLOOD BY AUTOMATED COUNT: 14.1 %
EST. GFR  (AFRICAN AMERICAN): >60 ML/MIN/1.73 M^2
EST. GFR  (NON AFRICAN AMERICAN): >60 ML/MIN/1.73 M^2
GLUCOSE SERPL-MCNC: 105 MG/DL
HCT VFR BLD AUTO: 38.7 %
HGB BLD-MCNC: 12.5 G/DL
IMM GRANULOCYTES # BLD AUTO: 0.39 K/UL
IMM GRANULOCYTES NFR BLD AUTO: 2.9 %
LYMPHOCYTES # BLD AUTO: 1.7 K/UL
LYMPHOCYTES NFR BLD: 12.6 %
MAGNESIUM SERPL-MCNC: 1.6 MG/DL
MCH RBC QN AUTO: 30.9 PG
MCHC RBC AUTO-ENTMCNC: 32.3 G/DL
MCV RBC AUTO: 96 FL
MONOCYTES # BLD AUTO: 1.3 K/UL
MONOCYTES NFR BLD: 9.8 %
NEUTROPHILS # BLD AUTO: 9.6 K/UL
NEUTROPHILS NFR BLD: 71.1 %
NRBC BLD-RTO: 0 /100 WBC
PHOSPHATE SERPL-MCNC: 3.5 MG/DL
PLATELET # BLD AUTO: 268 K/UL
PMV BLD AUTO: 10.9 FL
POTASSIUM SERPL-SCNC: 3.9 MMOL/L
PROT SERPL-MCNC: 6.1 G/DL
RBC # BLD AUTO: 4.04 M/UL
SODIUM SERPL-SCNC: 138 MMOL/L
WBC # BLD AUTO: 13.46 K/UL

## 2018-05-30 PROCEDURE — 99231 SBSQ HOSP IP/OBS SF/LOW 25: CPT | Mod: GC,,, | Performed by: HOSPITALIST

## 2018-05-30 PROCEDURE — 84100 ASSAY OF PHOSPHORUS: CPT

## 2018-05-30 PROCEDURE — 83735 ASSAY OF MAGNESIUM: CPT

## 2018-05-30 PROCEDURE — 11000001 HC ACUTE MED/SURG PRIVATE ROOM

## 2018-05-30 PROCEDURE — A4216 STERILE WATER/SALINE, 10 ML: HCPCS | Performed by: STUDENT IN AN ORGANIZED HEALTH CARE EDUCATION/TRAINING PROGRAM

## 2018-05-30 PROCEDURE — 25000003 PHARM REV CODE 250: Performed by: STUDENT IN AN ORGANIZED HEALTH CARE EDUCATION/TRAINING PROGRAM

## 2018-05-30 PROCEDURE — 80053 COMPREHEN METABOLIC PANEL: CPT

## 2018-05-30 PROCEDURE — 36415 COLL VENOUS BLD VENIPUNCTURE: CPT

## 2018-05-30 PROCEDURE — 85025 COMPLETE CBC W/AUTO DIFF WBC: CPT

## 2018-05-30 PROCEDURE — 63600175 PHARM REV CODE 636 W HCPCS: Performed by: STUDENT IN AN ORGANIZED HEALTH CARE EDUCATION/TRAINING PROGRAM

## 2018-05-30 RX ADMIN — SODIUM CHLORIDE, PRESERVATIVE FREE 5 ML: 5 INJECTION INTRAVENOUS at 09:05

## 2018-05-30 RX ADMIN — ATORVASTATIN CALCIUM 40 MG: 20 TABLET, FILM COATED ORAL at 08:05

## 2018-05-30 RX ADMIN — DONEPEZIL HYDROCHLORIDE 10 MG: 10 TABLET ORAL at 09:05

## 2018-05-30 RX ADMIN — Medication 125 MG: at 05:05

## 2018-05-30 RX ADMIN — Medication 125 MG: at 11:05

## 2018-05-30 RX ADMIN — SERTRALINE HYDROCHLORIDE 25 MG: 50 TABLET ORAL at 08:05

## 2018-05-30 RX ADMIN — ENOXAPARIN SODIUM 40 MG: 100 INJECTION SUBCUTANEOUS at 05:05

## 2018-05-30 RX ADMIN — CYANOCOBALAMIN TAB 250 MCG 1000 MCG: 250 TAB at 08:05

## 2018-05-30 RX ADMIN — Medication 125 MG: at 12:05

## 2018-05-30 RX ADMIN — ASPIRIN 325 MG: 325 TABLET, DELAYED RELEASE ORAL at 08:05

## 2018-05-30 RX ADMIN — SERTRALINE HYDROCHLORIDE 50 MG: 50 TABLET ORAL at 08:05

## 2018-05-30 RX ADMIN — Medication 1000 UNITS: at 08:05

## 2018-05-30 RX ADMIN — LEVOTHYROXINE SODIUM 100 MCG: 100 TABLET ORAL at 05:05

## 2018-05-30 NOTE — SUBJECTIVE & OBJECTIVE
Interval History: NAEON. Some stool in the rectocele.  Pt discharge is pending placement    Review of Systems   Constitutional: Negative for activity change, appetite change, chills, diaphoresis, fatigue and fever.   Respiratory: Negative for cough, choking, shortness of breath, wheezing and stridor.    Cardiovascular: Negative for chest pain, palpitations and leg swelling.   Gastrointestinal: Positive for diarrhea and vomiting. Negative for abdominal distention and abdominal pain.   Endocrine: Negative.    Genitourinary: Negative.    Musculoskeletal: Negative.    Skin: Positive for pallor and wound.   Allergic/Immunologic: Negative.    Neurological: Negative.    Hematological: Negative.    Psychiatric/Behavioral: Positive for confusion (2/2 Dementia ).     Objective:     Vital Signs (Most Recent):  Temp: 98.5 °F (36.9 °C) (05/30/18 1032)  Pulse: 91 (05/30/18 0719)  Resp: 15 (05/30/18 0719)  BP: 116/85 (05/30/18 0719)  SpO2: 95 % (05/30/18 0719) Vital Signs (24h Range):  Temp:  [97.5 °F (36.4 °C)-98.5 °F (36.9 °C)] 98.5 °F (36.9 °C)  Pulse:  [] 91  Resp:  [15-20] 15  SpO2:  [94 %-95 %] 95 %  BP: (101-152)/(74-96) 116/85     Weight: 48.9 kg (107 lb 14.4 oz)  Body mass index is 19.74 kg/m².    Physical Exam   Constitutional: She appears well-developed and well-nourished.   Pleasant, mildly confused elderly female in no acute distress   HENT:   Head: Normocephalic and atraumatic.   Mouth/Throat: Oropharynx is clear and moist.   Dental work noted   Eyes: Conjunctivae and EOM are normal.   Neck: Normal range of motion. Neck supple. No JVD present.   Cardiovascular: Normal rate and regular rhythm.    No murmur heard.  Pulmonary/Chest: Effort normal and breath sounds normal. She has no wheezes. She has no rales.   Abdominal: Soft. Bowel sounds are normal. She exhibits no distension. There is no tenderness.   Musculoskeletal: Normal range of motion. She exhibits no edema, tenderness or deformity.   Neurological: She  is alert.   Very pleasant.  Orientated to person, place but not time. Unsure if she has had BM   Skin: Skin is warm. Capillary refill takes less than 2 seconds. No rash noted. There is pallor.   Wounds noted on b/l heels, s/p friction injury on the back of shoes as described by daughter   Psychiatric: She has a normal mood and affect.         CRANIAL NERVES     CN III, IV, VI   Extraocular motions are normal.        Significant Labs:   A1C:     Recent Labs  Lab 05/26/18  1512   HGBA1C 5.3     ABGs: No results for input(s): PH, PCO2, HCO3, POCSATURATED, BE, TOTALHB, COHB, METHB, O2HB, POCFIO2 in the last 48 hours.  Bilirubin:     Recent Labs  Lab 05/26/18  1512 05/27/18  0651 05/28/18  0635 05/29/18  0642 05/30/18  0642   BILITOT 1.0 0.8 0.7 0.5 0.5     Blood Culture:   No results for input(s): LABBLOO in the last 48 hours.  BMP:     Recent Labs  Lab 05/30/18  0642         K 3.9      CO2 26   BUN 13   CREATININE 0.8   CALCIUM 9.1   MG 1.6     CBC:     Recent Labs  Lab 05/29/18  0642 05/30/18  0642   WBC 12.36 13.46*   HGB 12.6 12.5   HCT 39.8 38.7    268     CMP:     Recent Labs  Lab 05/29/18  0642 05/30/18  0642    138   K 3.6 3.9    104   CO2 28 26   * 105   BUN 12 13   CREATININE 0.8 0.8   CALCIUM 9.4 9.1   PROT 6.3 6.1   ALBUMIN 2.7* 2.6*   BILITOT 0.5 0.5   ALKPHOS 96 92   AST 21 44*   ALT 12 25   ANIONGAP 7* 8   EGFRNONAA >60.0 >60.0     Cardiac Markers: No results for input(s): CKMB, MYOGLOBIN, BNP, TROPISTAT in the last 48 hours.  Coagulation: No results for input(s): PT, INR, APTT in the last 48 hours.  Lactic Acid:   No results for input(s): LACTATE in the last 48 hours.  Lipase:   No results for input(s): LIPASE in the last 48 hours.  Lipid Panel: No results for input(s): CHOL, HDL, LDLCALC, TRIG, CHOLHDL in the last 48 hours.  Magnesium:     Recent Labs  Lab 05/29/18  0642 05/30/18  0642   MG 1.9 1.6     Pathology Results  (Last 10 years)    None        POCT  Glucose:     Recent Labs  Lab 05/29/18  2133   POCTGLUCOSE 115*     Prealbumin: No results for input(s): PREALBUMIN in the last 48 hours.  Respiratory Culture: No results for input(s): GSRESP, RESPIRATORYC in the last 48 hours.  Troponin: No results for input(s): TROPONINI in the last 48 hours.  TSH:     Recent Labs  Lab 05/26/18  1512   TSH 7.626*     Urine Culture: No results for input(s): LABURIN in the last 48 hours.  Urine Studies: No results for input(s): COLORU, APPEARANCEUA, PHUR, SPECGRAV, PROTEINUA, GLUCUA, KETONESU, BILIRUBINUA, OCCULTUA, NITRITE, UROBILINOGEN, LEUKOCYTESUR, RBCUA, WBCUA, BACTERIA, SQUAMEPITHEL, HYALINECASTS in the last 48 hours.    Invalid input(s): WRIGHTSUR  All pertinent labs within the past 24 hours have been reviewed.    Significant Imaging: I have reviewed all pertinent imaging results/findings within the past 24 hours.   CT ABDOMEN AND PELVIS  In this patient with a history of colon cancer status post partial distal colectomy and reanastomosis, CT findings are compatible with a moderate severity pancolitis.  5.5 cm left adnexal cyst.  Further evaluation with pelvic ultrasound is recommended.  Cholelithiasis.  Compression deformity of the T11 vertebral body, unchanged.  Remote fracture of the right inferior pubic ramus.  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

## 2018-05-30 NOTE — PHYSICIAN QUERY
PT Name: Arina Adame  MR #: [id number]     Physician Query Form - Documentation Clarification      CDS/: Lexy SPAULDING, RN, CCDS              Contact information: [email or web address]    This form is a permanent document in the medical record.     Query Date: May 30, 2018    By submitting this query, we are merely seeking further clarification of documentation. Please utilize your independent clinical judgment when addressing the question(s) below.    The Medical record reflects the following:    Supporting Clinical Findings Location in Medical Record     Alteration in skin integrity   - Sacral pressure injury noted  - b/l Heel wounds likely 2/2 friction w/ footwear   - Wound care consulted; appreciate recommendations        Wound care consult received for skin assessment of sacrum and heels.  Pt presents with OT at bedside who assisted with pt care.  Noted bilateral heels and sacrum/right buttocks with blanchable redness that was relieved when offloaded. Siddhartha score 13         Hospital medicine progress 5/29/18           Wound Care Consult 5/28/18                          Wound Care Consult 5/28/18                                                                            Doctor, Please specify diagnosis or diagnoses associated with above clinical findings.  Please specify the sacral pressure injury and b/l heelswounds    Provider Use Only       [  ] Stage 1 sacral pressure ulcer and b/l heel pressure ulcers     [x  ] Stage 1 sacral and b/l heel pressure injuries     [  ] Other (please specify): ____________________________                                                                                                                 [  ] Clinically undetermined

## 2018-05-30 NOTE — ASSESSMENT & PLAN NOTE
- K 2.7 this AM; 3.9 this AM; resolved  - Replaced w/ 20 mEq IV and 50 mEq of K-lyte x 2 PO  - Will monitor w/ daily labs  - Patient asymptomatic

## 2018-05-30 NOTE — ASSESSMENT & PLAN NOTE
- WBC 20.22 on admission; but has since resolved  - Has been evaluated for Leukocytosi by Dr. Fry in the past w/ low suspicion of myeloproliferative disorder  - Patient w/ new onset diarrhea  2/2 C.diff  - See Diarrhea

## 2018-05-30 NOTE — ASSESSMENT & PLAN NOTE
- Patient w/ new onset diarrhea ( 6-7x/day) for about  3-4 days  - Noted blood streaks per daughter  - On admission pt w/ leukocytosis of 20.22  - Afebrile at home and on admission  - Cdiff toxin and antigen positive  - Stool Cx and E.coli 0157 in process  - Bcx w/ NGTD  - Contact precautions in place  - Vancomycin PO started; continue   - CT abdomen/pelvis w/ noted pancolitis  - Worsening diarrhea noted at the end of the shift on 5/28 by the nursing staff; patient was not ready for  -on 5/29 patients diarrhea improved and is clear for discharge but daughter stated that she cannot care for her at home at this moment, thus consult to [facility] placed and CM aware.

## 2018-05-30 NOTE — PROGRESS NOTES
[facility] Hospital Medicine  Progress Note    Patient Name: Arina Adame  MRN: [id number]  Patient Class: IP- Inpatient   Admission Date: 5/26/2018  Length of Stay: 4 days  Attending Physician: Abdoulaye Giles MD  Primary Care Provider: Zeinab Meier MD    [facility] Medicine Team: [facility] 1 Chioma Vincent MD    Subjective:     Principal Problem:Clostridium difficile infection    HPI:  Patient is an 89 y.o. W/ past medical history significant for dementia, carotid artery disease, HTN, remote Hx of colon CA s/p partial distal colectomy and reanastomosis( Klebsiella, E.coli, Strep.Viridans) , hypothyroidism, recurrent UTIs and decubitus ulcers as well as recent evaluation by Heme/Onc for neutrophilia presents to the ED w/ daughter for evaluation of new onset loose BM w/ streaks of blood. Per daughter the symptoms started on 23rd, when she noted patient having brown bowel movements w/ some noted mucous and blood in the toilet bowl. The stools proceeded to become more loose and she had been having about 5-7 BM for about 2 days. Denies any abdominal pain or discomfort, although per daughter, the patient has decreased pain awareness.  Patient also had an episode of NBNB emesis x 1 time 2 days PTA after consuming some pizza and a cookie before bed.   Patient is primarily wheelchair bound. Her daughter brings her in and cares for much of her needs at home.  Patient without lymphadenopathy, fevers/chills at home, denies chest pain, palpitations, dysuria, recent travel, changes in her diet, consumption of raw/uncooked foods or recent Abx use. Patient's daughter states that she has completed a course of ciprofloxacin for UTI/malodorous urine about 3 months ago (Klebsiella).     Hospital Course:  Patient admitted to IM-1 for new onset diarrhea and leukocytosis w/ presumed Dx of C.Diff. Patient w/ positive C.diff Tox and antigen; started Tx w/ PO vancomycin solution Q6 125; diarrhea and leukocytosis are  both improving. Patient w/ worsening diarrhea on 05/28 so will delay discharge until improved and bulked stools.  Morning of 5/29, patient doing much better with no BM.  Patient was ready for discharge but daughter stated that she cannot care for her at home at this moment, thus consult to [facility] placed and CM aware.    Interval History: NAEON. Some stool in the rectocele.  Pt discharge is pending placement    Review of Systems   Constitutional: Negative for activity change, appetite change, chills, diaphoresis, fatigue and fever.   Respiratory: Negative for cough, choking, shortness of breath, wheezing and stridor.    Cardiovascular: Negative for chest pain, palpitations and leg swelling.   Gastrointestinal: Positive for diarrhea and vomiting. Negative for abdominal distention and abdominal pain.   Endocrine: Negative.    Genitourinary: Negative.    Musculoskeletal: Negative.    Skin: Positive for pallor and wound.   Allergic/Immunologic: Negative.    Neurological: Negative.    Hematological: Negative.    Psychiatric/Behavioral: Positive for confusion (2/2 Dementia ).     Objective:     Vital Signs (Most Recent):  Temp: 98.5 °F (36.9 °C) (05/30/18 1032)  Pulse: 91 (05/30/18 0719)  Resp: 15 (05/30/18 0719)  BP: 116/85 (05/30/18 0719)  SpO2: 95 % (05/30/18 0719) Vital Signs (24h Range):  Temp:  [97.5 °F (36.4 °C)-98.5 °F (36.9 °C)] 98.5 °F (36.9 °C)  Pulse:  [] 91  Resp:  [15-20] 15  SpO2:  [94 %-95 %] 95 %  BP: (101-152)/(74-96) 116/85     Weight: 48.9 kg (107 lb 14.4 oz)  Body mass index is 19.74 kg/m².    Physical Exam   Constitutional: She appears well-developed and well-nourished.   Pleasant, mildly confused elderly female in no acute distress   HENT:   Head: Normocephalic and atraumatic.   Mouth/Throat: Oropharynx is clear and moist.   Dental work noted   Eyes: Conjunctivae and EOM are normal.   Neck: Normal range of motion. Neck supple. No JVD present.   Cardiovascular: Normal rate and regular  rhythm.    No murmur heard.  Pulmonary/Chest: Effort normal and breath sounds normal. She has no wheezes. She has no rales.   Abdominal: Soft. Bowel sounds are normal. She exhibits no distension. There is no tenderness.   Musculoskeletal: Normal range of motion. She exhibits no edema, tenderness or deformity.   Neurological: She is alert.   Very pleasant.  Orientated to person, place but not time. Unsure if she has had BM   Skin: Skin is warm. Capillary refill takes less than 2 seconds. No rash noted. There is pallor.   Wounds noted on b/l heels, s/p friction injury on the back of shoes as described by daughter   Psychiatric: She has a normal mood and affect.         CRANIAL NERVES     CN III, IV, VI   Extraocular motions are normal.        Significant Labs:   A1C:     Recent Labs  Lab 05/26/18  1512   HGBA1C 5.3     ABGs: No results for input(s): PH, PCO2, HCO3, POCSATURATED, BE, TOTALHB, COHB, METHB, O2HB, POCFIO2 in the last 48 hours.  Bilirubin:     Recent Labs  Lab 05/26/18  1512 05/27/18  0651 05/28/18  0635 05/29/18  0642 05/30/18  0642   BILITOT 1.0 0.8 0.7 0.5 0.5     Blood Culture:   No results for input(s): LABBLOO in the last 48 hours.  BMP:     Recent Labs  Lab 05/30/18  0642         K 3.9      CO2 26   BUN 13   CREATININE 0.8   CALCIUM 9.1   MG 1.6     CBC:     Recent Labs  Lab 05/29/18  0642 05/30/18  0642   WBC 12.36 13.46*   HGB 12.6 12.5   HCT 39.8 38.7    268     CMP:     Recent Labs  Lab 05/29/18  0642 05/30/18  0642    138   K 3.6 3.9    104   CO2 28 26   * 105   BUN 12 13   CREATININE 0.8 0.8   CALCIUM 9.4 9.1   PROT 6.3 6.1   ALBUMIN 2.7* 2.6*   BILITOT 0.5 0.5   ALKPHOS 96 92   AST 21 44*   ALT 12 25   ANIONGAP 7* 8   EGFRNONAA >60.0 >60.0     Cardiac Markers: No results for input(s): CKMB, MYOGLOBIN, BNP, TROPISTAT in the last 48 hours.  Coagulation: No results for input(s): PT, INR, APTT in the last 48 hours.  Lactic Acid:   No results for  input(s): LACTATE in the last 48 hours.  Lipase:   No results for input(s): LIPASE in the last 48 hours.  Lipid Panel: No results for input(s): CHOL, HDL, LDLCALC, TRIG, CHOLHDL in the last 48 hours.  Magnesium:     Recent Labs  Lab 05/29/18  0642 05/30/18  0642   MG 1.9 1.6     Pathology Results  (Last 10 years)    None        POCT Glucose:     Recent Labs  Lab 05/29/18  2133   POCTGLUCOSE 115*     Prealbumin: No results for input(s): PREALBUMIN in the last 48 hours.  Respiratory Culture: No results for input(s): GSRESP, RESPIRATORYC in the last 48 hours.  Troponin: No results for input(s): TROPONINI in the last 48 hours.  TSH:     Recent Labs  Lab 05/26/18  1512   TSH 7.626*     Urine Culture: No results for input(s): LABURIN in the last 48 hours.  Urine Studies: No results for input(s): COLORU, APPEARANCEUA, PHUR, SPECGRAV, PROTEINUA, GLUCUA, KETONESU, BILIRUBINUA, OCCULTUA, NITRITE, UROBILINOGEN, LEUKOCYTESUR, RBCUA, WBCUA, BACTERIA, SQUAMEPITHEL, HYALINECASTS in the last 48 hours.    Invalid input(s): WRIGHTSUR  All pertinent labs within the past 24 hours have been reviewed.    Significant Imaging: I have reviewed all pertinent imaging results/findings within the past 24 hours.   CT ABDOMEN AND PELVIS  In this patient with a history of colon cancer status post partial distal colectomy and reanastomosis, CT findings are compatible with a moderate severity pancolitis.  5.5 cm left adnexal cyst.  Further evaluation with pelvic ultrasound is recommended.  Cholelithiasis.  Compression deformity of the T11 vertebral body, unchanged.  Remote fracture of the right inferior pubic ramus.  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    Assessment/Plan:      * Clostridium difficile infection    - Patient w/ new onset diarrhea ( 6-7x/day) for about  3-4 days  - Noted blood streaks per daughter  - On admission pt w/ leukocytosis of 20.22  - Afebrile at home and on admission  - Cdiff toxin and antigen  positive  - Stool Cx and E.coli 0157 in process  - Bcx w/ NGTD  - Contact precautions in place  - Vancomycin PO started; continue   - CT abdomen/pelvis w/ noted pancolitis  - Worsening diarrhea noted at the end of the shift on 5/28 by the nursing staff; patient was not ready for  -on 5/29 patients diarrhea improved and is clear for discharge but daughter stated that she cannot care for her at home at this moment, thus consult to [facility] placed and CM aware.          Preventive measure    PER WOUND CARE TEAM   Pressure injury prevention interventions to include:  -NO diapers  -Critic-aid Clear to sacrum/buttocks  -JOSR bed surface  -Repositioning every 2 hours  -Heel foam dressings          Hypokalemia    - K 2.7 this AM; 3.9 this AM; resolved  - Replaced w/ 20 mEq IV and 50 mEq of K-lyte x 2 PO  - Will monitor w/ daily labs  - Patient asymptomatic         Alteration in skin integrity    - Sacral pressure injury noted  - b/l Heel wounds likely 2/2 friction w/ footwear   - Wound care consulted; appreciate recommendations         Anxiety    - Continue Zoloft           Diarrhea of presumed infectious origin    - See C.Diff          Leukocytosis    - WBC 20.22 on admission; but has since resolved  - Has been evaluated for Leukocytosi by Dr. Fry in the past w/ low suspicion of myeloproliferative disorder  - Patient w/ new onset diarrhea  2/2 C.diff  - See Diarrhea         Decreased mobility    - PT/OT consulted - recommendation for H/H  - Patient mostly wheelchair bound although, per daughter, she is able to ambulate w/ balance belt at times  -on 5/29 patients diarrhea improved and is clear for discharge but daughter stated that she cannot care for her at home at this moment, thus consult to [facility] placed and CM aware.        History of colon cancer    - Remote Hx of Colon cancer s/p resection  - Stable         Dementia    - Alzheimer per daughter at bedside  - Continue home Donepezil  - Delirium precautions  -  Fall precautions in place  - Seroquel [facility] PRN        Carotid arterial disease    - 9/2014 b/l US carotids - 40-59% stenosis of the right internal carotid artery with homogeneous plaque   and 1 - 39% stenosis of the left internal carotid artery with mild homogeneous plaque  - Continue ASA and Statin for stroke prevention         Hypothyroidism    - Continue home synthroid  - TSH 7,626        Vitamin D deficiency disease    - Continue home Vit D supplementation         Memory loss    - See dementia           VTE Risk Mitigation         Ordered     enoxaparin injection 40 mg  Daily      05/26/18 1904     IP VTE HIGH RISK PATIENT  Once      05/26/18 1958     Place GERARD hose  Until discontinued      05/26/18 1958     Place sequential compression device  Until discontinued      05/26/18 1904              Chioma Vincent MD  Department of Hospital Medicine   [facility]

## 2018-05-30 NOTE — ASSESSMENT & PLAN NOTE
- PT/OT consulted - recommendation for H/H  - Patient mostly wheelchair bound although, per daughter, she is able to ambulate w/ balance belt at times  -on 5/29 patients diarrhea improved and is clear for discharge but daughter stated that she cannot care for her at home at this moment, thus consult to [facility] placed and CM aware.

## 2018-05-31 LAB
BACTERIA BLD CULT: NORMAL
BACTERIA BLD CULT: NORMAL

## 2018-05-31 PROCEDURE — 99231 SBSQ HOSP IP/OBS SF/LOW 25: CPT | Mod: GC,,, | Performed by: HOSPITALIST

## 2018-05-31 PROCEDURE — 25000003 PHARM REV CODE 250: Performed by: STUDENT IN AN ORGANIZED HEALTH CARE EDUCATION/TRAINING PROGRAM

## 2018-05-31 PROCEDURE — 97530 THERAPEUTIC ACTIVITIES: CPT

## 2018-05-31 PROCEDURE — 97110 THERAPEUTIC EXERCISES: CPT

## 2018-05-31 PROCEDURE — 97535 SELF CARE MNGMENT TRAINING: CPT

## 2018-05-31 PROCEDURE — 11000001 HC ACUTE MED/SURG PRIVATE ROOM

## 2018-05-31 PROCEDURE — 63600175 PHARM REV CODE 636 W HCPCS: Performed by: STUDENT IN AN ORGANIZED HEALTH CARE EDUCATION/TRAINING PROGRAM

## 2018-05-31 PROCEDURE — 97116 GAIT TRAINING THERAPY: CPT

## 2018-05-31 PROCEDURE — A4216 STERILE WATER/SALINE, 10 ML: HCPCS | Performed by: STUDENT IN AN ORGANIZED HEALTH CARE EDUCATION/TRAINING PROGRAM

## 2018-05-31 RX ADMIN — CYANOCOBALAMIN TAB 250 MCG 1000 MCG: 250 TAB at 09:05

## 2018-05-31 RX ADMIN — Medication 125 MG: at 05:05

## 2018-05-31 RX ADMIN — Medication 125 MG: at 11:05

## 2018-05-31 RX ADMIN — DONEPEZIL HYDROCHLORIDE 10 MG: 10 TABLET ORAL at 10:05

## 2018-05-31 RX ADMIN — ATORVASTATIN CALCIUM 40 MG: 20 TABLET, FILM COATED ORAL at 09:05

## 2018-05-31 RX ADMIN — LEVOTHYROXINE SODIUM 100 MCG: 100 TABLET ORAL at 05:05

## 2018-05-31 RX ADMIN — Medication 125 MG: at 12:05

## 2018-05-31 RX ADMIN — Medication 1000 UNITS: at 09:05

## 2018-05-31 RX ADMIN — SERTRALINE HYDROCHLORIDE 75 MG: 50 TABLET ORAL at 09:05

## 2018-05-31 RX ADMIN — ASPIRIN 325 MG: 325 TABLET, DELAYED RELEASE ORAL at 09:05

## 2018-05-31 RX ADMIN — ENOXAPARIN SODIUM 40 MG: 100 INJECTION SUBCUTANEOUS at 05:05

## 2018-05-31 RX ADMIN — SODIUM CHLORIDE, PRESERVATIVE FREE 5 ML: 5 INJECTION INTRAVENOUS at 10:05

## 2018-05-31 NOTE — PROGRESS NOTES
[facility] Hospital Medicine  Progress Note    Patient Name: Arina Adame  MRN: [id number]  Patient Class: IP- Inpatient   Admission Date: 5/26/2018  Length of Stay: 5 days  Attending Physician: Abdoulaye Giles MD  Primary Care Provider: Zeinab Meier MD    [facility] Medicine Team: [facility] 1 Cris Calvo MD    Subjective:     Principal Problem:Clostridium difficile infection    HPI:  Patient is an 89 y.o. W/ past medical history significant for dementia, carotid artery disease, HTN, remote Hx of colon CA s/p partial distal colectomy and reanastomosis( Klebsiella, E.coli, Strep.Viridans) , hypothyroidism, recurrent UTIs and decubitus ulcers as well as recent evaluation by Heme/Onc for neutrophilia presents to the ED w/ daughter for evaluation of new onset loose BM w/ streaks of blood. Per daughter the symptoms started on 23rd, when she noted patient having brown bowel movements w/ some noted mucous and blood in the toilet bowl. The stools proceeded to become more loose and she had been having about 5-7 BM for about 2 days. Denies any abdominal pain or discomfort, although per daughter, the patient has decreased pain awareness.  Patient also had an episode of NBNB emesis x 1 time 2 days PTA after consuming some pizza and a cookie before bed.   Patient is primarily wheelchair bound. Her daughter brings her in and cares for much of her needs at home.  Patient without lymphadenopathy, fevers/chills at home, denies chest pain, palpitations, dysuria, recent travel, changes in her diet, consumption of raw/uncooked foods or recent Abx use. Patient's daughter states that she has completed a course of ciprofloxacin for UTI/malodorous urine about 3 months ago (Klebsiella).     Hospital Course:  Patient admitted to IM-1 for new onset diarrhea and leukocytosis w/ presumed Dx of C.Diff. Patient w/ positive C.diff Tox and antigen; started Tx w/ PO vancomycin solution Q6 125; diarrhea and leukocytosis are both  improving. Patient w/ worsening diarrhea on 05/28 so will delay discharge until improved and bulked stools.  Morning of 5/29, patient doing much better with no BM.  Patient was ready for discharge but daughter stated that she cannot care for her at home at this moment, thus consult to [facility] placed and CM aware.    Interval History: NAEON; patient w/ new IV after infiltration. Minimal loose stools at this time as patient is pending placement to [facility]. Awaiting insurance approval after updated PT/OT recommendations. Patient w/o any additional complaints at this time and is tolerating PO Vanc well.     Review of Systems   Constitutional: Negative for activity change, appetite change, chills, diaphoresis, fatigue and fever.   Respiratory: Negative for cough, choking, shortness of breath, wheezing and stridor.    Cardiovascular: Negative for chest pain, palpitations and leg swelling.   Gastrointestinal: Positive for diarrhea and vomiting. Negative for abdominal distention and abdominal pain.   Endocrine: Negative.    Genitourinary: Negative.    Musculoskeletal: Negative.    Skin: Positive for pallor and wound.   Allergic/Immunologic: Negative.    Neurological: Negative.    Hematological: Negative.    Psychiatric/Behavioral: Positive for confusion (2/2 Dementia ).     Objective:     Vital Signs (Most Recent):  Temp: 97.8 °F (36.6 °C) (05/31/18 0327)  Pulse: 73 (05/31/18 0100)  Resp: 15 (05/31/18 0100)  BP: 111/75 (05/31/18 0100)  SpO2: 95 % (05/31/18 0100) Vital Signs (24h Range):  Temp:  [97.7 °F (36.5 °C)-98.5 °F (36.9 °C)] 97.8 °F (36.6 °C)  Pulse:  [] 73  Resp:  [15-19] 15  SpO2:  [94 %-95 %] 95 %  BP: (106-113)/(72-79) 111/75     Weight: 48.9 kg (107 lb 14.4 oz)  Body mass index is 19.74 kg/m².    Physical Exam   Constitutional: She appears well-developed and well-nourished.   Pleasant, mildly confused elderly female in no acute distress   HENT:   Head: Normocephalic and atraumatic.   Mouth/Throat:  Oropharynx is clear and moist.   Dental work noted   Eyes: Conjunctivae and EOM are normal.   Neck: Normal range of motion. Neck supple. No JVD present.   Cardiovascular: Normal rate and regular rhythm.    No murmur heard.  Pulmonary/Chest: Effort normal and breath sounds normal. She has no wheezes. She has no rales.   Abdominal: Soft. Bowel sounds are normal. She exhibits no distension. There is no tenderness.   Musculoskeletal: Normal range of motion. She exhibits no edema, tenderness or deformity.   Neurological: She is alert.   Very pleasant.  Orientated to person, place but not time. Unsure if she has had BM   Skin: Skin is warm. Capillary refill takes less than 2 seconds. No rash noted. There is pallor.   Wounds noted on b/l heels, s/p friction injury on the back of shoes as described by daughter   Psychiatric: She has a normal mood and affect.         CRANIAL NERVES     CN III, IV, VI   Extraocular motions are normal.        Significant Labs:   A1C:     Recent Labs  Lab 05/26/18  1512   HGBA1C 5.3     ABGs: No results for input(s): PH, PCO2, HCO3, POCSATURATED, BE, TOTALHB, COHB, METHB, O2HB, POCFIO2 in the last 48 hours.  Bilirubin:     Recent Labs  Lab 05/26/18  1512 05/27/18  0651 05/28/18  0635 05/29/18  0642 05/30/18  0642   BILITOT 1.0 0.8 0.7 0.5 0.5     Blood Culture:   No results for input(s): LABBLOO in the last 48 hours.  BMP:     Recent Labs  Lab 05/30/18  0642         K 3.9      CO2 26   BUN 13   CREATININE 0.8   CALCIUM 9.1   MG 1.6     CBC:     Recent Labs  Lab 05/30/18  0642   WBC 13.46*   HGB 12.5   HCT 38.7        CMP:     Recent Labs  Lab 05/30/18  0642      K 3.9      CO2 26      BUN 13   CREATININE 0.8   CALCIUM 9.1   PROT 6.1   ALBUMIN 2.6*   BILITOT 0.5   ALKPHOS 92   AST 44*   ALT 25   ANIONGAP 8   EGFRNONAA >60.0     Cardiac Markers: No results for input(s): CKMB, MYOGLOBIN, BNP, TROPISTAT in the last 48 hours.  Coagulation: No results for  input(s): PT, INR, APTT in the last 48 hours.  Lactic Acid:   No results for input(s): LACTATE in the last 48 hours.  Lipase:   No results for input(s): LIPASE in the last 48 hours.  Lipid Panel: No results for input(s): CHOL, HDL, LDLCALC, TRIG, CHOLHDL in the last 48 hours.  Magnesium:     Recent Labs  Lab 05/30/18  0642   MG 1.6     Pathology Results  (Last 10 years)    None        POCT Glucose:     Recent Labs  Lab 05/29/18  2133   POCTGLUCOSE 115*     Prealbumin: No results for input(s): PREALBUMIN in the last 48 hours.  Respiratory Culture: No results for input(s): GSRESP, RESPIRATORYC in the last 48 hours.  Troponin: No results for input(s): TROPONINI in the last 48 hours.  TSH:     Recent Labs  Lab 05/26/18  1512   TSH 7.626*     Urine Culture: No results for input(s): LABURIN in the last 48 hours.  Urine Studies: No results for input(s): COLORU, APPEARANCEUA, PHUR, SPECGRAV, PROTEINUA, GLUCUA, KETONESU, BILIRUBINUA, OCCULTUA, NITRITE, UROBILINOGEN, LEUKOCYTESUR, RBCUA, WBCUA, BACTERIA, SQUAMEPITHEL, HYALINECASTS in the last 48 hours.    Invalid input(s): WRIGHTSUR  All pertinent labs within the past 24 hours have been reviewed.    Significant Imaging: I have reviewed all pertinent imaging results/findings within the past 24 hours.   CT ABDOMEN AND PELVIS  In this patient with a history of colon cancer status post partial distal colectomy and reanastomosis, CT findings are compatible with a moderate severity pancolitis.  5.5 cm left adnexal cyst.  Further evaluation with pelvic ultrasound is recommended.  Cholelithiasis.  Compression deformity of the T11 vertebral body, unchanged.  Remote fracture of the right inferior pubic ramus.  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    Assessment/Plan:      * Clostridium difficile infection    - Patient w/ new onset diarrhea ( 6-7x/day) for about  3-4 days  - Noted blood streaks per daughter  - On admission pt w/ leukocytosis of 20.22  -  Afebrile at home and on admission  - Cdiff toxin and antigen positive  - Stool Cx and E.coli 0157 in process  - Bcx w/ NGTD  - Contact precautions in place  - Vancomycin PO started; continue   - CT abdomen/pelvis w/ noted pancolitis  - Worsening diarrhea noted at the end of the shift on 5/28 by the nursing staff; patient was not ready for  - On 5/29 patients diarrhea improved and is clear for discharge but daughter stated that she cannot care for her at home at this moment, thus consult to [facility] placed and CM aware.  - Awaiting placement           Preventive measure    PER WOUND CARE TEAM   Pressure injury prevention interventions to include:  -NO diapers  -Critic-aid Clear to sacrum/buttocks  -JOSR bed surface  -Repositioning every 2 hours  -Heel foam dressings          Hypokalemia    - K 2.7 this AM; 3.9 this AM; resolved  - Replaced w/ 20 mEq IV and 50 mEq of K-lyte x 2 PO  - Will monitor w/ daily labs  - Patient asymptomatic         Alteration in skin integrity    - Sacral pressure injury noted  - b/l Heel wounds likely 2/2 friction w/ footwear   - Wound care consulted; appreciate recommendations         Anxiety    - Continue Zoloft           Diarrhea of presumed infectious origin    - See C.Diff          Leukocytosis    - WBC 20.22 on admission; but has since resolved  - Currently labs are Q72 hours w/ last on 05/30  - Has been evaluated for Leukocytosi by Dr. Fry in the past w/ low suspicion of myeloproliferative disorder  - Patient w/ new onset diarrhea  2/2 C.diff  - See Diarrhea         Decreased mobility    - PT/OT consulted - recommendation for H/H  - Patient mostly wheelchair bound although, per daughter, she is able to ambulate w/ balance belt at times  -on 5/29 patients diarrhea improved and is clear for discharge but daughter stated that she cannot care for her at home at this moment, thus consult to [facility] placed and CM aware.        History of colon cancer    - Remote Hx of Colon cancer  s/p resection  - Stable         Dementia    - Alzheimer per daughter at bedside  - Continue home Donepezil  - Delirium precautions  - Fall precautions in place  - Seroquel [facility] PRN        Carotid arterial disease    - 9/2014 b/l US carotids - 40-59% stenosis of the right internal carotid artery with homogeneous plaque   and 1 - 39% stenosis of the left internal carotid artery with mild homogeneous plaque  - Continue ASA and Statin for stroke prevention        Hypothyroidism    - Continue home synthroid  - TSH 7,626        Vitamin D deficiency disease    - Continue home Vit D supplementation         Memory loss    - See dementia           VTE Risk Mitigation         Ordered     enoxaparin injection 40 mg  Daily      05/26/18 1904     IP VTE HIGH RISK PATIENT  Once      05/26/18 1958     Place GERARD hose  Until discontinued      05/26/18 1958     Place sequential compression device  Until discontinued      05/26/18 1904              Cris Calvo MD  Department of Hospital Medicine   [facility]

## 2018-05-31 NOTE — PLAN OF CARE
Problem: Patient Care Overview  Goal: Plan of Care Review  Outcome: Ongoing (interventions implemented as appropriate)  Plan of care went over with patient at beginning of shift: frequent rounds, Visi monitoring between 11p-5a, Contact Isolation for C-Diff, IV maintenance, turning q 2 hours, bed alarms and rest and sleep.    All of the above was done but the pt lost her IV at some point as it was infiltration at 9pm. A new IV was started about 1am, 22 jelco in her left wrist.

## 2018-05-31 NOTE — PLAN OF CARE
Problem: Occupational Therapy Goal  Goal: Occupational Therapy Goal  Goals to be met by:6/15/18    Patient will increase functional independence with ADLs by performing:    UE Dressing with Contact Guard Assistance.  LE Dressing with Contact Guard Assistance.  Grooming while seated with Minimal Assistance.  Toileting from bedside commode with Minimal Assistance for hygiene and clothing management.      Outcome: Ongoing (interventions implemented as appropriate)  Patient's goals are appropriate.  HERVE Cam  5/31/2018

## 2018-05-31 NOTE — SUBJECTIVE & OBJECTIVE
Interval History: NAEON; patient w/ new IV after infiltration. Minimal loose stools at this time as patient is pending placement to [facility]. Awaiting insurance approval after updated PT/OT recommendations. Patient w/o any additional complaints at this time and is tolerating PO Vanc well.     Review of Systems   Constitutional: Negative for activity change, appetite change, chills, diaphoresis, fatigue and fever.   Respiratory: Negative for cough, choking, shortness of breath, wheezing and stridor.    Cardiovascular: Negative for chest pain, palpitations and leg swelling.   Gastrointestinal: Positive for diarrhea and vomiting. Negative for abdominal distention and abdominal pain.   Endocrine: Negative.    Genitourinary: Negative.    Musculoskeletal: Negative.    Skin: Positive for pallor and wound.   Allergic/Immunologic: Negative.    Neurological: Negative.    Hematological: Negative.    Psychiatric/Behavioral: Positive for confusion (2/2 Dementia ).     Objective:     Vital Signs (Most Recent):  Temp: 97.8 °F (36.6 °C) (05/31/18 0327)  Pulse: 73 (05/31/18 0100)  Resp: 15 (05/31/18 0100)  BP: 111/75 (05/31/18 0100)  SpO2: 95 % (05/31/18 0100) Vital Signs (24h Range):  Temp:  [97.7 °F (36.5 °C)-98.5 °F (36.9 °C)] 97.8 °F (36.6 °C)  Pulse:  [] 73  Resp:  [15-19] 15  SpO2:  [94 %-95 %] 95 %  BP: (106-113)/(72-79) 111/75     Weight: 48.9 kg (107 lb 14.4 oz)  Body mass index is 19.74 kg/m².    Physical Exam   Constitutional: She appears well-developed and well-nourished.   Pleasant, mildly confused elderly female in no acute distress   HENT:   Head: Normocephalic and atraumatic.   Mouth/Throat: Oropharynx is clear and moist.   Dental work noted   Eyes: Conjunctivae and EOM are normal.   Neck: Normal range of motion. Neck supple. No JVD present.   Cardiovascular: Normal rate and regular rhythm.    No murmur heard.  Pulmonary/Chest: Effort normal and breath sounds normal. She has no wheezes. She has no rales.    Abdominal: Soft. Bowel sounds are normal. She exhibits no distension. There is no tenderness.   Musculoskeletal: Normal range of motion. She exhibits no edema, tenderness or deformity.   Neurological: She is alert.   Very pleasant.  Orientated to person, place but not time. Unsure if she has had BM   Skin: Skin is warm. Capillary refill takes less than 2 seconds. No rash noted. There is pallor.   Wounds noted on b/l heels, s/p friction injury on the back of shoes as described by daughter   Psychiatric: She has a normal mood and affect.         CRANIAL NERVES     CN III, IV, VI   Extraocular motions are normal.        Significant Labs:   A1C:     Recent Labs  Lab 05/26/18  1512   HGBA1C 5.3     ABGs: No results for input(s): PH, PCO2, HCO3, POCSATURATED, BE, TOTALHB, COHB, METHB, O2HB, POCFIO2 in the last 48 hours.  Bilirubin:     Recent Labs  Lab 05/26/18  1512 05/27/18  0651 05/28/18  0635 05/29/18  0642 05/30/18  0642   BILITOT 1.0 0.8 0.7 0.5 0.5     Blood Culture:   No results for input(s): LABBLOO in the last 48 hours.  BMP:     Recent Labs  Lab 05/30/18  0642         K 3.9      CO2 26   BUN 13   CREATININE 0.8   CALCIUM 9.1   MG 1.6     CBC:     Recent Labs  Lab 05/30/18  0642   WBC 13.46*   HGB 12.5   HCT 38.7        CMP:     Recent Labs  Lab 05/30/18  0642      K 3.9      CO2 26      BUN 13   CREATININE 0.8   CALCIUM 9.1   PROT 6.1   ALBUMIN 2.6*   BILITOT 0.5   ALKPHOS 92   AST 44*   ALT 25   ANIONGAP 8   EGFRNONAA >60.0     Cardiac Markers: No results for input(s): CKMB, MYOGLOBIN, BNP, TROPISTAT in the last 48 hours.  Coagulation: No results for input(s): PT, INR, APTT in the last 48 hours.  Lactic Acid:   No results for input(s): LACTATE in the last 48 hours.  Lipase:   No results for input(s): LIPASE in the last 48 hours.  Lipid Panel: No results for input(s): CHOL, HDL, LDLCALC, TRIG, CHOLHDL in the last 48 hours.  Magnesium:     Recent Labs  Lab  05/30/18  0642   MG 1.6     Pathology Results  (Last 10 years)    None        POCT Glucose:     Recent Labs  Lab 05/29/18  2133   POCTGLUCOSE 115*     Prealbumin: No results for input(s): PREALBUMIN in the last 48 hours.  Respiratory Culture: No results for input(s): GSRESP, RESPIRATORYC in the last 48 hours.  Troponin: No results for input(s): TROPONINI in the last 48 hours.  TSH:     Recent Labs  Lab 05/26/18  1512   TSH 7.626*     Urine Culture: No results for input(s): LABURIN in the last 48 hours.  Urine Studies: No results for input(s): COLORU, APPEARANCEUA, PHUR, SPECGRAV, PROTEINUA, GLUCUA, KETONESU, BILIRUBINUA, OCCULTUA, NITRITE, UROBILINOGEN, LEUKOCYTESUR, RBCUA, WBCUA, BACTERIA, SQUAMEPITHEL, HYALINECASTS in the last 48 hours.    Invalid input(s): WRIGHTSUR  All pertinent labs within the past 24 hours have been reviewed.    Significant Imaging: I have reviewed all pertinent imaging results/findings within the past 24 hours.   CT ABDOMEN AND PELVIS  In this patient with a history of colon cancer status post partial distal colectomy and reanastomosis, CT findings are compatible with a moderate severity pancolitis.  5.5 cm left adnexal cyst.  Further evaluation with pelvic ultrasound is recommended.  Cholelithiasis.  Compression deformity of the T11 vertebral body, unchanged.  Remote fracture of the right inferior pubic ramus.  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

## 2018-05-31 NOTE — PLAN OF CARE
Problem: Patient Care Overview  Goal: Plan of Care Review  Outcome: Ongoing (interventions implemented as appropriate)  Pt is free off fall , after working with OT  Pt vomit small amount of breakfast content x 1 denies nausea after vomiting. Continue to monitor visie, [id number], remains on contact isolation for c diff, remains incontinent of bowel and bladder, skin remain intact will continue to monitor

## 2018-05-31 NOTE — ASSESSMENT & PLAN NOTE
- Patient w/ new onset diarrhea ( 6-7x/day) for about  3-4 days  - Noted blood streaks per daughter  - On admission pt w/ leukocytosis of 20.22  - Afebrile at home and on admission  - Cdiff toxin and antigen positive  - Stool Cx and E.coli 0157 in process  - Bcx w/ NGTD  - Contact precautions in place  - Vancomycin PO started; continue   - CT abdomen/pelvis w/ noted pancolitis  - Worsening diarrhea noted at the end of the shift on 5/28 by the nursing staff; patient was not ready for  - On 5/29 patients diarrhea improved and is clear for discharge but daughter stated that she cannot care for her at home at this moment, thus consult to [facility] placed and CM aware.  - Awaiting placement

## 2018-05-31 NOTE — ASSESSMENT & PLAN NOTE
- WBC 20.22 on admission; but has since resolved  - Currently labs are Q72 hours w/ last on 05/30  - Has been evaluated for Leukocytosi by Dr. Fry in the past w/ low suspicion of myeloproliferative disorder  - Patient w/ new onset diarrhea  2/2 C.diff  - See Diarrhea

## 2018-05-31 NOTE — PLAN OF CARE
Problem: Physical Therapy Goal  Goal: Physical Therapy Goal  Goals to be met by: 2018     Patient will increase functional independence with mobility by performin. Supine to sit with Contact Guard Assistance  2. Sit to supine with Contact Guard Assistance  3. Sit to stand transfer with Contact Guard Assistance  4. Bed to chair transfer with Minimal Assistance  5. Gait  x 20 feet with Minimal Assistance using Rolling Walker.   6. Lower extremity exercise program x15 reps per handout, with supervision     Outcome: Ongoing (interventions implemented as appropriate)  Goals remain appropriate.

## 2018-05-31 NOTE — PT/OT/SLP PROGRESS
"Physical Therapy Treatment    Patient Name:  Arina Adame   MRN:  [id number]    Recommendations:     Discharge Recommendations:  home with home health   Discharge Equipment Recommendations: none   Barriers to discharge: Decreased caregiver support    Assessment:     Arina Adame is a 89 y.o. female admitted with a medical diagnosis of Clostridium difficile infection.  She presents with the following impairments/functional limitations:  impaired endurance, impaired self care skills, weakness, impaired functional mobilty, impaired balance, gait instability. Pt tolerated tx well with focus on sitting/standing balance, attempting gait, and therex. Pt progressing well with pt tolerating side stepping at EOB. Pt remains limited by poor balance with noted posterior lean requiring constant Min A to maintain standing. Pt will continue to benefit from skilled therapy to improve impairments listed above.      Rehab Prognosis:  Good ; patient would benefit from acute skilled PT services to address these deficits and reach maximum level of function.      Recent Surgery: * No surgery found *      Plan:     During this hospitalization, patient to be seen 4 x/week to address the above listed problems via gait training, therapeutic activities, therapeutic exercises, neuromuscular re-education, wheelchair management/training  · Plan of Care Expires:  06/27/18   Plan of Care Reviewed with: patient    Subjective     Communicated with NSG prior to session.  Patient found R sidelying upon PTA entry to room, agreeable to treatment.  OT reported pt was just placed in R sidelying with wedge prior to PTA tx.     Chief Complaint: upset stomach  Patient comments/goals: "I just threw up with the other girl, but I feel way better now."  Pain/Comfort:  · Pain Rating 1: 0/10  · Pain Rating Post-Intervention 1: 0/10    Patients cultural, spiritual, Evangelical conflicts given the current situation: none stated    Objective:     Patient found " with: telemetry     General Precautions: Standard, fall   Orthopedic Precautions:N/A   Braces: N/A     Functional Mobility:  · Bed Mobility:     · Rolling Right: contact guard assistance  · Supine to Sit: contact guard assistance  · Sit to Supine: contact guard assistance  · Transfers:     · Sit to Stand:  minimum assistance with rolling walker  · Gait: Pt tolerates side steps at EOB 5 steps to L/R x 2 trials with RW and Mod A. Pt with posterior lean requiring support and pt requires some assistance with managing RW and cueing for continuation.   · Balance: Pt with posterior lean noted throughout all standing and attempts at gait with pt limited to only sidestepping this day.       AM-PAC 6 CLICK MOBILITY  Turning over in bed (including adjusting bedclothes, sheets and blankets)?: 3  Sitting down on and standing up from a chair with arms (e.g., wheelchair, bedside commode, etc.): 3  Moving from lying on back to sitting on the side of the bed?: 3  Moving to and from a bed to a chair (including a wheelchair)?: 2  Need to walk in hospital room?: 2  Climbing 3-5 steps with a railing?: 1  Total Score: 14       Therapeutic Activities and Exercises:   Pt sits at EOB 10 minutes with SBA and cues to improve upright posture and forward lean, with pt leaning posteriorly throughout tx.  Pt performs BLE therex: AP, GS, LAQ, Seated Marching x 20 reps.   Pt stands x 2 trials ~ 40 seconds first trial and ~ 2 minutes second trial with RW and Min A. Pt requires increased assistance to begin attempted ambulation.     Patient left right sidelying with call button in reach..    GOALS:    Physical Therapy Goals        Problem: Physical Therapy Goal    Goal Priority Disciplines Outcome Goal Variances Interventions   Physical Therapy Goal     PT/OT, PT Ongoing (interventions implemented as appropriate)     Description:  Goals to be met by: 2018     Patient will increase functional independence with mobility by performin.  Supine to sit with Contact Guard Assistance  2. Sit to supine with Contact Guard Assistance  3. Sit to stand transfer with Contact Guard Assistance  4. Bed to chair transfer with Minimal Assistance  5. Gait  x 20 feet with Minimal Assistance using Rolling Walker.   6. Lower extremity exercise program x15 reps per handout, with supervision                      Time Tracking:     PT Received On: 05/31/18  PT Start Time: 1114     PT Stop Time: 1138  PT Total Time (min): 24 min     Billable Minutes: Gait Training 8 and Therapeutic Activity 16    Treatment Type: Treatment  PT/PTA: PTA     PTA Visit Number: 1     Alexey Boateng, PTA  05/31/2018

## 2018-05-31 NOTE — PT/OT/SLP PROGRESS
Occupational Therapy   Treatment    Name: Arina Adame  MRN: [id number]  Admitting Diagnosis:  Clostridium difficile infection       Recommendations:     Discharge Recommendations: home with home health  Discharge Equipment Recommendations:  none  Barriers to discharge:  None    Subjective     Communicated with: patient prior to session.  Pain/Comfort:  · Pain Rating 1: 0/10  · Pain Rating Post-Intervention 1: 0/10    Patients cultural, spiritual, Hindu conflicts given the current situation: none stated     Objective:     Patient found with: telemetry    General Precautions: Standard, fall   Orthopedic Precautions:N/A   Braces: N/A     Occupational Performance:    Bed Mobility:    · Patient completed Rolling/Turning to Left with  minimum assistance  · Patient completed Rolling/Turning to Right with minimum assistance  · Patient completed Scooting/Bridging with minimum assistance sitting EOB scooting to HOB  · Patient completed Supine to Sit with minimum assistance  · Patient completed Sit to Supine with moderate assistance     Functional Mobility/Transfers:  · Patient completed Sit <> Stand Transfer with moderate assistance  with  no assistive device     Activities of Daily Living:  · Grooming: setup for washing face EOB    · UB Dressing: moderate assistance Dawson and donning front gown and back gown  · LB Dressing: supervision Dawson and donning socks, but needed min A for sitting balance EOB   · Toileting: total assistance Patient soiled and needed to be cleaned    Patient left HOB elevated with call button in reach and all needs met.     [facility] 6 Click:  [facility] Total Score: 16    Treatment & Education:  Patient performed B UE ROM exercises 1 x 10 in all planes and joints focusing to improve strength and endurance to increase independence with ADLs. Patient needed SBA<>Min A to maintain sitting balance during exercises EOB.      Education:    Assessment:     Arina Adame is a 89 y.o. female with a  medical diagnosis of Clostridium difficile infection.  Performance deficits affecting function are weakness, impaired endurance, impaired functional mobilty, gait instability, impaired balance, impaired self care skills.  Patient tolerated treatment session, but needed assistance to maintain upright posture EOB during tasks (SBA<>min A). Patient vomited and nursing notified and aware.      Rehab Prognosis:  good; patient would benefit from acute skilled OT services to address these deficits and reach maximum level of function.       Plan:     Patient to be seen 3 x/week to address the above listed problems via self-care/home management, therapeutic activities, therapeutic exercises  · Plan of Care Expires: 06/28/18  · Plan of Care Reviewed with: patient    This Plan of care has been discussed with the patient who was involved in its development and understands and is in agreement with the identified goals and treatment plan    GOALS:    Occupational Therapy Goals        Problem: Occupational Therapy Goal    Goal Priority Disciplines Outcome Interventions   Occupational Therapy Goal     OT, PT/OT Ongoing (interventions implemented as appropriate)    Description:  Goals to be met by:6/15/18    Patient will increase functional independence with ADLs by performing:    UE Dressing with Contact Guard Assistance.  LE Dressing with Contact Guard Assistance.  Grooming while seated with Minimal Assistance.  Toileting from bedside commode with Minimal Assistance for hygiene and clothing management.                       Time Tracking:     OT Date of Treatment: 05/31/18  OT Start Time: 1036  OT Stop Time: 1114  OT Total Time (min): 38 min    Billable Minutes:Self Care/Home Management 28  Therapeutic Exercise 10    HERVE Cam  5/31/2018

## 2018-06-01 VITALS
BODY MASS INDEX: 19.85 KG/M2 | RESPIRATION RATE: 17 BRPM | HEART RATE: 110 BPM | WEIGHT: 107.88 LBS | DIASTOLIC BLOOD PRESSURE: 87 MMHG | SYSTOLIC BLOOD PRESSURE: 126 MMHG | HEIGHT: 62 IN | TEMPERATURE: 98 F | OXYGEN SATURATION: 96 %

## 2018-06-01 PROCEDURE — 25000003 PHARM REV CODE 250: Performed by: STUDENT IN AN ORGANIZED HEALTH CARE EDUCATION/TRAINING PROGRAM

## 2018-06-01 PROCEDURE — 99239 HOSP IP/OBS DSCHRG MGMT >30: CPT | Mod: GC,,, | Performed by: HOSPITALIST

## 2018-06-01 RX ADMIN — SERTRALINE HYDROCHLORIDE 75 MG: 50 TABLET ORAL at 10:06

## 2018-06-01 RX ADMIN — CYANOCOBALAMIN TAB 250 MCG 1000 MCG: 250 TAB at 10:06

## 2018-06-01 RX ADMIN — Medication 125 MG: at 11:06

## 2018-06-01 RX ADMIN — Medication 125 MG: at 06:06

## 2018-06-01 RX ADMIN — ASPIRIN 325 MG: 325 TABLET, DELAYED RELEASE ORAL at 10:06

## 2018-06-01 RX ADMIN — LEVOTHYROXINE SODIUM 100 MCG: 100 TABLET ORAL at 06:06

## 2018-06-01 RX ADMIN — ATORVASTATIN CALCIUM 40 MG: 20 TABLET, FILM COATED ORAL at 10:06

## 2018-06-01 RX ADMIN — Medication 1000 UNITS: at 11:06

## 2018-06-01 NOTE — PLAN OF CARE
06/01/18 1504   Final Note   Assessment Type Final Discharge Note   Discharge Disposition [facility] Follow Up  Appt(s) scheduled? Yes   Discharge plans and expectations educations in teach back method with documentation complete? (Per bedside nursing)   Right Care Referral Info   Post Acute Recommendation Home-care   Facility Name ([facility])

## 2018-06-01 NOTE — PLAN OF CARE
ELLIOT spoke with Latonya at [facility] and was told that pt had been denied for [facility].  ELLIOT called pt's daughter, Obdulia, to notify her of the decision.  Obdulia expressed understanding and asked if a hospital bed could be ordered for her at [place].  ELLIOT spoke with KAJAL Snowden, at [facility] and was told that's he would order bed.  No other needs expressed.      ELLIOT spoke with Obdulia again to confirm [facility].  Orders sent via [facility].  ELLIOT called DME at [phone] to confirm hospital bed was ordered and was told that the Dx needs to be updated to I77.9 and R26.89.  ELLIOT called Dr Gonzalez and gave her this information.  DME stated that they would get the revised Rx for hospital bed and contact Obdulia for delivery.  Obdulia's cell phone was given to DME.    339pm--ELLIOT called [facility] and spoke with Parris regarding updated HH orders.  Parris stated that they had received them and that pt would be seen on 6/2.  Pt's daughter stated that she was providing transportation for pt DC.      Naz Lezama LMSW

## 2018-06-01 NOTE — PROGRESS NOTES
IV removed.  Bleeding controlled.  All belongings gathered by family.  Discharge papers explained to daughter.  Daughter voices understanding.  Awaiting wheelchair transport.

## 2018-06-01 NOTE — SUBJECTIVE & OBJECTIVE
Interval History: NAEON. Minimal loose stools at this time, and symptoms continue to improve on appropriate therapy. Home w/ H/H today. Patient w/o any additional complaints at this time and is tolerating PO Vanc well.     Review of Systems   Constitutional: Negative for activity change, appetite change, chills, diaphoresis, fatigue and fever.   Respiratory: Negative for cough, choking, shortness of breath, wheezing and stridor.    Cardiovascular: Negative for chest pain, palpitations and leg swelling.   Gastrointestinal: Positive for diarrhea and vomiting. Negative for abdominal distention and abdominal pain.   Endocrine: Negative.    Genitourinary: Negative.    Musculoskeletal: Negative.    Skin: Positive for pallor and wound.   Allergic/Immunologic: Negative.    Neurological: Negative.    Hematological: Negative.    Psychiatric/Behavioral: Positive for confusion (2/2 Dementia ).     Objective:     Vital Signs (Most Recent):  Temp: 97.5 °F (36.4 °C) (06/01/18 1328)  Pulse: 109 (06/01/18 1300)  Resp: 18 (06/01/18 1300)  BP: 116/79 (06/01/18 1300)  SpO2: 95 % (06/01/18 1300) Vital Signs (24h Range):  Temp:  [97.5 °F (36.4 °C)-98.4 °F (36.9 °C)] 97.5 °F (36.4 °C)  Pulse:  [] 109  Resp:  [14-20] 18  SpO2:  [92 %-97 %] 95 %  BP: (116-145)/(71-94) 116/79     Weight: 48.9 kg (107 lb 14.4 oz)  Body mass index is 19.74 kg/m².    Physical Exam   Constitutional: She appears well-developed and well-nourished.   Pleasant, mildly confused elderly female in no acute distress   HENT:   Head: Normocephalic and atraumatic.   Mouth/Throat: Oropharynx is clear and moist.   Dental work noted   Eyes: Conjunctivae and EOM are normal.   Neck: Normal range of motion. Neck supple. No JVD present.   Cardiovascular: Normal rate and regular rhythm.    No murmur heard.  Pulmonary/Chest: Effort normal and breath sounds normal. She has no wheezes. She has no rales.   Abdominal: Soft. Bowel sounds are normal. She exhibits no distension.  There is no tenderness.   Musculoskeletal: Normal range of motion. She exhibits no edema, tenderness or deformity.   Neurological: She is alert.   Very pleasant.  Orientated to person, place but not time. Unsure if she has had BM   Skin: Skin is warm. Capillary refill takes less than 2 seconds. No rash noted. There is pallor.   Wounds noted on b/l heels, s/p friction injury on the back of shoes as described by daughter   Psychiatric: She has a normal mood and affect.         CRANIAL NERVES     CN III, IV, VI   Extraocular motions are normal.        Significant Labs:   A1C:     Recent Labs  Lab 05/26/18  1512   HGBA1C 5.3     ABGs: No results for input(s): PH, PCO2, HCO3, POCSATURATED, BE, TOTALHB, COHB, METHB, O2HB, POCFIO2 in the last 48 hours.  Bilirubin:     Recent Labs  Lab 05/26/18  1512 05/27/18  0651 05/28/18  0635 05/29/18  0642 05/30/18  0642   BILITOT 1.0 0.8 0.7 0.5 0.5     Blood Culture:   No results for input(s): LABBLOO in the last 48 hours.  BMP:   No results for input(s): GLU, NA, K, CL, CO2, BUN, CREATININE, CALCIUM, MG in the last 48 hours.  CBC:   No results for input(s): WBC, HGB, HCT, PLT in the last 48 hours.  CMP:   No results for input(s): NA, K, CL, CO2, GLU, BUN, CREATININE, CALCIUM, PROT, ALBUMIN, BILITOT, ALKPHOS, AST, ALT, ANIONGAP, EGFRNONAA in the last 48 hours.    Invalid input(s): ESTGFAFR[place]  Cardiac Markers: No results for input(s): CKMB, MYOGLOBIN, BNP, TROPISTAT in the last 48 hours.  Coagulation: No results for input(s): PT, INR, APTT in the last 48 hours.  Lactic Acid:   No results for input(s): LACTATE in the last 48 hours.  Lipase:   No results for input(s): LIPASE in the last 48 hours.  Lipid Panel: No results for input(s): CHOL, HDL, LDLCALC, TRIG, CHOLHDL in the last 48 hours.  Magnesium:   No results for input(s): MG in the last 48 hours.  Pathology Results  (Last 10 years)    None        POCT Glucose:   No results for input(s): POCTGLUCOSE in the last 48  hours.  Prealbumin: No results for input(s): PREALBUMIN in the last 48 hours.  Respiratory Culture: No results for input(s): GSRESP, RESPIRATORYC in the last 48 hours.  Troponin: No results for input(s): TROPONINI in the last 48 hours.  TSH:     Recent Labs  Lab 05/26/18  1512   TSH 7.626*     Urine Culture: No results for input(s): LABURIN in the last 48 hours.  Urine Studies: No results for input(s): COLORU, APPEARANCEUA, PHUR, SPECGRAV, PROTEINUA, GLUCUA, KETONESU, BILIRUBINUA, OCCULTUA, NITRITE, UROBILINOGEN, LEUKOCYTESUR, RBCUA, WBCUA, BACTERIA, SQUAMEPITHEL, HYALINECASTS in the last 48 hours.    Invalid input(s): WRIGHTSUR  All pertinent labs within the past 24 hours have been reviewed.    Significant Imaging: I have reviewed all pertinent imaging results/findings within the past 24 hours.   CT ABDOMEN AND PELVIS  In this patient with a history of colon cancer status post partial distal colectomy and reanastomosis, CT findings are compatible with a moderate severity pancolitis.  5.5 cm left adnexal cyst.  Further evaluation with pelvic ultrasound is recommended.  Cholelithiasis.  Compression deformity of the T11 vertebral body, unchanged.  Remote fracture of the right inferior pubic ramus.  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

## 2018-06-01 NOTE — PLAN OF CARE
Problem: Patient Care Overview  Goal: Plan of Care Review  Outcome: Ongoing (interventions implemented as appropriate)  Plan of care went over with patient at beginning of shift: frequent monitoring via Visi, frequent checks, turning frequently, Isolation Precautions due to C-Diff, iV maintenance and rest and sleep.    All was accomplished but the pt didn't get any rest and sleep. I believe she stayed up all night watching the [place] TV channel. At 7am rounds, she was finally sleeping. The pt voided 3 X with stool and urine; and she was cleaned afterwards.

## 2018-06-05 ENCOUNTER — PATIENT OUTREACH (OUTPATIENT)
Dept: ADMINISTRATIVE | Facility: CLINIC | Age: 83
End: 2018-06-05

## 2018-06-05 ENCOUNTER — TELEPHONE (OUTPATIENT)
Dept: INTERNAL MEDICINE | Facility: CLINIC | Age: 83
End: 2018-06-05

## 2018-06-05 DIAGNOSIS — A04.72 C. DIFFICILE DIARRHEA: Primary | ICD-10-CM

## 2018-06-05 NOTE — TELEPHONE ENCOUNTER
----- Message from Janell Hammer sent at 6/5/2018  2:24 PM CDT -----  Contact: Ms Steiner /   daughter      ----- Message -----  From: Savanah Francois  Sent: 6/5/2018   2:15 PM  To: Renea Arriola Staff    Ms Steiner states patient has bad rash on buttock area very red raw with blisters. Ms Steiner want to know if something can be called into pharmacy.  Please call Ms Steiner [phone] for more info

## 2018-06-05 NOTE — TELEPHONE ENCOUNTER
Pt's daughter states that pt was recently d/c form hospital. She was positive for C-Difficile. Pt. wears diapers. She is irritated in the perineum area. Area is raw, shiny and has blisters. Also, has an odor like yeast. She c/o  burning when she is being cleaned. Daughter has been changing her every 2 hours and applying Desitin. She does receive home P/T   Any suggestions?

## 2018-06-06 NOTE — TELEPHONE ENCOUNTER
That's what I told her and she doesn't agree. Mother is still having diarrhea .Diarrhea not any better since leaving the hospital. She is up all night changing her.

## 2018-06-06 NOTE — PT/OT/SLP DISCHARGE
Physical Therapy Discharge Summary    Name: Arina Adame  MRN: [id number]   Principal Problem: Clostridium difficile infection     Patient Discharged from acute Physical Therapy on 2018.  Please refer to prior PT noted date on 2018 for functional status.     Assessment:     Patient was discharged unexpectedly.  Information required to complete an accurate discharge summary is unknown.  Refer to therapy initial evaluation and last progress note for initial and most recent functional status and goal achievement.  Recommendations made may be found in medical record.    Objective:     GOALS:    Physical Therapy Goals        Problem: Physical Therapy Goal    Goal Priority Disciplines Outcome Goal Variances Interventions   Physical Therapy Goal     PT/OT, PT Ongoing (interventions implemented as appropriate)     Description:  Goals to be met by: 2018     Patient will increase functional independence with mobility by performin. Supine to sit with Contact Guard Assistance  2. Sit to supine with Contact Guard Assistance  3. Sit to stand transfer with Contact Guard Assistance  4. Bed to chair transfer with Minimal Assistance  5. Gait  x 20 feet with Minimal Assistance using Rolling Walker.   6. Lower extremity exercise program x15 reps per handout, with supervision                      Reasons for Discontinuation of Therapy Services  Transfer to alternate level of care.      Plan:     Patient Discharged to: Home with Home Health Service and 24hr assist.    CHELSEY MATOS, PT  2018

## 2018-06-06 NOTE — TELEPHONE ENCOUNTER
"I spoke to [facility] in [facility] [phone]. I was told that they do not a have a wound care nurse. The nurses assigned do wound care. Apparently, the daughter had already called and a nurse was sent to evaluate the condition. She will have that nurse return call with her assessment. Also daughter is asking for a refill on pt's Vancomycin. "she doesn't want to have a lapse in treatment"although there were no refills.    "

## 2018-06-06 NOTE — TELEPHONE ENCOUNTER
"Bm's/diarrhea every 2 hours. Pt. is also hallucinating at night "grabbing at things in the air". Daughter wants Dr Miller /GI on the case. I did mention ID too. I will call home health to collect stool specimen. Home health notified.  "

## 2018-06-06 NOTE — TELEPHONE ENCOUNTER
It looks like she was rx'd 160mL, which is 8 days outpatient and she had 2 days while in hospital. Treatment is 10 days total so that should be sufficient.

## 2018-06-07 ENCOUNTER — TELEPHONE (OUTPATIENT)
Dept: INTERNAL MEDICINE | Facility: CLINIC | Age: 83
End: 2018-06-07

## 2018-06-07 ENCOUNTER — LAB VISIT (OUTPATIENT)
Dept: LAB | Facility: HOSPITAL | Age: 83
End: 2018-06-07
Attending: INTERNAL MEDICINE
Payer: MEDICARE

## 2018-06-07 DIAGNOSIS — A04.72 C. DIFFICILE DIARRHEA: ICD-10-CM

## 2018-06-07 LAB
C DIFF GDH STL QL: NEGATIVE
C DIFF TOX A+B STL QL IA: NEGATIVE

## 2018-06-07 PROCEDURE — 87449 NOS EACH ORGANISM AG IA: CPT

## 2018-06-07 NOTE — TELEPHONE ENCOUNTER
----- Message from Caity Wick sent at 6/7/2018 10:28 AM CDT -----  Contact: [facility] [phone]  Prior Authorization Needed    Medication: vancomycin 25 mg/mL solution    Pharmacy Info: [facility] [address] [place] [address] AT [facility] [address]    Plan does not cover this medication. Please call plan at [phone] to initiate prior authorization or call/fax pharmacy to change medication. Patient ID#[id number]    Please notify pharmacy when prior authorization has been approved.    Thank You

## 2018-06-08 DIAGNOSIS — A04.72 C. DIFFICILE DIARRHEA: ICD-10-CM

## 2018-06-08 NOTE — TELEPHONE ENCOUNTER
Spoke with [facility] and they stated that the Rx for vancomycin 25mg/mL you sent over for pt, is a compound and verbalized that it would need to be filled at a specialty pharmacy such as [facility]. Please adv.

## 2018-06-08 NOTE — TELEPHONE ENCOUNTER
----- Message from Teri Martins sent at 6/8/2018  4:03 PM CDT -----  Contact: Daughter Obdulia [phone]  She said that they are out of stock of the vancomycin 25 mg/mL solution at [facility] so, please send it to [id number] - [place], [place] - [address] Counts include 234 beds at the [facility] [phone] (Phone)  [phone] (Fax)    Thank you

## 2018-06-10 ENCOUNTER — PATIENT MESSAGE (OUTPATIENT)
Dept: INTERNAL MEDICINE | Facility: CLINIC | Age: 83
End: 2018-06-10

## 2018-06-11 ENCOUNTER — PATIENT MESSAGE (OUTPATIENT)
Dept: INTERNAL MEDICINE | Facility: CLINIC | Age: 83
End: 2018-06-11

## 2018-06-11 ENCOUNTER — HOSPITAL ENCOUNTER (EMERGENCY)
Facility: HOSPITAL | Age: 83
Discharge: HOME | End: 2018-06-11
Attending: EMERGENCY MEDICINE
Payer: MEDICARE

## 2018-06-11 ENCOUNTER — TELEPHONE (OUTPATIENT)
Dept: INTERNAL MEDICINE | Facility: CLINIC | Age: 83
End: 2018-06-11

## 2018-06-11 VITALS
HEART RATE: 94 BPM | HEIGHT: 62 IN | BODY MASS INDEX: 19.69 KG/M2 | WEIGHT: 107 LBS | SYSTOLIC BLOOD PRESSURE: 177 MMHG | DIASTOLIC BLOOD PRESSURE: 84 MMHG | OXYGEN SATURATION: 94 % | TEMPERATURE: 98 F | RESPIRATION RATE: 16 BRPM

## 2018-06-11 DIAGNOSIS — R19.7 DIARRHEA, UNSPECIFIED TYPE: ICD-10-CM

## 2018-06-11 DIAGNOSIS — N39.0 URINARY TRACT INFECTION WITHOUT HEMATURIA, SITE UNSPECIFIED: Primary | ICD-10-CM

## 2018-06-11 LAB
ALBUMIN SERPL BCP-MCNC: 3.4 G/DL
ALP SERPL-CCNC: 109 U/L
ALT SERPL W/O P-5'-P-CCNC: 13 U/L
ANION GAP SERPL CALC-SCNC: 11 MMOL/L
AST SERPL-CCNC: 22 U/L
BACTERIA #/AREA URNS AUTO: ABNORMAL /HPF
BASOPHILS # BLD AUTO: 0.05 K/UL
BASOPHILS NFR BLD: 0.5 %
BILIRUB SERPL-MCNC: 0.8 MG/DL
BILIRUB UR QL STRIP: NEGATIVE
BUN SERPL-MCNC: 15 MG/DL
CALCIUM SERPL-MCNC: 9.7 MG/DL
CHLORIDE SERPL-SCNC: 103 MMOL/L
CLARITY UR REFRACT.AUTO: ABNORMAL
CO2 SERPL-SCNC: 26 MMOL/L
COLOR UR AUTO: YELLOW
CREAT SERPL-MCNC: 0.8 MG/DL
DIFFERENTIAL METHOD: ABNORMAL
EOSINOPHIL # BLD AUTO: 0.2 K/UL
EOSINOPHIL NFR BLD: 1.5 %
ERYTHROCYTE [DISTWIDTH] IN BLOOD BY AUTOMATED COUNT: 14.6 %
EST. GFR  (AFRICAN AMERICAN): >60 ML/MIN/1.73 M^2
EST. GFR  (NON AFRICAN AMERICAN): >60 ML/MIN/1.73 M^2
GLUCOSE SERPL-MCNC: 129 MG/DL
GLUCOSE UR QL STRIP: NEGATIVE
HCT VFR BLD AUTO: 43.5 %
HGB BLD-MCNC: 13.8 G/DL
HGB UR QL STRIP: NEGATIVE
IMM GRANULOCYTES # BLD AUTO: 0.05 K/UL
IMM GRANULOCYTES NFR BLD AUTO: 0.5 %
KETONES UR QL STRIP: NEGATIVE
LEUKOCYTE ESTERASE UR QL STRIP: ABNORMAL
LYMPHOCYTES # BLD AUTO: 1.4 K/UL
LYMPHOCYTES NFR BLD: 12.3 %
MCH RBC QN AUTO: 30.9 PG
MCHC RBC AUTO-ENTMCNC: 31.7 G/DL
MCV RBC AUTO: 97 FL
MICROSCOPIC COMMENT: ABNORMAL
MONOCYTES # BLD AUTO: 0.9 K/UL
MONOCYTES NFR BLD: 8.3 %
NEUTROPHILS # BLD AUTO: 8.5 K/UL
NEUTROPHILS NFR BLD: 76.9 %
NITRITE UR QL STRIP: POSITIVE
NRBC BLD-RTO: 0 /100 WBC
PH UR STRIP: 6 [PH] (ref 5–8)
PLATELET # BLD AUTO: 283 K/UL
PMV BLD AUTO: 10.9 FL
POTASSIUM SERPL-SCNC: 4 MMOL/L
PROT SERPL-MCNC: 7.5 G/DL
PROT UR QL STRIP: NEGATIVE
RBC # BLD AUTO: 4.47 M/UL
RBC #/AREA URNS AUTO: 0 /HPF (ref 0–4)
SODIUM SERPL-SCNC: 140 MMOL/L
SP GR UR STRIP: 1.01 (ref 1–1.03)
SQUAMOUS #/AREA URNS AUTO: 29 /HPF
URN SPEC COLLECT METH UR: ABNORMAL
UROBILINOGEN UR STRIP-ACNC: NEGATIVE EU/DL
WBC # BLD AUTO: 10.99 K/UL
WBC #/AREA URNS AUTO: 39 /HPF (ref 0–5)

## 2018-06-11 PROCEDURE — 85025 COMPLETE CBC W/AUTO DIFF WBC: CPT

## 2018-06-11 PROCEDURE — 99284 EMERGENCY DEPT VISIT MOD MDM: CPT | Mod: ,,, | Performed by: NURSE PRACTITIONER

## 2018-06-11 PROCEDURE — 87086 URINE CULTURE/COLONY COUNT: CPT

## 2018-06-11 PROCEDURE — 80053 COMPREHEN METABOLIC PANEL: CPT

## 2018-06-11 PROCEDURE — 99283 EMERGENCY DEPT VISIT LOW MDM: CPT | Mod: 25

## 2018-06-11 PROCEDURE — 25000003 PHARM REV CODE 250: Performed by: NURSE PRACTITIONER

## 2018-06-11 PROCEDURE — 96360 HYDRATION IV INFUSION INIT: CPT

## 2018-06-11 PROCEDURE — 87088 URINE BACTERIA CULTURE: CPT

## 2018-06-11 PROCEDURE — 81001 URINALYSIS AUTO W/SCOPE: CPT

## 2018-06-11 PROCEDURE — 87186 SC STD MICRODIL/AGAR DIL: CPT

## 2018-06-11 PROCEDURE — 87077 CULTURE AEROBIC IDENTIFY: CPT

## 2018-06-11 RX ORDER — CIPROFLOXACIN 250 MG/1
250 TABLET, FILM COATED ORAL 2 TIMES DAILY
Qty: 14 TABLET | Refills: 0 | Status: SHIPPED | OUTPATIENT
Start: 2018-06-11 | End: 2018-06-11

## 2018-06-11 RX ORDER — SULFAMETHOXAZOLE AND TRIMETHOPRIM 800; 160 MG/1; MG/1
1 TABLET ORAL
Status: COMPLETED | OUTPATIENT
Start: 2018-06-11 | End: 2018-06-11

## 2018-06-11 RX ORDER — ASPIRIN 325 MG
325 TABLET ORAL DAILY
Status: ON HOLD | COMMUNITY
End: 2018-06-25 | Stop reason: HOSPADM

## 2018-06-11 RX ORDER — SULFAMETHOXAZOLE AND TRIMETHOPRIM 800; 160 MG/1; MG/1
1 TABLET ORAL 2 TIMES DAILY
Qty: 14 TABLET | Refills: 0 | Status: SHIPPED | OUTPATIENT
Start: 2018-06-11 | End: 2018-06-18

## 2018-06-11 RX ORDER — CIPROFLOXACIN 250 MG/1
250 TABLET, FILM COATED ORAL
Status: DISCONTINUED | OUTPATIENT
Start: 2018-06-11 | End: 2018-06-11

## 2018-06-11 RX ADMIN — SULFAMETHOXAZOLE AND TRIMETHOPRIM 1 TABLET: 800; 160 TABLET ORAL at 06:06

## 2018-06-11 RX ADMIN — SODIUM CHLORIDE 1000 ML: 0.9 INJECTION, SOLUTION INTRAVENOUS at 01:06

## 2018-06-11 NOTE — ED NOTES
Patient identifiers verified and correct for .   LOC: The patient is awake, alert and aware of environment with an appropriate affect, the patient is oriented x 3 and speaking appropriately.   APPEARANCE: Patient appears comfortable and in no acute distress, patient is clean and well groomed.  SKIN: The skin is warm and dry, color consistent with ethnicity, patient has normal skin turgor and moist mucus membranes, skin intact, no breakdown or bruising noted.   MUSCULOSKELETAL: Patient moving all extremities spontaneously, no swelling noted.  RESPIRATORY: Airway is open and patent, respirations are spontaneous, patient has a normal effort and rate, no accessory muscle use noted, pt placed on continuous pulse ox with O2 sats noted at 97% on room air.  CARDIAC: Pt placed on cardiac monitor. Patient has a normal rate and regular rhythm, no edema noted, capillary refill < 3 seconds.   GASTRO: Soft and non tender to palpation, no distention noted, hyperactive bowel sounds present in all four quadrants. Pts daughter reports continued diarrhea that is green in color and liquid--pt denies pain.  : Pt denies any pain or frequency with urination.  NEURO: Pt opens eyes spontaneously, behavior appropriate to situation, follows commands, facial expression symmetrical, bilateral hand grasp equal and even, purposeful motor response noted, normal sensation in all extremities when touched with a finger.

## 2018-06-11 NOTE — TELEPHONE ENCOUNTER
----- Message from Janell Hammer sent at 6/6/2018  9:33 AM CDT -----  Contact: Teresita/[facility]/[phone]      ----- Message -----  From: Dana Mabry  Sent: 6/6/2018   9:29 AM  To: Renea Arriola Staff    Type:Home Health(orders,updates,clarifications,etc)    Home Health Agency/Nurse: Teresita/[facility]    Phone number: [phone]    Reason for call:    Comments: Teresita want to talk with Dr Meier's nurse in regards call from patient daughter yesterday 06/05/18. Please call and advise. Thank you!!!

## 2018-06-11 NOTE — TELEPHONE ENCOUNTER
----- Message from Deb Andersen sent at 6/11/2018  2:02 PM CDT -----  Prescription Alternative Needed:     The pharmacy needs alternative on the following RX:    vancomycin 25 mg/mL solution    Reason: Pharmacy cannot order this medication.    Pharmacy: [place] [id number]     #[phone]    Please advise.    Thank You

## 2018-06-11 NOTE — ED NOTES
Pt to er with family due to continued diarrhea--pt was discharged on Saturday for same--daughter reports that diarrhea appears soupy and an army green color--denies any pain and fever

## 2018-06-11 NOTE — ED PROVIDER NOTES
Encounter Date: 6/11/2018       History     Chief Complaint   Patient presents with    Diarrhea     dc'd on sat with cdiff, now army green stool massive     Patient is an 89 y.o.with medical medical history of dementia, CAD, HTN, hypothyroidism, recurrent UTIs and decubitus ulcers presents to the ED due to loose BMs.  Pt daughter states that pt was recently discharged with C-diff.  Since that time pt C-diff negative but continued diarrhea.  Pt daughter states diarrhea dark green.  Pt denies abdominal pain, cheat pain, fever, chills, nausea or vomiting.           Review of patient's allergies indicates:   Allergen Reactions    Codeine      Other reaction(s): Unknown    Iodine      Other reaction(s): Unknown    Penicillins      Other reaction(s): Unknown     Past Medical History:   Diagnosis Date    Alzheimer disease     Arthritis     B12 nutritional deficiency 8/6/2012    Carotid arterial disease 8/27/2013    Cataract     Colon cancer     Elevated blood pressure (not hypertension) 3/17/2014    Epiretinal membrane     Essential hypertension 3/16/2016    Hearing loss 3/17/2014    History of colon cancer 5/28/2015    [place] (hard of hearing)     Hypothyroidism 11/21/2012    IGT (impaired glucose tolerance) 2/11/2015    Macrocytosis 11/21/2012    Nevus of choroid     Osteoporosis, 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     Past Surgical History:   Procedure Laterality Date    BREAST SURGERY      CATARACT EXTRACTION W/  INTRAOCULAR LENS IMPLANT Bilateral n/a    OU    COLECTOMY      DENTAL SURGERY      right ankle surgery       Family History   Problem Relation Age of Onset    Hip fracture Mother     Thyroid disease Sister         Thyroidectomy    Diabetes Neg Hx     Amblyopia Neg Hx     Blindness Neg Hx     Cataracts Neg Hx     Glaucoma Neg Hx     Macular degeneration Neg Hx     Retinal detachment Neg Hx     Strabismus Neg Hx      Social  History   Substance Use Topics    Smoking status: Former Smoker     Quit date: 6/24/1970    Smokeless tobacco: Former User    Alcohol use No     Review of Systems   Constitutional: Positive for appetite change. Negative for chills and fever.   HENT: Negative for sore throat.    Respiratory: Negative for chest tightness, shortness of breath and wheezing.    Cardiovascular: Negative for chest pain, palpitations and leg swelling.   Gastrointestinal: Positive for diarrhea. Negative for abdominal distention, abdominal pain, constipation, nausea and vomiting.   Genitourinary: Negative for difficulty urinating, dysuria, frequency and urgency.   Musculoskeletal: Negative for arthralgias, back pain and myalgias.   Skin: Negative for color change and rash.   Neurological: Negative for dizziness, syncope, weakness, numbness and headaches.   Hematological: Does not bruise/bleed easily.       Physical Exam     Initial Vitals [06/11/18 1246]   BP Pulse Resp Temp SpO2   108/71 104 20 97.9 °F (36.6 °C) 96 %      MAP       --         Physical Exam    Nursing note and vitals reviewed.  Constitutional: Vital signs are normal. She appears well-developed and well-nourished. She is cooperative.  Non-toxic appearance. She does not have a sickly appearance. She does not appear ill. No distress.   HENT:   Head: Normocephalic and atraumatic.   Mouth/Throat: Uvula is midline, oropharynx is clear and moist and mucous membranes are normal.   Eyes: Conjunctivae, EOM and lids are normal. Pupils are equal, round, and reactive to light.   Neck: Normal range of motion, full passive range of motion without pain and phonation normal. Neck supple. No JVD present.   Cardiovascular: Normal rate, regular rhythm, normal heart sounds and intact distal pulses.   Pulses:       Radial pulses are 2+ on the right side, and 2+ on the left side.        Dorsalis pedis pulses are 2+ on the right side, and 2+ on the left side.   Pulmonary/Chest: Effort normal and  breath sounds normal. She has no decreased breath sounds.   Abdominal: Soft. Normal appearance and bowel sounds are normal. There is no tenderness. There is no rigidity, no rebound and no guarding.   Musculoskeletal: Normal range of motion.   Neurological: She is alert and oriented to person, place, and time. She has normal strength. No cranial nerve deficit or sensory deficit. GCS eye subscore is 4. GCS verbal subscore is 5. GCS motor subscore is 6.   Skin: Skin is warm, dry and intact. Capillary refill takes more than 3 seconds. No abrasion, no laceration and no rash noted. No cyanosis. Nails show no clubbing.   Psychiatric: She has a normal mood and affect. Her speech is normal and behavior is normal. Judgment and thought content normal. Cognition and memory are normal.         ED Course   Procedures  Labs Reviewed   CBC W/ AUTO DIFFERENTIAL - Abnormal; Notable for the following:        Result Value    MCHC 31.7 (*)     RDW 14.6 (*)     Gran # (ANC) 8.5 (*)     Immature Grans (Abs) 0.05 (*)     Gran% 76.9 (*)     Lymph% 12.3 (*)     All other components within normal limits   COMPREHENSIVE METABOLIC PANEL - Abnormal; Notable for the following:     Glucose 129 (*)     Albumin 3.4 (*)     All other components within normal limits   URINALYSIS, REFLEX TO URINE CULTURE - Abnormal; Notable for the following:     Appearance, UA Cloudy (*)     Nitrite, UA Positive (*)     Leukocytes, UA 2+ (*)     All other components within normal limits    Narrative:     Preferred Collection Type->Urine, Clean Catch  WHITE TOP TUBE   URINALYSIS MICROSCOPIC - Abnormal; Notable for the following:     WBC, UA 39 (*)     All other components within normal limits    Narrative:     Preferred Collection Type->Urine, Clean Catch  WHITE TOP TUBE   CULTURE, URINE          No orders to display              APC / Resident Notes:   Emergent evaluation of a 89 yo female patient presenting to the ER with chief complaint of diarrhea. Pt daughter  states that since pt was diagnosed with C-diff on 5/26.  Pt had repeat C-diff on 6/7 which was negative.  Pt daughter states that she has continued dark green diarrhea. On exam, pt denies any abdominal pain.  Abdomen soft and nontender.  Bowel sounds hyperactive. Breath sounds clear.  I will get labs, hydrate and reassess.  Differential diagnoses include but are not limited to :  gastritis, enteritis, colitis, viral gastroenteritis, or bacterial diarrhea to include C diff, E. Coli,Shigella or others.  I discussed the care of this patient with my Supervising Physician.      Pt CBC and CMP unremarkable.  Urinalysis shows cloudy appearance, (+) Nittrates and (2+) Leukocytes.  Will treat with Bactrim.  Pt and family updated on labs results.  Pt without diarrhea while in the ED.      Patient is hemodynamically stable, vital signs are normal. Discharge instructions given. Prescription for Bactrim given and explained. Return to ED precautions discussed. Follow up as directed. Pt verbalized understanding of this plan. Pt is stable for discharge.                    Clinical Impression:   The primary encounter diagnosis was Urinary tract infection without hematuria, site unspecified. A diagnosis of Diarrhea, unspecified type was also pertinent to this visit.      Disposition:   Disposition: Discharged  Condition: Stable                        Sanaz Tyler NP  06/11/18 0148

## 2018-06-11 NOTE — TELEPHONE ENCOUNTER
Called and spoke to pt.'s daughter since it was not specified who called about the solution. Just a reassuring that you did not want her to continue, right? She also said shes at the Er with the pt right now and they are sending her home they told her this is the ending of th cdiff.

## 2018-06-12 ENCOUNTER — PES CALL (OUTPATIENT)
Dept: ADMINISTRATIVE | Facility: CLINIC | Age: 83
End: 2018-06-12

## 2018-06-13 LAB — BACTERIA UR CULT: NORMAL

## 2018-06-18 ENCOUNTER — TELEPHONE (OUTPATIENT)
Dept: INTERNAL MEDICINE | Facility: CLINIC | Age: 83
End: 2018-06-18

## 2018-06-18 DIAGNOSIS — Z86.19 HISTORY OF CLOSTRIDIUM DIFFICILE INFECTION: ICD-10-CM

## 2018-06-18 DIAGNOSIS — R19.7 DIARRHEA, UNSPECIFIED TYPE: Primary | ICD-10-CM

## 2018-06-18 NOTE — TELEPHONE ENCOUNTER
Ordered Cdiff stool. She really needs to f/u w/ GI (placed referral, please have pt call to make appt) more than anyone else - can take 9:40am on 6/22. Please cancel appt for 6/20/18. Thanks!

## 2018-06-18 NOTE — TELEPHONE ENCOUNTER
No more hosp f/u available, would you like her to schedule with someone else? Also would you like to put an order in the stool?

## 2018-06-18 NOTE — TELEPHONE ENCOUNTER
----- Message from Isabel Sotomayor sent at 6/18/2018 10:19 AM CDT -----  Contact: Obdulia daughter [phone]  Patient daughter Obdulia called stated she is unable to keep appt for her mother for 06/20 because her (Obdulia)  has appt with Oncology on same day. Ask to move appt to Friday 06/22 and also pt still having diarrhea (green spinach)  Color would like to  a specimen cup to bring to lab.    Please advise

## 2018-06-20 ENCOUNTER — PATIENT MESSAGE (OUTPATIENT)
Dept: INTERNAL MEDICINE | Facility: CLINIC | Age: 83
End: 2018-06-20

## 2018-06-20 NOTE — TELEPHONE ENCOUNTER
Technically I haven't seen or evaluated pt for the diarrhea as daughter has just been portal messaging. Pt has appt on Friday. Will eval then.

## 2018-06-22 ENCOUNTER — OFFICE VISIT (OUTPATIENT)
Dept: INTERNAL MEDICINE | Facility: CLINIC | Age: 83
DRG: 371 | End: 2018-06-22
Payer: MEDICARE

## 2018-06-22 VITALS
DIASTOLIC BLOOD PRESSURE: 66 MMHG | HEIGHT: 61 IN | BODY MASS INDEX: 20.42 KG/M2 | HEART RATE: 103 BPM | SYSTOLIC BLOOD PRESSURE: 104 MMHG | WEIGHT: 108.13 LBS

## 2018-06-22 DIAGNOSIS — F41.9 ANXIETY: ICD-10-CM

## 2018-06-22 DIAGNOSIS — K52.9 CHRONIC DIARRHEA: ICD-10-CM
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
## 2018-06-22 DIAGNOSIS — Z09 HOSPITAL DISCHARGE FOLLOW-UP: Primary | ICD-10-CM

## 2018-06-22 DIAGNOSIS — F03.90 DEMENTIA WITHOUT BEHAVIORAL DISTURBANCE, UNSPECIFIED DEMENTIA TYPE: ICD-10-CM

## 2018-06-22 DIAGNOSIS — Z86.19 HISTORY OF CLOSTRIDIUM DIFFICILE INFECTION: ICD-10-CM

## 2018-06-22 DIAGNOSIS — R53.81 DEBILITY: ICD-10-CM

## 2018-06-22 DIAGNOSIS — E03.9 HYPOTHYROIDISM, UNSPECIFIED TYPE: ICD-10-CM

## 2018-06-22 PROCEDURE — 99499 UNLISTED E&M SERVICE: CPT | Mod: S$PBB,,, | Performed by: INTERNAL MEDICINE

## 2018-06-22 PROCEDURE — 99496 TRANSJ CARE MGMT HIGH F2F 7D: CPT | Mod: S$GLB,,, | Performed by: INTERNAL MEDICINE

## 2018-06-22 PROCEDURE — 99999 PR PBB SHADOW E&M-EST. PATIENT-LVL V: CPT | Mod: PBBFAC,,, | Performed by: INTERNAL MEDICINE

## 2018-06-22 RX ORDER — SERTRALINE HYDROCHLORIDE 50 MG/1
TABLET, FILM COATED ORAL
COMMUNITY
Start: 2018-05-29 | End: 2018-06-22

## 2018-06-22 NOTE — PROGRESS NOTES
Transitional Care Note  Subjective:       Patient ID: Arina Adame is a 89 y.o. female.  Chief Complaint: HOSPITAL F/U     Family and/or Caretaker present at visit?  Yes, daughter Obdulia  Diagnostic tests reviewed/disposition: No diagnosic tests pending after this hospitalization.  Disease/illness education: yes  Home health/community services discussion/referrals: Patient has home health established at [facility].   Establishment or re-establishment of referral orders for community resources: No other necessary community resources.   Discussion with other health care providers: No discussion with other health care providers necessary.     HPI Pt is well known to me. She does have severe dementia w/ memory loss and needs almost total assist at home.  Lays in bed or sits in wheelchair mostly. When she tries to get out of bed on her own, does have weakness. Frequent falls at home. Obdulia is the primary caretaker of Ms. Adame and Obdulia's . Had discussed NH in the past but Ms. Steiner reports that Ms. Adame's wishes in the past is to remain at home and she would not put her in NH.     Pt was hospitalized from 5/26/18 through 5/29/18 for Cdiff diarrhea. Recommended SNF but denied by [employer]. Discharged to home w/ PO vanco, PT/OT/WC via .  S/p total of 10 days of vancomycin. Daughter has been emailing and c/o continued diarrhea. Rechecked stool for Cdiff but neg. Went back to ED 6/11/18 - discharged. Dx w/ UTI and s/p bactrim.    Recent fall out of bed recently going to the bedside commode. Didn't tell daughter about it. Had life alert but wouldn't wear it.   Pt w/ anxiety/insomnia - on seroquel 25mg nightly, sertraline 75mg daily. Previously on xanax. Discussed w/ h/o of falls, cannot be taking xanax. Daughter says she's shaved pieces of her own xanax and given pt shavings in the past.   Re-discussed NH again and daughter seems to be more amenable to this. Would like to speak w/ CM and palliative care.  "Referrals in.     Daughter says that pt's still having diarrhea - soft but not watery, mashed potato consistency. Daughter has to wash her pretty frequently. No skin breakdown. Taking probiotic and yogurt. No abdominal pain. Stool is sticky. C diff is negative. No bloody stools. Previously was taking metamucil prior to CDiff but not taking it currently.     No nausea, vomiting. Eating normal. No fevers/chills. No changes int he diet.     Hypothyroidism - on synthroid. Needs repeat TFT as the last TSH 5/2017 was high.     Review of Systems  as above in HPI.     Objective:      Physical Exam    /66 (BP Location: Left arm, Patient Position: Sitting, BP Method: Small (Manual))   Pulse 103   Ht 5' 1" (1.549 m)   Wt 49 kg (108 lb 1.6 oz)   BMI 20.43 kg/m²     Gen - A+O, NAD, sitting in wheelchair. Can follow some instructions.   HEENT - PERRL, OP clear. MMM. Does not follow command to check EOM.   Neck - no LAD  CV - RRR  Chest - CTAB, no wheezing/rhonchi/crackles  ABd - S/NT/ND/+BS.   Ext - 2+ B radial pulses. No LE edema.   MSK - no spinal or hip tenderness to palpation. Able to stand but w/ help. No shoulder, elbow, ankle, knee tenderness to palpation.   Neuro - perrl, tongue protrusion, closing eyes tightly, sensation to light touch of face, hearing, shoulder shrug, m of mastication intact. Did not follow all the directions for neuro exam. Knee and shoulder and elbow flexion and extension and ab/adduction normal.   Skin - no rashes noted.     Previous labs reviewed.     Assessment/Plan       Arina was seen today for hospital follow up.    Diagnoses and all orders for this visit:    Hospital discharge follow-up  -     Ambulatory referral to Outpatient Case Management  -     Ambulatory Referral to Palliative Care    Chronic diarrhea - rec metamucil daily.  -     Ambulatory referral to Outpatient Case Management  -     Ambulatory Referral to Palliative Care  -     Ambulatory referral to " Gastroenterology    Dementia without behavioral disturbance, unspecified dementia type - referral to psychiatry also. Highly recommend placement as daughter seems to be overwhelmed at home.   -     Ambulatory referral to Outpatient Case Management  -     Ambulatory Referral to Palliative Care    Debility  -     Ambulatory referral to Outpatient Case Management  -     Ambulatory Referral to Palliative Care    History of Clostridium difficile infection - recent stool neg for CDiff. S/p vanco oral. F/u w/ GI.     Hypothyroidism, unspecified type - cont synthroid. Adjust accordingly.   -     TSH; Future; Expected date: 06/22/2018  -     T4, free; Future; Expected date: 06/22/2018    Fall, initial encounter - daughter reports pt fell off of bed. Unknown if she had any trauma. Joint exams w/o anything abnormal. Daughter says pt has been walking towards the R. No specific localized weakness elicited on exam but limited due to cooperation. Will get CT of head.   -     CT Head Without Contrast; Future; Expected date: 06/22/2018    Anxiety - on zoloft 75mg daily. seroquel 25mg nightly.   -     Ambulatory Referral to Psychiatry    Other orders  -     Discontinue: sertraline (ZOLOFT) 50 MG tablet;     45 minutes was spent on patient with over half the time was spent in coordination of care and/or counseling.    RTC in 4 mo, sooner if needed.     Zeinab Meier MD  Department of Internal Medicine - [facility]  10:15 AM

## 2018-06-23 ENCOUNTER — HOSPITAL ENCOUNTER (INPATIENT)
Facility: HOSPITAL | Age: 83
LOS: 6 days | Discharge: HOME HEALTH | DRG: 371 | End: 2018-06-29
Attending: EMERGENCY MEDICINE | Admitting: EMERGENCY MEDICINE
Payer: MEDICARE

## 2018-06-23 ENCOUNTER — NURSE TRIAGE (OUTPATIENT)
Dept: ADMINISTRATIVE | Facility: CLINIC | Age: 83
End: 2018-06-23

## 2018-06-23 DIAGNOSIS — R00.0 TACHYCARDIA: ICD-10-CM

## 2018-06-23 DIAGNOSIS — A41.9 SEPSIS, DUE TO UNSPECIFIED ORGANISM: ICD-10-CM

## 2018-06-23 DIAGNOSIS — A09 INFECTIOUS DIARRHEA: Primary | ICD-10-CM

## 2018-06-23 DIAGNOSIS — D72.829 LEUKOCYTOSIS: ICD-10-CM

## 2018-06-23 LAB
ALBUMIN SERPL BCP-MCNC: 3.2 G/DL
ALP SERPL-CCNC: 107 U/L
ALT SERPL W/O P-5'-P-CCNC: 13 U/L
ANION GAP SERPL CALC-SCNC: 11 MMOL/L
AST SERPL-CCNC: 18 U/L
BACTERIA #/AREA URNS AUTO: ABNORMAL /HPF
BASOPHILS # BLD AUTO: 0.05 K/UL
BASOPHILS NFR BLD: 0.2 %
BILIRUB SERPL-MCNC: 1.1 MG/DL
BILIRUB UR QL STRIP: NEGATIVE
BUN SERPL-MCNC: 16 MG/DL
CALCIUM SERPL-MCNC: 9.4 MG/DL
CHLORIDE SERPL-SCNC: 105 MMOL/L
CLARITY UR REFRACT.AUTO: ABNORMAL
CO2 SERPL-SCNC: 23 MMOL/L
COLOR UR AUTO: YELLOW
CREAT SERPL-MCNC: 1 MG/DL
DIFFERENTIAL METHOD: ABNORMAL
EOSINOPHIL # BLD AUTO: 0 K/UL
EOSINOPHIL NFR BLD: 0 %
ERYTHROCYTE [DISTWIDTH] IN BLOOD BY AUTOMATED COUNT: 15.1 %
EST. GFR  (AFRICAN AMERICAN): 57.7 ML/MIN/1.73 M^2
EST. GFR  (NON AFRICAN AMERICAN): 50.1 ML/MIN/1.73 M^2
GLUCOSE SERPL-MCNC: 153 MG/DL
GLUCOSE UR QL STRIP: ABNORMAL
HCT VFR BLD AUTO: 38.4 %
HGB BLD-MCNC: 12.4 G/DL
HGB UR QL STRIP: ABNORMAL
HYALINE CASTS UR QL AUTO: 0 /LPF
IMM GRANULOCYTES # BLD AUTO: 0.25 K/UL
IMM GRANULOCYTES NFR BLD AUTO: 0.8 %
KETONES UR QL STRIP: NEGATIVE
LACTATE SERPL-SCNC: 1 MMOL/L
LEUKOCYTE ESTERASE UR QL STRIP: ABNORMAL
LIPASE SERPL-CCNC: 3 U/L
LYMPHOCYTES # BLD AUTO: 1.1 K/UL
LYMPHOCYTES NFR BLD: 3.5 %
MAGNESIUM SERPL-MCNC: 1.9 MG/DL
MCH RBC QN AUTO: 31.4 PG
MCHC RBC AUTO-ENTMCNC: 32.3 G/DL
MCV RBC AUTO: 97 FL
MICROSCOPIC COMMENT: ABNORMAL
MONOCYTES # BLD AUTO: 2.2 K/UL
MONOCYTES NFR BLD: 6.9 %
NEUTROPHILS # BLD AUTO: 28.4 K/UL
NEUTROPHILS NFR BLD: 88.6 %
NITRITE UR QL STRIP: POSITIVE
NRBC BLD-RTO: 0 /100 WBC
PH UR STRIP: 5 [PH] (ref 5–8)
PHOSPHATE SERPL-MCNC: 2.3 MG/DL
PLATELET # BLD AUTO: 259 K/UL
PMV BLD AUTO: 10.6 FL
POTASSIUM SERPL-SCNC: 3.3 MMOL/L
PROT SERPL-MCNC: 7.2 G/DL
PROT UR QL STRIP: ABNORMAL
RBC # BLD AUTO: 3.95 M/UL
RBC #/AREA URNS AUTO: 2 /HPF (ref 0–4)
SODIUM SERPL-SCNC: 139 MMOL/L
SP GR UR STRIP: 1.01 (ref 1–1.03)
SQUAMOUS #/AREA URNS AUTO: 1 /HPF
URN SPEC COLLECT METH UR: ABNORMAL
UROBILINOGEN UR STRIP-ACNC: NEGATIVE EU/DL
WBC # BLD AUTO: 32.08 K/UL
WBC #/AREA URNS AUTO: 76 /HPF (ref 0–5)
WBC CLUMPS UR QL AUTO: ABNORMAL

## 2018-06-23 PROCEDURE — 87186 SC STD MICRODIL/AGAR DIL: CPT

## 2018-06-23 PROCEDURE — 99284 EMERGENCY DEPT VISIT MOD MDM: CPT | Mod: 25

## 2018-06-23 PROCEDURE — 87040 BLOOD CULTURE FOR BACTERIA: CPT | Mod: 59

## 2018-06-23 PROCEDURE — 87427 SHIGA-LIKE TOXIN AG IA: CPT

## 2018-06-23 PROCEDURE — 87449 NOS EACH ORGANISM AG IA: CPT

## 2018-06-23 PROCEDURE — 87077 CULTURE AEROBIC IDENTIFY: CPT

## 2018-06-23 PROCEDURE — 96361 HYDRATE IV INFUSION ADD-ON: CPT

## 2018-06-23 PROCEDURE — 25000003 PHARM REV CODE 250: Performed by: PHYSICIAN ASSISTANT

## 2018-06-23 PROCEDURE — 93010 ELECTROCARDIOGRAM REPORT: CPT | Mod: ,,, | Performed by: INTERNAL MEDICINE

## 2018-06-23 PROCEDURE — 83690 ASSAY OF LIPASE: CPT

## 2018-06-23 PROCEDURE — 25000003 PHARM REV CODE 250: Performed by: HOSPITALIST

## 2018-06-23 PROCEDURE — 83605 ASSAY OF LACTIC ACID: CPT

## 2018-06-23 PROCEDURE — 80053 COMPREHEN METABOLIC PANEL: CPT

## 2018-06-23 PROCEDURE — 83735 ASSAY OF MAGNESIUM: CPT

## 2018-06-23 PROCEDURE — 84100 ASSAY OF PHOSPHORUS: CPT

## 2018-06-23 PROCEDURE — S0030 INJECTION, METRONIDAZOLE: HCPCS | Performed by: PHYSICIAN ASSISTANT

## 2018-06-23 PROCEDURE — 87493 C DIFF AMPLIFIED PROBE: CPT

## 2018-06-23 PROCEDURE — 87046 STOOL CULTR AEROBIC BACT EA: CPT | Mod: 59

## 2018-06-23 PROCEDURE — 11000001 HC ACUTE MED/SURG PRIVATE ROOM

## 2018-06-23 PROCEDURE — 87209 SMEAR COMPLEX STAIN: CPT

## 2018-06-23 PROCEDURE — 81001 URINALYSIS AUTO W/SCOPE: CPT

## 2018-06-23 PROCEDURE — 93005 ELECTROCARDIOGRAM TRACING: CPT

## 2018-06-23 PROCEDURE — 85025 COMPLETE CBC W/AUTO DIFF WBC: CPT

## 2018-06-23 PROCEDURE — 99291 CRITICAL CARE FIRST HOUR: CPT | Mod: ,,, | Performed by: EMERGENCY MEDICINE

## 2018-06-23 PROCEDURE — 87088 URINE BACTERIA CULTURE: CPT

## 2018-06-23 PROCEDURE — 87045 FECES CULTURE AEROBIC BACT: CPT

## 2018-06-23 PROCEDURE — 87086 URINE CULTURE/COLONY COUNT: CPT

## 2018-06-23 PROCEDURE — 96365 THER/PROPH/DIAG IV INF INIT: CPT

## 2018-06-23 RX ORDER — ONDANSETRON 8 MG/1
8 TABLET, ORALLY DISINTEGRATING ORAL EVERY 8 HOURS PRN
Status: DISCONTINUED | OUTPATIENT
Start: 2018-06-23 | End: 2018-06-29 | Stop reason: HOSPADM

## 2018-06-23 RX ORDER — ENOXAPARIN SODIUM 100 MG/ML
30 INJECTION SUBCUTANEOUS EVERY 24 HOURS
Status: DISCONTINUED | OUTPATIENT
Start: 2018-06-24 | End: 2018-06-29 | Stop reason: HOSPADM

## 2018-06-23 RX ORDER — ACETAMINOPHEN 325 MG/1
650 TABLET ORAL EVERY 4 HOURS PRN
Status: DISCONTINUED | OUTPATIENT
Start: 2018-06-23 | End: 2018-06-29 | Stop reason: HOSPADM

## 2018-06-23 RX ORDER — DONEPEZIL HYDROCHLORIDE 10 MG/1
10 TABLET, FILM COATED ORAL NIGHTLY
Status: DISCONTINUED | OUTPATIENT
Start: 2018-06-24 | End: 2018-06-29 | Stop reason: HOSPADM

## 2018-06-23 RX ORDER — METRONIDAZOLE 500 MG/100ML
500 INJECTION, SOLUTION INTRAVENOUS
Status: COMPLETED | OUTPATIENT
Start: 2018-06-23 | End: 2018-06-23

## 2018-06-23 RX ORDER — SODIUM CHLORIDE 0.9 % (FLUSH) 0.9 %
5 SYRINGE (ML) INJECTION
Status: DISCONTINUED | OUTPATIENT
Start: 2018-06-23 | End: 2018-06-29 | Stop reason: HOSPADM

## 2018-06-23 RX ORDER — CEFTRIAXONE 1 G/1
1 INJECTION, POWDER, FOR SOLUTION INTRAMUSCULAR; INTRAVENOUS
Status: DISCONTINUED | OUTPATIENT
Start: 2018-06-23 | End: 2018-06-23

## 2018-06-23 RX ORDER — IBUPROFEN 200 MG
16 TABLET ORAL
Status: DISCONTINUED | OUTPATIENT
Start: 2018-06-23 | End: 2018-06-29 | Stop reason: HOSPADM

## 2018-06-23 RX ORDER — POTASSIUM CHLORIDE 20 MEQ/1
40 TABLET, EXTENDED RELEASE ORAL ONCE
Status: COMPLETED | OUTPATIENT
Start: 2018-06-23 | End: 2018-06-24

## 2018-06-23 RX ORDER — SODIUM CHLORIDE, SODIUM LACTATE, POTASSIUM CHLORIDE, CALCIUM CHLORIDE 600; 310; 30; 20 MG/100ML; MG/100ML; MG/100ML; MG/100ML
INJECTION, SOLUTION INTRAVENOUS CONTINUOUS
Status: ACTIVE | OUTPATIENT
Start: 2018-06-24 | End: 2018-06-24

## 2018-06-23 RX ORDER — DONEPEZIL HYDROCHLORIDE 10 MG/1
10 TABLET, FILM COATED ORAL NIGHTLY
Status: DISCONTINUED | OUTPATIENT
Start: 2018-06-24 | End: 2018-06-23

## 2018-06-23 RX ORDER — QUETIAPINE FUMARATE 25 MG/1
25 TABLET, FILM COATED ORAL NIGHTLY
Status: DISCONTINUED | OUTPATIENT
Start: 2018-06-23 | End: 2018-06-29 | Stop reason: HOSPADM

## 2018-06-23 RX ORDER — ATORVASTATIN CALCIUM 20 MG/1
40 TABLET, FILM COATED ORAL DAILY
Status: DISCONTINUED | OUTPATIENT
Start: 2018-06-24 | End: 2018-06-29 | Stop reason: HOSPADM

## 2018-06-23 RX ORDER — LEVOTHYROXINE SODIUM 100 UG/1
100 TABLET ORAL
Status: DISCONTINUED | OUTPATIENT
Start: 2018-06-24 | End: 2018-06-29 | Stop reason: HOSPADM

## 2018-06-23 RX ORDER — RAMELTEON 8 MG/1
8 TABLET ORAL NIGHTLY PRN
Status: DISCONTINUED | OUTPATIENT
Start: 2018-06-23 | End: 2018-06-29 | Stop reason: HOSPADM

## 2018-06-23 RX ADMIN — SODIUM CHLORIDE 1000 ML: 0.9 INJECTION, SOLUTION INTRAVENOUS at 07:06

## 2018-06-23 RX ADMIN — METRONIDAZOLE 500 MG: 500 INJECTION, SOLUTION INTRAVENOUS at 09:06

## 2018-06-23 RX ADMIN — Medication 125 MG: at 10:06

## 2018-06-23 RX ADMIN — QUETIAPINE FUMARATE 25 MG: 25 TABLET ORAL at 11:06

## 2018-06-23 RX ADMIN — RAMELTEON 8 MG: 8 TABLET, FILM COATED ORAL at 11:06

## 2018-06-23 NOTE — TELEPHONE ENCOUNTER
Reason for Disposition   [1] MODERATE diarrhea (e.g., 4-6 times / day more than normal) AND [2] age > 70 years    Protocols used:  DIARRHEA-A-

## 2018-06-24 PROBLEM — R54 AGE-RELATED PHYSICAL DEBILITY: Status: ACTIVE | Noted: 2018-06-24

## 2018-06-24 PROBLEM — N30.00 ACUTE CYSTITIS WITHOUT HEMATURIA: Status: ACTIVE | Noted: 2018-06-24

## 2018-06-24 LAB
ANION GAP SERPL CALC-SCNC: 6 MMOL/L
BASOPHILS # BLD AUTO: 0.04 K/UL
BASOPHILS NFR BLD: 0.2 %
BUN SERPL-MCNC: 14 MG/DL
C DIFF GDH STL QL: POSITIVE
C DIFF TOX A+B STL QL IA: NEGATIVE
C DIFF TOX GENS STL QL NAA+PROBE: POSITIVE
CALCIUM SERPL-MCNC: 8.5 MG/DL
CHLORIDE SERPL-SCNC: 110 MMOL/L
CO2 SERPL-SCNC: 24 MMOL/L
CREAT SERPL-MCNC: 0.8 MG/DL
DIFFERENTIAL METHOD: ABNORMAL
EOSINOPHIL # BLD AUTO: 0.2 K/UL
EOSINOPHIL NFR BLD: 0.9 %
ERYTHROCYTE [DISTWIDTH] IN BLOOD BY AUTOMATED COUNT: 15.1 %
EST. GFR  (AFRICAN AMERICAN): >60 ML/MIN/1.73 M^2
EST. GFR  (NON AFRICAN AMERICAN): >60 ML/MIN/1.73 M^2
GLUCOSE SERPL-MCNC: 91 MG/DL
GRAM STN SPEC: NORMAL
HCT VFR BLD AUTO: 32.1 %
HGB BLD-MCNC: 10.1 G/DL
IMM GRANULOCYTES # BLD AUTO: 0.17 K/UL
IMM GRANULOCYTES NFR BLD AUTO: 0.8 %
LYMPHOCYTES # BLD AUTO: 1.6 K/UL
LYMPHOCYTES NFR BLD: 7.4 %
MCH RBC QN AUTO: 31.3 PG
MCHC RBC AUTO-ENTMCNC: 31.5 G/DL
MCV RBC AUTO: 99 FL
MONOCYTES # BLD AUTO: 1.4 K/UL
MONOCYTES NFR BLD: 6.5 %
NEUTROPHILS # BLD AUTO: 18.6 K/UL
NEUTROPHILS NFR BLD: 84.2 %
NRBC BLD-RTO: 0 /100 WBC
PHOSPHATE SERPL-MCNC: 2.6 MG/DL
PLATELET # BLD AUTO: 210 K/UL
PMV BLD AUTO: 10.6 FL
POTASSIUM SERPL-SCNC: 3.4 MMOL/L
RBC # BLD AUTO: 3.23 M/UL
SODIUM SERPL-SCNC: 140 MMOL/L
WBC # BLD AUTO: 22.02 K/UL

## 2018-06-24 PROCEDURE — 25000003 PHARM REV CODE 250: Performed by: HOSPITALIST

## 2018-06-24 PROCEDURE — 85025 COMPLETE CBC W/AUTO DIFF WBC: CPT

## 2018-06-24 PROCEDURE — 11000001 HC ACUTE MED/SURG PRIVATE ROOM

## 2018-06-24 PROCEDURE — 80048 BASIC METABOLIC PNL TOTAL CA: CPT

## 2018-06-24 PROCEDURE — 87205 SMEAR GRAM STAIN: CPT

## 2018-06-24 PROCEDURE — 99223 1ST HOSP IP/OBS HIGH 75: CPT | Mod: ,,, | Performed by: HOSPITALIST

## 2018-06-24 PROCEDURE — 63600175 PHARM REV CODE 636 W HCPCS: Performed by: HOSPITALIST

## 2018-06-24 PROCEDURE — 84100 ASSAY OF PHOSPHORUS: CPT

## 2018-06-24 PROCEDURE — 36415 COLL VENOUS BLD VENIPUNCTURE: CPT

## 2018-06-24 RX ORDER — ASPIRIN 81 MG/1
81 TABLET ORAL DAILY
Status: DISCONTINUED | OUTPATIENT
Start: 2018-06-24 | End: 2018-06-29 | Stop reason: HOSPADM

## 2018-06-24 RX ORDER — CIPROFLOXACIN 250 MG/1
250 TABLET, FILM COATED ORAL EVERY 12 HOURS
Status: DISCONTINUED | OUTPATIENT
Start: 2018-06-24 | End: 2018-06-25

## 2018-06-24 RX ADMIN — ATORVASTATIN CALCIUM 40 MG: 20 TABLET, FILM COATED ORAL at 10:06

## 2018-06-24 RX ADMIN — ASPIRIN 81 MG: 81 TABLET, COATED ORAL at 10:06

## 2018-06-24 RX ADMIN — QUETIAPINE FUMARATE 25 MG: 25 TABLET ORAL at 09:06

## 2018-06-24 RX ADMIN — Medication 1 CAPSULE: at 10:06

## 2018-06-24 RX ADMIN — ENOXAPARIN SODIUM 30 MG: 100 INJECTION SUBCUTANEOUS at 05:06

## 2018-06-24 RX ADMIN — Medication 125 MG: at 05:06

## 2018-06-24 RX ADMIN — CIPROFLOXACIN HYDROCHLORIDE 250 MG: 250 TABLET, FILM COATED ORAL at 08:06

## 2018-06-24 RX ADMIN — SODIUM CHLORIDE, SODIUM LACTATE, POTASSIUM CHLORIDE, AND CALCIUM CHLORIDE: .6; .31; .03; .02 INJECTION, SOLUTION INTRAVENOUS at 12:06

## 2018-06-24 RX ADMIN — Medication 125 MG: at 11:06

## 2018-06-24 RX ADMIN — POTASSIUM CHLORIDE 40 MEQ: 1500 TABLET, EXTENDED RELEASE ORAL at 12:06

## 2018-06-24 RX ADMIN — CIPROFLOXACIN HYDROCHLORIDE 250 MG: 250 TABLET, FILM COATED ORAL at 12:06

## 2018-06-24 RX ADMIN — LEVOTHYROXINE SODIUM 100 MCG: 100 TABLET ORAL at 05:06

## 2018-06-24 RX ADMIN — DONEPEZIL HYDROCHLORIDE 10 MG: 10 TABLET ORAL at 08:06

## 2018-06-24 RX ADMIN — Medication 125 MG: at 12:06

## 2018-06-24 RX ADMIN — DONEPEZIL HYDROCHLORIDE 10 MG: 10 TABLET ORAL at 12:06

## 2018-06-24 RX ADMIN — CIPROFLOXACIN HYDROCHLORIDE 250 MG: 250 TABLET, FILM COATED ORAL at 10:06

## 2018-06-24 RX ADMIN — RAMELTEON 8 MG: 8 TABLET, FILM COATED ORAL at 08:06

## 2018-06-24 NOTE — NURSING
Bladder scan performed, 307 ml documented. Medicine team notified.  Ok to not straight cath, per MD, pt produces urine.  Will continue to monitor with bladder scans and intake/output.

## 2018-06-24 NOTE — ASSESSMENT & PLAN NOTE
- cont home donepezil  - cont home quetiapine  - sertraline was stopped by PCP  - delirium precautions

## 2018-06-24 NOTE — ED NOTES
Patient soiled diaper and bed. Patient cleaned and stool sample collected. Pt in and out cathed and urine sample collected. Patient bed linens changed and patient positioned for comfort. Patient has no complaints at this time. Will continue to monitor.

## 2018-06-24 NOTE — ED TRIAGE NOTES
Arina Adame, a 89 y.o. female presents to the ED       Chief Complaint   Patient presents with    Diarrhea     Daughter reports diarrhea.  States pt was treated for c-diff within the past month.  States pt began vomiting this am.      Review of patient's allergies indicates:   Allergen Reactions    Codeine      Other reaction(s): Unknown    Iodine      Other reaction(s): Unknown    Penicillins      Other reaction(s): Unknown     Past Medical History:   Diagnosis Date    Alzheimer disease     Arthritis     B12 nutritional deficiency 8/6/2012    Carotid arterial disease 8/27/2013    Cataract     Colon cancer     Elevated blood pressure (not hypertension) 3/17/2014    Epiretinal membrane     Essential hypertension 3/16/2016    Hearing loss 3/17/2014    History of colon cancer 5/28/2015    [place] (hard of hearing)     Hypothyroidism 11/21/2012    IGT (impaired glucose tolerance) 2/11/2015    Macrocytosis 11/21/2012    Nevus of choroid     Osteoporosis, 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

## 2018-06-24 NOTE — H&P
[facility] Hospital Medicine  History & Physical    Patient Name: Arina Adame  MRN: [id number]  Admission Date: 6/23/2018  Attending Physician: Tatiana Sousa MD   Primary Care Provider: Zeinab Meier MD    [facility] Medicine Team: [facility] HOSP MED 3 Darlin Stevenson MD     Patient information was obtained from caregiver / friend, past medical records and ER records.     Subjective:     Principal Problem:Diarrhea of presumed infectious origin    Chief Complaint:   Chief Complaint   Patient presents with    Diarrhea     Daughter reports diarrhea.  States pt was treated for c-diff within the past month.  States pt began vomiting this am.         HPI: 89 y/o female with PMH of dementia, Recurrent UTIs (mostly pan sensitive klebsiella), remote Hx of colon CA s/p partial distal colectomy and reanastomosis, recent hx of C diff colitis (diagnosed on 5/26/18), presented to the ED from home on 6/23 for 1 day hx of non bloody diarrhea, with caretaker being concerned for recurrent C diff per smell of the stool. Patient developed watery diarrhea on the morning of 6/23, with subsequent several episodes of loose stools/diarrhea. Associated with 1 episode of non bloody vomitus, as well as chills. No abdominal pain or distention. Had mild dysuria on the AM of 6/23, however, daughter was concerned that it was related to frequent stools. Completed 5 day Rx course with Bactrim for UTI. UTis are usually associated with confusion and foul smell, which currently isn't a concern.     Patient was hospitalized at the end of 5/2017 for C diff colitis, underwent PO vancomycin therapy. Subsequent C diff testing was negative, however, stools never solidified. Patient's PCP had plans for outpatient GI evaluation for ongoing diarrhea.     Lives with daughter outside of [place]. Uses walker. Had home health prior to hospitalization.       Past Medical History:   Diagnosis Date    Alzheimer disease     Arthritis     B12  nutritional deficiency 8/6/2012    C. 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      Current Outpatient Prescriptions on File Prior to Encounter   Medication Sig    aspirin 325 MG tablet Take 325 mg by mouth once daily.    atorvastatin (LIPITOR) 40 MG tablet TAKE 1 TABLET (40 MG TOTAL) BY MOUTH ONCE DAILY.    CALCIUM CITRATE/VITAMIN D3 (CITRACAL + D MAXIMUM ORAL) Take 1 tablet by mouth once daily.     cholecalciferol, vitamin D3, (VITAMIN D3) 1,000 unit capsule Take 1,000 Units by mouth once daily.      cyanocobalamin (VITAMIN B-12) 500 MCG tablet Take 500 mcg by mouth once daily.     donepezil (ARICEPT) 10 MG tablet Take 1 tablet (10 mg total) by mouth every evening.    levothyroxine (SYNTHROID) 100 MCG tablet 1 Tablet Oral every morning except 1.5 pills on Sundays.    multivitamin (ONE DAILY MULTIVITAMIN) per tablet Take 1 tablet by mouth once daily.    QUEtiapine (SEROQUEL) 25 MG Tab TAKE 1 TABLET (25 MG TOTAL) BY MOUTH  NIGHTLY AS NEEDED. (Patient taking differently: Take 25 mg by mouth every evening. )    walker (ULTRA-LIGHT ROLLATOR) Misc 1 each by Misc.(Non-Drug; Combo Route) route once daily.     Family History     Problem Relation (Age of Onset)    Hip fracture Mother    Thyroid disease Sister        Social History Main Topics    Smoking status: Former Smoker     Quit date: 6/24/1970    Smokeless tobacco: Former User    Alcohol use No    Drug use: No    Sexual activity: Not on file     Review of Systems   Constitutional: Positive for chills. Negative for activity change, appetite change, fatigue, fever and unexpected weight change.   HENT: Negative for congestion, postnasal drip, sinus pressure and sore throat.    Eyes: Negative for visual disturbance.   Respiratory: Negative for cough and wheezing.    Cardiovascular: Negative for chest pain, palpitations and leg swelling.   Gastrointestinal: Positive for diarrhea and vomiting. Negative for abdominal distention, abdominal pain, anal bleeding, blood in stool and nausea.   Genitourinary: Negative for decreased urine volume, difficulty urinating, dysuria and frequency.   Musculoskeletal: Negative for arthralgias and myalgias.   Neurological: Negative for dizziness, syncope, weakness and light-headedness.   Psychiatric/Behavioral: Negative for confusion, decreased concentration and dysphoric mood.     Objective:     Vital Signs (Most Recent):  Temp: 97.3 °F (36.3 °C) (06/23/18 2304)  Pulse: 85 (06/23/18 2304)  Resp: 16 (06/23/18 2304)  BP: (!) 104/58 (06/23/18 2304)  SpO2: (!) 93 % (06/23/18 2304) Vital Signs (24h Range):  Temp:  [97.3 °F (36.3 °C)-98.3 °F (36.8 °C)] 97.3 °F (36.3 °C)  Pulse:  [] 85  Resp:  [15-22] 16  SpO2:  [92 %-96 %] 93 %  BP: (104-132)/(58-78) 104/58     Weight: 48.5 kg (107 lb)  Body mass index is 20.22 kg/m².    Physical Exam   Constitutional: She is oriented to person, place, and time. She appears well-developed and well-nourished. No distress.    Elderly lady, resting comfortably in bed , no acute distress   HENT:   Head: Normocephalic and atraumatic.   Right Ear: External ear normal.   Left Ear: External ear normal.   Eyes: EOM are normal. Pupils are equal, round, and reactive to light. No scleral icterus.   Neck: Normal range of motion. Neck supple.   Cardiovascular: Normal rate, regular rhythm, normal heart sounds and intact distal pulses.    No murmur heard.  Pulmonary/Chest: Effort normal and breath sounds normal. No respiratory distress. She has no wheezes.   Abdominal: Soft. Bowel sounds are normal. She exhibits no distension and no mass. There is no tenderness. There is no guarding.   Musculoskeletal: Normal range of motion. She exhibits no edema, tenderness or deformity.   Neurological: She is alert and oriented to person, place, and time. No cranial nerve deficit.   Skin: Skin is warm and dry. No rash noted. She is not diaphoretic.   Psychiatric: She has a normal mood and affect.         CRANIAL NERVES     CN III, IV, VI   Pupils are equal, round, and reactive to light.  Extraocular motions are normal.        Significant Labs:   Blood Culture: No results for input(s): LABBLOO in the last 48 hours.  CBC:   Recent Labs  Lab 06/23/18 1932   WBC 32.08*   HGB 12.4   HCT 38.4        CMP:   Recent Labs  Lab 06/23/18 1932      K 3.3*      CO2 23   *   BUN 16   CREATININE 1.0   CALCIUM 9.4   PROT 7.2   ALBUMIN 3.2*   BILITOT 1.1*   ALKPHOS 107   AST 18   ALT 13   ANIONGAP 11   EGFRNONAA 50.1*     Lactic Acid:   Recent Labs  Lab 06/23/18 2022   LACTATE 1.0     Magnesium:   Recent Labs  Lab 06/23/18  1932   MG 1.9     TSH:   Recent Labs  Lab 06/22/18  1037   TSH 0.951     Urine Culture: No results for input(s): LABURIN in the last 48 hours.  Urine Studies:   Recent Labs  Lab 06/23/18 2144   COLORU Yellow   APPEARANCEUA Cloudy*   PHUR 5.0   SPECGRAV 1.015   PROTEINUA 1+*   GLUCUA 1+*   KETONESU Negative   BILIRUBINUA Negative    OCCULTUA 2+*   NITRITE Positive*   UROBILINOGEN Negative   LEUKOCYTESUR 3+*   RBCUA 2   WBCUA 76*   BACTERIA Many*   SQUAMEPITHEL 1   HYALINECASTS 0       Significant Imaging: CXR: I have reviewed all pertinent results/findings within the past 24 hours and my personal findings are:  no radiologic evidence of consolidation     Assessment/Plan:     * Diarrhea of presumed infectious origin    - recent hx of C diff colitis s/p PO vancomycin therapy, now presenting with worsening diarrhea, concerning for recurrence   - f/u stool studies  - C dfif studies pending  - PO vancomycin   - fidaxomicin unavailable inpatient as it appears  - probiotic  - cautious IVF to keep up with fluid losses  - deferring imaging given benign physical exam        Acute cystitis without hematuria    - hx of pan sensitive klebsiella, finished bactrim therapy. Now with leukocytosis with wbc of 32k and UA concerning for uti with dysuria per hx.   - treat with PO cipro for now  - f/u U cx  - de escalate abx appropriately, given concern for  c diff colitis          Dementia    - cont home donepezil  - cont home quetiapine  - sertraline was stopped by PCP  - delirium precautions            Hypothyroidism    - cont home levothyroxine         Age-related physical debility    - PT OT ordered        Hypokalemia    - likely 2/2 vomiting./ diarrhea  - PO replacement          Carotid arterial disease    - statin, ASA 81            VTE Risk Mitigation         Ordered     enoxaparin injection 30 mg  Daily      06/23/18 2252     IP VTE LOW RISK PATIENT  Once      06/23/18 2252        PT OT  F/u labs  De escalate abx accordingly     Admitted to IM3    Darlin Stevenson MD  Department of Hospital Medicine   [facility]    Darlin Stevenson MD  Internal Medicine PGY-3  Pager [phone]

## 2018-06-24 NOTE — ASSESSMENT & PLAN NOTE
- hx of arcos sensitive klebsiella, finished bactrim therapy. Now with leukocytosis with wbc of 32k and UA concerning for uti with dysuria per hx.   - treat with PO cipro for now  - f/u U cx  - de escalate abx appropriately, given concern for  c diff colitis

## 2018-06-24 NOTE — PLAN OF CARE
Problem: Patient Care Overview  Goal: Plan of Care Review  Outcome: Ongoing (interventions implemented as appropriate)  Report received, care assumed. Patient arrived on the unit per stretcher w daughter at side. Was able to scoot from stretcher to bed w minimal assist. Daughter is primary caregiver and the historian. Patient in no apparent distress and appears comfortable. Unit routine and expectations reviewed w daughter and patient who are both in aggreement w POC. Safety:  call light in reach, patient oriented to room & instructed how to notify nurse if assistance is needed, questions & concerns addressed, bed in lowest position with wheels locked & side rails up X 2, fall precautions followed, patient free from fall & injury thus far this shift;  VTE/bleeding precautions maintained.  Activity:  patient remains in bed, weight shifted at least every other hour.  Neurological:  patient Alert but confused pleasantly;  follows simlple commands, equal  strength & dorsi/plantarflexion, neuro checks performed as ordered & WDL.  Respiratory:  patient tolerates room air without distress, denies SOB.  Cardiac:  Denies chest pain, BP stable.  HR stable.  Afebrile this shift.  GI:  Patient tolerates PO intake well, denies nausea, LBM 6/26/18.  :  patient voids clear yellow urine without foul odor spontaneously & without difficulty, adequate output for shift.  Skin:  CDI.  Devices:  PIV CDI, negative for s/sx of infection & infiltration.  Pain:  patient denies pain.  Will continue to monitor patient.

## 2018-06-24 NOTE — ED PROVIDER NOTES
Encounter Date: 6/23/2018       History     Chief Complaint   Patient presents with    Diarrhea     Daughter reports diarrhea.  States pt was treated for c-diff within the past month.  States pt began vomiting this am.      89 year old female with medical history of dementia, CAD, HTN, Recurrent UTIs, Decubitus ulcer presenting to the ED with the chief complaint of diarrhea. Majority of history obtained from daughter at bedside 2/2 patient's mental status. Patient has been having chronic diarrhea since being diagnosed with C. Diff in 5/2018. She reports completing her Vancomycin therapy and had repeat cultures 3 days ago that were negative. Patient's daughter reports attending to the patient this morning and noticing she had a loose bowel movement. She reports the stool was gold in color and had an odor to it that reminded her of C. Diff. She reports this is different than her recent bowel movements that have been green. She estimates the patient to have 5-6 bowel movements per day. Patient is dependent on her daughter for all ADLs. She ambulates with a gait belt and uses a walker PRN. She reports associated chills and vomiting. Patient consumed fried catfish last night and has been experiencing emesis after eating anything PO. Patient's daughter reports she is stressed out with taking care of her mother as she is taking care of her  who was recently diagnosed with bladder cancer. She denies chest pain, SOB, abdominal pain, and urinary symptoms. Patient recently diagnosed with a UTI and treated with Bactrim.          Review of patient's allergies indicates:   Allergen Reactions    Codeine      Other reaction(s): Unknown    Iodine      Other reaction(s): Unknown    Penicillins      Other reaction(s): Unknown     Past Medical History:   Diagnosis Date    Alzheimer disease     Arthritis     B12 nutritional deficiency 8/6/2012    Carotid arterial disease 8/27/2013    Cataract     Colon cancer      Elevated blood pressure (not hypertension) 3/17/2014    Epiretinal membrane     Essential hypertension 3/16/2016    Hearing loss 3/17/2014    History of colon cancer 5/28/2015    [place] (hard of hearing)     Hypothyroidism 11/21/2012    IGT (impaired glucose tolerance) 2/11/2015    Macrocytosis 11/21/2012    Nevus of choroid     Osteoporosis, 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     Past Surgical History:   Procedure Laterality Date    BREAST SURGERY      CATARACT EXTRACTION W/  INTRAOCULAR LENS IMPLANT Bilateral n/a    OU    COLECTOMY      DENTAL SURGERY      right ankle surgery       Family History   Problem Relation Age of Onset    Hip fracture Mother     Thyroid disease Sister         Thyroidectomy    Diabetes Neg Hx     Amblyopia Neg Hx     Blindness Neg Hx     Cataracts Neg Hx     Glaucoma Neg Hx     Macular degeneration Neg Hx     Retinal detachment Neg Hx     Strabismus Neg Hx      Social History   Substance Use Topics    Smoking status: Former Smoker     Quit date: 6/24/1970    Smokeless tobacco: Former User    Alcohol use No     Review of Systems   Constitutional: Positive for chills. Negative for fever.   HENT: Negative for congestion, sore throat and trouble swallowing.    Eyes: Negative for pain and visual disturbance.   Respiratory: Negative for cough and shortness of breath.    Cardiovascular: Negative for chest pain.   Gastrointestinal: Positive for diarrhea and vomiting. Negative for abdominal pain and blood in stool.   Genitourinary: Negative for dysuria and hematuria.   Musculoskeletal: Negative for neck pain and neck stiffness.   Skin: Negative for wound.   Neurological: Negative for weakness, light-headedness and headaches.       Physical Exam     Initial Vitals [06/23/18 1751]   BP Pulse Resp Temp SpO2   115/61 (!) 129 20 98.3 °F (36.8 °C) (!) 92 %      MAP       --         Physical Exam    Constitutional: She appears  well-developed and well-nourished. She is not diaphoretic. No distress.   [place]ldery pleasant female   HENT:   Head: Normocephalic and atraumatic.   Mouth/Throat: Oropharynx is clear and moist.   Eyes: Conjunctivae and EOM are normal. Pupils are equal, round, and reactive to light.   Neck: Normal range of motion. Neck supple.   Cardiovascular: Regular rhythm and intact distal pulses. Tachycardia present.    Pulmonary/Chest: Breath sounds normal. No respiratory distress. She has no wheezes.   Abdominal: Soft. Bowel sounds are normal. There is no tenderness.   Musculoskeletal: Normal range of motion. She exhibits no edema or tenderness.   Neurological: She is alert. She has normal strength. No cranial nerve deficit or sensory deficit.   Skin: Skin is warm and dry. No erythema.   Psychiatric: She has a normal mood and affect. Cognition and memory are impaired.       ED Course   Procedures  Labs Reviewed   CBC W/ AUTO DIFFERENTIAL - Abnormal; Notable for the following:        Result Value    WBC 32.08 (*)     RBC 3.95 (*)     MCH 31.4 (*)     RDW 15.1 (*)     Immature Granulocytes 0.8 (*)     Gran # (ANC) 28.4 (*)     Immature Grans (Abs) 0.25 (*)     Mono # 2.2 (*)     Gran% 88.6 (*)     Lymph% 3.5 (*)     All other components within normal limits   COMPREHENSIVE METABOLIC PANEL - Abnormal; Notable for the following:     Potassium 3.3 (*)     Glucose 153 (*)     Albumin 3.2 (*)     Total Bilirubin 1.1 (*)     eGFR if  57.7 (*)     eGFR if non  50.1 (*)     All other components within normal limits   LIPASE - Abnormal; Notable for the following:     Lipase 3 (*)     All other components within normal limits   PHOSPHORUS - Abnormal; Notable for the following:     Phosphorus 2.3 (*)     All other components within normal limits   CLOSTRIDIUM DIFFICILE   CULTURE, STOOL   CULTURE, BLOOD   CULTURE, BLOOD   ENTEROHEMORRHAGIC E.COLI   MAGNESIUM   LACTIC ACID, PLASMA   URINALYSIS, REFLEX TO URINE  CULTURE   STOOL EXAM-OVA,CYSTS,PARASITES          Imaging Results          X-Ray Chest 1 View (Final result)  Result time 06/23/18 21:24:39    Final result by Celio Wagoner MD (06/23/18 21:24:39)                 Impression:      Chronic increased interstitial markings, increased since prior. Biapical pleural thickening.    No consolidation, pleural effusion or pneumothorax.      Electronically signed by: Celio Wagoner MD  Date:    06/23/2018  Time:    21:24             Narrative:    EXAMINATION:  XR CHEST 1 VIEW    CLINICAL HISTORY:  Elevated white blood cell count, unspecified    TECHNIQUE:  Single frontal view of the chest was performed.    COMPARISON:  June 28, 2008.    FINDINGS:  Patient is rotated to the right.    Chronic increased interstitial markings, increased since prior.  Biapical pleural thickening.  No consolidation, pleural effusion or pneumothorax.    Heart and lungs otherwise appear unchanged when allowing for differences in technique and positioning.                                       APC / Resident Notes:   89 year old female with medical history of dementia, CAD, HTN, Recurrent UTIs, Decubitus ulcer presenting to the ED c/o diarrhea. Recently treated for C. Diff in 5/2018 and completed therapy. C. Diff cultures 3 days ago negative. DDx includes but not limited to viral gastroenteritis, electrolyte disturbance, dehydration, cardiac arrhyhtmia, C. Diff, sepsis. Will get labs, BCx, Stool Cx. Will give fluids.     Work-up significant for leukocytosis 32, mild hypokalemia 3.3, creatine 1.0, BUN 16, mild hyperbilirubinemia 1.1, Lipase 3. CXR with increased chronic interstitial markings, no evidence of consolidation.     Etiology most consistent with infectious diarrhea. Most likely recurrent C. Diff infection caused from recent Bactrim therapy for UTI. However, stool cultures 3 days ago negative. Will start on broad spectrum Abx in the ED until stool culture confirmation. Will admit to  medicine for ongoing management. I have discussed the care of this patient with my supervising physician.          Attending Attestation:     Physician Attestation Statement for NP/PA:   I have conducted a face to face encounter with this patient in addition to the NP/PA, due to Medical Complexity    Other NP/PA Attestation Additions:    History of Present Illness: Patient history limited by dementia.  Patient was nontoxic and did appear well, but patient's heart rate was elevated markedly elevated white blood cell count.  Most recent workup for C diff was negative.  Patient started on broad-spectrum antibiotics for coverage of sepsis.  Patient given fluids.  Patient being admitted to Internal Medicine for further evaluation.       Attending Critical Care:   Critical Care Times:   ==============================================================  · Total Critical Care Time - exclusive of procedural time: 40 minutes.  ==============================================================  Critical care was necessary to treat or prevent imminent or life-threatening deterioration of the following conditions: sepsis.   Critical care was time spent personally by me on the following activities: obtaining history from patient or relative, examination of patient, review of x-rays / CT sent with the patient, development of treatment plan with patient or relative, ordering and performing treatments and interventions, evaluation of patient's response to treatment and re-evaluation of patient's conition.   Critical Care Condition: potentially life-threatening                  Clinical Impression:   The primary encounter diagnosis was Infectious diarrhea. Diagnoses of Tachycardia and Leukocytosis were also pertinent to this visit.      Disposition:   Disposition: Admitted  Condition: Fair                        Dylan Monique PA-C  06/23/18 6250       Karan Gage Jr., MD  06/24/18 1204

## 2018-06-24 NOTE — SUBJECTIVE & OBJECTIVE
Past Medical History:   Diagnosis Date    Alzheimer disease     Arthritis     B12 nutritional deficiency 8/6/2012    C. 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      Current Outpatient Prescriptions on File Prior to Encounter   Medication Sig    aspirin 325 MG tablet Take 325 mg by mouth once daily.    atorvastatin (LIPITOR) 40 MG tablet TAKE 1 TABLET (40 MG TOTAL) BY MOUTH ONCE DAILY.    CALCIUM CITRATE/VITAMIN D3 (CITRACAL + D MAXIMUM ORAL) Take 1 tablet by mouth once daily.     cholecalciferol, vitamin D3, (VITAMIN D3) 1,000 unit capsule Take 1,000 Units by mouth once daily.      cyanocobalamin (VITAMIN B-12) 500 MCG tablet Take 500 mcg by mouth once daily.     donepezil (ARICEPT) 10 MG tablet Take 1 tablet (10 mg total) by mouth every evening.    levothyroxine (SYNTHROID) 100 MCG tablet 1 Tablet Oral every morning except 1.5 pills on Sundays.    multivitamin (ONE DAILY MULTIVITAMIN) per tablet Take 1 tablet by mouth  once daily.    QUEtiapine (SEROQUEL) 25 MG Tab TAKE 1 TABLET (25 MG TOTAL) BY MOUTH NIGHTLY AS NEEDED. (Patient taking differently: Take 25 mg by mouth every evening. )    walker (ULTRA-LIGHT ROLLATOR) Misc 1 each by Misc.(Non-Drug; Combo Route) route once daily.     Family History     Problem Relation (Age of Onset)    Hip fracture Mother    Thyroid disease Sister        Social History Main Topics    Smoking status: Former Smoker     Quit date: 6/24/1970    Smokeless tobacco: Former User    Alcohol use No    Drug use: No    Sexual activity: Not on file     Review of Systems   Constitutional: Positive for chills. Negative for activity change, appetite change, fatigue, fever and unexpected weight change.   HENT: Negative for congestion, postnasal drip, sinus pressure and sore throat.    Eyes: Negative for visual disturbance.   Respiratory: Negative for cough and wheezing.    Cardiovascular: Negative for chest pain, palpitations and leg swelling.   Gastrointestinal: Positive for diarrhea and vomiting. Negative for abdominal distention, abdominal pain, anal bleeding, blood in stool and nausea.   Genitourinary: Negative for decreased urine volume, difficulty urinating, dysuria and frequency.   Musculoskeletal: Negative for arthralgias and myalgias.   Neurological: Negative for dizziness, syncope, weakness and light-headedness.   Psychiatric/Behavioral: Negative for confusion, decreased concentration and dysphoric mood.     Objective:     Vital Signs (Most Recent):  Temp: 97.3 °F (36.3 °C) (06/23/18 2304)  Pulse: 85 (06/23/18 2304)  Resp: 16 (06/23/18 2304)  BP: (!) 104/58 (06/23/18 2304)  SpO2: (!) 93 % (06/23/18 2304) Vital Signs (24h Range):  Temp:  [97.3 °F (36.3 °C)-98.3 °F (36.8 °C)] 97.3 °F (36.3 °C)  Pulse:  [] 85  Resp:  [15-22] 16  SpO2:  [92 %-96 %] 93 %  BP: (104-132)/(58-78) 104/58     Weight: 48.5 kg (107 lb)  Body mass index is 20.22 kg/m².    Physical Exam   Constitutional: She is oriented to  person, place, and time. She appears well-developed and well-nourished. No distress.   Elderly lady, resting comfortably in bed , no acute distress   HENT:   Head: Normocephalic and atraumatic.   Right Ear: External ear normal.   Left Ear: External ear normal.   Eyes: EOM are normal. Pupils are equal, round, and reactive to light. No scleral icterus.   Neck: Normal range of motion. Neck supple.   Cardiovascular: Normal rate, regular rhythm, normal heart sounds and intact distal pulses.    No murmur heard.  Pulmonary/Chest: Effort normal and breath sounds normal. No respiratory distress. She has no wheezes.   Abdominal: Soft. Bowel sounds are normal. She exhibits no distension and no mass. There is no tenderness. There is no guarding.   Musculoskeletal: Normal range of motion. She exhibits no edema, tenderness or deformity.   Neurological: She is alert and oriented to person, place, and time. No cranial nerve deficit.   Skin: Skin is warm and dry. No rash noted. She is not diaphoretic.   Psychiatric: She has a normal mood and affect.         CRANIAL NERVES     CN III, IV, VI   Pupils are equal, round, and reactive to light.  Extraocular motions are normal.        Significant Labs:   Blood Culture: No results for input(s): LABBLOO in the last 48 hours.  CBC:   Recent Labs  Lab 06/23/18 1932   WBC 32.08*   HGB 12.4   HCT 38.4        CMP:   Recent Labs  Lab 06/23/18 1932      K 3.3*      CO2 23   *   BUN 16   CREATININE 1.0   CALCIUM 9.4   PROT 7.2   ALBUMIN 3.2*   BILITOT 1.1*   ALKPHOS 107   AST 18   ALT 13   ANIONGAP 11   EGFRNONAA 50.1*     Lactic Acid:   Recent Labs  Lab 06/23/18 2022   LACTATE 1.0     Magnesium:   Recent Labs  Lab 06/23/18  1932   MG 1.9     TSH:   Recent Labs  Lab 06/22/18  1037   TSH 0.951     Urine Culture: No results for input(s): LABURIN in the last 48 hours.  Urine Studies:   Recent Labs  Lab 06/23/18 2144   COLORU Yellow   APPEARANCEUA Cloudy*   PHUR 5.0    SPECGRAV 1.015   PROTEINUA 1+*   GLUCUA 1+*   KETONESU Negative   BILIRUBINUA Negative   OCCULTUA 2+*   NITRITE Positive*   UROBILINOGEN Negative   LEUKOCYTESUR 3+*   RBCUA 2   WBCUA 76*   BACTERIA Many*   SQUAMEPITHEL 1   HYALINECASTS 0       Significant Imaging: CXR: I have reviewed all pertinent results/findings within the past 24 hours and my personal findings are:  no radiologic evidence of consolidation

## 2018-06-24 NOTE — HPI
88 y/o female with PMH of dementia, Recurrent UTIs (mostly pan sensitive klebsiella), remote Hx of colon CA s/p partial distal colectomy and reanastomosis, recent hx of C diff colitis (diagnosed on 5/26/18), presented to the ED from home on 6/23 for 1 day hx of non bloody diarrhea, with caretaker being concerned for recurrent C diff per smell of the stool. Patient developed watery diarrhea on the morning of 6/23, with subsequent several episodes of loose stools/diarrhea. Associated with 1 episode of non bloody vomitus, as well as chills. No abdominal pain or distention. Had mild dysuria on the AM of 6/23, however, daughter was concerned that it was related to frequent stools. Completed 5 day Rx course with Bactrim for UTI. UTis are usually associated with confusion and foul smell, which currently isn't a concern.     Patient was hospitalized at the end of 5/2017 for C diff colitis, underwent PO vancomycin therapy. Subsequent C diff testing was negative, however, stools never solidified. Patient's PCP had plans for outpatient GI evaluation for ongoing diarrhea.     Lives with daughter outside of [place]. Uses walker. Had home health prior to hospitalization.

## 2018-06-25 ENCOUNTER — TELEPHONE (OUTPATIENT)
Dept: ADMINISTRATIVE | Facility: CLINIC | Age: 83
End: 2018-06-25

## 2018-06-25 LAB
ANION GAP SERPL CALC-SCNC: 6 MMOL/L
BASOPHILS # BLD AUTO: 0.05 K/UL
BASOPHILS NFR BLD: 0.4 %
BUN SERPL-MCNC: 11 MG/DL
CALCIUM SERPL-MCNC: 8.4 MG/DL
CHLORIDE SERPL-SCNC: 110 MMOL/L
CO2 SERPL-SCNC: 23 MMOL/L
CREAT SERPL-MCNC: 0.7 MG/DL
DIFFERENTIAL METHOD: ABNORMAL
E COLI SXT1 STL QL IA: NEGATIVE
E COLI SXT2 STL QL IA: NEGATIVE
EOSINOPHIL # BLD AUTO: 0.4 K/UL
EOSINOPHIL NFR BLD: 3.5 %
ERYTHROCYTE [DISTWIDTH] IN BLOOD BY AUTOMATED COUNT: 15.1 %
EST. GFR  (AFRICAN AMERICAN): >60 ML/MIN/1.73 M^2
EST. GFR  (NON AFRICAN AMERICAN): >60 ML/MIN/1.73 M^2
GLUCOSE SERPL-MCNC: 87 MG/DL
HCT VFR BLD AUTO: 32.6 %
HGB BLD-MCNC: 10.2 G/DL
IMM GRANULOCYTES # BLD AUTO: 0.1 K/UL
IMM GRANULOCYTES NFR BLD AUTO: 0.9 %
LYMPHOCYTES # BLD AUTO: 1.6 K/UL
LYMPHOCYTES NFR BLD: 13.9 %
MCH RBC QN AUTO: 30.6 PG
MCHC RBC AUTO-ENTMCNC: 31.3 G/DL
MCV RBC AUTO: 98 FL
MONOCYTES # BLD AUTO: 1 K/UL
MONOCYTES NFR BLD: 8.4 %
NEUTROPHILS # BLD AUTO: 8.2 K/UL
NEUTROPHILS NFR BLD: 72.9 %
NRBC BLD-RTO: 0 /100 WBC
PHOSPHATE SERPL-MCNC: 2.2 MG/DL
PLATELET # BLD AUTO: 216 K/UL
PMV BLD AUTO: 10.3 FL
POTASSIUM SERPL-SCNC: 3.1 MMOL/L
RBC # BLD AUTO: 3.33 M/UL
SODIUM SERPL-SCNC: 139 MMOL/L
WBC # BLD AUTO: 11.27 K/UL

## 2018-06-25 PROCEDURE — 63600175 PHARM REV CODE 636 W HCPCS: Performed by: HOSPITALIST

## 2018-06-25 PROCEDURE — 25000003 PHARM REV CODE 250: Performed by: HOSPITALIST

## 2018-06-25 PROCEDURE — 97162 PT EVAL MOD COMPLEX 30 MIN: CPT

## 2018-06-25 PROCEDURE — 84100 ASSAY OF PHOSPHORUS: CPT

## 2018-06-25 PROCEDURE — 80048 BASIC METABOLIC PNL TOTAL CA: CPT

## 2018-06-25 PROCEDURE — 36415 COLL VENOUS BLD VENIPUNCTURE: CPT

## 2018-06-25 PROCEDURE — 97535 SELF CARE MNGMENT TRAINING: CPT

## 2018-06-25 PROCEDURE — 97165 OT EVAL LOW COMPLEX 30 MIN: CPT

## 2018-06-25 PROCEDURE — G8988 SELF CARE GOAL STATUS: HCPCS | Mod: CJ

## 2018-06-25 PROCEDURE — 85025 COMPLETE CBC W/AUTO DIFF WBC: CPT

## 2018-06-25 PROCEDURE — G8987 SELF CARE CURRENT STATUS: HCPCS | Mod: CK

## 2018-06-25 PROCEDURE — 25000003 PHARM REV CODE 250: Performed by: STUDENT IN AN ORGANIZED HEALTH CARE EDUCATION/TRAINING PROGRAM

## 2018-06-25 PROCEDURE — 11000001 HC ACUTE MED/SURG PRIVATE ROOM

## 2018-06-25 PROCEDURE — 99231 SBSQ HOSP IP/OBS SF/LOW 25: CPT | Mod: ,,, | Performed by: HOSPITALIST

## 2018-06-25 RX ORDER — MINOCYCLINE HYDROCHLORIDE 100 MG/1
100 CAPSULE ORAL EVERY 12 HOURS
Qty: 10 CAPSULE | Refills: 0 | Status: SHIPPED | OUTPATIENT
Start: 2018-06-25 | End: 2018-06-26 | Stop reason: HOSPADM

## 2018-06-25 RX ORDER — POTASSIUM CHLORIDE 20 MEQ/1
40 TABLET, EXTENDED RELEASE ORAL 2 TIMES DAILY
Status: COMPLETED | OUTPATIENT
Start: 2018-06-25 | End: 2018-06-25

## 2018-06-25 RX ORDER — SODIUM,POTASSIUM PHOSPHATES 280-250MG
1 POWDER IN PACKET (EA) ORAL
Status: COMPLETED | OUTPATIENT
Start: 2018-06-25 | End: 2018-06-26

## 2018-06-25 RX ORDER — ASPIRIN 81 MG/1
81 TABLET ORAL DAILY
Refills: 0 | COMMUNITY
Start: 2018-06-26 | End: 2019-06-26

## 2018-06-25 RX ORDER — DOXYCYCLINE HYCLATE 100 MG
100 TABLET ORAL EVERY 12 HOURS
Status: DISCONTINUED | OUTPATIENT
Start: 2018-06-25 | End: 2018-06-26

## 2018-06-25 RX ADMIN — POTASSIUM CHLORIDE 40 MEQ: 1500 TABLET, EXTENDED RELEASE ORAL at 10:06

## 2018-06-25 RX ADMIN — Medication 125 MG: at 12:06

## 2018-06-25 RX ADMIN — QUETIAPINE FUMARATE 25 MG: 25 TABLET ORAL at 08:06

## 2018-06-25 RX ADMIN — POTASSIUM & SODIUM PHOSPHATES POWDER PACK 280-160-250 MG 1 PACKET: 280-160-250 PACK at 08:06

## 2018-06-25 RX ADMIN — ENOXAPARIN SODIUM 30 MG: 100 INJECTION SUBCUTANEOUS at 05:06

## 2018-06-25 RX ADMIN — ATORVASTATIN CALCIUM 40 MG: 20 TABLET, FILM COATED ORAL at 09:06

## 2018-06-25 RX ADMIN — Medication 1 CAPSULE: at 09:06

## 2018-06-25 RX ADMIN — POTASSIUM & SODIUM PHOSPHATES POWDER PACK 280-160-250 MG 1 PACKET: 280-160-250 PACK at 12:06

## 2018-06-25 RX ADMIN — DOXYCYCLINE HYCLATE 100 MG: 100 TABLET, COATED ORAL at 10:06

## 2018-06-25 RX ADMIN — RAMELTEON 8 MG: 8 TABLET, FILM COATED ORAL at 08:06

## 2018-06-25 RX ADMIN — Medication 125 MG: at 11:06

## 2018-06-25 RX ADMIN — DONEPEZIL HYDROCHLORIDE 10 MG: 10 TABLET ORAL at 08:06

## 2018-06-25 RX ADMIN — ASPIRIN 81 MG: 81 TABLET, COATED ORAL at 09:06

## 2018-06-25 RX ADMIN — POTASSIUM CHLORIDE 40 MEQ: 1500 TABLET, EXTENDED RELEASE ORAL at 08:06

## 2018-06-25 RX ADMIN — Medication 125 MG: at 05:06

## 2018-06-25 RX ADMIN — LEVOTHYROXINE SODIUM 100 MCG: 100 TABLET ORAL at 05:06

## 2018-06-25 RX ADMIN — DOXYCYCLINE HYCLATE 100 MG: 100 TABLET, COATED ORAL at 08:06

## 2018-06-25 RX ADMIN — POTASSIUM & SODIUM PHOSPHATES POWDER PACK 280-160-250 MG 1 PACKET: 280-160-250 PACK at 05:06

## 2018-06-25 NOTE — PROGRESS NOTES
Home Health SOC with [facility] Art. Dr Abdoulaye Giles. SN,PT,OT,ST,HHA services provided.

## 2018-06-25 NOTE — ASSESSMENT & PLAN NOTE
- recent hx of C diff colitis s/p PO vancomycin therapy, now presenting with worsening diarrhea, concerning for recurrence   - stool studies negative thus far  - C dfif positive  - PO vancomycin x 14 days as this is a second episode of C. Diff  - probiotic  - cautious IVF to keep up with fluid losses

## 2018-06-25 NOTE — PLAN OF CARE
CM spoke with patient's daughter about discharge plans. Agrees with PT/OT and would like her Mom to go to [facility] for a short time, then plan to return home with [facility] ([facility] If unable to go to -SNF will take home, does not want patient in [place] SNF. Consult in.     06/25/18 7476   Discharge Assessment   Assessment Type Discharge Planning Assessment   Confirmed/corrected address and phone number on facesheet? Yes   Assessment information obtained from? Caregiver;Medical Record  (daughterObdulia [phone])   Expected Length of Stay (days) 3   Communicated expected length of stay with patient/caregiver yes   Prior to hospitilization cognitive status: Not Oriented to Place;Not Oriented to Time   Prior to hospitalization functional status: Needs Assistance;Assistive Equipment   Current cognitive status: Not Oriented to Place;Not Oriented to Time   Current Functional Status: Assistive Equipment;Needs Assistance;Partially Dependent   Lives With child(rosi), adult  (daughter and son in law live with patient)   Able to Return to Prior Arrangements yes   Is patient able to care for self after discharge? No   Who are your caregiver(s) and their phone number(s)? Obdulia mcnulty   Patient's perception of discharge disposition skilled nursing facility   Readmission Within The Last 30 Days no previous admission in last 30 days   Patient currently being followed by outpatient case management? Yes   If yes, name of outpatient case management following: [facility] outpatient case management   Patient currently receives any other outside agency services? No  (referral to outpatient Palliative care was made and daughter in agreement [facility] it)   Equipment Currently Used at Home walker, rolling;shower chair;wheelchair   Do you have any problems affording any of your prescribed medications? No   Is the patient taking medications as prescribed? yes   Does the patient have transportation home? Yes   Transportation Available family or  friend will provide   Does the patient receive services at the [facility]? No   Discharge Plan A Skilled Nursing Facility   Discharge Plan B Home with family;Home Health  (current with [facility]   Patient/Family In Agreement With Plan yes

## 2018-06-25 NOTE — PLAN OF CARE
Problem: Physical Therapy Goal  Goal: Physical Therapy Goal  Goals to be met by: 18    Patient will increase functional independence with mobility by performin. Supine to sit with MInimal Assistance  2. Sit to supine with MInimal Assistance  3. Rolling to Left and Right with Supervision.  4. Sit to stand transfer with Minimal Assistance  5. Bed to chair transfer with Minimal Assistance using Rolling Walker  6. Gait  x 10 feet with Moderate Assistance using Rolling Walker.   7. Lower extremity exercise program x10 reps per handout, with assistance as needed     Outcome: Ongoing (interventions implemented as appropriate)  Physical therapy evaluation completed. Plan of care initiated.    Suellen Alvarez, SPT  2018

## 2018-06-25 NOTE — PLAN OF CARE
[facility]    HOME HEALTH ORDERS  FACE TO FACE ENCOUNTER    Patient Name: Arina Adame  YOB: 1928    PCP: Zeinab Meier MD   PCP Address: [address] / [place] [address]  PCP Phone Number: [phone]  PCP Fax: [phone]    Encounter Date: 06/29/2018      Admit to Home Health    Diagnoses:  Active Hospital Problems    Diagnosis  POA    *Diarrhea of presumed infectious origin [R19.7]  Yes    Acute cystitis without hematuria [N30.00]  Yes    Age-related physical debility [R54]  Yes    Hypokalemia [E87.6]  Yes    Dementia [F03.90]  Yes    Carotid arterial disease [I77.9]  Yes    Hypothyroidism [E03.9]  Yes      Resolved Hospital Problems    Diagnosis Date Resolved POA   No resolved problems to display.       Future Appointments  Date Time Provider Department Center   6/25/2018 11:30 AM [facility] [id number] [address] LB LIMIT [facility] IC Daniel Mitchell           I have seen and examined this patient face to face today. My clinical findings that support the need for the home health skilled services and home bound status are the following:  Weakness/numbness causing balance and gait disturbance due to Infection making it taxing to leave home.    Allergies:  Review of patient's allergies indicates:   Allergen Reactions    Codeine      Other reaction(s): Unknown    Iodine      Other reaction(s): Unknown    Penicillins      Other reaction(s): Unknown       Diet: regular diet    Activities: as tolerated    Nursing:   SN to complete comprehensive assessment including routine vital signs. Instruct on disease process and s/s of complications to report to MD. Review/verify medication list sent home with the patient at time of discharge  and instruct patient/caregiver as needed. Frequency may be adjusted depending on start of care date.    Notify MD if SBP > 160 or < 90; DBP > 90 or < 50; HR > 120 or < 50; Temp > 101      CONSULTS:    Physical Therapy to evaluate and treat. Evaluate for  home safety and equipment needs; Establish/upgrade home exercise program. Perform / instruct on therapeutic exercises, gait training, transfer training, and Range of Motion.  Occupational Therapy to evaluate and treat. Evaluate home environment for safety and equipment needs. Perform/Instruct on transfers, ADL training, ROM, and therapeutic exercises.  Aide to provide assistance with personal care, ADLs, and vital signs.    MISCELLANEOUS CARE:  N/A    WOUND CARE ORDERS  n/a      Medications: Review discharge medications with patient and family and provide education.       Arina Adame [facility]   Home Medication Instructions JARET:[id number]    Printed on:06/29/18 4553   Medication Information                      aspirin (ECOTRIN) 81 MG EC tablet  Take 1 tablet (81 mg total) by mouth once daily.             atorvastatin (LIPITOR) 40 MG tablet  TAKE 1 TABLET (40 MG TOTAL) BY MOUTH ONCE DAILY.             CALCIUM CITRATE/VITAMIN D3 (CITRACAL + D MAXIMUM ORAL)  Take 1 tablet by mouth once daily.              cholecalciferol, vitamin D3, (VITAMIN D3) 1,000 unit capsule  Take 1,000 Units by mouth once daily.               cyanocobalamin (VITAMIN B-12) 500 MCG tablet  Take 500 mcg by mouth once daily.              donepezil (ARICEPT) 10 MG tablet  Take 1 tablet (10 mg total) by mouth every evening.             levoFLOXacin (LEVAQUIN) 500 MG tablet  Take 1 tablet (500 mg total) by mouth once daily. for 1 day             levothyroxine (SYNTHROID) 100 MCG tablet  1 Tablet Oral every morning except 1.5 pills on Sundays.             multivitamin (ONE DAILY MULTIVITAMIN) per tablet  Take 1 tablet by mouth once daily.             QUEtiapine (SEROQUEL) 25 MG Tab  TAKE 1 TABLET (25 MG TOTAL) BY MOUTH NIGHTLY AS NEEDED.             vancomycin oral solution 25mg/mL  Take 1 teaspoonful (5 ml) by mouth 4 times daily until 7/7/2018             walker (ULTRA-LIGHT ROLLATOR) Misc  1 each by Misc.(Non-Drug; Combo Route) route once daily.                  I certify that this patient is confined to her home and needs intermittent skilled nursing care, physical therapy and occupational therapy.

## 2018-06-25 NOTE — PLAN OF CARE
Problem: Occupational Therapy Goal  Goal: Occupational Therapy Goal  Goals to be met by: 7/2   Patient will increase functional independence with ADLs by performing:    UE Dressing with Set-up Assistance while seated EOB.  LE Dressing with Supervision while seated EOB.  Grooming while standing at EOB with mod A for balance and set up assistance and verbal cues only for grooming task.  Toileting from bedside commode with Minimal Assistance for hygiene and clothing management.   Rolling to Bilateral with Modified [place].   Supine to sit with Modified [place], verbal cues only.  Stand pivot transfers with Moderate Assistance to various surfaces (BSC, chair).  Upper extremity exercise program x15 reps per handout, with assistance as needed for increased endurance to functional task.    Outcome: Ongoing (interventions implemented as appropriate)  OT marion completed this date. Recommend SNF upon DC. Priscila Odom OT 6/25/2018

## 2018-06-25 NOTE — ASSESSMENT & PLAN NOTE
- hx of arcos sensitive klebsiella, finished bactrim therapy. Now with leukocytosis with wbc of 32k and UA concerning for uti with dysuria per hx.   - started PO cipro on admit  - urine cx: GNR, lactose . Prior cultures Klebsiella sensitive to doxy  - 6/25 started doxycycline 100 mg BID x 5 days

## 2018-06-25 NOTE — SUBJECTIVE & OBJECTIVE
Past Medical History:   Diagnosis Date    Alzheimer disease     Arthritis     B12 nutritional deficiency 8/6/2012    C. difficile colitis     5/2018    Carotid arterial disease 8/27/2013    Cataract     Colon cancer     Elevated blood pressure (not hypertension) 3/17/2014    Epiretinal membrane     Essential hypertension 3/16/2016    Hearing loss 3/17/2014    History of colon cancer 5/28/2015    [place] (hard of hearing)     Hypothyroidism 11/21/2012    IGT (impaired glucose tolerance) 2/11/2015    Macrocytosis 11/21/2012    Nevus of choroid     Osteoporosis, 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         Past Surgical History:   Procedure Laterality Date    BREAST SURGERY      CATARACT EXTRACTION W/  INTRAOCULAR LENS IMPLANT Bilateral n/a    OU    COLECTOMY      DENTAL SURGERY      right ankle surgery         Review of patient's allergies indicates:   Allergen Reactions    Codeine      Other reaction(s): Unknown    Iodine      Other reaction(s): Unknown    Penicillins      Other reaction(s): Unknown       No current facility-administered medications on file prior to encounter.      Current Outpatient Prescriptions on File Prior to Encounter   Medication Sig    atorvastatin (LIPITOR) 40 MG tablet TAKE 1 TABLET (40 MG TOTAL) BY MOUTH ONCE DAILY.    CALCIUM CITRATE/VITAMIN D3 (CITRACAL + D MAXIMUM ORAL) Take 1 tablet by mouth once daily.     cholecalciferol, vitamin D3, (VITAMIN D3) 1,000 unit capsule Take 1,000 Units by mouth once daily.      cyanocobalamin (VITAMIN B-12) 500 MCG tablet Take 500 mcg by mouth once daily.     donepezil (ARICEPT) 10 MG tablet Take 1 tablet (10 mg total) by mouth every evening.    levothyroxine (SYNTHROID) 100 MCG tablet 1 Tablet Oral every morning except 1.5 pills on Sundays.    multivitamin (ONE DAILY MULTIVITAMIN) per tablet Take 1 tablet by mouth once daily.    QUEtiapine (SEROQUEL) 25 MG Tab TAKE 1 TABLET  (25 MG TOTAL) BY MOUTH NIGHTLY AS NEEDED. (Patient taking differently: Take 25 mg by mouth every evening. )    walker (ULTRA-LIGHT ROLLATOR) Misc 1 each by Misc.(Non-Drug; Combo Route) route once daily.    [DISCONTINUED] aspirin 325 MG tablet Take 325 mg by mouth once daily.     Family History     Problem Relation (Age of Onset)    Hip fracture Mother    Thyroid disease Sister        Social History Main Topics    Smoking status: Former Smoker     Quit date: 6/24/1970    Smokeless tobacco: Former User    Alcohol use No    Drug use: No    Sexual activity: Not on file     Review of Systems   Constitutional: Negative for activity change, appetite change, chills, fatigue, fever and unexpected weight change.   HENT: Negative for congestion, postnasal drip, sinus pressure and sore throat.    Eyes: Negative for visual disturbance.   Respiratory: Negative for cough and wheezing.    Cardiovascular: Negative for chest pain, palpitations and leg swelling.   Gastrointestinal: Negative for abdominal distention, abdominal pain, anal bleeding, blood in stool, diarrhea (resolved), nausea and vomiting.   Genitourinary: Negative for decreased urine volume, difficulty urinating, dysuria and frequency.   Musculoskeletal: Negative for arthralgias and myalgias.   Neurological: Negative for dizziness, syncope, weakness and light-headedness.   Psychiatric/Behavioral: Negative for confusion, decreased concentration and dysphoric mood.     Objective:     Vital Signs (Most Recent):  Temp: 97.5 °F (36.4 °C) (06/25/18 1150)  Pulse: 64 (06/25/18 1150)  Resp: 16 (06/25/18 1150)  BP: 117/77 (06/25/18 1150)  SpO2: 98 % (06/25/18 1150) Vital Signs (24h Range):  Temp:  [97.2 °F (36.2 °C)-98.5 °F (36.9 °C)] 97.5 °F (36.4 °C)  Pulse:  [] 64  Resp:  [16-20] 16  SpO2:  [94 %-98 %] 98 %  BP: (117-177)/(65-90) 117/77     Weight: 46.5 kg (102 lb 8.2 oz)  Body mass index is 19.37 kg/m².    Physical Exam   Constitutional: She is oriented to  person, place, and time. She appears well-developed and well-nourished. No distress.   Elderly lady, resting comfortably in bed , no acute distress   HENT:   Head: Normocephalic and atraumatic.   Right Ear: External ear normal.   Left Ear: External ear normal.   Eyes: EOM are normal. Pupils are equal, round, and reactive to light. No scleral icterus.   Neck: Normal range of motion. Neck supple.   Cardiovascular: Normal rate, regular rhythm, normal heart sounds and intact distal pulses.    No murmur heard.  Pulmonary/Chest: Effort normal and breath sounds normal. No respiratory distress. She has no wheezes.   Abdominal: Soft. Bowel sounds are normal. She exhibits no distension and no mass. There is no tenderness. There is no guarding.   Musculoskeletal: Normal range of motion. She exhibits no edema, tenderness or deformity.   Neurological: She is alert and oriented to person, place, and time. No cranial nerve deficit.   Skin: Skin is warm and dry. No rash noted. She is not diaphoretic.   Psychiatric: She has a normal mood and affect.         CRANIAL NERVES     CN III, IV, VI   Pupils are equal, round, and reactive to light.  Extraocular motions are normal.        Significant Labs:   Blood Culture:     Recent Labs  Lab 06/23/18 2023   LABBLOO No Growth to date  No Growth to date  No Growth to date  No Growth to date     CBC:     Recent Labs  Lab 06/23/18 1932 06/24/18  0544 06/25/18  0530   WBC 32.08* 22.02* 11.27   HGB 12.4 10.1* 10.2*   HCT 38.4 32.1* 32.6*    210 216     CMP:     Recent Labs  Lab 06/23/18 1932 06/24/18  0544 06/25/18  0530    140 139   K 3.3* 3.4* 3.1*    110 110   CO2 23 24 23   * 91 87   BUN 16 14 11   CREATININE 1.0 0.8 0.7   CALCIUM 9.4 8.5* 8.4*   PROT 7.2  --   --    ALBUMIN 3.2*  --   --    BILITOT 1.1*  --   --    ALKPHOS 107  --   --    AST 18  --   --    ALT 13  --   --    ANIONGAP 11 6* 6*   EGFRNONAA 50.1* >60.0 >60.0     Lactic Acid:     Recent Labs  Lab  06/23/18 2022   LACTATE 1.0     Magnesium:     Recent Labs  Lab 06/23/18  1932   MG 1.9     TSH:     Recent Labs  Lab 06/22/18  1037   TSH 0.951     Urine Culture:     Recent Labs  Lab 06/23/18 2144   LABURIN GRAM NEGATIVE KAL, LACTOSE >100,000 cfu/mlIdentification and susceptibility pending     Urine Studies:     Recent Labs  Lab 06/23/18 2144   COLORU Yellow   APPEARANCEUA Cloudy*   PHUR 5.0   SPECGRAV 1.015   PROTEINUA 1+*   GLUCUA 1+*   KETONESU Negative   BILIRUBINUA Negative   OCCULTUA 2+*   NITRITE Positive*   UROBILINOGEN Negative   LEUKOCYTESUR 3+*   RBCUA 2   WBCUA 76*   BACTERIA Many*   SQUAMEPITHEL 1   HYALINECASTS 0       Significant Imaging: CXR: I have reviewed all pertinent results/findings within the past 24 hours and my personal findings are:  no radiologic evidence of consolidation

## 2018-06-25 NOTE — PT/OT/SLP EVAL
Physical Therapy Evaluation    Patient Name:  Arina Adame   MRN:  [id number]    Recommendations:     Discharge Recommendations:  nursing facility, skilled   Discharge Equipment Recommendations: none   Barriers to discharge: Decreased caregiver support    Assessment:     Arina Adame is a 89 y.o. female admitted with a medical diagnosis of Diarrhea of presumed infectious origin.  She presents with the following impairments/functional limitations:  weakness, impaired endurance, impaired self care skills, impaired functional mobilty, gait instability, impaired balance, impaired cognition, decreased lower extremity function, decreased safety awareness. Patient is unreliable historian 2/2 to dementia; caregiver not present at bedside to confirm home environment set-up and prior functional mobility. Patient reports she lives in single-story home with stairs to get inside and that she lives with her daughter. Prior to hospitalization, pt reports she was able to walk with a rolling walker but unable to elaborate further on level assistance needed for household mobility. Upon evaluation she presents with decreased ability to follow instruction, decreased BLE strength/endurance and decreased static/dynamic balance limiting her ability to perform bed mobility, transfers and standing safely and independently. Would benefit from acute care PT services to address the above deficits and return to PLOF. Recommend acute care physical therapy 3x/week during hospital course and d/c to SNF.      Rehab Prognosis:  fair; patient would benefit from acute skilled PT services to address these deficits and reach maximum level of function.      Recent Surgery: * No surgery found *      Plan:     During this hospitalization, patient to be seen 3 x/week to address the above listed problems via gait training, therapeutic activities, therapeutic exercises, neuromuscular re-education  · Plan of Care Expires:  07/25/18   Plan of Care  "Reviewed with: patient    Subjective     Communicated with nsg prior to session.  Patient found supine with HOB elevated upon PT entry to room, agreeable to evaluation.      Chief Complaint: decreased BLE strength/endurance  Patient comments/goals: "It's hard to stand"  Pain/Comfort:  · Pain Rating 1: 0/10    Patients cultural, spiritual, Anglican conflicts given the current situation: none    Living Environment:  Patient History:  · Home environment: Single story home with unknown number of stairs to entry  · Assistance provided:  24/7 care from daughter    Previous Level of Mobilty  · Pt reports functional household mobility with daughter and ability to ambulate with rolling walker. Daughter not present at bedside to confirm.      DME: Rolling walker  - Bold implies equipment being used    Objective:     Patient found with: bed alarm, peripheral IV     General Precautions: Standard, fall, contact   Orthopedic Precautions:N/A   Braces: N/A       Exams:  Cognitive Exam  Patient is oriented to Person and follows 50% of single-step commands    Fine Motor Coordination -       Intact   Postural Exam Patient presented with the following abnormalities: -       Rounded shoulders  -       Forward head  -       Posterior pelvic tilt   Sensation -       Intact   Skin Integrity/Edema -       Skin integrity: Visible skin intact/thin   R LE ROM WFL   R LE Strength  WFL   -Hip Flexion:  4+/5  -Knee Extension:  4+/5  -Knee Flexion: 4+/5   L LE ROM WFL   L LE Strength  WFL   -Hip Flexion:  4+/5  -Knee Extension:  4+/5  -Knee Flexion: 4+/5       Functional Mobility  Bed Mobility  Scooting: maximal assistance  Supine to Sit: maximal assistance   Sit to Supine: moderate assistance   Transfers Sit to Stand:  moderate assistance with handhold assist for 2 trials- patient stood with excessive anterior trunk flexion. Given repeat v/c for trunk/hip extension but with limited patient response. Ambulation not attempted 2/2 to level of " assist needed in standing. Patient experienced dizziness when standing which quickly resolved.           Balance   Static Sitting moderate assistance   Dynamic Sitting moderate assistance   Static Standing maximal assistance   Dynamic Standing maximal assistance       AM-PAC 6 CLICK MOBILITY  Total Score:11       Therapeutic Activities and Exercises:   PT educated pt on the following  - role of PT  - PT POC- 3x/week during acute care hospital course  - discharge recommendation (SNF) and equipment needs (continue using current DME)  - level of assistance currently req (Max A to stand )and safety precautions with nsg staff   All questions and concerns answered and addressed. White board updated with pertinent information. Nsg notified.       Patient left supine with all lines intact, call button in reach and bed alarm on.    GOALS:    Physical Therapy Goals        Problem: Physical Therapy Goal    Goal Priority Disciplines Outcome Goal Variances Interventions   Physical Therapy Goal     PT/OT, PT      Description:  Goals to be met by: 18    Patient will increase functional independence with mobility by performin. Supine to sit with MInimal Assistance  2. Sit to supine with MInimal Assistance  3. Rolling to Left and Right with Supervision.  4. Sit to stand transfer with Minimal Assistance  5. Bed to chair transfer with Minimal Assistance using Rolling Walker  6. Gait  x 10 feet with Moderate Assistance using Rolling Walker.   7. Lower extremity exercise program x10 reps per handout, with assistance as needed                      History:     Past Medical History:   Diagnosis Date    Alzheimer disease     Arthritis     B12 nutritional deficiency 2012    C. 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       Past Surgical History:   Procedure Laterality Date    BREAST SURGERY      CATARACT EXTRACTION W/  INTRAOCULAR LENS IMPLANT Bilateral n/a    OU    COLECTOMY      DENTAL SURGERY      right ankle surgery         Clinical Decision Making:     History  Co-morbidities and personal factors that may impact the plan of care Examination  Body Structures and Functions, activity limitations and participation restrictions that may impact the plan of care Clinical Presentation   Decision Making/ Complexity Score   Co-morbidities:   [] Time since onset of injury / illness / exacerbation  [x] Status of current condition  [x]Patient's cognitive status and safety concerns    [] Multiple Medical Problems (see med hx)  Personal Factors:   [x] Patient's age  [] Prior Level of function   [] Patient's home situation (environment and family support)  [] Patient's level of motivation  [] Expected progression of patient      HISTORY:(criteria)    [] 86357 - no personal factors/history    [] 11363 - has 1-2 personal factor/comorbidity     [x] 96971 - has >3 personal factor/comorbidity     Body Regions:  [] Objective examination findings  [] Head     []  Neck  [] Trunk   [] Upper Extremity  [x] Lower Extremity    Body Systems:  [x] For communication ability, affect, cognition, language, and learning style: the assessment of the ability to make needs known, consciousness, orientation (person, place, and time), expected emotional /behavioral responses, and learning preferences (eg, learning barriers, education  needs)  [x] For the neuromuscular system: a general assessment of gross coordinated movement (eg, balance, gait, locomotion, transfers,  and transitions) and motor function  (motor control and motor learning)  [x] For the musculoskeletal system: the assessment of gross symmetry, gross range of motion, gross strength, height, and weight  [] For the integumentary system: the assessment of pliability(texture), presence of scar formation, skin color, and skin integrity  [] For cardiovascular/pulmonary system: the assessment of heart rate, respiratory rate, blood pressure, and edema     Activity limitations:    [x] Patient's cognitive status and saf ety concerns          [x] Status of current condition      [] Weight bearing restriction  [] Cardiopulmunary Restriction    Participation Restrictions:   [] Goals and goal agreement with the patient     [] Rehab potential (prognosis) and probable outcome      Examination of Body System: (criteria)    [] 75412 - addressing 1-2 elements    [] 27962 - addressing a total of 3 or more elements     [x] 79215 -  Addressing a total of 4 or more elements         Clinical Presentation: (criteria)  Evolving - 87626     On examination of body system using standardized tests and measures patient presents with 4 or more elements from any of the following: body structures and functions, activity limitations, and/or participation restrictions.  Leading to a clinical presentation that is considered evolving with changing characteristics                              Clinical Decision Making  (Eval Complexity):  Moderate - 02549     Time Tracking:     PT Received On: 06/25/18  PT Start Time: 0952     PT Stop Time: 1013  PT Total Time (min): 21 min     Billable Minutes: Evaluation 21      DRE Riggs  06/25/2018

## 2018-06-25 NOTE — PROGRESS NOTES
[facility] Hospital Medicine  Progress Note    Patient Name: Arina Adame  MRN: [id number]  Patient Class: IP- Inpatient   Admission Date: 6/23/2018  Length of Stay: 2 days  Attending Physician: Tatiana Sousa MD  Primary Care Provider: Zeinab Meier MD    [facility] Medicine Team: [facility] 3 Anitra Bergeron MD    Subjective:     Principal Problem:Diarrhea of presumed infectious origin    HPI:  89 y/o female with PMH of dementia, Recurrent UTIs (mostly pan sensitive klebsiella), remote Hx of colon CA s/p partial distal colectomy and reanastomosis, recent hx of C diff colitis (diagnosed on 5/26/18), presented to the ED from home on 6/23 for 1 day hx of non bloody diarrhea, with caretaker being concerned for recurrent C diff per smell of the stool. Patient developed watery diarrhea on the morning of 6/23, with subsequent several episodes of loose stools/diarrhea. Associated with 1 episode of non bloody vomitus, as well as chills. No abdominal pain or distention. Had mild dysuria on the AM of 6/23, however, daughter was concerned that it was related to frequent stools. Completed 5 day Rx course with Bactrim for UTI. UTis are usually associated with confusion and foul smell, which currently isn't a concern.     Patient was hospitalized at the end of 5/2017 for C diff colitis, underwent PO vancomycin therapy. Subsequent C diff testing was negative, however, stools never solidified. Patient's PCP had plans for outpatient GI evaluation for ongoing diarrhea.     Lives with daughter outside of [place]. Uses walker. Had home health prior to hospitalization.       Hospital Course:  Admitted to [facility] for recurrent C. Diff. C. Diff PCR positive. E.coli neg, blood cx neg. Urine cx growing GNRs. Pt started onf PO vanc x 14 days and cipro for UTI. 6/25 d/c'ed cipro and started Doxy 100 mg BID x 5 days for UTI. Stable for d/c. Pt and family requesting [facility], if unable then home with home health.  Awaiting SNF.    Past Medical History:   Diagnosis Date    Alzheimer disease     Arthritis     B12 nutritional deficiency 8/6/2012    C. 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      Current Outpatient Prescriptions on File Prior to Encounter   Medication Sig    atorvastatin (LIPITOR) 40 MG tablet TAKE 1 TABLET (40 MG TOTAL) BY MOUTH ONCE DAILY.    CALCIUM CITRATE/VITAMIN D3 (CITRACAL + D MAXIMUM ORAL) Take 1 tablet by mouth once daily.     cholecalciferol, vitamin D3, (VITAMIN D3) 1,000 unit capsule Take 1,000 Units by mouth once daily.      cyanocobalamin (VITAMIN B-12) 500 MCG tablet Take 500 mcg by mouth once daily.     donepezil (ARICEPT) 10 MG tablet Take 1 tablet (10 mg total) by mouth every evening.    levothyroxine (SYNTHROID) 100 MCG tablet 1 Tablet Oral every morning except 1.5 pills on Sundays.    multivitamin (ONE DAILY MULTIVITAMIN) per tablet Take 1 tablet by mouth once daily.    QUEtiapine (SEROQUEL) 25 MG  Tab TAKE 1 TABLET (25 MG TOTAL) BY MOUTH NIGHTLY AS NEEDED. (Patient taking differently: Take 25 mg by mouth every evening. )    walker (ULTRA-LIGHT ROLLATOR) Misc 1 each by Misc.(Non-Drug; Combo Route) route once daily.    [DISCONTINUED] aspirin 325 MG tablet Take 325 mg by mouth once daily.     Family History     Problem Relation (Age of Onset)    Hip fracture Mother    Thyroid disease Sister        Social History Main Topics    Smoking status: Former Smoker     Quit date: 6/24/1970    Smokeless tobacco: Former User    Alcohol use No    Drug use: No    Sexual activity: Not on file     Review of Systems   Constitutional: Negative for activity change, appetite change, chills, fatigue, fever and unexpected weight change.   HENT: Negative for congestion, postnasal drip, sinus pressure and sore throat.    Eyes: Negative for visual disturbance.   Respiratory: Negative for cough and wheezing.    Cardiovascular: Negative for chest pain, palpitations and leg swelling.   Gastrointestinal: Negative for abdominal distention, abdominal pain, anal bleeding, blood in stool, diarrhea (resolved), nausea and vomiting.   Genitourinary: Negative for decreased urine volume, difficulty urinating, dysuria and frequency.   Musculoskeletal: Negative for arthralgias and myalgias.   Neurological: Negative for dizziness, syncope, weakness and light-headedness.   Psychiatric/Behavioral: Negative for confusion, decreased concentration and dysphoric mood.     Objective:     Vital Signs (Most Recent):  Temp: 97.5 °F (36.4 °C) (06/25/18 1150)  Pulse: 64 (06/25/18 1150)  Resp: 16 (06/25/18 1150)  BP: 117/77 (06/25/18 1150)  SpO2: 98 % (06/25/18 1150) Vital Signs (24h Range):  Temp:  [97.2 °F (36.2 °C)-98.5 °F (36.9 °C)] 97.5 °F (36.4 °C)  Pulse:  [] 64  Resp:  [16-20] 16  SpO2:  [94 %-98 %] 98 %  BP: (117-177)/(65-90) 117/77     Weight: 46.5 kg (102 lb 8.2 oz)  Body mass index is 19.37 kg/m².    Physical Exam   Constitutional: She is  oriented to person, place, and time. She appears well-developed and well-nourished. No distress.   Elderly lady, resting comfortably in bed , no acute distress   HENT:   Head: Normocephalic and atraumatic.   Right Ear: External ear normal.   Left Ear: External ear normal.   Eyes: EOM are normal. Pupils are equal, round, and reactive to light. No scleral icterus.   Neck: Normal range of motion. Neck supple.   Cardiovascular: Normal rate, regular rhythm, normal heart sounds and intact distal pulses.    No murmur heard.  Pulmonary/Chest: Effort normal and breath sounds normal. No respiratory distress. She has no wheezes.   Abdominal: Soft. Bowel sounds are normal. She exhibits no distension and no mass. There is no tenderness. There is no guarding.   Musculoskeletal: Normal range of motion. She exhibits no edema, tenderness or deformity.   Neurological: She is alert and oriented to person, place, and time. No cranial nerve deficit.   Skin: Skin is warm and dry. No rash noted. She is not diaphoretic.   Psychiatric: She has a normal mood and affect.         CRANIAL NERVES     CN III, IV, VI   Pupils are equal, round, and reactive to light.  Extraocular motions are normal.        Significant Labs:   Blood Culture:     Recent Labs  Lab 06/23/18 2023   LABBLOO No Growth to date  No Growth to date  No Growth to date  No Growth to date     CBC:     Recent Labs  Lab 06/23/18 1932 06/24/18  0544 06/25/18  0530   WBC 32.08* 22.02* 11.27   HGB 12.4 10.1* 10.2*   HCT 38.4 32.1* 32.6*    210 216     CMP:     Recent Labs  Lab 06/23/18 1932 06/24/18  0544 06/25/18  0530    140 139   K 3.3* 3.4* 3.1*    110 110   CO2 23 24 23   * 91 87   BUN 16 14 11   CREATININE 1.0 0.8 0.7   CALCIUM 9.4 8.5* 8.4*   PROT 7.2  --   --    ALBUMIN 3.2*  --   --    BILITOT 1.1*  --   --    ALKPHOS 107  --   --    AST 18  --   --    ALT 13  --   --    ANIONGAP 11 6* 6*   EGFRNONAA 50.1* >60.0 >60.0     Lactic Acid:      Recent Labs  Lab 06/23/18  2022   LACTATE 1.0     Magnesium:     Recent Labs  Lab 06/23/18  1932   MG 1.9     TSH:     Recent Labs  Lab 06/22/18  1037   TSH 0.951     Urine Culture:     Recent Labs  Lab 06/23/18 2144   LABURIN GRAM NEGATIVE KAL, LACTOSE >100,000 cfu/mlIdentification and susceptibility pending     Urine Studies:     Recent Labs  Lab 06/23/18 2144   COLORU Yellow   APPEARANCEUA Cloudy*   PHUR 5.0   SPECGRAV 1.015   PROTEINUA 1+*   GLUCUA 1+*   KETONESU Negative   BILIRUBINUA Negative   OCCULTUA 2+*   NITRITE Positive*   UROBILINOGEN Negative   LEUKOCYTESUR 3+*   RBCUA 2   WBCUA 76*   BACTERIA Many*   SQUAMEPITHEL 1   HYALINECASTS 0       Significant Imaging: CXR: I have reviewed all pertinent results/findings within the past 24 hours and my personal findings are:  no radiologic evidence of consolidation     Assessment/Plan:      * Diarrhea of presumed infectious origin    - recent hx of C diff colitis s/p PO vancomycin therapy, now presenting with worsening diarrhea, concerning for recurrence   - stool studies negative thus far  - C dfif positive  - PO vancomycin x 14 days as this is a second episode of C. Diff  - probiotic  - cautious IVF to keep up with fluid losses        Age-related physical debility    - PT OT : recommending SNF  - pt/family want [facility], if unable, then home health        Acute cystitis without hematuria    - hx of pan sensitive klebsiella, finished bactrim therapy. Now with leukocytosis with wbc of 32k and UA concerning for uti with dysuria per hx.   - started PO cipro on admit  - urine cx: GNR, lactose . Prior cultures Klebsiella sensitive to doxy  - 6/25 started doxycycline 100 mg BID x 5 days          Hypokalemia    - likely 2/2 vomiting./ diarrhea  - PO replacement          Dementia    - cont home donepezil  - cont home quetiapine  - sertraline was stopped by PCP  - delirium precautions            Carotid arterial disease    - statin, ASA 81           Hypothyroidism    - cont home levothyroxine           VTE Risk Mitigation         Ordered     enoxaparin injection 30 mg  Daily      06/23/18 2252     IP VTE LOW RISK PATIENT  Once      06/23/18 2252        DISPO: pending [facility]      Anitra Bergeron MD  Department of Hospital Medicine   [facility]

## 2018-06-25 NOTE — HOSPITAL COURSE
Arina Adame is a 89 y.o. female with dementia and recent diagnosis of C. Diff on PO Vanc who presents to the ED with non-bloody diarrhea.  WBC trended down from 32 to 11.  C. Diff PCR returned positive.  Continue PO Vanc to complete a 14 day course.  Will need a prolonged course for 14 days given recurrence.  She also reports dysuria, so urine culture was ordered; growing Klebsiella oxytoca.  She was originally placed on Cipro given allergies to penicillin, which we changed to doxycycline today based on her previous urine culture a week ago growing Klebsiella.  She was originally going to be discharged today, but PT OT suggested SNF.

## 2018-06-25 NOTE — PT/OT/SLP EVAL
Occupational Therapy   Evaluation    Name: Arina Adame  MRN: [id number]  Admitting Diagnosis:  Diarrhea of presumed infectious origin      Recommendations:     Discharge Recommendations: nursing facility, skilled  Discharge Equipment Recommendations:   (TBD )  Barriers to discharge:   (level of skilled assistance required)    History:     Occupational Profile:  *Pt has dementia; no family present for eval, so history needs verification.  Living Environment: Pt lives with her daughter and her daughter's  in a one story home, no steps. She has a tub/shower combo with a seat.  Previous level of function: Pt required min-mod assist from her daughter for all ADL tasks. She no longer performs most IADL tasks. She utilizes a RW for functional mobility in her home and takes the wheelchair for community mobility. She no longer drives. She rarely leaves her home other than going to Dr. montalvo. 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 She enjoys watching tv.   Roles and Routines: caretaker to self, mother, community dweller  Equipment Owned:  walker, rolling, shower chair, wheelchair  Assistance upon Discharge: 24/7 assist from daughter and daughter's --pt stated they do not work during day, but this needs to be verified with daughter    Past Medical History:   Diagnosis Date    Alzheimer disease     Arthritis     B12 nutritional deficiency 8/6/2012    C. 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 No family present for session.   Pain/Comfort:  · Pain Rating 1: 0/10    Patients cultural, spiritual, Yazidism conflicts given the current situation: none reported     Objective:     Patient found with: bed alarm, peripheral IV. Pt found soiled in urine.     General Precautions: Standard, hearing, fall, contact (dementia, delirium)   Orthopedic Precautions:N/A   Braces: N/A     Occupational Performance:    Bed Mobility:    · Patient completed Rolling/Turning to Right with minimum assistance, with side rail and HOB flat   · Patient completed Scooting/Bridging with minimum assistance in forward plane at EOB  · able to independently scoot self up in bed toward HOB with side rail and HOB while laying supine with verbal cues for completion of task and extended time  · Min A for lateral scoot to R at EOB x3 trials with verbal and tactile cues for sequencing, initiation, and termination of task  · Patient completed Supine to Sit with minimum assistance and HOB flat   · Patient completed Sit to Supine with minimum assistance, HOB flat     Functional Mobility/Transfers:  · Patient completed Sit <> Stand Transfer with maximal assistance from EOB with  no assistive device, unable to stand fully upright, required unilateral support   · Pt urinated upon standing, so further t/f and functional mobility were terminated in order to perform hygiene/cleaning     Activities of Daily Living:  · Feeding:  set up assistance to eat breakfast utilizing utensils  · Grooming: stand by assistance to clean hands with sanitizing wipes while seated EOB, verbal cues to  terminate task due to perseveration  · Mod A to clean BLE with washcloth after urinating on self  · UB Dressing: moderate assistance to don gown while seated EOB, verbal cues for sequencing  · LB Dressing: moderate assistance to doff and don socks in 4 point position while seated EOB, verbal cues for sequencing  · Toileting: total assistance for hygiene after urinating x2 during session (found soiled upon entry, urinated upon standing)  · Pt reported incontinence     Cognitive/Visual Perceptual:  Cognitive/Psychosocial Skills:     -       Oriented to: disoriented x4--stated she will be 89 years old in a few days, which is untrue  -       Follows Commands/attention:Easily distracted, but able to follow one step commands once redirected. Requires cueing for  initiation, sequencing, and termination of most tasks.  -       Communication: clear/fluent, but pleasantly confused  -       Memory: impaired  -       Safety awareness/insight to disability: impaired   -       Mood/Affect/Coping skills/emotional control: Appropriate to situation  Visual/Perceptual:      -Intact--pt wears glasses during day    Physical Exam:  Balance:    -       Min A for static and dynamic sitting due to posterior and R lateral lean, max A for static standing  Postural examination/scapula alignment:    -       Rounded shoulders  -       Forward head  -       Posterior pelvic tilt  Skin integrity: Visible skin intact  Edema:  None noted  Sensation:    -       Intact  Motor Planning:    -       impaired for tasks that are not automatic  Dominant hand:    -       R  Upper Extremity Range of Motion:     -       Right Upper Extremity: WFL  -       Left Upper Extremity: WFL  Upper Extremity Strength:    -       Right Upper Extremity: WFL  -       Left Upper Extremity: WFL   Strength:    -       Right Upper Extremity: WFL except 3+/5  -       Left Upper Extremity: WFL except 3+/5  Fine Motor Coordination:    -       Intact    Patient left with bed  "in chair position and breakfast tray set up in front of pt with all lines intact and call button in reach, bed alarm malfunctioning/not setting    [facility] 6 Click:  [facility] Total Score: 15    Treatment & Education:  -edu for ot role and poc  -edu for log rolling technique for bed mobility  -blinds opened and daily orientation provided verbally for delirium precautions  -questions and concerns addressed within ot scope of practice  Education:    Assessment:     Arina Adame is a 89 y.o. female with a medical diagnosis of Diarrhea of presumed infectious origin.  She presents with decreased functional independence in various areas of her life. She demo decreased endurance to functional task. She is pleasantly confused and disoriented, but willing to participate. She demo decreased ability to stand fully upright in order to participate in functional tasks. She is unsafe to return home at this time and a high fall risk due to the level of assistance needed to stand and likely for significant assist needed during OOB functional activities. Pt would continue to benefit from skilled OT services for maximum functional outcome and safety. Recommend SNF placement upon DC from acute care before returning home. Performance deficits affecting function are weakness, impaired endurance, impaired self care skills, impaired functional mobilty, gait instability, impaired balance, impaired cognition, decreased upper extremity function, decreased lower extremity function, decreased safety awareness, impaired coordination, impaired fine motor, impaired cardiopulmonary response to activity.      Rehab Prognosis:  good; patient would benefit from acute skilled OT services to address these deficits and reach maximum level of function.         Clinical Decision Makin.  OT Low:  "Pt evaluation falls under low complexity for evaluation coding due to performance deficits noted in 1-3 areas as stated above and 0 co-morbities affecting " "current functional status. Data obtained from problem focused assessments. No modifications or assistance was required for completion of evaluation. Only brief occupational profile and history review completed."     Plan:     Patient to be seen 3 x/week to address the above listed problems via self-care/home management, therapeutic activities, therapeutic exercises  · Plan of Care Expires: 07/23/18  · Plan of Care Reviewed with: patient    This Plan of care has been discussed with the patient who was involved in its development and understands and is in agreement with the identified goals and treatment plan    GOALS:    Occupational Therapy Goals        Problem: Occupational Therapy Goal    Goal Priority Disciplines Outcome Interventions   Occupational Therapy Goal     OT, PT/OT Ongoing (interventions implemented as appropriate)    Description:  Goals to be met by: 7/2   Patient will increase functional independence with ADLs by performing:    UE Dressing with Set-up Assistance while seated EOB.  LE Dressing with Supervision while seated EOB.  Grooming while standing at EOB with mod A for balance and set up assistance and verbal cues only for grooming task.  Toileting from bedside commode with Minimal Assistance for hygiene and clothing management.   Rolling to Bilateral with Modified [place].   Supine to sit with Modified [place], verbal cues only.  Stand pivot transfers with Moderate Assistance to various surfaces (BSC, chair).  Upper extremity exercise program x15 reps per handout, with assistance as needed for increased endurance to functional task.                      Time Tracking:     OT Date of Treatment: 06/25/18  OT Start Time: 0840  OT Stop Time: 0912  OT Total Time (min): 32 min    Billable Minutes:Evaluation 22  Self Care/Home Management 10    Priscila Odom OT  6/25/2018    "

## 2018-06-26 ENCOUNTER — TELEPHONE (OUTPATIENT)
Dept: GASTROENTEROLOGY | Facility: CLINIC | Age: 83
End: 2018-06-26

## 2018-06-26 PROBLEM — G92.9 ENCEPHALOPATHY, TOXIC: Status: ACTIVE | Noted: 2018-06-26

## 2018-06-26 LAB
ANION GAP SERPL CALC-SCNC: 6 MMOL/L
BACTERIA UR CULT: NORMAL
BASOPHILS # BLD AUTO: 0.07 K/UL
BASOPHILS NFR BLD: 0.8 %
BUN SERPL-MCNC: 8 MG/DL
CALCIUM SERPL-MCNC: 9.1 MG/DL
CHLORIDE SERPL-SCNC: 109 MMOL/L
CO2 SERPL-SCNC: 27 MMOL/L
CREAT SERPL-MCNC: 0.6 MG/DL
DIFFERENTIAL METHOD: ABNORMAL
EOSINOPHIL # BLD AUTO: 0.3 K/UL
EOSINOPHIL NFR BLD: 3.1 %
ERYTHROCYTE [DISTWIDTH] IN BLOOD BY AUTOMATED COUNT: 14.9 %
EST. GFR  (AFRICAN AMERICAN): >60 ML/MIN/1.73 M^2
EST. GFR  (NON AFRICAN AMERICAN): >60 ML/MIN/1.73 M^2
GLUCOSE SERPL-MCNC: 87 MG/DL
HCT VFR BLD AUTO: 38.8 %
HGB BLD-MCNC: 12.5 G/DL
IMM GRANULOCYTES # BLD AUTO: 0.11 K/UL
IMM GRANULOCYTES NFR BLD AUTO: 1.2 %
LYMPHOCYTES # BLD AUTO: 1.5 K/UL
LYMPHOCYTES NFR BLD: 16.9 %
MCH RBC QN AUTO: 31.1 PG
MCHC RBC AUTO-ENTMCNC: 32.2 G/DL
MCV RBC AUTO: 97 FL
MONOCYTES # BLD AUTO: 0.7 K/UL
MONOCYTES NFR BLD: 8.1 %
NEUTROPHILS # BLD AUTO: 6.4 K/UL
NEUTROPHILS NFR BLD: 69.9 %
NRBC BLD-RTO: 0 /100 WBC
O+P STL TRI STN: NORMAL
PHOSPHATE SERPL-MCNC: 2.7 MG/DL
PLATELET # BLD AUTO: 246 K/UL
PMV BLD AUTO: 10.5 FL
POTASSIUM SERPL-SCNC: 4.2 MMOL/L
RBC # BLD AUTO: 4.02 M/UL
SODIUM SERPL-SCNC: 142 MMOL/L
WBC # BLD AUTO: 9.12 K/UL

## 2018-06-26 PROCEDURE — 63600175 PHARM REV CODE 636 W HCPCS: Performed by: HOSPITALIST

## 2018-06-26 PROCEDURE — 25000003 PHARM REV CODE 250: Performed by: STUDENT IN AN ORGANIZED HEALTH CARE EDUCATION/TRAINING PROGRAM

## 2018-06-26 PROCEDURE — 80048 BASIC METABOLIC PNL TOTAL CA: CPT

## 2018-06-26 PROCEDURE — 86580 TB INTRADERMAL TEST: CPT | Performed by: HOSPITALIST

## 2018-06-26 PROCEDURE — 36415 COLL VENOUS BLD VENIPUNCTURE: CPT

## 2018-06-26 PROCEDURE — 11000001 HC ACUTE MED/SURG PRIVATE ROOM

## 2018-06-26 PROCEDURE — 84100 ASSAY OF PHOSPHORUS: CPT

## 2018-06-26 PROCEDURE — 99232 SBSQ HOSP IP/OBS MODERATE 35: CPT | Mod: GC,,, | Performed by: HOSPITALIST

## 2018-06-26 PROCEDURE — 85025 COMPLETE CBC W/AUTO DIFF WBC: CPT

## 2018-06-26 PROCEDURE — 25000003 PHARM REV CODE 250: Performed by: HOSPITALIST

## 2018-06-26 RX ORDER — CIPROFLOXACIN 500 MG/1
500 TABLET ORAL EVERY 12 HOURS
Status: DISCONTINUED | OUTPATIENT
Start: 2018-06-26 | End: 2018-06-29 | Stop reason: HOSPADM

## 2018-06-26 RX ORDER — LEVOFLOXACIN 500 MG/1
500 TABLET, FILM COATED ORAL DAILY
Qty: 5 TABLET | Refills: 0 | Status: SHIPPED | OUTPATIENT
Start: 2018-06-26 | End: 2018-06-29

## 2018-06-26 RX ADMIN — DOXYCYCLINE HYCLATE 100 MG: 100 TABLET, COATED ORAL at 08:06

## 2018-06-26 RX ADMIN — QUETIAPINE FUMARATE 25 MG: 25 TABLET ORAL at 09:06

## 2018-06-26 RX ADMIN — ASPIRIN 81 MG: 81 TABLET, COATED ORAL at 08:06

## 2018-06-26 RX ADMIN — CIPROFLOXACIN HYDROCHLORIDE 500 MG: 500 TABLET, FILM COATED ORAL at 09:06

## 2018-06-26 RX ADMIN — Medication 5 UNITS: at 05:06

## 2018-06-26 RX ADMIN — LEVOTHYROXINE SODIUM 100 MCG: 100 TABLET ORAL at 05:06

## 2018-06-26 RX ADMIN — Medication 125 MG: at 05:06

## 2018-06-26 RX ADMIN — Medication 1 CAPSULE: at 08:06

## 2018-06-26 RX ADMIN — DONEPEZIL HYDROCHLORIDE 10 MG: 10 TABLET ORAL at 09:06

## 2018-06-26 RX ADMIN — Medication 125 MG: at 11:06

## 2018-06-26 RX ADMIN — ENOXAPARIN SODIUM 30 MG: 100 INJECTION SUBCUTANEOUS at 04:06

## 2018-06-26 RX ADMIN — POTASSIUM & SODIUM PHOSPHATES POWDER PACK 280-160-250 MG 1 PACKET: 280-160-250 PACK at 08:06

## 2018-06-26 RX ADMIN — ATORVASTATIN CALCIUM 40 MG: 20 TABLET, FILM COATED ORAL at 08:06

## 2018-06-26 NOTE — SUBJECTIVE & OBJECTIVE
Past Medical History:   Diagnosis Date    Alzheimer disease     Arthritis     B12 nutritional deficiency 8/6/2012    C. difficile colitis     5/2018    Carotid arterial disease 8/27/2013    Cataract     Colon cancer     Elevated blood pressure (not hypertension) 3/17/2014    Epiretinal membrane     Essential hypertension 3/16/2016    Hearing loss 3/17/2014    History of colon cancer 5/28/2015    [place] (hard of hearing)     Hypothyroidism 11/21/2012    IGT (impaired glucose tolerance) 2/11/2015    Macrocytosis 11/21/2012    Nevus of choroid     Osteoporosis, 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         Past Surgical History:   Procedure Laterality Date    BREAST SURGERY      CATARACT EXTRACTION W/  INTRAOCULAR LENS IMPLANT Bilateral n/a    OU    COLECTOMY      DENTAL SURGERY      right ankle surgery         Review of patient's allergies indicates:   Allergen Reactions    Codeine      Other reaction(s): Unknown    Iodine      Other reaction(s): Unknown    Penicillins      Other reaction(s): Unknown       No current facility-administered medications on file prior to encounter.      Current Outpatient Prescriptions on File Prior to Encounter   Medication Sig    atorvastatin (LIPITOR) 40 MG tablet TAKE 1 TABLET (40 MG TOTAL) BY MOUTH ONCE DAILY.    CALCIUM CITRATE/VITAMIN D3 (CITRACAL + D MAXIMUM ORAL) Take 1 tablet by mouth once daily.     cholecalciferol, vitamin D3, (VITAMIN D3) 1,000 unit capsule Take 1,000 Units by mouth once daily.      cyanocobalamin (VITAMIN B-12) 500 MCG tablet Take 500 mcg by mouth once daily.     donepezil (ARICEPT) 10 MG tablet Take 1 tablet (10 mg total) by mouth every evening.    levothyroxine (SYNTHROID) 100 MCG tablet 1 Tablet Oral every morning except 1.5 pills on Sundays.    multivitamin (ONE DAILY MULTIVITAMIN) per tablet Take 1 tablet by mouth once daily.    QUEtiapine (SEROQUEL) 25 MG Tab TAKE 1 TABLET  (25 MG TOTAL) BY MOUTH NIGHTLY AS NEEDED. (Patient taking differently: Take 25 mg by mouth every evening. )    walker (ULTRA-LIGHT ROLLATOR) Misc 1 each by Misc.(Non-Drug; Combo Route) route once daily.     Family History     Problem Relation (Age of Onset)    Hip fracture Mother    Thyroid disease Sister        Social History Main Topics    Smoking status: Former Smoker     Quit date: 6/24/1970    Smokeless tobacco: Former User    Alcohol use No    Drug use: No    Sexual activity: Not on file     Review of Systems   Constitutional: Negative for activity change, appetite change, chills, fatigue, fever and unexpected weight change.   HENT: Negative for congestion, postnasal drip, sinus pressure and sore throat.    Eyes: Negative for visual disturbance.   Respiratory: Negative for cough and wheezing.    Cardiovascular: Negative for chest pain, palpitations and leg swelling.   Gastrointestinal: Negative for abdominal distention, abdominal pain, anal bleeding, blood in stool, diarrhea (resolved), nausea and vomiting.   Genitourinary: Negative for decreased urine volume, difficulty urinating, dysuria and frequency.   Musculoskeletal: Negative for arthralgias and myalgias.   Neurological: Negative for dizziness, syncope, weakness and light-headedness.   Psychiatric/Behavioral: Negative for confusion, decreased concentration and dysphoric mood.     Objective:     Vital Signs (Most Recent):  Temp: 97.7 °F (36.5 °C) (06/26/18 0812)  Pulse: 68 (06/26/18 0812)  Resp: 18 (06/26/18 0812)  BP: (!) 178/89 (06/26/18 0812)  SpO2: 96 % (06/26/18 0812) Vital Signs (24h Range):  Temp:  [97.4 °F (36.3 °C)-98.2 °F (36.8 °C)] 97.7 °F (36.5 °C)  Pulse:  [64-85] 68  Resp:  [16-18] 18  SpO2:  [96 %-97 %] 96 %  BP: (128-178)/(70-89) 178/89     Weight: 46.5 kg (102 lb 8.2 oz)  Body mass index is 19.37 kg/m².    Physical Exam   Constitutional: She is oriented to person, place, and time. She appears well-developed and well-nourished. No  distress.   Elderly lady, resting comfortably in bed , no acute distress   HENT:   Head: Normocephalic and atraumatic.   Right Ear: External ear normal.   Left Ear: External ear normal.   Eyes: EOM are normal. Pupils are equal, round, and reactive to light. No scleral icterus.   Neck: Normal range of motion. Neck supple.   Cardiovascular: Normal rate, regular rhythm, normal heart sounds and intact distal pulses.    No murmur heard.  Pulmonary/Chest: Effort normal and breath sounds normal. No respiratory distress. She has no wheezes.   Abdominal: Soft. Bowel sounds are normal. She exhibits no distension and no mass. There is no tenderness. There is no guarding.   Musculoskeletal: Normal range of motion. She exhibits no edema, tenderness or deformity.   Neurological: She is alert and oriented to person, place, and time. No cranial nerve deficit.   Skin: Skin is warm and dry. No rash noted. She is not diaphoretic.   Psychiatric: She has a normal mood and affect.         CRANIAL NERVES     CN III, IV, VI   Pupils are equal, round, and reactive to light.  Extraocular motions are normal.        Significant Labs:   Blood Culture:   No results for input(s): LABBLOO in the last 48 hours.  CBC:     Recent Labs  Lab 06/25/18  0530 06/26/18  0700   WBC 11.27 9.12   HGB 10.2* 12.5   HCT 32.6* 38.8    246     CMP:     Recent Labs  Lab 06/25/18  0530 06/26/18  0700    142   K 3.1* 4.2    109   CO2 23 27   GLU 87 87   BUN 11 8   CREATININE 0.7 0.6   CALCIUM 8.4* 9.1   ANIONGAP 6* 6*   EGFRNONAA >60.0 >60.0     Lactic Acid:   No results for input(s): LACTATE in the last 48 hours.  Magnesium:   No results for input(s): MG in the last 48 hours.  TSH:     Recent Labs  Lab 06/22/18  1037   TSH 0.951     Urine Culture:   No results for input(s): LABURIN in the last 48 hours.  Urine Studies:   No results for input(s): COLORU, APPEARANCEUA, PHUR, SPECGRAV, PROTEINUA, GLUCUA, KETONESU, BILIRUBINUA, OCCULTUA, NITRITE,  UROBILINOGEN, LEUKOCYTESUR, RBCUA, WBCUA, BACTERIA, SQUAMEPITHEL, HYALINECASTS in the last 48 hours.    Invalid input(s): WRIGHTSUR    Significant Imaging: CXR: I have reviewed all pertinent results/findings within the past 24 hours and my personal findings are:  no radiologic evidence of consolidation

## 2018-06-26 NOTE — PLAN OF CARE
Problem: Patient Care Overview  Goal: Plan of Care Review  Outcome: Ongoing (interventions implemented as appropriate)  Plan of care reviewed. PPD placed to RFA to be read on 6-28 for SNF placement. Pt lying in bed AAO no distress noted. Marlon cont to monitor

## 2018-06-26 NOTE — ASSESSMENT & PLAN NOTE
- hx of arcos sensitive klebsiella, finished bactrim therapy. Now with leukocytosis with wbc of 32k and UA concerning for uti with dysuria per hx.   - started PO cipro on admit  - urine cx: GNR, lactose . Prior cultures Klebsiella sensitive to doxy  - 6/25 started doxycycline 100 mg BID x 5 days  -6/26 Kleb found to be resistant to Doxy, transitioned to Levaquin 500 BID x 5 day course

## 2018-06-26 NOTE — TELEPHONE ENCOUNTER
"----- Message from Ruddy Miller MD sent at 6/26/2018 10:52 AM CDT -----  Contact: [phone] Obdulia Boogie (pts  daughter)  I won't be able to see next week while I am on call  After that, we have to go week by week seeing how many we are overbooked already  ----- Message -----  From: Jammie Kaye MA  Sent: 6/26/2018  10:49 AM  To: Ruddy Miller MD    She is presently back in the hospital for C-diff.  Would you consider seeing her on a Tuesday or Thursday once she has been discharged.  Daughter asked for GI to be consulted while she was in the hospital and was told that was not needed.  ----- Message -----  From: Ruddy Miller MD  Sent: 6/23/2018  12:27 PM  To: Jammie Kaye MA    Would be first available new pt  ----- Message -----  From: Jammie Kaye MA  Sent: 6/22/2018   3:25 PM  To: Ruddy Miller MD    She will be a NP to you.   ----- Message -----  From: Maria Eugenia Jamison MA  Sent: 6/22/2018   3:12 PM  To: [facility] Staff    Needs Advice    Reason for call:  Asking if Dr Miller could see her mother.  Her stool went from looking like "spinach" to now looking like "mashed potatoes"      Communication Preference: [phone] Obdulia Boogie (pts  daughter)    Additional Information:   Pt had C-diff but Dr Meier said she's clear now. Obdulia is a pt of Dr Miller and wants him to see her mother.           "

## 2018-06-26 NOTE — PLAN OF CARE
CM spoke with daughter, Obdulia. Again talked about SNF placement and daughter is understanding that patient may need longer than 7-10 days of therapy which is [facility] usual stay. Daughter ok'd referrals being sent to [facility] and [facility] and she will go see tomorrow. Face sheet, H&P, medicine note and PT/OT evals sent via .

## 2018-06-26 NOTE — PROGRESS NOTES
[facility] Hospital Medicine  Progress Note    Patient Name: Arina Adame  MRN: [id number]  Patient Class: IP- Inpatient   Admission Date: 6/23/2018  Length of Stay: 3 days  Attending Physician: Theresa Avalos*  Primary Care Provider: Zeinab Meier MD    [facility] Medicine Team: [facility] [place] 3 Charles Phillip MD    Subjective:     Principal Problem:Diarrhea of presumed infectious origin    HPI:  89 y/o female with PMH of dementia, Recurrent UTIs (mostly pan sensitive klebsiella), remote Hx of colon CA s/p partial distal colectomy and reanastomosis, recent hx of C diff colitis (diagnosed on 5/26/18), presented to the ED from home on 6/23 for 1 day hx of non bloody diarrhea, with caretaker being concerned for recurrent C diff per smell of the stool. Patient developed watery diarrhea on the morning of 6/23, with subsequent several episodes of loose stools/diarrhea. Associated with 1 episode of non bloody vomitus, as well as chills. No abdominal pain or distention. Had mild dysuria on the AM of 6/23, however, daughter was concerned that it was related to frequent stools. Completed 5 day Rx course with Bactrim for UTI. UTis are usually associated with confusion and foul smell, which currently isn't a concern.     Patient was hospitalized at the end of 5/2017 for C diff colitis, underwent PO vancomycin therapy. Subsequent C diff testing was negative, however, stools never solidified. Patient's PCP had plans for outpatient GI evaluation for ongoing diarrhea.     Lives with daughter outside of [place]. Uses walker. Had home health prior to hospitalization.       Hospital Course:  Admitted to [facility] for recurrent C. Diff. C. Diff PCR positive. E.coli neg, blood cx neg. Urine cx growing GNRs. Pt started onf PO vanc x 14 days and cipro for UTI. 6/25 d/c'ed cipro and started Doxy 100 mg BID x 5 days for UTI. Stable for d/c. Pt and family requesting [facility], if unable then home with home  health. Awaiting SNF.    Past Medical History:   Diagnosis Date    Alzheimer disease     Arthritis     B12 nutritional deficiency 8/6/2012    C. 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      Current Outpatient Prescriptions on File Prior to Encounter   Medication Sig    atorvastatin (LIPITOR) 40 MG tablet TAKE 1 TABLET (40 MG TOTAL) BY MOUTH ONCE DAILY.    CALCIUM CITRATE/VITAMIN D3 (CITRACAL + D MAXIMUM ORAL) Take 1 tablet by mouth once daily.     cholecalciferol, vitamin D3, (VITAMIN D3) 1,000 unit capsule Take 1,000 Units by mouth once daily.      cyanocobalamin (VITAMIN B-12) 500 MCG tablet Take 500 mcg by mouth once daily.     donepezil (ARICEPT) 10 MG tablet Take 1 tablet (10 mg total) by mouth every evening.    levothyroxine (SYNTHROID) 100 MCG tablet 1 Tablet Oral every morning except 1.5 pills on Sundays.    multivitamin (ONE DAILY MULTIVITAMIN) per tablet Take 1 tablet by mouth once daily.    QUEtiapine  (SEROQUEL) 25 MG Tab TAKE 1 TABLET (25 MG TOTAL) BY MOUTH NIGHTLY AS NEEDED. (Patient taking differently: Take 25 mg by mouth every evening. )    walker (ULTRA-LIGHT ROLLATOR) Misc 1 each by Misc.(Non-Drug; Combo Route) route once daily.     Family History     Problem Relation (Age of Onset)    Hip fracture Mother    Thyroid disease Sister        Social History Main Topics    Smoking status: Former Smoker     Quit date: 6/24/1970    Smokeless tobacco: Former User    Alcohol use No    Drug use: No    Sexual activity: Not on file     Review of Systems   Constitutional: Negative for activity change, appetite change, chills, fatigue, fever and unexpected weight change.   HENT: Negative for congestion, postnasal drip, sinus pressure and sore throat.    Eyes: Negative for visual disturbance.   Respiratory: Negative for cough and wheezing.    Cardiovascular: Negative for chest pain, palpitations and leg swelling.   Gastrointestinal: Negative for abdominal distention, abdominal pain, anal bleeding, blood in stool, diarrhea (resolved), nausea and vomiting.   Genitourinary: Negative for decreased urine volume, difficulty urinating, dysuria and frequency.   Musculoskeletal: Negative for arthralgias and myalgias.   Neurological: Negative for dizziness, syncope, weakness and light-headedness.   Psychiatric/Behavioral: Negative for confusion, decreased concentration and dysphoric mood.     Objective:     Vital Signs (Most Recent):  Temp: 97.7 °F (36.5 °C) (06/26/18 0812)  Pulse: 68 (06/26/18 0812)  Resp: 18 (06/26/18 0812)  BP: (!) 178/89 (06/26/18 0812)  SpO2: 96 % (06/26/18 0812) Vital Signs (24h Range):  Temp:  [97.4 °F (36.3 °C)-98.2 °F (36.8 °C)] 97.7 °F (36.5 °C)  Pulse:  [64-85] 68  Resp:  [16-18] 18  SpO2:  [96 %-97 %] 96 %  BP: (128-178)/(70-89) 178/89     Weight: 46.5 kg (102 lb 8.2 oz)  Body mass index is 19.37 kg/m².    Physical Exam   Constitutional: She is oriented to person, place, and time. She appears  well-developed and well-nourished. No distress.   Elderly lady, resting comfortably in bed , no acute distress   HENT:   Head: Normocephalic and atraumatic.   Right Ear: External ear normal.   Left Ear: External ear normal.   Eyes: EOM are normal. Pupils are equal, round, and reactive to light. No scleral icterus.   Neck: Normal range of motion. Neck supple.   Cardiovascular: Normal rate, regular rhythm, normal heart sounds and intact distal pulses.    No murmur heard.  Pulmonary/Chest: Effort normal and breath sounds normal. No respiratory distress. She has no wheezes.   Abdominal: Soft. Bowel sounds are normal. She exhibits no distension and no mass. There is no tenderness. There is no guarding.   Musculoskeletal: Normal range of motion. She exhibits no edema, tenderness or deformity.   Neurological: She is alert and oriented to person, place, and time. No cranial nerve deficit.   Skin: Skin is warm and dry. No rash noted. She is not diaphoretic.   Psychiatric: She has a normal mood and affect.         CRANIAL NERVES     CN III, IV, VI   Pupils are equal, round, and reactive to light.  Extraocular motions are normal.        Significant Labs:   Blood Culture:   No results for input(s): LABBLOO in the last 48 hours.  CBC:     Recent Labs  Lab 06/25/18  0530 06/26/18  0700   WBC 11.27 9.12   HGB 10.2* 12.5   HCT 32.6* 38.8    246     CMP:     Recent Labs  Lab 06/25/18  0530 06/26/18  0700    142   K 3.1* 4.2    109   CO2 23 27   GLU 87 87   BUN 11 8   CREATININE 0.7 0.6   CALCIUM 8.4* 9.1   ANIONGAP 6* 6*   EGFRNONAA >60.0 >60.0     Lactic Acid:   No results for input(s): LACTATE in the last 48 hours.  Magnesium:   No results for input(s): MG in the last 48 hours.  TSH:     Recent Labs  Lab 06/22/18  1037   TSH 0.951     Urine Culture:   No results for input(s): LABURIN in the last 48 hours.  Urine Studies:   No results for input(s): COLORU, APPEARANCEUA, PHUR, SPECGRAV, PROTEINUA, GLUCUA,  KETONESU, BILIRUBINUA, OCCULTUA, NITRITE, UROBILINOGEN, LEUKOCYTESUR, RBCUA, WBCUA, BACTERIA, SQUAMEPITHEL, HYALINECASTS in the last 48 hours.    Invalid input(s): JASPER    Significant Imaging: CXR: I have reviewed all pertinent results/findings within the past 24 hours and my personal findings are:  no radiologic evidence of consolidation     Assessment/Plan:      * Diarrhea of presumed infectious origin    - recent hx of C diff colitis s/p PO vancomycin therapy, now presenting with worsening diarrhea, concerning for recurrence   - stool studies negative thus far  - C dfif positive  - PO vancomycin x 14 days as this is a second episode of C. Diff  - probiotic  - cautious IVF to keep up with fluid losses        Encephalopathy, toxic    -2/2 to infectious process and cytokine mediated encephalopathy   -continue with IV antibiotics         Age-related physical debility    - PT OT : recommending SNF  - pt/family want [facility], if unable, then home health        Acute cystitis without hematuria    - hx of pan sensitive klebsiella, finished bactrim therapy. Now with leukocytosis with wbc of 32k and UA concerning for uti with dysuria per hx.   - started PO cipro on admit  - urine cx: GNR, lactose . Prior cultures Klebsiella sensitive to doxy  - 6/25 started doxycycline 100 mg BID x 5 days  -6/26 Kleb found to be resistant to Doxy, transitioned to Levaquin 500 BID x 5 day course           Hypokalemia    - likely 2/2 vomiting./ diarrhea  - PO replacement          Dementia    - cont home donepezil  - cont home quetiapine  - sertraline was stopped by PCP  - delirium precautions            Carotid arterial disease    - statin, ASA 81          Hypothyroidism    - cont home levothyroxine           VTE Risk Mitigation         Ordered     enoxaparin injection 30 mg  Daily      06/23/18 2252     IP VTE LOW RISK PATIENT  Once      06/23/18 2252          Plan discussed with attending Dr. Theresa Avalos* ,  further recommendations as per attending addendum. Please feel free to call with any questions or concerns.          Charles Phillip MD  Department of Hospital Medicine   [facility]

## 2018-06-27 LAB
ANION GAP SERPL CALC-SCNC: 9 MMOL/L
BACTERIA STL CULT: NORMAL
BASOPHILS # BLD AUTO: 0.05 K/UL
BASOPHILS NFR BLD: 0.6 %
BUN SERPL-MCNC: 12 MG/DL
CALCIUM SERPL-MCNC: 8.8 MG/DL
CHLORIDE SERPL-SCNC: 108 MMOL/L
CO2 SERPL-SCNC: 24 MMOL/L
CREAT SERPL-MCNC: 0.7 MG/DL
DIFFERENTIAL METHOD: ABNORMAL
EOSINOPHIL # BLD AUTO: 0.3 K/UL
EOSINOPHIL NFR BLD: 3.5 %
ERYTHROCYTE [DISTWIDTH] IN BLOOD BY AUTOMATED COUNT: 14.9 %
EST. GFR  (AFRICAN AMERICAN): >60 ML/MIN/1.73 M^2
EST. GFR  (NON AFRICAN AMERICAN): >60 ML/MIN/1.73 M^2
GLUCOSE SERPL-MCNC: 114 MG/DL
HCT VFR BLD AUTO: 35.7 %
HGB BLD-MCNC: 11.2 G/DL
IMM GRANULOCYTES # BLD AUTO: 0.16 K/UL
IMM GRANULOCYTES NFR BLD AUTO: 1.9 %
LYMPHOCYTES # BLD AUTO: 1.9 K/UL
LYMPHOCYTES NFR BLD: 22.3 %
MCH RBC QN AUTO: 30.6 PG
MCHC RBC AUTO-ENTMCNC: 31.4 G/DL
MCV RBC AUTO: 98 FL
MONOCYTES # BLD AUTO: 0.8 K/UL
MONOCYTES NFR BLD: 9.8 %
NEUTROPHILS # BLD AUTO: 5.3 K/UL
NEUTROPHILS NFR BLD: 61.9 %
NRBC BLD-RTO: 0 /100 WBC
PHOSPHATE SERPL-MCNC: 3.6 MG/DL
PLATELET # BLD AUTO: 272 K/UL
PMV BLD AUTO: 10.4 FL
POTASSIUM SERPL-SCNC: 3.8 MMOL/L
RBC # BLD AUTO: 3.66 M/UL
SODIUM SERPL-SCNC: 141 MMOL/L
WBC # BLD AUTO: 8.55 K/UL

## 2018-06-27 PROCEDURE — 99232 SBSQ HOSP IP/OBS MODERATE 35: CPT | Mod: GC,,, | Performed by: HOSPITALIST

## 2018-06-27 PROCEDURE — 25000003 PHARM REV CODE 250: Performed by: STUDENT IN AN ORGANIZED HEALTH CARE EDUCATION/TRAINING PROGRAM

## 2018-06-27 PROCEDURE — 97535 SELF CARE MNGMENT TRAINING: CPT

## 2018-06-27 PROCEDURE — 63600175 PHARM REV CODE 636 W HCPCS: Performed by: HOSPITALIST

## 2018-06-27 PROCEDURE — 11000001 HC ACUTE MED/SURG PRIVATE ROOM

## 2018-06-27 PROCEDURE — 25000003 PHARM REV CODE 250: Performed by: HOSPITALIST

## 2018-06-27 PROCEDURE — 36415 COLL VENOUS BLD VENIPUNCTURE: CPT

## 2018-06-27 PROCEDURE — 85025 COMPLETE CBC W/AUTO DIFF WBC: CPT

## 2018-06-27 PROCEDURE — 80048 BASIC METABOLIC PNL TOTAL CA: CPT

## 2018-06-27 PROCEDURE — 97530 THERAPEUTIC ACTIVITIES: CPT

## 2018-06-27 PROCEDURE — 84100 ASSAY OF PHOSPHORUS: CPT

## 2018-06-27 RX ADMIN — ATORVASTATIN CALCIUM 40 MG: 20 TABLET, FILM COATED ORAL at 08:06

## 2018-06-27 RX ADMIN — CIPROFLOXACIN HYDROCHLORIDE 500 MG: 500 TABLET, FILM COATED ORAL at 08:06

## 2018-06-27 RX ADMIN — Medication 1 CAPSULE: at 09:06

## 2018-06-27 RX ADMIN — Medication 125 MG: at 05:06

## 2018-06-27 RX ADMIN — QUETIAPINE FUMARATE 25 MG: 25 TABLET ORAL at 08:06

## 2018-06-27 RX ADMIN — DONEPEZIL HYDROCHLORIDE 10 MG: 10 TABLET ORAL at 08:06

## 2018-06-27 RX ADMIN — ENOXAPARIN SODIUM 30 MG: 100 INJECTION SUBCUTANEOUS at 05:06

## 2018-06-27 RX ADMIN — Medication 125 MG: at 06:06

## 2018-06-27 RX ADMIN — ASPIRIN 81 MG: 81 TABLET, COATED ORAL at 08:06

## 2018-06-27 RX ADMIN — LEVOTHYROXINE SODIUM 100 MCG: 100 TABLET ORAL at 06:06

## 2018-06-27 RX ADMIN — Medication 125 MG: at 12:06

## 2018-06-27 NOTE — PLAN OF CARE
locet completed and faxed to #[phone] for placement.  142 also completed and placed in right care.

## 2018-06-27 NOTE — PT/OT/SLP PROGRESS
Physical Therapy Treatment    Patient Name:  Arina Adame   MRN:  [id number]    Recommendations:     Discharge Recommendations:  nursing facility, skilled   Discharge Equipment Recommendations: none   Barriers to discharge: Decreased caregiver support    Assessment:     Arina Adame is a 89 y.o. female admitted with a medical diagnosis of Diarrhea of presumed infectious origin.  She presents with the following impairments/functional limitations:  weakness, impaired endurance, impaired self care skills, impaired functional mobilty, gait instability, impaired balance, impaired cognition, decreased lower extremity function, decreased safety awareness. Pt declined t/fs and gait activity on this date, but agreed to EOB seated therex. Pt tolerated exercises well, and will continue to benefit from PT services at this time. Continue with PT POC as indicated.     Rehab Prognosis:  fair; patient would benefit from acute skilled PT services to address these deficits and reach maximum level of function.      Recent Surgery: * No surgery found *      Plan:     During this hospitalization, patient to be seen 3 x/week to address the above listed problems via gait training, therapeutic activities, therapeutic exercises, neuromuscular re-education  · Plan of Care Expires:  07/25/18   Plan of Care Reviewed with: patient    Subjective     Communicated with nursing prior to session.  Patient found supine upon PT entry to room, agreeable to treatment.      Chief Complaint: none stated  Patient comments/goals: none stated  Pain/Comfort:  · Pain Rating 1: 0/10  · Pain Rating Post-Intervention 1: 0/10    Patients cultural, spiritual, Mandaen conflicts given the current situation: none     Objective:     Patient found with:  (all lines intact)     General Precautions: Standard, fall, contact   Orthopedic Precautions:N/A   Braces: N/A     Functional Mobility:  · Bed Mobility:  Scooting: moderate assistance  · Supine to Sit:  moderate assistance  · Sit to Supine: moderate assistance      AM-PAC 6 CLICK MOBILITY  Turning over in bed (including adjusting bedclothes, sheets and blankets)?: 2  Sitting down on and standing up from a chair with arms (e.g., wheelchair, bedside commode, etc.): 2  Moving from lying on back to sitting on the side of the bed?: 2  Moving to and from a bed to a chair (including a wheelchair)?: 2  Need to walk in hospital room?: 2  Climbing 3-5 steps with a railing?: 1  Basic Mobility Total Score: 11       Therapeutic Activities and Exercises:   -Pt sat EOB 15 min with CG-SBA for pt safety.    -Seated B LE therex x10 reps with assistance as needed: AP, LAQ, Hip Flexion, and Hip Adduction w/ pillow.    Patient left supine with all lines intact, call button in reach and bed alarm on..    GOALS:    Physical Therapy Goals        Problem: Physical Therapy Goal    Goal Priority Disciplines Outcome Goal Variances Interventions   Physical Therapy Goal     PT/OT, PT Ongoing (interventions implemented as appropriate)     Description:  Goals to be met by: 18    Patient will increase functional independence with mobility by performin. Supine to sit with MInimal Assistance  2. Sit to supine with MInimal Assistance  3. Rolling to Left and Right with Supervision.  4. Sit to stand transfer with Minimal Assistance  5. Bed to chair transfer with Minimal Assistance using Rolling Walker  6. Gait  x 10 feet with Moderate Assistance using Rolling Walker.   7. Lower extremity exercise program x10 reps per handout, with assistance as needed                      Time Tracking:     PT Received On: 18  PT Start Time: 1018     PT Stop Time: 1033  PT Total Time (min): 15 min     Billable Minutes: Therapeutic Activity 15    Treatment Type: Treatment  PT/PTA: PT, PTA     PTA Visit Number: 1     Nahomy Valentine PTA  2018

## 2018-06-27 NOTE — PROGRESS NOTES
[facility] Hospital Medicine  Progress Note    Patient Name: Arina Adame  MRN: [id number]  Patient Class: IP- Inpatient   Admission Date: 6/23/2018  Length of Stay: 4 days  Attending Physician: Theresa Avalos*  Primary Care Provider: Zeinab Meier MD    [facility] Medicine Team: [facility] [place] [facility] MED 3 Maulik Bustillo MD    Subjective:     Principal Problem:Diarrhea of presumed infectious origin    HPI:  88 y/o female with PMH of dementia, Recurrent UTIs (mostly pan sensitive klebsiella), remote Hx of colon CA s/p partial distal colectomy and reanastomosis, recent hx of C diff colitis (diagnosed on 5/26/18), presented to the ED from home on 6/23 for 1 day hx of non bloody diarrhea, with caretaker being concerned for recurrent C diff per smell of the stool. Patient developed watery diarrhea on the morning of 6/23, with subsequent several episodes of loose stools/diarrhea. Associated with 1 episode of non bloody vomitus, as well as chills. No abdominal pain or distention. Had mild dysuria on the AM of 6/23, however, daughter was concerned that it was related to frequent stools. Completed 5 day Rx course with Bactrim for UTI. UTis are usually associated with confusion and foul smell, which currently isn't a concern.     Patient was hospitalized at the end of 5/2017 for C diff colitis, underwent PO vancomycin therapy. Subsequent C diff testing was negative, however, stools never solidified. Patient's PCP had plans for outpatient GI evaluation for ongoing diarrhea.     Lives with daughter outside of [place]. Uses walker. Had home health prior to hospitalization.       Hospital Course:  Arina Adame is a 89 y.o. female with dementia and recent diagnosis of C. Diff on PO Vanc who presents to the ED with non-bloody diarrhea.  WBC trended down from 32 to 11.  C. Diff PCR returned positive.  Continue PO Vanc to complete a 14 day course.  Will need a prolonged course for 14 days given recurrence.   She also reports dysuria, so urine culture was ordered; growing Klebsiella oxytoca.  She was originally placed on Cipro given allergies to penicillin, which we changed to doxycycline today based on her previous urine culture a week ago growing Klebsiella.  She was originally going to be discharged today, but PT OT suggested SNF.    Interval History: NAEO. Awaiting placement.    Review of Systems   Constitutional: Negative for activity change, appetite change, chills, fatigue, fever and unexpected weight change.   HENT: Negative for congestion, postnasal drip, sinus pressure and sore throat.    Eyes: Negative for visual disturbance.   Respiratory: Negative for cough and wheezing.    Cardiovascular: Negative for chest pain, palpitations and leg swelling.   Gastrointestinal: Negative for abdominal distention, abdominal pain, anal bleeding, blood in stool, diarrhea (resolved), nausea and vomiting.   Genitourinary: Negative for decreased urine volume, difficulty urinating, dysuria and frequency.   Musculoskeletal: Negative for arthralgias and myalgias.   Neurological: Negative for dizziness, syncope, weakness and light-headedness.   Psychiatric/Behavioral: Negative for confusion, decreased concentration and dysphoric mood.     Objective:     Vital Signs (Most Recent):  Temp: 98.6 °F (37 °C) (06/27/18 1528)  Pulse: 106 (06/27/18 1528)  Resp: 18 (06/27/18 1528)  BP: 125/77 (06/27/18 1528)  SpO2: 98 % (06/27/18 1528) Vital Signs (24h Range):  Temp:  [97.3 °F (36.3 °C)-98.6 °F (37 °C)] 98.6 °F (37 °C)  Pulse:  [] 106  Resp:  [16-18] 18  SpO2:  [95 %-99 %] 98 %  BP: (108-151)/(64-77) 125/77     Weight: 46.5 kg (102 lb 8.2 oz)  Body mass index is 19.37 kg/m².    Intake/Output Summary (Last 24 hours) at 06/27/18 1710  Last data filed at 06/27/18 1300   Gross per 24 hour   Intake              480 ml   Output                0 ml   Net              480 ml      Physical Exam   Constitutional: She is oriented to person,  place, and time. She appears well-developed and well-nourished. No distress.   Elderly lady, resting comfortably in bed , no acute distress   HENT:   Head: Normocephalic and atraumatic.   Right Ear: External ear normal.   Left Ear: External ear normal.   Eyes: EOM are normal. Pupils are equal, round, and reactive to light. No scleral icterus.   Neck: Normal range of motion. Neck supple.   Cardiovascular: Normal rate, regular rhythm, normal heart sounds and intact distal pulses.    No murmur heard.  Pulmonary/Chest: Effort normal and breath sounds normal. No respiratory distress. She has no wheezes.   Abdominal: Soft. Bowel sounds are normal. She exhibits no distension and no mass. There is no tenderness. There is no guarding.   Musculoskeletal: Normal range of motion. She exhibits no edema, tenderness or deformity.   Neurological: She is alert and oriented to person, place, and time. No cranial nerve deficit.   Skin: Skin is warm and dry. No rash noted. She is not diaphoretic.   Psychiatric: She has a normal mood and affect.       Significant Labs:   CBC:   Recent Labs  Lab 06/26/18  0700 06/27/18  0345   WBC 9.12 8.55   HGB 12.5 11.2*   HCT 38.8 35.7*    272     CMP:   Recent Labs  Lab 06/26/18  0700 06/27/18  0345    141   K 4.2 3.8    108   CO2 27 24   GLU 87 114*   BUN 8 12   CREATININE 0.6 0.7   CALCIUM 9.1 8.8   ANIONGAP 6* 9   EGFRNONAA >60.0 >60.0       Significant Imaging: I have reviewed and interpreted all pertinent imaging results/findings within the past 24 hours.    Assessment/Plan:      * Diarrhea of presumed infectious origin    - recent hx of C diff colitis s/p PO vancomycin therapy, now presenting with worsening diarrhea, concerning for recurrence   - stool studies negative thus far  - C dfif positive  - PO vancomycin x 14 days as this is a second episode of C. Diff  - probiotic  - cautious IVF to keep up with fluid losses        Encephalopathy, toxic    -2/2 to infectious  process and cytokine mediated encephalopathy   -continue with IV antibiotics         Age-related physical debility    - PT OT : recommending SNF  - pt/family want [facility], if unable, then home health        Acute cystitis without hematuria    - hx of pan sensitive klebsiella, finished bactrim therapy. Now with leukocytosis with wbc of 32k and UA concerning for uti with dysuria per hx.   - started PO cipro on admit  - urine cx: GNR, lactose . Prior cultures Klebsiella sensitive to doxy  - 6/25 started doxycycline 100 mg BID x 5 days  -6/26 Kleb found to be resistant to Doxy, transitioned to Levaquin 500 BID x 5 day course           Hypokalemia    - likely 2/2 vomiting./ diarrhea  - PO replacement          Dementia    - cont home donepezil  - cont home quetiapine  - sertraline was stopped by PCP  - delirium precautions            Carotid arterial disease    - statin, ASA 81          Hypothyroidism    - cont home levothyroxine           VTE Risk Mitigation         Ordered     enoxaparin injection 30 mg  Daily      06/23/18 2252     IP VTE LOW RISK PATIENT  Once      06/23/18 2252              Maulik Bustillo MD  Department of Hospital Medicine   [facility]

## 2018-06-27 NOTE — PLAN OF CARE
Problem: Occupational Therapy Goal  Goal: Occupational Therapy Goal  Goals to be met by: 7/2   Patient will increase functional independence with ADLs by performing:    UE Dressing with Set-up Assistance while seated EOB.  LE Dressing with Supervision while seated EOB.  Grooming while standing at EOB with mod A for balance and set up assistance and verbal cues only for grooming task.  Toileting from bedside commode with Minimal Assistance for hygiene and clothing management.   Rolling to Bilateral with Modified [place].   Supine to sit with Modified [place], verbal cues only.  Stand pivot transfers with Moderate Assistance to various surfaces (BSC, chair).  Upper extremity exercise program x15 reps per handout, with assistance as needed for increased endurance to functional task.     Outcome: Ongoing (interventions implemented as appropriate)  Goals remain appropriate. Pt progressing well in POC. Priscila Odom OT 6/27/2018

## 2018-06-27 NOTE — PLAN OF CARE
Problem: Physical Therapy Goal  Goal: Physical Therapy Goal  Goals to be met by: 18    Patient will increase functional independence with mobility by performin. Supine to sit with MInimal Assistance  2. Sit to supine with MInimal Assistance  3. Rolling to Left and Right with Supervision.  4. Sit to stand transfer with Minimal Assistance  5. Bed to chair transfer with Minimal Assistance using Rolling Walker  6. Gait  x 10 feet with Moderate Assistance using Rolling Walker.   7. Lower extremity exercise program x10 reps per handout, with assistance as needed     Goals remain appropriate at time. Continue with PT POC as indicated.

## 2018-06-27 NOTE — SUBJECTIVE & OBJECTIVE
Interval History: NAEO. Awaiting placement.    Review of Systems   Constitutional: Negative for activity change, appetite change, chills, fatigue, fever and unexpected weight change.   HENT: Negative for congestion, postnasal drip, sinus pressure and sore throat.    Eyes: Negative for visual disturbance.   Respiratory: Negative for cough and wheezing.    Cardiovascular: Negative for chest pain, palpitations and leg swelling.   Gastrointestinal: Negative for abdominal distention, abdominal pain, anal bleeding, blood in stool, diarrhea (resolved), nausea and vomiting.   Genitourinary: Negative for decreased urine volume, difficulty urinating, dysuria and frequency.   Musculoskeletal: Negative for arthralgias and myalgias.   Neurological: Negative for dizziness, syncope, weakness and light-headedness.   Psychiatric/Behavioral: Negative for confusion, decreased concentration and dysphoric mood.     Objective:     Vital Signs (Most Recent):  Temp: 98.6 °F (37 °C) (06/27/18 1528)  Pulse: 106 (06/27/18 1528)  Resp: 18 (06/27/18 1528)  BP: 125/77 (06/27/18 1528)  SpO2: 98 % (06/27/18 1528) Vital Signs (24h Range):  Temp:  [97.3 °F (36.3 °C)-98.6 °F (37 °C)] 98.6 °F (37 °C)  Pulse:  [] 106  Resp:  [16-18] 18  SpO2:  [95 %-99 %] 98 %  BP: (108-151)/(64-77) 125/77     Weight: 46.5 kg (102 lb 8.2 oz)  Body mass index is 19.37 kg/m².    Intake/Output Summary (Last 24 hours) at 06/27/18 1710  Last data filed at 06/27/18 1300   Gross per 24 hour   Intake              480 ml   Output                0 ml   Net              480 ml      Physical Exam   Constitutional: She is oriented to person, place, and time. She appears well-developed and well-nourished. No distress.   Elderly lady, resting comfortably in bed , no acute distress   HENT:   Head: Normocephalic and atraumatic.   Right Ear: External ear normal.   Left Ear: External ear normal.   Eyes: EOM are normal. Pupils are equal, round, and reactive to light. No scleral  icterus.   Neck: Normal range of motion. Neck supple.   Cardiovascular: Normal rate, regular rhythm, normal heart sounds and intact distal pulses.    No murmur heard.  Pulmonary/Chest: Effort normal and breath sounds normal. No respiratory distress. She has no wheezes.   Abdominal: Soft. Bowel sounds are normal. She exhibits no distension and no mass. There is no tenderness. There is no guarding.   Musculoskeletal: Normal range of motion. She exhibits no edema, tenderness or deformity.   Neurological: She is alert and oriented to person, place, and time. No cranial nerve deficit.   Skin: Skin is warm and dry. No rash noted. She is not diaphoretic.   Psychiatric: She has a normal mood and affect.       Significant Labs:   CBC:   Recent Labs  Lab 06/26/18  0700 06/27/18  0345   WBC 9.12 8.55   HGB 12.5 11.2*   HCT 38.8 35.7*    272     CMP:   Recent Labs  Lab 06/26/18  0700 06/27/18  0345    141   K 4.2 3.8    108   CO2 27 24   GLU 87 114*   BUN 8 12   CREATININE 0.6 0.7   CALCIUM 9.1 8.8   ANIONGAP 6* 9   EGFRNONAA >60.0 >60.0       Significant Imaging: I have reviewed and interpreted all pertinent imaging results/findings within the past 24 hours.

## 2018-06-27 NOTE — PT/OT/SLP PROGRESS
Occupational Therapy   Treatment    Name: Arina Adame  MRN: [id number]  Admitting Diagnosis:  Diarrhea of presumed infectious origin       Recommendations:     Discharge Recommendations: nursing facility, skilled  Discharge Equipment Recommendations:  none  Barriers to discharge:   (level of skilled assistance required )    Subjective     Communicated with: RN prior to session. No family present for session.   Pain/Comfort:  · Pain Rating 1: 0/10    Patients cultural, spiritual, Sabianist conflicts given the current situation: none reported     Objective:     Patient found with: telemetry, peripheral IV    General Precautions: Standard, fall, contact (dementia)   Orthopedic Precautions:N/A   Braces: N/A     Occupational Performance:    Bed Mobility:    · Patient completed Rolling/Turning to Right with contact guard assistance, with side rail and HOB flat   · Patient completed Scooting/Bridging with minimum assistance and HOB flat, in backward plane while sitting and to bridge/scoot to HOB while lying supine, cues and assist at B hips  · Min A to scoot to L laterally x1 trial and to R laterally x2 trials  · CGA to scoot in forward plane to EOB  · Patient completed Supine to Sit with minimum assistance, with side rail and HOB flat   · Patient completed Sit to Supine with minimum assistance and HOB flat      Functional Mobility/Transfers:  · Patient completed Sit <> Stand Transfer with maximal assistance  with  no assistive device  from EOB x2 trials, unable to stand fully upright on both trials due to posterior lean/extensor tone  · Functional Mobility: Not appropriate this date due to significant assist needed for stand    Activities of Daily Living:  · Feeding:  supervision to finish eating lunch after setup (meat cut up for pt)--able to use utensils, but requires verbal cues for sequencing  · Grooming: minimum assistance after set up for oral hygiene while seated EOB--cues for sequencing and appropriate use of  tools (attempted to use mouth wash as toothpaste)--also perseverated on task and required cues to terminate one step in order to initiate the next   · SBA after setup to wash face with washcloth while seated EOB, cues for sequencing  · LB Dressing: contact guard assistance to don socks while seated EOB utilizing 4 point position  · Maximum assistance to don brief while seated EOB    Patient left with bed in chair position with all lines intact and call button in reach    [facility] 6 Click:  [facility] Total Score: 17    Treatment & Education:  -edu for ot role and poc  -edu for progress made thus far  -white board updated   -no family present for education  -questions and concerns addressed within ot scope of practice  -daily orientation provided and blinds opened due to delirium precautions  Education:    Assessment:     Arina Adame is a 89 y.o. female with a medical diagnosis of Diarrhea of presumed infectious origin.  She presents with decreased functional independence in various areas of her life. She demo decreased endurance to functional task. She demo increased ability to participate in ADL tasks this date, but requires significant cueing for sequencing. She demo increased ability with standing, but continues to require significant assist which is far from her baseline. She demo good motivation and willingness to participate, pleasantly confused most of session. Pt would continue to benefit from skilled OT services for maximum functional outcome and safety. Performance deficits affecting function are weakness, impaired endurance, impaired functional mobilty, gait instability, impaired self care skills, impaired balance, impaired cognition, decreased lower extremity function, decreased upper extremity function, decreased coordination, decreased safety awareness, impaired coordination, impaired cardiopulmonary response to activity.      Rehab Prognosis:  good; patient would benefit from acute skilled OT services  to address these deficits and reach maximum level of function.       Plan:     Patient to be seen 3 x/week to address the above listed problems via self-care/home management, therapeutic activities, therapeutic exercises  · Plan of Care Expires: 07/23/18  · Plan of Care Reviewed with: patient    This Plan of care has been discussed with the patient who was involved in its development and understands and is in agreement with the identified goals and treatment plan    GOALS:    Occupational Therapy Goals        Problem: Occupational Therapy Goal    Goal Priority Disciplines Outcome Interventions   Occupational Therapy Goal     OT, PT/OT Ongoing (interventions implemented as appropriate)    Description:  Goals to be met by: 7/2   Patient will increase functional independence with ADLs by performing:    UE Dressing with Set-up Assistance while seated EOB.  LE Dressing with Supervision while seated EOB.  Grooming while standing at EOB with mod A for balance and set up assistance and verbal cues only for grooming task.  Toileting from bedside commode with Minimal Assistance for hygiene and clothing management.   Rolling to Bilateral with Modified Levy.   Supine to sit with Modified Levy, verbal cues only.  Stand pivot transfers with Moderate Assistance to various surfaces (BSC, chair).  Upper extremity exercise program x15 reps per handout, with assistance as needed for increased endurance to functional task.                      Time Tracking:     OT Date of Treatment: 06/27/18  OT Start Time: 1421  OT Stop Time: 1459  OT Total Time (min): 38 min    Billable Minutes:Self Care/Home Management 28  Therapeutic Activity 10    Priscila Odom OT  6/27/2018

## 2018-06-27 NOTE — PLAN OF CARE
KAJAL spoke with Nisha at [facility]; they need 48 hrs of formed stools before acceptance. Will follow.

## 2018-06-28 LAB
ANION GAP SERPL CALC-SCNC: 7 MMOL/L
BACTERIA BLD CULT: NORMAL
BACTERIA BLD CULT: NORMAL
BASOPHILS # BLD AUTO: 0.08 K/UL
BASOPHILS NFR BLD: 0.8 %
BUN SERPL-MCNC: 13 MG/DL
CALCIUM SERPL-MCNC: 8.9 MG/DL
CHLORIDE SERPL-SCNC: 108 MMOL/L
CO2 SERPL-SCNC: 27 MMOL/L
CREAT SERPL-MCNC: 0.7 MG/DL
DIFFERENTIAL METHOD: ABNORMAL
EOSINOPHIL # BLD AUTO: 0.3 K/UL
EOSINOPHIL NFR BLD: 3 %
ERYTHROCYTE [DISTWIDTH] IN BLOOD BY AUTOMATED COUNT: 14.9 %
EST. GFR  (AFRICAN AMERICAN): >60 ML/MIN/1.73 M^2
EST. GFR  (NON AFRICAN AMERICAN): >60 ML/MIN/1.73 M^2
GLUCOSE SERPL-MCNC: 101 MG/DL
HCT VFR BLD AUTO: 36.1 %
HGB BLD-MCNC: 11.3 G/DL
IMM GRANULOCYTES # BLD AUTO: 0.37 K/UL
IMM GRANULOCYTES NFR BLD AUTO: 3.9 %
LYMPHOCYTES # BLD AUTO: 2.1 K/UL
LYMPHOCYTES NFR BLD: 22.2 %
MCH RBC QN AUTO: 30.4 PG
MCHC RBC AUTO-ENTMCNC: 31.3 G/DL
MCV RBC AUTO: 97 FL
MONOCYTES # BLD AUTO: 1.1 K/UL
MONOCYTES NFR BLD: 11.1 %
NEUTROPHILS # BLD AUTO: 5.6 K/UL
NEUTROPHILS NFR BLD: 59 %
NRBC BLD-RTO: 0 /100 WBC
PHOSPHATE SERPL-MCNC: 3.7 MG/DL
PLATELET # BLD AUTO: 278 K/UL
PMV BLD AUTO: 10.2 FL
POTASSIUM SERPL-SCNC: 3.8 MMOL/L
RBC # BLD AUTO: 3.72 M/UL
SODIUM SERPL-SCNC: 142 MMOL/L
TB INDURATION 48 - 72 HR READ: 0 MM
WBC # BLD AUTO: 9.52 K/UL

## 2018-06-28 PROCEDURE — 80048 BASIC METABOLIC PNL TOTAL CA: CPT

## 2018-06-28 PROCEDURE — 25000003 PHARM REV CODE 250: Performed by: HOSPITALIST

## 2018-06-28 PROCEDURE — 84100 ASSAY OF PHOSPHORUS: CPT

## 2018-06-28 PROCEDURE — 25000003 PHARM REV CODE 250: Performed by: STUDENT IN AN ORGANIZED HEALTH CARE EDUCATION/TRAINING PROGRAM

## 2018-06-28 PROCEDURE — 36415 COLL VENOUS BLD VENIPUNCTURE: CPT

## 2018-06-28 PROCEDURE — 85025 COMPLETE CBC W/AUTO DIFF WBC: CPT

## 2018-06-28 PROCEDURE — 99231 SBSQ HOSP IP/OBS SF/LOW 25: CPT | Mod: GC,,, | Performed by: HOSPITALIST

## 2018-06-28 PROCEDURE — 63600175 PHARM REV CODE 636 W HCPCS: Performed by: HOSPITALIST

## 2018-06-28 PROCEDURE — 11000001 HC ACUTE MED/SURG PRIVATE ROOM

## 2018-06-28 PROCEDURE — 97116 GAIT TRAINING THERAPY: CPT

## 2018-06-28 PROCEDURE — 97530 THERAPEUTIC ACTIVITIES: CPT

## 2018-06-28 RX ADMIN — Medication 125 MG: at 05:06

## 2018-06-28 RX ADMIN — DONEPEZIL HYDROCHLORIDE 10 MG: 10 TABLET ORAL at 09:06

## 2018-06-28 RX ADMIN — Medication 1 CAPSULE: at 09:06

## 2018-06-28 RX ADMIN — CIPROFLOXACIN HYDROCHLORIDE 500 MG: 500 TABLET, FILM COATED ORAL at 09:06

## 2018-06-28 RX ADMIN — ENOXAPARIN SODIUM 30 MG: 100 INJECTION SUBCUTANEOUS at 04:06

## 2018-06-28 RX ADMIN — Medication 125 MG: at 06:06

## 2018-06-28 RX ADMIN — ASPIRIN 81 MG: 81 TABLET, COATED ORAL at 09:06

## 2018-06-28 RX ADMIN — LEVOTHYROXINE SODIUM 100 MCG: 100 TABLET ORAL at 06:06

## 2018-06-28 RX ADMIN — ATORVASTATIN CALCIUM 40 MG: 20 TABLET, FILM COATED ORAL at 09:06

## 2018-06-28 RX ADMIN — Medication 125 MG: at 12:06

## 2018-06-28 RX ADMIN — Medication 125 MG: at 11:06

## 2018-06-28 RX ADMIN — QUETIAPINE FUMARATE 25 MG: 25 TABLET ORAL at 09:06

## 2018-06-28 NOTE — PROGRESS NOTES
[facility] Hospital Medicine  Progress Note    Patient Name: Arina Adame  MRN: [id number]  Patient Class: IP- Inpatient   Admission Date: 6/23/2018  Length of Stay: 5 days  Attending Physician: Theresa Avalos*  Primary Care Provider: Zeinab Meier MD    [facility] Medicine Team: [facility] MED 3 Maulik Bustillo MD    Subjective:     Principal Problem:Diarrhea of presumed infectious origin    HPI:  89 y/o female with PMH of dementia, Recurrent UTIs (mostly pan sensitive klebsiella), remote Hx of colon CA s/p partial distal colectomy and reanastomosis, recent hx of C diff colitis (diagnosed on 5/26/18), presented to the ED from home on 6/23 for 1 day hx of non bloody diarrhea, with caretaker being concerned for recurrent C diff per smell of the stool. Patient developed watery diarrhea on the morning of 6/23, with subsequent several episodes of loose stools/diarrhea. Associated with 1 episode of non bloody vomitus, as well as chills. No abdominal pain or distention. Had mild dysuria on the AM of 6/23, however, daughter was concerned that it was related to frequent stools. Completed 5 day Rx course with Bactrim for UTI. UTis are usually associated with confusion and foul smell, which currently isn't a concern.     Patient was hospitalized at the end of 5/2017 for C diff colitis, underwent PO vancomycin therapy. Subsequent C diff testing was negative, however, stools never solidified. Patient's PCP had plans for outpatient GI evaluation for ongoing diarrhea.     Lives with daughter outside of [place]. Uses walker. Had home health prior to hospitalization.       Hospital Course:  Arina Adame is a 89 y.o. female with dementia and recent diagnosis of C. Diff on PO Vanc who presents to the ED with non-bloody diarrhea.  WBC trended down from 32 to 11.  C. Diff PCR returned positive.  Continue PO Vanc to complete a 14 day course.  Will need a prolonged course for 14 days given recurrence.   She also reports dysuria, so urine culture was ordered; growing Klebsiella oxytoca.  She was originally placed on Cipro given allergies to penicillin, which we changed to doxycycline today based on her previous urine culture a week ago growing Klebsiella.  She was originally going to be discharged today, but PT OT suggested SNF.    Interval History: No stools overnight. Resting comfortably in no acute distress. Awaiting placement.    Review of Systems   Constitutional: Negative for activity change, appetite change, chills, fatigue, fever and unexpected weight change.   HENT: Negative for congestion, postnasal drip, sinus pressure and sore throat.    Eyes: Negative for visual disturbance.   Respiratory: Negative for cough and wheezing.    Cardiovascular: Negative for chest pain, palpitations and leg swelling.   Gastrointestinal: Negative for abdominal distention, abdominal pain, anal bleeding, blood in stool, diarrhea (resolved), nausea and vomiting.   Genitourinary: Negative for decreased urine volume, difficulty urinating, dysuria and frequency.   Musculoskeletal: Negative for arthralgias and myalgias.   Neurological: Negative for dizziness, syncope, weakness and light-headedness.   Psychiatric/Behavioral: Negative for confusion, decreased concentration and dysphoric mood.     Objective:     Vital Signs (Most Recent):  Temp: 97.8 °F (36.6 °C) (06/28/18 0755)  Pulse: 74 (06/28/18 0755)  Resp: 18 (06/28/18 0755)  BP: (!) 175/92 (MD NOTIFIED. VERBALIZED UNDERSTANDING. AWAITING ORDERS. ) (06/28/18 0903)  SpO2: 96 % (06/28/18 0755) Vital Signs (24h Range):  Temp:  [97.3 °F (36.3 °C)-98.6 °F (37 °C)] 97.8 °F (36.6 °C)  Pulse:  [] 74  Resp:  [16-18] 18  SpO2:  [94 %-98 %] 96 %  BP: (112-175)/(63-92) 175/92     Weight: 46.5 kg (102 lb 8.2 oz)  Body mass index is 19.37 kg/m².    Intake/Output Summary (Last 24 hours) at 06/28/18 0951  Last data filed at 06/27/18 1300   Gross per 24 hour   Intake              240 ml    Output                0 ml   Net              240 ml      Physical Exam   Constitutional: She is oriented to person, place, and time. She appears well-developed and well-nourished. No distress.   Elderly lady, resting comfortably in bed , no acute distress   HENT:   Head: Normocephalic and atraumatic.   Right Ear: External ear normal.   Left Ear: External ear normal.   Eyes: EOM are normal. Pupils are equal, round, and reactive to light. No scleral icterus.   Neck: Normal range of motion. Neck supple.   Cardiovascular: Normal rate, regular rhythm, normal heart sounds and intact distal pulses.    No murmur heard.  Pulmonary/Chest: Effort normal and breath sounds normal. No respiratory distress. She has no wheezes.   Abdominal: Soft. Bowel sounds are normal. She exhibits no distension and no mass. There is no tenderness. There is no guarding.   Musculoskeletal: Normal range of motion. She exhibits no edema, tenderness or deformity.   Neurological: She is alert and oriented to person, place, and time. No cranial nerve deficit.   Skin: Skin is warm and dry. No rash noted. She is not diaphoretic.   Psychiatric: She has a normal mood and affect.       Significant Labs:   CBC:   Recent Labs  Lab 06/27/18  0345 06/28/18  0348   WBC 8.55 9.52   HGB 11.2* 11.3*   HCT 35.7* 36.1*    278     CMP:   Recent Labs  Lab 06/27/18  0345 06/28/18  0348    142   K 3.8 3.8    108   CO2 24 27   * 101   BUN 12 13   CREATININE 0.7 0.7   CALCIUM 8.8 8.9   ANIONGAP 9 7*   EGFRNONAA >60.0 >60.0       Significant Imaging: I have reviewed and interpreted all pertinent imaging results/findings within the past 24 hours.    Assessment/Plan:      * Diarrhea of presumed infectious origin    - recent hx of C diff colitis s/p PO vancomycin therapy, now presenting with worsening diarrhea, concerning for recurrence   - stool studies negative thus far  - C dfif positive  - PO vancomycin x 14 days as this is a second episode of  C. Diff  - probiotic  - cautious IVF to keep up with fluid losses        Encephalopathy, toxic    -2/2 to infectious process and cytokine mediated encephalopathy   -continue with IV antibiotics         Age-related physical debility    - PT OT : recommending SNF  - pt/family want [facility], if unable, then home health        Acute cystitis without hematuria    - hx of pan sensitive klebsiella, finished bactrim therapy. Now with leukocytosis with wbc of 32k and UA concerning for uti with dysuria per hx.   - started PO cipro on admit  - urine cx: GNR, lactose . Prior cultures Klebsiella sensitive to doxy  - 6/25 started doxycycline 100 mg BID x 5 days  -6/26 Kleb found to be resistant to Doxy, transitioned to Levaquin 500 BID x 5 day course           Hypokalemia    - likely 2/2 vomiting./ diarrhea  - PO replacement          Dementia    - cont home donepezil  - cont home quetiapine  - sertraline was stopped by PCP  - delirium precautions            Carotid arterial disease    - statin, ASA 81          Hypothyroidism    - cont home levothyroxine           VTE Risk Mitigation         Ordered     enoxaparin injection 30 mg  Daily      06/23/18 2252     IP VTE LOW RISK PATIENT  Once      06/23/18 2252              Maulik Bustillo MD  Department of Hospital Medicine   [facility]

## 2018-06-28 NOTE — PT/OT/SLP PROGRESS
Physical Therapy Treatment    Patient Name:  Arina Adame   MRN:  [id number]    Recommendations:     Discharge Recommendations:  nursing facility, skilled   Discharge Equipment Recommendations: none   Barriers to discharge: Decreased caregiver support    Assessment:     Arina Adame is a 89 y.o. female admitted with a medical diagnosis of Diarrhea of presumed infectious origin.  She presents with the following impairments/functional limitations:  weakness, impaired endurance, impaired functional mobilty, impaired self care skills, impaired balance, impaired cognition, decreased lower extremity function, decreased upper extremity function, decreased coordination, decreased safety awareness, impaired coordination, impaired cardiopulmonary response to activity.  Pt tolerated treatment well, and will continue to benefit from PT services at this time. Continue with PT POC as indicated.     Rehab Prognosis:  fair; patient would benefit from acute skilled PT services to address these deficits and reach maximum level of function.      Recent Surgery: * No surgery found *      Plan:     During this hospitalization, patient to be seen 3 x/week to address the above listed problems via therapeutic exercises, therapeutic activities, gait training, neuromuscular re-education  · Plan of Care Expires:  07/25/18   Plan of Care Reviewed with: patient    Subjective     Communicated with nursing prior to session.  Patient found supine upon PT entry to room, agreeable to treatment.      Chief Complaint: none stated  Patient comments/goals: none stated  Pain/Comfort:  · Pain Rating 1: 0/10  · Pain Rating Post-Intervention 1: 0/10    Patients cultural, spiritual, Orthodoxy conflicts given the current situation: none stated    Objective:     Patient found with:  (lines intact)     General Precautions: Standard, fall, contact (dementia)   Orthopedic Precautions:N/A   Braces: N/A     Functional Mobility:  · Bed Mobility:  Scooting:  minimum assistance  · Supine to Sit: minimum assistance  · Sit to Supine: minimum assistance  · Transfers:  Sit to Stand:  minimum assistance with rolling walker  · Gait: Pt took 4 steps forward/backward with RW and Min-Mod A; Pt took 4 sidesteps L<>R with RW and Min-Mod A.      AM-PAC 6 CLICK MOBILITY  Turning over in bed (including adjusting bedclothes, sheets and blankets)?: 3  Sitting down on and standing up from a chair with arms (e.g., wheelchair, bedside commode, etc.): 3  Moving from lying on back to sitting on the side of the bed?: 3  Moving to and from a bed to a chair (including a wheelchair)?: 2  Need to walk in hospital room?: 2  Climbing 3-5 steps with a railing?: 1  Basic Mobility Total Score: 14       Therapeutic Activities and Exercises:   -B LE therex x10 reps: AP, LAQ, Hip Flexion, and Hip adduction w/ pillow.    Patient left supine with all lines intact, call button in reach and bed alarm on..    GOALS:    Physical Therapy Goals        Problem: Physical Therapy Goal    Goal Priority Disciplines Outcome Goal Variances Interventions   Physical Therapy Goal     PT/OT, PT Ongoing (interventions implemented as appropriate)     Description:  Goals to be met by: 18    Patient will increase functional independence with mobility by performin. Supine to sit with MInimal Assistance. Met  2. Sit to supine with MInimal Assistance. Met  3. Rolling to Left and Right with Supervision.  4. Sit to stand transfer with Minimal Assistance. Met  5. Bed to chair transfer with Minimal Assistance using Rolling Walker  6. Gait  x 10 feet with Moderate Assistance using Rolling Walker.   7. Lower extremity exercise program x10 reps per handout, with assistance as needed                       Time Tracking:     PT Received On: 18  PT Start Time: 1011     PT Stop Time: 1035  PT Total Time (min): 24 min     Billable Minutes: Gait Training 8 and Therapeutic Activity 16    Treatment Type: Treatment  PT/PTA: PTA      PTA Visit Number: 2     Nahomy Valentine, PTA  06/28/2018

## 2018-06-28 NOTE — SUBJECTIVE & OBJECTIVE
Interval History: No stools overnight. Resting comfortably in no acute distress. Awaiting placement.    Review of Systems   Constitutional: Negative for activity change, appetite change, chills, fatigue, fever and unexpected weight change.   HENT: Negative for congestion, postnasal drip, sinus pressure and sore throat.    Eyes: Negative for visual disturbance.   Respiratory: Negative for cough and wheezing.    Cardiovascular: Negative for chest pain, palpitations and leg swelling.   Gastrointestinal: Negative for abdominal distention, abdominal pain, anal bleeding, blood in stool, diarrhea (resolved), nausea and vomiting.   Genitourinary: Negative for decreased urine volume, difficulty urinating, dysuria and frequency.   Musculoskeletal: Negative for arthralgias and myalgias.   Neurological: Negative for dizziness, syncope, weakness and light-headedness.   Psychiatric/Behavioral: Negative for confusion, decreased concentration and dysphoric mood.     Objective:     Vital Signs (Most Recent):  Temp: 97.8 °F (36.6 °C) (06/28/18 0755)  Pulse: 74 (06/28/18 0755)  Resp: 18 (06/28/18 0755)  BP: (!) 175/92 (MD NOTIFIED. VERBALIZED UNDERSTANDING. AWAITING ORDERS. ) (06/28/18 0903)  SpO2: 96 % (06/28/18 0755) Vital Signs (24h Range):  Temp:  [97.3 °F (36.3 °C)-98.6 °F (37 °C)] 97.8 °F (36.6 °C)  Pulse:  [] 74  Resp:  [16-18] 18  SpO2:  [94 %-98 %] 96 %  BP: (112-175)/(63-92) 175/92     Weight: 46.5 kg (102 lb 8.2 oz)  Body mass index is 19.37 kg/m².    Intake/Output Summary (Last 24 hours) at 06/28/18 0951  Last data filed at 06/27/18 1300   Gross per 24 hour   Intake              240 ml   Output                0 ml   Net              240 ml      Physical Exam   Constitutional: She is oriented to person, place, and time. She appears well-developed and well-nourished. No distress.   Elderly lady, resting comfortably in bed , no acute distress   HENT:   Head: Normocephalic and atraumatic.   Right Ear: External ear  normal.   Left Ear: External ear normal.   Eyes: EOM are normal. Pupils are equal, round, and reactive to light. No scleral icterus.   Neck: Normal range of motion. Neck supple.   Cardiovascular: Normal rate, regular rhythm, normal heart sounds and intact distal pulses.    No murmur heard.  Pulmonary/Chest: Effort normal and breath sounds normal. No respiratory distress. She has no wheezes.   Abdominal: Soft. Bowel sounds are normal. She exhibits no distension and no mass. There is no tenderness. There is no guarding.   Musculoskeletal: Normal range of motion. She exhibits no edema, tenderness or deformity.   Neurological: She is alert and oriented to person, place, and time. No cranial nerve deficit.   Skin: Skin is warm and dry. No rash noted. She is not diaphoretic.   Psychiatric: She has a normal mood and affect.       Significant Labs:   CBC:   Recent Labs  Lab 06/27/18  0345 06/28/18  0348   WBC 8.55 9.52   HGB 11.2* 11.3*   HCT 35.7* 36.1*    278     CMP:   Recent Labs  Lab 06/27/18  0345 06/28/18  0348    142   K 3.8 3.8    108   CO2 24 27   * 101   BUN 12 13   CREATININE 0.7 0.7   CALCIUM 8.8 8.9   ANIONGAP 9 7*   EGFRNONAA >60.0 >60.0       Significant Imaging: I have reviewed and interpreted all pertinent imaging results/findings within the past 24 hours.

## 2018-06-28 NOTE — PLAN OF CARE
Problem: Physical Therapy Goal  Goal: Physical Therapy Goal  Goals to be met by: 18    Patient will increase functional independence with mobility by performin. Supine to sit with MInimal Assistance. Met  2. Sit to supine with MInimal Assistance. Met  3. Rolling to Left and Right with Supervision.  4. Sit to stand transfer with Minimal Assistance. Met  5. Bed to chair transfer with Minimal Assistance using Rolling Walker  6. Gait  x 10 feet with Moderate Assistance using Rolling Walker.   7. Lower extremity exercise program x10 reps per handout, with assistance as needed     Goals remain appropriate at time. Continue with PT POC as indicated.

## 2018-06-29 VITALS
BODY MASS INDEX: 19.35 KG/M2 | SYSTOLIC BLOOD PRESSURE: 174 MMHG | RESPIRATION RATE: 18 BRPM | TEMPERATURE: 98 F | HEIGHT: 61 IN | DIASTOLIC BLOOD PRESSURE: 89 MMHG | WEIGHT: 102.5 LBS | HEART RATE: 91 BPM | OXYGEN SATURATION: 97 %

## 2018-06-29 LAB
ANION GAP SERPL CALC-SCNC: 9 MMOL/L
BASOPHILS # BLD AUTO: 0.04 K/UL
BASOPHILS NFR BLD: 0.3 %
BUN SERPL-MCNC: 12 MG/DL
CALCIUM SERPL-MCNC: 9.3 MG/DL
CHLORIDE SERPL-SCNC: 106 MMOL/L
CO2 SERPL-SCNC: 26 MMOL/L
CREAT SERPL-MCNC: 0.8 MG/DL
DIFFERENTIAL METHOD: ABNORMAL
EOSINOPHIL # BLD AUTO: 0.4 K/UL
EOSINOPHIL NFR BLD: 3.5 %
ERYTHROCYTE [DISTWIDTH] IN BLOOD BY AUTOMATED COUNT: 15.1 %
EST. GFR  (AFRICAN AMERICAN): >60 ML/MIN/1.73 M^2
EST. GFR  (NON AFRICAN AMERICAN): >60 ML/MIN/1.73 M^2
GIANT PLATELETS BLD QL SMEAR: PRESENT
GLUCOSE SERPL-MCNC: 96 MG/DL
HCT VFR BLD AUTO: 37.9 %
HGB BLD-MCNC: 11.6 G/DL
IMM GRANULOCYTES # BLD AUTO: 0.52 K/UL
IMM GRANULOCYTES NFR BLD AUTO: 4.3 %
LYMPHOCYTES # BLD AUTO: 2.3 K/UL
LYMPHOCYTES NFR BLD: 18.9 %
MCH RBC QN AUTO: 30.3 PG
MCHC RBC AUTO-ENTMCNC: 30.6 G/DL
MCV RBC AUTO: 99 FL
MONOCYTES # BLD AUTO: 1.3 K/UL
MONOCYTES NFR BLD: 10.7 %
NEUTROPHILS # BLD AUTO: 7.5 K/UL
NEUTROPHILS NFR BLD: 62.3 %
NRBC BLD-RTO: 0 /100 WBC
PHOSPHATE SERPL-MCNC: 3.6 MG/DL
PLATELET # BLD AUTO: 291 K/UL
PLATELET BLD QL SMEAR: ABNORMAL
PMV BLD AUTO: 10.1 FL
POTASSIUM SERPL-SCNC: 3.8 MMOL/L
RBC # BLD AUTO: 3.83 M/UL
SODIUM SERPL-SCNC: 141 MMOL/L
WBC # BLD AUTO: 11.99 K/UL

## 2018-06-29 PROCEDURE — 25000003 PHARM REV CODE 250: Performed by: STUDENT IN AN ORGANIZED HEALTH CARE EDUCATION/TRAINING PROGRAM

## 2018-06-29 PROCEDURE — 99239 HOSP IP/OBS DSCHRG MGMT >30: CPT | Mod: GC,,, | Performed by: HOSPITALIST

## 2018-06-29 PROCEDURE — 36415 COLL VENOUS BLD VENIPUNCTURE: CPT

## 2018-06-29 PROCEDURE — 85025 COMPLETE CBC W/AUTO DIFF WBC: CPT

## 2018-06-29 PROCEDURE — 80048 BASIC METABOLIC PNL TOTAL CA: CPT

## 2018-06-29 PROCEDURE — 25000003 PHARM REV CODE 250: Performed by: HOSPITALIST

## 2018-06-29 PROCEDURE — 84100 ASSAY OF PHOSPHORUS: CPT

## 2018-06-29 PROCEDURE — 97535 SELF CARE MNGMENT TRAINING: CPT

## 2018-06-29 RX ORDER — LEVOFLOXACIN 500 MG/1
500 TABLET, FILM COATED ORAL DAILY
Qty: 1 TABLET | Refills: 0 | Status: SHIPPED | OUTPATIENT
Start: 2018-06-29 | End: 2018-06-30

## 2018-06-29 RX ADMIN — Medication 1 CAPSULE: at 09:06

## 2018-06-29 RX ADMIN — ATORVASTATIN CALCIUM 40 MG: 20 TABLET, FILM COATED ORAL at 09:06

## 2018-06-29 RX ADMIN — LEVOTHYROXINE SODIUM 100 MCG: 100 TABLET ORAL at 05:06

## 2018-06-29 RX ADMIN — Medication 125 MG: at 12:06

## 2018-06-29 RX ADMIN — Medication 125 MG: at 11:06

## 2018-06-29 RX ADMIN — CIPROFLOXACIN HYDROCHLORIDE 500 MG: 500 TABLET, FILM COATED ORAL at 09:06

## 2018-06-29 RX ADMIN — ASPIRIN 81 MG: 81 TABLET, COATED ORAL at 09:06

## 2018-06-29 RX ADMIN — Medication 125 MG: at 05:06

## 2018-06-29 NOTE — PLAN OF CARE
Problem: Occupational Therapy Goal  Goal: Occupational Therapy Goal  Goals to be met by: 7/2   Patient will increase functional independence with ADLs by performing:    UE Dressing with Set-up Assistance while seated EOB.  LE Dressing with Supervision while seated EOB.  Grooming while standing at EOB with mod A for balance and set up assistance and verbal cues only for grooming task.  Toileting from bedside commode with Minimal Assistance for hygiene and clothing management.   Rolling to Bilateral with Modified [place].   Supine to sit with Modified [place], verbal cues only.  Stand pivot transfers with Moderate Assistance to various surfaces (BSC, chair).  Upper extremity exercise program x15 reps per handout, with assistance as needed for increased endurance to functional task.     Outcome: Ongoing (interventions implemented as appropriate)  Goals addressed and unmet.  Cont with POC  Ruth Pina OT  6/29/2018

## 2018-06-29 NOTE — PLAN OF CARE
Patient's daughter has decided to take her mother home with home health as the nursing homes do not use gait belts with therapy and daughter feels that her mother needs one. Patient is current with [facility]. Veronique with [facility] notified of pending discharge.

## 2018-06-29 NOTE — PT/OT/SLP PROGRESS
Occupational Therapy   Treatment    Name: Arina Adame  MRN: [id number]  Admitting Diagnosis:  Diarrhea of presumed infectious origin       Recommendations:     Discharge Recommendations: nursing facility, skilled  Discharge Equipment Recommendations:  none  Barriers to discharge:  None    Subjective     Communicated with: RN prior to session.  Pain/Comfort:  · Pain Rating 1: 0/10    Patients cultural, spiritual, Adventist conflicts given the current situation: none stated     Objective:     Patient found with:  (all lines intact )    General Precautions: Standard, fall, contact   Orthopedic Precautions:N/A   Braces: N/A     Occupational Performance:    Bed Mobility:    · Pt min A with bed mobility      Activities of Daily Living:  · Grooming: contact guard assistance  set up  · UB Dressing: minimum assistance      Patient left supine with all lines intact    AMPA 6 Click:  [facility] Total Score: 17    Treatment & Education:  Pt educated on safety, role of OT, importance of increased participation in self care for gains , expectations for participation, expectations for gains, POC, energy conservation, caregiver strain. White board updated.   - upper extremity dressing with Min A sitting edge of bed  - bed mobility Min A     Education:    Assessment:     Arina Adame is a 89 y.o. female with a medical diagnosis of Diarrhea of presumed infectious origin.  She presents prepared for DC to SNF.  Pt willing to participate and with improving functional performance and with good potential for continued gains.  Performance deficits affecting function are weakness, impaired endurance, impaired self care skills, impaired balance, impaired functional mobilty, impaired cardiopulmonary response to activity, decreased lower extremity function, decreased upper extremity function.      Rehab Prognosis:  good; patient would benefit from acute skilled OT services to address these deficits and reach maximum level of function.        Plan:     Patient to be seen 3 x/week to address the above listed problems via self-care/home management, therapeutic activities, therapeutic exercises  · Plan of Care Expires: 07/23/18  · Plan of Care Reviewed with: patient    This Plan of care has been discussed with the patient who was involved in its development and understands and is in agreement with the identified goals and treatment plan    GOALS:    Occupational Therapy Goals        Problem: Occupational Therapy Goal    Goal Priority Disciplines Outcome Interventions   Occupational Therapy Goal     OT, PT/OT Ongoing (interventions implemented as appropriate)    Description:  Goals to be met by: 7/2   Patient will increase functional independence with ADLs by performing:    UE Dressing with Set-up Assistance while seated EOB.  LE Dressing with Supervision while seated EOB.  Grooming while standing at EOB with mod A for balance and set up assistance and verbal cues only for grooming task.  Toileting from bedside commode with Minimal Assistance for hygiene and clothing management.   Rolling to Bilateral with Modified [place].   Supine to sit with Modified [place], verbal cues only.  Stand pivot transfers with Moderate Assistance to various surfaces (BSC, chair).  Upper extremity exercise program x15 reps per handout, with assistance as needed for increased endurance to functional task.                      Time Tracking:     OT Date of Treatment: 06/29/18  OT Start Time: 0930  OT Stop Time: 0945  OT Total Time (min): 15 min    Billable Minutes:Self Care/Home Management 15    Ruth Pina, OT  6/29/2018

## 2018-06-29 NOTE — DISCHARGE SUMMARY
DISCHARGE SUMMARY  Hospital Medicine    [place] [facility]    Patient Name: Arina Adame  YOB: 1928    Admit Date: 6/23/2018    Discharge Date: 06/29/2018     Discharge Attending Physician: Theresa Avalos*     Resident on Service: Anitra Bergeron DO    Chief Complaint: Diarrhea of presumed infectious origin    Princilpal Diagnoses:  Active Hospital Problems    Diagnosis  POA    *Diarrhea of presumed infectious origin [R19.7]  Yes    Encephalopathy, toxic [G92]  Yes    Acute cystitis without hematuria [N30.00]  Yes    Age-related physical debility [R54]  Yes    Hypokalemia [E87.6]  Yes    Dementia [F03.90]  Yes    Carotid arterial disease [I77.9]  Yes    Hypothyroidism [E03.9]  Yes      Resolved Hospital Problems    Diagnosis Date Resolved POA   No resolved problems to display.       Discharged Condition: Admit problems have resolved      HOSPITAL COURSE:      Initial Presentation:  88 y/o female with PMH of dementia, Recurrent UTIs (mostly pan sensitive klebsiella), remote Hx of colon CA s/p partial distal colectomy and reanastomosis, recent hx of C diff colitis (diagnosed on 5/26/18), presented to the ED from home on 6/23 for 1 day hx of non bloody diarrhea, with caretaker being concerned for recurrent C diff per smell of the stool. Patient developed watery diarrhea on the morning of 6/23, with subsequent several episodes of loose stools/diarrhea. Associated with 1 episode of non bloody vomitus, as well as chills. No abdominal pain or distention. Had mild dysuria on the AM of 6/23, however, daughter was concerned that it was related to frequent stools. Completed 5 day Rx course with Bactrim for UTI. UTis are usually associated with confusion and foul smell, which currently isn't a concern.      Patient was hospitalized at the end of 5/2017 for C diff colitis, underwent PO vancomycin therapy. Subsequent C diff testing was negative, however, stools never solidified.  "Patient's PCP had plans for outpatient GI evaluation for ongoing diarrhea.      Lives with daughter outside of [place]. Uses walker. Had home health prior to hospitalization.     BP (!) 162/80 (BP Location: Left arm, Patient Position: Lying)   Pulse 75   Temp 97.9 °F (36.6 °C) (Axillary)   Resp 18   Ht 5' 1" (1.549 m)   Wt 46.5 kg (102 lb 8.2 oz)   SpO2 98%   Breastfeeding? No   BMI 19.37 kg/m²     Physical Exam   Constitutional: She is oriented to person, place, and time. She appears well-developed and well-nourished. No distress.   Elderly lady, resting comfortably in bed , no acute distress   HENT:   Head: Normocephalic and atraumatic.   Right Ear: External ear normal.   Left Ear: External ear normal.   Eyes: EOM are normal. Pupils are equal, round, and reactive to light. No scleral icterus.   Neck: Normal range of motion. Neck supple.   Cardiovascular: Normal rate, regular rhythm, normal heart sounds and intact distal pulses.    No murmur heard.  Pulmonary/Chest: Effort normal and breath sounds normal. No respiratory distress. She has no wheezes.   Abdominal: Soft. Bowel sounds are normal. She exhibits no distension and no mass. There is no tenderness. There is no guarding.   Musculoskeletal: Normal range of motion. She exhibits no edema, tenderness or deformity.   Neurological: She is alert and oriented to person, place, and time. No cranial nerve deficit.   Skin: Skin is warm and dry. No rash noted. She is not diaphoretic.   Psychiatric: She has a normal mood and affect.     Course of Principle Problem for Admission:  Arina Adame is a 89 y.o. female with dementia and recent diagnosis of C. Diff on PO Vanc who presents to the ED with non-bloody diarrhea.  WBC trended down from 32 to 11.  C. Diff PCR returned positive.  Continue PO Vanc to complete a 14 day course.  Will need a prolonged course for 14 days given recurrence.  She also reports dysuria, so urine culture was ordered; growing " Klebsiella oxytoca.  She was originally placed on Cipro given allergies to penicillin, which we changed to doxycycline today based on her previous urine culture a week ago growing Klebsiella. 6/26 cx showed resistance to doxy so switched to Levoquin. Pt stable for d/c home with HH. Will d/c on PO vanc to complete 14 days, and Levoquin x 1 day.    Medical Problems Addressed in the Hospital:  Diarrhea of presumed infectious origin     - recent hx of C diff colitis s/p PO vancomycin therapy, now presenting with worsening diarrhea, concerning for recurrence   - stool studies negative far  - C dfif positive  - PO vancomycin x 14 days as this is a second episode of C. Diff, end date 7/7       Encephalopathy, toxic     -2/2 to infectious process and cytokine mediated encephalopathy   -continue with antibiotics        Age-related physical debility     - PT OT : recommending SNF  - [facility], but unable, d/c'ing with home health       Acute cystitis without hematuria     - hx of pan sensitive klebsiella, finished bactrim therapy. Now with leukocytosis with wbc of 32k and UA concerning for uti with dysuria per hx.   - started PO cipro on admit  - urine cx: GNR, lactose . Prior cultures Klebsiella sensitive to doxy  - 6/25 started doxycycline 100 mg BID x 5 days  -6/26 Kleb found to be resistant to Doxy, transitioned to Levaquin 500 BID x 5 day course, 1 more day        Hypokalemia     - likely 2/2 vomiting./ diarrhea  - PO replacement       Dementia     - cont home donepezil and quetiapine  - sertraline was stopped by PCP  - delirium precautions         Carotid arterial disease     - statin, ASA 81       Hypothyroidism     - cont home levothyroxine        CONSULTS: n/a    Last CBC/BMP/HgbA1c (if applicable):  Recent Results (from the past 336 hour(s))   CBC with Automated Differential    Collection Time: 06/29/18  5:16 AM   Result Value Ref Range    WBC 11.99 3.90 - 12.70 K/uL    Hemoglobin 11.6 (L)  12.0 - 16.0 g/dL    Hematocrit 37.9 37.0 - 48.5 %    Platelets 291 150 - 350 K/uL   CBC with Automated Differential    Collection Time: 06/28/18  3:48 AM   Result Value Ref Range    WBC 9.52 3.90 - 12.70 K/uL    Hemoglobin 11.3 (L) 12.0 - 16.0 g/dL    Hematocrit 36.1 (L) 37.0 - 48.5 %    Platelets 278 150 - 350 K/uL   CBC with Automated Differential    Collection Time: 06/27/18  3:45 AM   Result Value Ref Range    WBC 8.55 3.90 - 12.70 K/uL    Hemoglobin 11.2 (L) 12.0 - 16.0 g/dL    Hematocrit 35.7 (L) 37.0 - 48.5 %    Platelets 272 150 - 350 K/uL     Recent Results (from the past 336 hour(s))   Basic Metabolic Panel (BMP)    Collection Time: 06/29/18  5:16 AM   Result Value Ref Range    Sodium 141 136 - 145 mmol/L    Potassium 3.8 3.5 - 5.1 mmol/L    Chloride 106 95 - 110 mmol/L    CO2 26 23 - 29 mmol/L    BUN, Bld 12 8 - 23 mg/dL    Creatinine 0.8 0.5 - 1.4 mg/dL    Calcium 9.3 8.7 - 10.5 mg/dL    Anion Gap 9 8 - 16 mmol/L   Basic Metabolic Panel (BMP)    Collection Time: 06/28/18  3:48 AM   Result Value Ref Range    Sodium 142 136 - 145 mmol/L    Potassium 3.8 3.5 - 5.1 mmol/L    Chloride 108 95 - 110 mmol/L    CO2 27 23 - 29 mmol/L    BUN, Bld 13 8 - 23 mg/dL    Creatinine 0.7 0.5 - 1.4 mg/dL    Calcium 8.9 8.7 - 10.5 mg/dL    Anion Gap 7 (L) 8 - 16 mmol/L   Basic Metabolic Panel (BMP)    Collection Time: 06/27/18  3:45 AM   Result Value Ref Range    Sodium 141 136 - 145 mmol/L    Potassium 3.8 3.5 - 5.1 mmol/L    Chloride 108 95 - 110 mmol/L    CO2 24 23 - 29 mmol/L    BUN, Bld 12 8 - 23 mg/dL    Creatinine 0.7 0.5 - 1.4 mg/dL    Calcium 8.8 8.7 - 10.5 mg/dL    Anion Gap 9 8 - 16 mmol/L     Lab Results   Component Value Date    HGBA1C 5.3 05/26/2018       Disposition:  Home with Home Health       Discharge Medication List:       Arina Adame   Home Medication Instructions JARET:[id number]    Printed on:06/29/18 1046   Medication Information                      aspirin (ECOTRIN) 81 MG EC tablet  Take 1  tablet (81 mg total) by mouth once daily.             atorvastatin (LIPITOR) 40 MG tablet  TAKE 1 TABLET (40 MG TOTAL) BY MOUTH ONCE DAILY.             CALCIUM CITRATE/VITAMIN D3 (CITRACAL + D MAXIMUM ORAL)  Take 1 tablet by mouth once daily.              cholecalciferol, vitamin D3, (VITAMIN D3) 1,000 unit capsule  Take 1,000 Units by mouth once daily.               cyanocobalamin (VITAMIN B-12) 500 MCG tablet  Take 500 mcg by mouth once daily.              donepezil (ARICEPT) 10 MG tablet  Take 1 tablet (10 mg total) by mouth every evening.             levoFLOXacin (LEVAQUIN) 500 MG tablet  Take 1 tablet (500 mg total) by mouth once daily. for 1 day             levothyroxine (SYNTHROID) 100 MCG tablet  1 Tablet Oral every morning except 1.5 pills on Sundays.             multivitamin (ONE DAILY MULTIVITAMIN) per tablet  Take 1 tablet by mouth once daily.             QUEtiapine (SEROQUEL) 25 MG Tab  TAKE 1 TABLET (25 MG TOTAL) BY MOUTH NIGHTLY AS NEEDED.             vancomycin oral solution 25mg/mL  Take 1 teaspoonful (5 ml) by mouth 4 times daily until 7/7/2018             walker (ULTRA-LIGHT ROLLATOR) Misc  1 each by Misc.(Non-Drug; Combo Route) route once daily.                 Patient Instructions:    Discharge Procedure Orders  Activity as tolerated     Notify your health care provider if you experience any of the following:  temperature >100.4     Notify your health care provider if you experience any of the following:  persistent nausea and vomiting or diarrhea     Notify your health care provider if you experience any of the following:  severe uncontrolled pain     Notify your health care provider if you experience any of the following:  difficulty breathing or increased cough     Notify your health care provider if you experience any of the following:  severe persistent headache     Notify your health care provider if you experience any of the following:  persistent dizziness, light-headedness, or visual  disturbances     Notify your health care provider if you experience any of the following:  increased confusion or weakness     Notify your health care provider if you experience any of the following:  temperature >100.4     Notify your health care provider if you experience any of the following:  persistent nausea and vomiting or diarrhea     Notify your health care provider if you experience any of the following:  severe uncontrolled pain     Notify your health care provider if you experience any of the following:  difficulty breathing or increased cough     Notify your health care provider if you experience any of the following:  severe persistent headache     Notify your health care provider if you experience any of the following:  persistent dizziness, light-headedness, or visual disturbances     Notify your health care provider if you experience any of the following:  increased confusion or weakness     Activity as tolerated           Signing Physician:  Anitra Bergeron MD

## 2018-07-02 ENCOUNTER — PATIENT OUTREACH (OUTPATIENT)
Dept: ADMINISTRATIVE | Facility: CLINIC | Age: 83
End: 2018-07-02

## 2018-07-02 ENCOUNTER — OUTPATIENT CASE MANAGEMENT (OUTPATIENT)
Dept: ADMINISTRATIVE | Facility: OTHER | Age: 83
End: 2018-07-02

## 2018-07-02 ENCOUNTER — NURSE TRIAGE (OUTPATIENT)
Dept: ADMINISTRATIVE | Facility: CLINIC | Age: 83
End: 2018-07-02

## 2018-07-02 RX ORDER — SERTRALINE HYDROCHLORIDE 50 MG/1
50 TABLET, FILM COATED ORAL DAILY
Status: ON HOLD | COMMUNITY
End: 2018-07-31

## 2018-07-02 NOTE — TELEPHONE ENCOUNTER
Spoke with daughter post d\c from hospital for TCC call. States 3 diarrhea stools a day. Admitted for Cdiff. States upon d\c told she was having formed stools. States has to constantly remind Mom to drink water. Usually is soaked with urine when she wakes up. She goes all night without urination since home. When does urinate it's light. In the process of triaging, daughter states that she needed to go because she just got home and  is sick and she needs to feed her mother lunch. Was unable to give disposition. Told her to call OOC if diarrhea worsens, urine dark, more confused, or more decreased urine output. She agreed. Message to pcp.    Reason for Disposition   Drinking very little and has signs of dehydration (e.g., no urine > 12 hours, very dry mouth, very lightheaded)    Protocols used: [facility]-A-[place]

## 2018-07-02 NOTE — PROGRESS NOTES
C3 nurse attempted to contact patient. No answer.  C3 nurse attempted to contact Arina Beverly Wendi  for a TCC post hospital discharge follow up call. No answer at phone number listed and no voicemail available. The patient does not have a scheduled HOSFU appointment within 7 days post hospital discharge date 6\29. Message sent to Physician staff to assist with HOSFU appointment scheduling.

## 2018-07-02 NOTE — PROGRESS NOTES
Thank you for the referral. The following patient has been assigned to Anabell Nguyen RN with Outpatient Complex Care Management for high risk screening.    Reason for referral: Hospital discharge follow-up  Chronic diarrhea  Dementia without behavioral disturbance, unspecified dementia type  Debility    Please contact [facility] at [phone] with any questions.    Thank you,    Candi Pham    Outpatient Case Management

## 2018-07-02 NOTE — PATIENT INSTRUCTIONS
Clostridium Difficile Infection  Clostridium difficile (C. diff) bacteria can be very harmful. They affect the intestinal tract. They can cause symptoms ranging from mild diarrhea to severe inflammation of the large intestine (colon). C. diff infection is most common during the days and weeks after treatment with antibiotics. Anyone can become infected. But the risk is greatly increased for people in hospitals and for people living in nursing homes or long-term care facilities. This is because antibiotic use is common there. Germs also spread easily in these places.    What causes C. diff infection?  The stomach and intestines have hundreds of kinds of bacteria. Many of these bacteria actually help keep harmful bacteria like C. diff from causing problems. Small amounts of C. diff are normal in the intestine and dont cause problems. When you take an antibiotic, the normal balance of good and bad bacteria may be affected. There may be too few good bacteria and too many harmful bacteria like C diff. In hospitals and nursing homes, C. diff may be spread from an infected person to others. This can happen when staff or visitors touch infected people or objects such as bed rails, stethoscopes, or bedpans and then touch other people or surfaces.  What are the symptoms of C. diff infection?  About half of people with C. diff infection have no symptoms. Yet they can still pass the infection to others. Others do have symptoms. These include:  · Watery diarrhea, which may contain mucus  · Pain and cramping  · Fever  Some who are infected develop serious problems. Symptoms include:  · Belly (abdominal) pain  · Abdominal swelling  · Nausea and vomiting  · Little or no diarrhea  How is C. diff infection diagnosed?  To confirm the infection, a sample of stool is tested for the bacteria or the toxins made by the bacteria.  How is C. diff infection treated?  Your healthcare provider will tell you to stop taking any antibiotics you  have been prescribed, based on your healthcare needs. He or she may prescribe different medicines as needed. In certain cases, you may be given an antibiotic directed at the C. diff infection. Talk with your healthcare provider before stopping or starting any medicines.  · Fluids are often given by IV (intravenously) through a vein. This helps replace fluids lost through diarrhea.  · In rare cases you may need surgery if treatment doesnt cure severe symptoms  To lessen symptoms:  · Drink plenty of fluids to replace water lost through diarrhea. Talk with your healthcare provider or nurse about which fluids are best.  · Follow your healthcare providers instructions for when and what to eat.  · Unless your healthcare provider tells you to do so, don't take medicines for diarrhea.  · Tell your healthcare provider if symptoms return. Even after treatment, C. diff may come back.  Your doctor may give you an additional medicine if your symptoms come back or you are at risk for another C. diff infection. This medicine is called bezlotoxumab. It is not an antibiotic, but it can help keep your C. diff symptoms from returning.  What are the complications of C. diff infection?  Complications include:  · Dehydration  · Electrolyte imbalances  · Low protein in the blood  · Severe widening (dilation) of the large intestine  · A hole (perforation) in the bowel  · Low blood pressure  · Kidney failure  · Inflammation or infection all over the body  · Death  How is C. diff prevented?  Hospitals and nursing homes take these steps to help prevent C. diff infections:  · Limiting use of antibiotics. Giving antibiotics only when needed can help reduce C. diff infections.  · Handwashing. Hospital staff should wash their hands before and after treating each person. They should also wash their hands after touching any surface in someone's' room. Soap and water work better than alcohol-based hand .  · Protective clothing. Healthcare  workers should wear gloves and a gown when entering the room of someone with C. diff infection. They should remove these items before leaving and then wash their hands.  · Private rooms. People with C. diff should be in private rooms. Or they may share rooms with others who have the same infection.  · Thorough cleaning. Equipment and rooms should be cleaned and disinfected every day.  · Education. Everyone should be shown the best ways to avoid infection.  You can do the following to help prevent C. diff:  · Take antibiotics only when you really need them. Antibiotics dont help treat illnesses caused by viruses. This includes colds and the flu. Dont ask for antibiotics from your healthcare provider if he or she says they wont work.  · When you are given antibiotics, take them as directed. Dont take more or less than the dosage prescribed. Do not take them for shorter or longer than your provider tells you to, even if you feel better.  · Wash your hands carefully. Do this after using the bathroom and before eating. Use plenty of soap and warm water. Alcohol-based hand  may not work against C. diff germs.  Everyone can help prevent C. diff:  In a hospital or care facility:  · Wash your hands well before and after visiting someone who has C. diff infection. Use soap and water. Alcohol-based hand  may not work against C. diff.  · If the staff asks you to, wear gloves. Take any other steps you are asked to follow to help prevent infection.  At home:  · If instructed, wear gloves when caring for a family member with C. diff infection. Throw the gloves away after each use. Then wash your hands well.  · Wash the persons clothes, bed linen, and towels separately. Use hot water. Use both detergent and liquid bleach.  · Disinfect surfaces in the persons room. This includes the phone, light switches, and remote controls.  Practice good handwashing:  · Use warm water and plenty of soap. Rub your hands  together well.  · Clean your whole hand. Wash under nails, between fingers, and up your wrists.  · Wash for at least 15 seconds to 20 seconds.   · Rinse. Let the water run down your fingers, not up your wrists.  · Dry your hands well. Then use a paper towel to turn off the faucet and open the door.  Date Last Reviewed: 1/1/2017  © 7888-4281 [employer]. [address] [place] [address], [place] [address]. All rights reserved. This information is not intended as a substitute for professional medical care. Always follow your healthcare professional's instructions.

## 2018-07-06 ENCOUNTER — OUTPATIENT CASE MANAGEMENT (OUTPATIENT)
Dept: ADMINISTRATIVE | Facility: OTHER | Age: 83
End: 2018-07-06

## 2018-07-06 NOTE — PROGRESS NOTES
Please note the following patient has been transferred to Dhara Yeager RN in Outpatient Complex Care Management for high risk screening.    Please contact [facility] at ext. [phone] with any questions.    Thank you,    Candi Pham    Outpatient Case Management

## 2018-07-10 ENCOUNTER — PATIENT MESSAGE (OUTPATIENT)
Dept: ADMINISTRATIVE | Facility: OTHER | Age: 83
End: 2018-07-10

## 2018-07-10 ENCOUNTER — OUTPATIENT CASE MANAGEMENT (OUTPATIENT)
Dept: ADMINISTRATIVE | Facility: OTHER | Age: 83
End: 2018-07-10

## 2018-07-10 NOTE — PROGRESS NOTES
7/10/18 Summary: Patient referred to OPCM for high risk. Attempted to reach patient and daughter Obdulia Renteria unsuccessfully, no voice message option on mobile and home number discontinued.   Interventions: Letter sent through portal.

## 2018-07-16 ENCOUNTER — PATIENT MESSAGE (OUTPATIENT)
Dept: ADMINISTRATIVE | Facility: OTHER | Age: 83
End: 2018-07-16

## 2018-07-16 ENCOUNTER — OUTPATIENT CASE MANAGEMENT (OUTPATIENT)
Dept: ADMINISTRATIVE | Facility: OTHER | Age: 83
End: 2018-07-16

## 2018-07-16 NOTE — LETTER
July 16, 2018    [facility] [place] Wendi  [address] [place] [address]             [facility] [address] [place] [address] Dear Ms. Wendi:    I work with [facility]'s Outpatient Case Management Department. We received a referral to call you to discuss your medical history. I attempted to reach you by telephone, but I was unsuccessful. Please call our department so that we can go over some questions with you regarding your health.    The Outpatient Case Management Department can be reached at [phone] from 8:00AM to 4:30 PM on Monday thru Friday. [facility] also has a program where a nurse is available 24/7 to answer questions or provide medical advice, their number is [phone].    Thanks,          Dhara Yeager RN [facility]  Outpatient Complex Care Management

## 2018-07-16 NOTE — PROGRESS NOTES
"7/16/18 Summary: Patient referred to OPCM for high risk. Attempted to reach patient and daughter Obdulia Renteria unsuccessfully, voice message option on mobile states "full, unable to leave message" and home number discontinued. Few minutes upon sending message, Obdulia Renteria patient's daughter returned call and requesting call back tomorrow Tuesday around 9 AM. States her spouse is checking in to get his first round of chemo.      Interventions: Letter unable to reach sent through portal. Call back tomorrow Tuesday 9 am.         "

## 2018-07-17 ENCOUNTER — TELEPHONE (OUTPATIENT)
Dept: INTERNAL MEDICINE | Facility: CLINIC | Age: 83
End: 2018-07-17

## 2018-07-17 ENCOUNTER — OUTPATIENT CASE MANAGEMENT (OUTPATIENT)
Dept: ADMINISTRATIVE | Facility: OTHER | Age: 83
End: 2018-07-17

## 2018-07-17 DIAGNOSIS — F03.90 DEMENTIA WITHOUT BEHAVIORAL DISTURBANCE, UNSPECIFIED DEMENTIA TYPE: Primary | ICD-10-CM

## 2018-07-17 DIAGNOSIS — R29.6 FREQUENT FALLS: ICD-10-CM

## 2018-07-17 NOTE — TELEPHONE ENCOUNTER
Dr. Meier is out on maternity leave.   What are the qualifying conditions for palliative and has this been discussed with pt/family?

## 2018-07-17 NOTE — PROGRESS NOTES
7/17/18 Summary:  Patient referred to OPCM for high risk. In agreement to have OPCM assist manage health needs. OPCM forms completed with patient's only daughter and DEMARCUS Renteria. Daughter states she and her spouse (daughter is 61 y/o and her spouse 73 y/o) moved in with patient few years ago to help her and help each other with expenses. States she's paying all bills but concerned with upcoming hospital bills for patient and her spouse who was just diagnosed with bladder cancer and will be starting chemo hopefully this week. Provided patient financial assistance number [phone]. Daughter asking for help from  to find services that patient could benefit from specially since she needs to accompany her spouse to his doctors appointments. Discussed adult day cares. Daughter concerned that she leaves gait bell on patient and really has helped using it to prevent falls. States most facilities do not allow gait belts as per [place] Law.  States this week she left patient sitting on chair with BSC next to her and had a neighbor check in. States cannot afford personal care attendant. Reports patient was admitted with diarrhea and tested positive for C. Difficile in stool. States she is finally having solid bowel movements though she still has rash in buttocks from hospital admission that she is putting diaper ointment. Dementia has progressed she states.     Interventions: Reviewed C. Difficile Quita teaching. Reviewed Dementia teaching QUITA: focusing on safety and improving communications. Fall prevention QUITA: discussed. Provided [facility] financial assistance number [phone]. Referred to OPCM SW to assist with resources. In-basket message sent to PCP requesting Palliative Care and informing patient is taking Metamucil daily and Probiotic Florastor daily. Mailing Senior Resource Guide. Offered to make PCP appt for rash, declines at this time. Instructed to let [facility] nurse know upon visit. Discussed to  keep clean, use barrier.     Plan:   Dementia teaching - continue  Clostridium Dificile - skin care  Fall prevention  - continue  Referral to Palliative Care  Referral to OPCM

## 2018-07-17 NOTE — TELEPHONE ENCOUNTER
----- Message from Ira Guzman MA sent at 7/17/2018  1:18 PM CDT -----      ----- Message -----  From: Dhara Yeager RN  Sent: 7/17/2018  12:03 PM  To: Renea Arriola Staff    Dr. Meier,    I work with [facility]s Outpatient Care Management Department. I am working with the above patient. Patient could benefit from referral to Palliative Care based on [facility]'s general referral criteria guidelines. If you are in agreement, please place an order in EPIC for: Ambulatory referral to Palliative care  [facility] [id number].    Further, upon medication reconciliation patient is taking Metamucil daily and Probiotic Florastor one daily. Can you please add into EPIC.          Thank you,        Dhara Yeager RN CCM

## 2018-07-17 NOTE — TELEPHONE ENCOUNTER
Per RN: Patient has advanced dementia, falls, using gait belt to transfer. Has only one daughter who lives with her and she is assisting her spouse as he was recently diagnosed with cancer. Trying to get more resources in patient's home. Palliative Care provides one NP visit at home monthly, SW, , etc and coordinate care with PCP. This would alleviate bringing patient to office visit and ensuring patient does not readmit hopefully.

## 2018-07-17 NOTE — LETTER
July 17, 2018    [facility] Wendi  [address] [place] [address]             [facility] [address] [facility] [address] [facility] [place] [address] Dear MsOlga Lidia Wendi  And Mrs. Renteria    It was a pleasure speaking with you today. As discussed, I am enclosing information on C. Difficile, Dementia, and Senior Resource Guide. Further, patient financial services can be reached at [phone]. Patient Relations at [phone]. Please do not get overwhelmed with the amount of information enclosed, as I will call you weekly in the beginning to review and discuss. Furthermore, I wanted to remind you that [facility] care line [phone] or [phone] is a 24 hour 7 days per week free nurse line to assist you determine best care options for you, provides triage, appointment booking, health education and advisory services.     I can be reached Monday through Wednesday at [phone]. The Outpatient Care Management Department can be reached at [phone] from 8:00AM to 4:30 PM on Monday thru Friday.    Sincerely,          Dhara Yeaegr RN [facility]  Outpatient Complex Care Manager

## 2018-07-18 ENCOUNTER — OUTPATIENT CASE MANAGEMENT (OUTPATIENT)
Dept: ADMINISTRATIVE | Facility: OTHER | Age: 83
End: 2018-07-18

## 2018-07-18 NOTE — PROGRESS NOTES
7/18/18 Summary: Message received from Dr Zamarripa orders placed for Palliative Care.     Interventions: Called Ara with [facility] at [phone], informed orders placed and provided information. States will call family to schedule.    Plan:   Dementia teaching - continue  Clostridium Dificile - skin care  Fall prevention  - continue  Referral to Palliative Care - DONE  Referral to [facility]

## 2018-07-18 NOTE — PROGRESS NOTES
7/18/18 Summary: Ara with Palliative Care called to inform they will be seeing patient Friday. Reports daughter informed her of rash around groin that was present from hospital discharge that she is placing diaper rash ointment that would like it seen, but does not want PCP appt at this time. This was discussed yesterday and asked daughter to report to [facility] when visiting later that day as she did not want MD visit.     Interventions: Called Veronique with [facility] at [phone], [facility] nurse was there yesterday and was not reported by daughter. hospitals therapist will go in AM and will have her look at it.       Plan:   Dementia teaching - continue  Clostridium Dificile - skin care  Fall prevention  - continue  Referral to Palliative Care - DONE  Referral to [facility]

## 2018-07-19 ENCOUNTER — OUTPATIENT CASE MANAGEMENT (OUTPATIENT)
Dept: ADMINISTRATIVE | Facility: OTHER | Age: 83
End: 2018-07-19

## 2018-07-19 NOTE — PROGRESS NOTES
7/19/2018:   SUMMARY:   spoke to patient's daughter, Obdulia Renteria via telephone in order to complete assessment.  She reported that she and her  live with patient in order to take care of her.  Daughter provides patient with total care at this time.  Daughter reported that her  was recently diagnosis with Cancer and she has multiple medical issues including a pain pump because her back is fused and she needs a knee replacement.  Daughter is an only child and has very limited support with caring for this patient.  She has a friend who comes to assist if she is not working but this is not very often.      Daughter is requesting assistance with respite care and financial assistance.      INTERVENTIONS:  1. Completed assessment with daughter  2. Offered support  3. Mailed patient information re: [employer] transportation benefit, [employer] catalog, [employer] education re: Caregiver support, Medicaid Long Term Personal Care, and [facility] Financial Assistance     PLAN:  Follow up scheduled for 8/2/2018  1. Did daughter receive mailing?  2. Answer questions  3. Provide support  4. Assess for additional needs.

## 2018-07-23 ENCOUNTER — HOSPITAL ENCOUNTER (INPATIENT)
Facility: HOSPITAL | Age: 83
LOS: 10 days | Discharge: HOME HEALTH | DRG: 871 | End: 2018-08-02
Attending: EMERGENCY MEDICINE | Admitting: HOSPITALIST
Payer: MEDICARE

## 2018-07-23 DIAGNOSIS — R00.0 TACHYCARDIA: ICD-10-CM

## 2018-07-23 DIAGNOSIS — A49.8 CLOSTRIDIUM DIFFICILE INFECTION: ICD-10-CM

## 2018-07-23 DIAGNOSIS — I50.9 CHF (CONGESTIVE HEART FAILURE): ICD-10-CM

## 2018-07-23 DIAGNOSIS — J98.4 CAVITARY LESION OF LUNG: ICD-10-CM

## 2018-07-23 DIAGNOSIS — F01.50 VASCULAR DEMENTIA WITHOUT BEHAVIORAL DISTURBANCE: ICD-10-CM

## 2018-07-23 DIAGNOSIS — J84.9 ILD (INTERSTITIAL LUNG DISEASE): ICD-10-CM

## 2018-07-23 DIAGNOSIS — A41.9 SEPSIS: Primary | ICD-10-CM

## 2018-07-23 LAB
ALBUMIN SERPL BCP-MCNC: 2.8 G/DL
ALP SERPL-CCNC: 104 U/L
ALT SERPL W/O P-5'-P-CCNC: 12 U/L
ANION GAP SERPL CALC-SCNC: 10 MMOL/L
ANISOCYTOSIS BLD QL SMEAR: SLIGHT
AST SERPL-CCNC: 18 U/L
BACTERIA #/AREA URNS AUTO: ABNORMAL /HPF
BASOPHILS # BLD AUTO: 0.05 K/UL
BASOPHILS NFR BLD: 0.2 %
BILIRUB SERPL-MCNC: 1.1 MG/DL
BILIRUB UR QL STRIP: ABNORMAL
BNP SERPL-MCNC: 125 PG/ML
BUN SERPL-MCNC: 17 MG/DL
CALCIUM SERPL-MCNC: 8.9 MG/DL
CHLORIDE SERPL-SCNC: 103 MMOL/L
CLARITY UR REFRACT.AUTO: ABNORMAL
CO2 SERPL-SCNC: 24 MMOL/L
COLOR UR AUTO: ABNORMAL
CREAT SERPL-MCNC: 1.1 MG/DL
DIFFERENTIAL METHOD: ABNORMAL
EOSINOPHIL # BLD AUTO: 0 K/UL
EOSINOPHIL NFR BLD: 0 %
ERYTHROCYTE [DISTWIDTH] IN BLOOD BY AUTOMATED COUNT: 14.6 %
EST. GFR  (AFRICAN AMERICAN): 51.4 ML/MIN/1.73 M^2
EST. GFR  (NON AFRICAN AMERICAN): 44.6 ML/MIN/1.73 M^2
GLUCOSE SERPL-MCNC: 128 MG/DL
GLUCOSE UR QL STRIP: NEGATIVE
HCT VFR BLD AUTO: 38.2 %
HGB BLD-MCNC: 11.9 G/DL
HGB UR QL STRIP: NEGATIVE
HYALINE CASTS UR QL AUTO: 55 /LPF
IMM GRANULOCYTES # BLD AUTO: 0.2 K/UL
IMM GRANULOCYTES NFR BLD AUTO: 0.7 %
INR PPP: 1.1
KETONES UR QL STRIP: NEGATIVE
LACTATE SERPL-SCNC: 1.4 MMOL/L
LACTATE SERPL-SCNC: 2.6 MMOL/L
LEUKOCYTE ESTERASE UR QL STRIP: ABNORMAL
LYMPHOCYTES # BLD AUTO: 0.8 K/UL
LYMPHOCYTES NFR BLD: 2.6 %
MAGNESIUM SERPL-MCNC: 1.9 MG/DL
MCH RBC QN AUTO: 30.1 PG
MCHC RBC AUTO-ENTMCNC: 31.2 G/DL
MCV RBC AUTO: 97 FL
MICROSCOPIC COMMENT: ABNORMAL
MONOCYTES # BLD AUTO: 1.9 K/UL
MONOCYTES NFR BLD: 6.1 %
NEUTROPHILS # BLD AUTO: 27.4 K/UL
NEUTROPHILS NFR BLD: 90.4 %
NITRITE UR QL STRIP: NEGATIVE
NRBC BLD-RTO: 0 /100 WBC
OVALOCYTES BLD QL SMEAR: ABNORMAL
PH UR STRIP: 5 [PH] (ref 5–8)
PHOSPHATE SERPL-MCNC: 3 MG/DL
PLATELET # BLD AUTO: 276 K/UL
PMV BLD AUTO: 10.4 FL
POIKILOCYTOSIS BLD QL SMEAR: SLIGHT
POLYCHROMASIA BLD QL SMEAR: ABNORMAL
POTASSIUM SERPL-SCNC: 3.8 MMOL/L
PROCALCITONIN SERPL IA-MCNC: 0.22 NG/ML
PROT SERPL-MCNC: 6.4 G/DL
PROT UR QL STRIP: ABNORMAL
PROTHROMBIN TIME: 11.4 SEC
RBC # BLD AUTO: 3.96 M/UL
RBC #/AREA URNS AUTO: 7 /HPF (ref 0–4)
SODIUM SERPL-SCNC: 137 MMOL/L
SP GR UR STRIP: 1.03 (ref 1–1.03)
SQUAMOUS #/AREA URNS AUTO: 5 /HPF
TROPONIN I SERPL DL<=0.01 NG/ML-MCNC: <0.006 NG/ML
URN SPEC COLLECT METH UR: ABNORMAL
UROBILINOGEN UR STRIP-ACNC: NEGATIVE EU/DL
WBC # BLD AUTO: 30.31 K/UL
WBC #/AREA URNS AUTO: 20 /HPF (ref 0–5)
WBC CLUMPS UR QL AUTO: ABNORMAL
YEAST UR QL AUTO: ABNORMAL

## 2018-07-23 PROCEDURE — 51702 INSERT TEMP BLADDER CATH: CPT

## 2018-07-23 PROCEDURE — 93005 ELECTROCARDIOGRAM TRACING: CPT

## 2018-07-23 PROCEDURE — 84100 ASSAY OF PHOSPHORUS: CPT

## 2018-07-23 PROCEDURE — 84484 ASSAY OF TROPONIN QUANT: CPT

## 2018-07-23 PROCEDURE — 81001 URINALYSIS AUTO W/SCOPE: CPT

## 2018-07-23 PROCEDURE — 83880 ASSAY OF NATRIURETIC PEPTIDE: CPT

## 2018-07-23 PROCEDURE — 85610 PROTHROMBIN TIME: CPT

## 2018-07-23 PROCEDURE — 99285 EMERGENCY DEPT VISIT HI MDM: CPT | Mod: 25

## 2018-07-23 PROCEDURE — 63600175 PHARM REV CODE 636 W HCPCS: Performed by: EMERGENCY MEDICINE

## 2018-07-23 PROCEDURE — 80053 COMPREHEN METABOLIC PANEL: CPT

## 2018-07-23 PROCEDURE — 93010 ELECTROCARDIOGRAM REPORT: CPT | Mod: ,,, | Performed by: INTERNAL MEDICINE

## 2018-07-23 PROCEDURE — 83605 ASSAY OF LACTIC ACID: CPT | Mod: 91

## 2018-07-23 PROCEDURE — 84145 PROCALCITONIN (PCT): CPT

## 2018-07-23 PROCEDURE — 87040 BLOOD CULTURE FOR BACTERIA: CPT | Mod: 59

## 2018-07-23 PROCEDURE — 25000003 PHARM REV CODE 250: Performed by: STUDENT IN AN ORGANIZED HEALTH CARE EDUCATION/TRAINING PROGRAM

## 2018-07-23 PROCEDURE — 25000003 PHARM REV CODE 250: Performed by: EMERGENCY MEDICINE

## 2018-07-23 PROCEDURE — 87086 URINE CULTURE/COLONY COUNT: CPT

## 2018-07-23 PROCEDURE — 63600175 PHARM REV CODE 636 W HCPCS: Performed by: STUDENT IN AN ORGANIZED HEALTH CARE EDUCATION/TRAINING PROGRAM

## 2018-07-23 PROCEDURE — 83735 ASSAY OF MAGNESIUM: CPT

## 2018-07-23 PROCEDURE — 96366 THER/PROPH/DIAG IV INF ADDON: CPT

## 2018-07-23 PROCEDURE — 99285 EMERGENCY DEPT VISIT HI MDM: CPT | Mod: ,,, | Performed by: EMERGENCY MEDICINE

## 2018-07-23 PROCEDURE — 87324 CLOSTRIDIUM AG IA: CPT

## 2018-07-23 PROCEDURE — 11000001 HC ACUTE MED/SURG PRIVATE ROOM

## 2018-07-23 PROCEDURE — 96375 TX/PRO/DX INJ NEW DRUG ADDON: CPT

## 2018-07-23 RX ORDER — IBUPROFEN 200 MG
16 TABLET ORAL
Status: DISCONTINUED | OUTPATIENT
Start: 2018-07-24 | End: 2018-08-02 | Stop reason: HOSPADM

## 2018-07-23 RX ORDER — ENOXAPARIN SODIUM 100 MG/ML
40 INJECTION SUBCUTANEOUS EVERY 24 HOURS
Status: DISCONTINUED | OUTPATIENT
Start: 2018-07-24 | End: 2018-07-30

## 2018-07-23 RX ORDER — SULFAMETHOXAZOLE AND TRIMETHOPRIM 800; 160 MG/1; MG/1
1 TABLET ORAL 2 TIMES DAILY
Status: DISCONTINUED | OUTPATIENT
Start: 2018-07-24 | End: 2018-07-24

## 2018-07-23 RX ORDER — ACETAMINOPHEN 325 MG/1
650 TABLET ORAL EVERY 4 HOURS PRN
Status: DISCONTINUED | OUTPATIENT
Start: 2018-07-24 | End: 2018-08-02 | Stop reason: HOSPADM

## 2018-07-23 RX ORDER — ATORVASTATIN CALCIUM 20 MG/1
40 TABLET, FILM COATED ORAL DAILY
Status: DISCONTINUED | OUTPATIENT
Start: 2018-07-24 | End: 2018-08-02 | Stop reason: HOSPADM

## 2018-07-23 RX ORDER — DONEPEZIL HYDROCHLORIDE 5 MG/1
10 TABLET, FILM COATED ORAL NIGHTLY
Status: DISCONTINUED | OUTPATIENT
Start: 2018-07-24 | End: 2018-08-02 | Stop reason: HOSPADM

## 2018-07-23 RX ORDER — CEFEPIME HYDROCHLORIDE 2 G/1
2 INJECTION, POWDER, FOR SOLUTION INTRAVENOUS
Status: COMPLETED | OUTPATIENT
Start: 2018-07-23 | End: 2018-07-23

## 2018-07-23 RX ORDER — SODIUM CHLORIDE 0.9 % (FLUSH) 0.9 %
5 SYRINGE (ML) INJECTION
Status: DISCONTINUED | OUTPATIENT
Start: 2018-07-24 | End: 2018-08-02 | Stop reason: HOSPADM

## 2018-07-23 RX ORDER — ACETAMINOPHEN 500 MG
1000 TABLET ORAL EVERY 8 HOURS PRN
Status: DISCONTINUED | OUTPATIENT
Start: 2018-07-24 | End: 2018-08-02 | Stop reason: HOSPADM

## 2018-07-23 RX ORDER — ONDANSETRON 2 MG/ML
4 INJECTION INTRAMUSCULAR; INTRAVENOUS EVERY 8 HOURS PRN
Status: DISCONTINUED | OUTPATIENT
Start: 2018-07-24 | End: 2018-07-24

## 2018-07-23 RX ORDER — GLUCAGON 1 MG
1 KIT INJECTION
Status: DISCONTINUED | OUTPATIENT
Start: 2018-07-24 | End: 2018-08-02 | Stop reason: HOSPADM

## 2018-07-23 RX ORDER — QUETIAPINE FUMARATE 25 MG/1
25 TABLET, FILM COATED ORAL NIGHTLY
Status: DISCONTINUED | OUTPATIENT
Start: 2018-07-23 | End: 2018-08-02 | Stop reason: HOSPADM

## 2018-07-23 RX ORDER — ASPIRIN 81 MG/1
81 TABLET ORAL DAILY
Status: DISCONTINUED | OUTPATIENT
Start: 2018-07-24 | End: 2018-08-02 | Stop reason: HOSPADM

## 2018-07-23 RX ORDER — CHOLECALCIFEROL (VITAMIN D3) 25 MCG
1000 TABLET ORAL DAILY
Status: DISCONTINUED | OUTPATIENT
Start: 2018-07-24 | End: 2018-08-02 | Stop reason: HOSPADM

## 2018-07-23 RX ORDER — IBUPROFEN 200 MG
24 TABLET ORAL
Status: DISCONTINUED | OUTPATIENT
Start: 2018-07-24 | End: 2018-08-02 | Stop reason: HOSPADM

## 2018-07-23 RX ORDER — LEVOTHYROXINE SODIUM 100 UG/1
100 TABLET ORAL
Status: DISCONTINUED | OUTPATIENT
Start: 2018-07-24 | End: 2018-08-01

## 2018-07-23 RX ORDER — CYANOCOBALAMIN (VITAMIN B-12) 250 MCG
500 TABLET ORAL DAILY
Status: DISCONTINUED | OUTPATIENT
Start: 2018-07-24 | End: 2018-08-02 | Stop reason: HOSPADM

## 2018-07-23 RX ADMIN — Medication 125 MG: at 07:07

## 2018-07-23 RX ADMIN — SODIUM CHLORIDE 1362 ML: 0.9 INJECTION, SOLUTION INTRAVENOUS at 04:07

## 2018-07-23 RX ADMIN — CEFEPIME 2 G: 2 INJECTION, POWDER, FOR SOLUTION INTRAVENOUS at 07:07

## 2018-07-23 RX ADMIN — VANCOMYCIN HYDROCHLORIDE 750 MG: 750 INJECTION, POWDER, LYOPHILIZED, FOR SOLUTION INTRAVENOUS at 04:07

## 2018-07-23 NOTE — ED NOTES
Stool sample obtained and sent to lab. Buchanan catheter placed per sterile technique without difficulty. Pt tolerated well. Redness and excoriation noted to buttocks.

## 2018-07-23 NOTE — ED TRIAGE NOTES
Pt presents per EMS for fever and lethargy. Daughter reports fever started today, last 2 nights daughter reports hallucinations. Dx with C.Diff and has finished antibiotics for it daughter reports diarrhea started again the last couple days.

## 2018-07-23 NOTE — ED NOTES
.  Patient identifiers for Arina Adame 89 y.o. female checked and correct.  Chief Complaint   Patient presents with    Fever     fever, weakness, lethargic, tachycardic onset symptoms 3 days ago     Past Medical History:   Diagnosis Date    Alzheimer disease     Arthritis     B12 nutritional deficiency 8/6/2012    C. 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 Patient is oriented x 3 and speaking appropriately. Pt denies pain.   APPEARANCE: Patient resting comfortably and in no acute distress. Patient is clean and well groomed, patient's clothing is properly fastened.  SKIN: The skin is warm and dry. Patient has normal skin turgor and moist mucus membranes. Buttock and groin red and excoriated with redness and breakdown noted. Daughter reports this has been worsening.   MUSKULOSKELETAL: Patient is moving all extremities well, no obvious deformities noted. Pulses intact. Generalized weakness.   RESPIRATORY: Airway is open and patent. Respirations are spontaneous and non-labored with normal effort and rate, BBS=diminished  CARDIAC: Patient has a normal rate and rhythm. ST on cardiac monitor, trace peripheral  edema noted.   ABDOMEN: No distention noted. Bowel sounds active in all 4 quadrants. Soft and non-tender upon palpation.Diarrhea  NEUROLOGICAL:  PERRL. Facial expression is symmetrical. Hand grasps are equal bilaterally. Normal sensation in all extremities when touched with finger.

## 2018-07-23 NOTE — ED PROVIDER NOTES
Encounter Date: 7/23/2018       History     Chief Complaint   Patient presents with    Fever     fever, weakness, lethargic, tachycardic onset symptoms 3 days ago     Pt is an 88 yo female with PMH of Alzheimer, Colon CA s/p colectomy and reanastamosis [place] [facility] [place]  And completed a home course of  PO vancomycin for C. Diff infection who was brought to the ED by EMS for fever. Daughter reports that she has been more confused at night for the past 2 nights. Today, she was much more fatigued than normal and would not get out of bed. This afternoon, daughter noticed she had a fever of >101 and called EMS. EMS noted pt to be febrile, tachycardic and hypotensive and administered 500cc of NS. On exam pt is rousable but disoriented. Daughter notes that she has a bed sore near her gluteal fold that has not healed.           Review of patient's allergies indicates:   Allergen Reactions    Codeine      Other reaction(s): Unknown    Iodine      Other reaction(s): Unknown    Penicillins      Other reaction(s): Unknown     Past Medical History:   Diagnosis Date    Alzheimer disease     Arthritis     B12 nutritional deficiency 8/6/2012    C. difficile colitis     5/2018    Carotid arterial disease 8/27/2013    Cataract     Colon cancer     Elevated blood pressure (not hypertension) 3/17/2014    Epiretinal membrane     Essential hypertension 3/16/2016    Hearing loss 3/17/2014    History of colon cancer 5/28/2015    [place] (hard of hearing)     Hypothyroidism 11/21/2012    IGT (impaired glucose tolerance) 2/11/2015    Macrocytosis 11/21/2012    Nevus of choroid     Osteoporosis, 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     Past Surgical History:   Procedure Laterality Date    BREAST SURGERY      CATARACT EXTRACTION W/  INTRAOCULAR LENS IMPLANT Bilateral n/a    OU    COLECTOMY      DENTAL SURGERY      right ankle surgery        Family History   Problem Relation Age of Onset    Hip fracture Mother     Thyroid disease Sister         Thyroidectomy    Diabetes Neg Hx     Amblyopia Neg Hx     Blindness Neg Hx     Cataracts Neg Hx     Glaucoma Neg Hx     Macular degeneration Neg Hx     Retinal detachment Neg Hx     Strabismus Neg Hx      Social History   Substance Use Topics    Smoking status: Former Smoker     Quit date: 6/24/1970    Smokeless tobacco: Former User    Alcohol use No     Review of Systems   Unable to perform ROS: Mental status change       Physical Exam     Initial Vitals [07/23/18 1544]   BP Pulse Resp Temp SpO2   101/64 (!) 125 18 (!) 101.4 °F (38.6 °C) 99 %      MAP       --         Physical Exam    Constitutional: She appears well-developed. She is not diaphoretic.   HENT:   Head: Normocephalic and atraumatic.   Cardiovascular: Regular rhythm and normal heart sounds. Tachycardia present.    Pulmonary/Chest: No respiratory distress. She has rales in the left middle field.   Abdominal: Soft. Normal appearance and bowel sounds are normal. There is no tenderness. There is no rigidity and no guarding.   Neurological: GCS eye subscore is 3. GCS verbal subscore is 4. GCS motor subscore is 6.         ED Course   Critical Care  Date/Time: 7/26/2018 9:54 AM  Performed by: MEGHANA ESPOSITO  Authorized by: RAZIA HALE   Direct patient critical care time: 20 minutes  Additional history critical care time: 5 minutes  Ordering / reviewing critical care time: 15 minutes  Documentation critical care time: 15 minutes  Consulting other physicians critical care time: 5 minutes  Consult with family critical care time: 5 minutes  Total critical care time (exclusive of procedural time) : 65 minutes  Critical care was necessary to treat or prevent imminent or life-threatening deterioration of the following conditions: sepsis.  Critical care was time spent personally by me on the following activities: obtaining history  from patient or surrogate, evaluation of patient's response to treatment, ordering and review of laboratory studies, pulse oximetry, review of old charts, examination of patient, ordering and performing treatments and interventions, ordering and review of radiographic studies and re-evaluation of patient's condition.        Labs Reviewed   CBC W/ AUTO DIFFERENTIAL - Abnormal; Notable for the following:        Result Value    WBC 30.31 (*)     RBC 3.96 (*)     Hemoglobin 11.9 (*)     MCHC 31.2 (*)     RDW 14.6 (*)     Immature Granulocytes 0.7 (*)     Gran # (ANC) 27.4 (*)     Immature Grans (Abs) 0.20 (*)     Lymph # 0.8 (*)     Mono # 1.9 (*)     Gran% 90.4 (*)     Lymph% 2.6 (*)     All other components within normal limits    Narrative:     sub tube gold to red  07/23/2018  16:37    COMPREHENSIVE METABOLIC PANEL - Abnormal; Notable for the following:     Glucose 128 (*)     Albumin 2.8 (*)     Total Bilirubin 1.1 (*)     eGFR if  51.4 (*)     eGFR if non  44.6 (*)     All other components within normal limits    Narrative:     sub tube gold to red  07/23/2018  16:37    B-TYPE NATRIURETIC PEPTIDE - Abnormal; Notable for the following:      (*)     All other components within normal limits    Narrative:     sub tube gold to red  07/23/2018  16:37    CULTURE, BLOOD   CULTURE, BLOOD   CLOSTRIDIUM DIFFICILE   LACTIC ACID, PLASMA    Narrative:     sub tube gold to red  07/23/2018  16:37    MAGNESIUM    Narrative:     sub tube gold to red  07/23/2018  16:37    PHOSPHORUS    Narrative:     sub tube gold to red  07/23/2018  16:37    PROTIME-INR    Narrative:     sub tube gold to red  07/23/2018  16:37    TROPONIN I    Narrative:     sub tube gold to red  07/23/2018  16:37    PROCALCITONIN    Narrative:     sub tube gold to red  07/23/2018  16:37    LACTIC ACID, PLASMA   URINALYSIS, REFLEX TO URINE CULTURE     EKG Readings: (Independently Interpreted)   Initial Reading: No STEMI.    Sinus tachycardia, no axis deviation, no interval changes. No ST segment changes.        Imaging Results          X-Ray Chest AP Portable (Final result)  Result time 07/23/18 17:25:47    Final result by Hero Sawyer Jr., MD (07/23/18 17:25:47)                 Impression:      Abnormal interstitial pattern accentuated compared to prior.  Congestive failure and developing bilateral infiltrates need to be considered.      Electronically signed by: Hero Sawyer MD  Date:    07/23/2018  Time:    17:25             Narrative:    EXAMINATION:  XR CHEST AP PORTABLE    CLINICAL HISTORY:  Sepsis;    TECHNIQUE:  Single frontal view of the chest was performed.    COMPARISON:  June 23, 2018.    FINDINGS:  Patient is rotated to the right.  Heart is enlarged.  Diffuse abnormal increase in the interstitial markings.  No confluent consolidation there are some patchy increased markings at the left base.  Degenerative change in the spine.                                 Medical Decision Making:   Initial Assessment:   88 yo female with PMH of alzheimer and history of c. Diff infection presents for fever and decreased mentation. Pt is febriel, tachycardic, and hypotensive. Concern for sepsis high. Will search for source, draw cultures and begin empiric antibiotics.   ED Management:  5:30 PM WBC elevated at 30.13.   5:37 PM CXR shows Abnormal interstitial pattern accentuated compared to prior, consistent with CHF and bilaterally developing infiltrates.   6:09 PM Pt given 2g IV Cefepime and 125mg PO Vancomycin. Pt to be admitted    Additional MDM:   Sepsis - SIRS Criteria: Temperature: Temp > 38.3 C. Heart Rate: HR > 90. Tachypnea: RR Normal. White Count: WBC > 12,000. .  Sepsis: Airway: The patient's airway was good (normal gag reflex and breathing). Antibiotic selection is designed to cover the patient at risk for MRSA and pseudomonas. The patient was treated with Vancomycin 20 mg/kg IVPB. The patient was hypotensive and required  fluid resuscitation. The patient was treated with the following IV Fluids to maximize the BP and the patient's CVP: NS bolus (30 mL/kg).            Attending Attestation:   Physician Attestation Statement for Resident:  As the supervising MD   Physician Attestation Statement: I have personally seen and examined this patient.   I agree with the above history. -: 89 Y.O. female presents via paramedic ambulation and has a hx of increasing lethargy, HTN, and a systolic of 80 and fever. Patient has a hx of C. Diff.    As the supervising MD I agree with the above PE.    As the supervising MD I agree with the above treatment, course, plan, and disposition.  I have reviewed and agree with the residents interpretation of the following: EKG, lab data and x-rays.            Attending ED Notes:   Patient presenting to the ED via paramedic ambulance she had been found to be extremely weak with a blood pressure in the 80s systolic patient also is significantly lethargic patient does have history of C diff in the past.  Patient presenting in a quite critical state and the patient was aggressively treated for probable sepsis with IV fluids IV antibiotics following blood cultures also had a cath UA also suspicious for possible UTI patient required critical care while in the ED and is admitted in a slightly improved state             Clinical Impression:   The primary encounter diagnosis was Sepsis. Diagnoses of CHF (congestive heart failure), Clostridium difficile infection, and Tachycardia were also pertinent to this visit.      Disposition:   Disposition: Admitted  Condition: Critical                        Chan Fabian MD  07/26/18 [id number]

## 2018-07-24 ENCOUNTER — OUTPATIENT CASE MANAGEMENT (OUTPATIENT)
Dept: ADMINISTRATIVE | Facility: OTHER | Age: 83
End: 2018-07-24

## 2018-07-24 PROBLEM — T14.8XXA WOUND OF SKIN: Status: ACTIVE | Noted: 2018-07-24

## 2018-07-24 PROBLEM — J98.4 CAVITARY LESION OF LUNG: Status: ACTIVE | Noted: 2018-07-24

## 2018-07-24 PROBLEM — R23.9 ALTERATION IN SKIN INTEGRITY DUE TO MOISTURE: Status: ACTIVE | Noted: 2018-07-24

## 2018-07-24 PROBLEM — N39.0 UTI (URINARY TRACT INFECTION): Status: ACTIVE | Noted: 2018-07-24

## 2018-07-24 LAB
ANION GAP SERPL CALC-SCNC: 13 MMOL/L
ANISOCYTOSIS BLD QL SMEAR: SLIGHT
BACTERIA UR CULT: NO GROWTH
BASOPHILS # BLD AUTO: 0.06 K/UL
BASOPHILS NFR BLD: 0.2 %
BUN SERPL-MCNC: 20 MG/DL
BURR CELLS BLD QL SMEAR: ABNORMAL
C DIFF GDH STL QL: POSITIVE
C DIFF TOX A+B STL QL IA: POSITIVE
CALCIUM SERPL-MCNC: 8.4 MG/DL
CHLORIDE SERPL-SCNC: 107 MMOL/L
CO2 SERPL-SCNC: 20 MMOL/L
CREAT SERPL-MCNC: 0.9 MG/DL
DIASTOLIC DYSFUNCTION: NO
DIFFERENTIAL METHOD: ABNORMAL
EOSINOPHIL # BLD AUTO: 0 K/UL
EOSINOPHIL NFR BLD: 0.1 %
ERYTHROCYTE [DISTWIDTH] IN BLOOD BY AUTOMATED COUNT: 14.9 %
EST. GFR  (AFRICAN AMERICAN): >60 ML/MIN/1.73 M^2
EST. GFR  (NON AFRICAN AMERICAN): 56.9 ML/MIN/1.73 M^2
ESTIMATED PA SYSTOLIC PRESSURE: 17.64
GLUCOSE SERPL-MCNC: 94 MG/DL
HCT VFR BLD AUTO: 36.4 %
HGB BLD-MCNC: 11.3 G/DL
HYPOCHROMIA BLD QL SMEAR: ABNORMAL
IMM GRANULOCYTES # BLD AUTO: 0.4 K/UL
IMM GRANULOCYTES NFR BLD AUTO: 1.2 %
LACTATE SERPL-SCNC: 1.9 MMOL/L
LYMPHOCYTES # BLD AUTO: 1.3 K/UL
LYMPHOCYTES NFR BLD: 3.8 %
MAGNESIUM SERPL-MCNC: 1.7 MG/DL
MCH RBC QN AUTO: 30.8 PG
MCHC RBC AUTO-ENTMCNC: 31 G/DL
MCV RBC AUTO: 99 FL
MONOCYTES # BLD AUTO: 2.3 K/UL
MONOCYTES NFR BLD: 6.7 %
NEUTROPHILS # BLD AUTO: 29.7 K/UL
NEUTROPHILS NFR BLD: 88 %
NRBC BLD-RTO: 0 /100 WBC
OVALOCYTES BLD QL SMEAR: ABNORMAL
PHOSPHATE SERPL-MCNC: 3.3 MG/DL
PLATELET # BLD AUTO: 258 K/UL
PLATELET BLD QL SMEAR: ABNORMAL
PMV BLD AUTO: 11.5 FL
POTASSIUM SERPL-SCNC: 3.2 MMOL/L
PREALB SERPL-MCNC: 9 MG/DL
RBC # BLD AUTO: 3.67 M/UL
RETIRED EF AND QEF - SEE NOTES: 55 (ref 55–65)
SODIUM SERPL-SCNC: 140 MMOL/L
TRICUSPID VALVE REGURGITATION: NORMAL
WBC # BLD AUTO: 33.75 K/UL

## 2018-07-24 PROCEDURE — 84134 ASSAY OF PREALBUMIN: CPT

## 2018-07-24 PROCEDURE — 63600175 PHARM REV CODE 636 W HCPCS: Performed by: STUDENT IN AN ORGANIZED HEALTH CARE EDUCATION/TRAINING PROGRAM

## 2018-07-24 PROCEDURE — 85025 COMPLETE CBC W/AUTO DIFF WBC: CPT

## 2018-07-24 PROCEDURE — 93306 TTE W/DOPPLER COMPLETE: CPT

## 2018-07-24 PROCEDURE — 83605 ASSAY OF LACTIC ACID: CPT

## 2018-07-24 PROCEDURE — 36415 COLL VENOUS BLD VENIPUNCTURE: CPT

## 2018-07-24 PROCEDURE — 93306 TTE W/DOPPLER COMPLETE: CPT | Mod: 26,,, | Performed by: INTERNAL MEDICINE

## 2018-07-24 PROCEDURE — 11000001 HC ACUTE MED/SURG PRIVATE ROOM

## 2018-07-24 PROCEDURE — 25000003 PHARM REV CODE 250: Performed by: STUDENT IN AN ORGANIZED HEALTH CARE EDUCATION/TRAINING PROGRAM

## 2018-07-24 PROCEDURE — 80048 BASIC METABOLIC PNL TOTAL CA: CPT

## 2018-07-24 PROCEDURE — 84100 ASSAY OF PHOSPHORUS: CPT

## 2018-07-24 PROCEDURE — 83735 ASSAY OF MAGNESIUM: CPT

## 2018-07-24 PROCEDURE — 25000003 PHARM REV CODE 250: Performed by: HOSPITALIST

## 2018-07-24 PROCEDURE — 99223 1ST HOSP IP/OBS HIGH 75: CPT | Mod: ,,, | Performed by: INTERNAL MEDICINE

## 2018-07-24 PROCEDURE — 99223 1ST HOSP IP/OBS HIGH 75: CPT | Mod: AI,GC,, | Performed by: HOSPITALIST

## 2018-07-24 PROCEDURE — 86480 TB TEST CELL IMMUN MEASURE: CPT

## 2018-07-24 PROCEDURE — 86481 TB AG RESPONSE T-CELL SUSP: CPT

## 2018-07-24 PROCEDURE — 86580 TB INTRADERMAL TEST: CPT | Performed by: STUDENT IN AN ORGANIZED HEALTH CARE EDUCATION/TRAINING PROGRAM

## 2018-07-24 PROCEDURE — 97166 OT EVAL MOD COMPLEX 45 MIN: CPT

## 2018-07-24 PROCEDURE — 99499 UNLISTED E&M SERVICE: CPT | Mod: ,,, | Performed by: PHYSICIAN ASSISTANT

## 2018-07-24 RX ORDER — ONDANSETRON 4 MG/1
4 TABLET, ORALLY DISINTEGRATING ORAL EVERY 8 HOURS PRN
Status: DISCONTINUED | OUTPATIENT
Start: 2018-07-24 | End: 2018-08-02 | Stop reason: HOSPADM

## 2018-07-24 RX ORDER — ONDANSETRON 4 MG/1
4 TABLET, ORALLY DISINTEGRATING ORAL EVERY 8 HOURS PRN
Status: CANCELLED | OUTPATIENT
Start: 2018-07-24

## 2018-07-24 RX ORDER — CEFEPIME HYDROCHLORIDE 2 G/1
2 INJECTION, POWDER, FOR SOLUTION INTRAVENOUS EVERY 24 HOURS
Status: DISCONTINUED | OUTPATIENT
Start: 2018-07-24 | End: 2018-07-24

## 2018-07-24 RX ORDER — CEFEPIME HYDROCHLORIDE 2 G/1
2 INJECTION, POWDER, FOR SOLUTION INTRAVENOUS
Status: DISCONTINUED | OUTPATIENT
Start: 2018-07-24 | End: 2018-07-24

## 2018-07-24 RX ORDER — POTASSIUM CHLORIDE 20 MEQ/1
40 TABLET, EXTENDED RELEASE ORAL ONCE
Status: COMPLETED | OUTPATIENT
Start: 2018-07-24 | End: 2018-07-24

## 2018-07-24 RX ORDER — CEFTRIAXONE 1 G/1
1 INJECTION, POWDER, FOR SOLUTION INTRAMUSCULAR; INTRAVENOUS
Status: DISCONTINUED | OUTPATIENT
Start: 2018-07-24 | End: 2018-07-25

## 2018-07-24 RX ORDER — SODIUM CHLORIDE 9 MG/ML
INJECTION, SOLUTION INTRAVENOUS CONTINUOUS
Status: DISCONTINUED | OUTPATIENT
Start: 2018-07-24 | End: 2018-07-25

## 2018-07-24 RX ADMIN — SULFAMETHOXAZOLE AND TRIMETHOPRIM 1 TABLET: 800; 160 TABLET ORAL at 01:07

## 2018-07-24 RX ADMIN — MICONAZOLE NITRATE: 20 OINTMENT TOPICAL at 09:07

## 2018-07-24 RX ADMIN — SODIUM CHLORIDE: 0.9 INJECTION, SOLUTION INTRAVENOUS at 07:07

## 2018-07-24 RX ADMIN — Medication 1000 UNITS: at 09:07

## 2018-07-24 RX ADMIN — QUETIAPINE FUMARATE 25 MG: 25 TABLET ORAL at 09:07

## 2018-07-24 RX ADMIN — LEVOTHYROXINE SODIUM 100 MCG: 100 TABLET ORAL at 07:07

## 2018-07-24 RX ADMIN — Medication 125 MG: at 07:07

## 2018-07-24 RX ADMIN — POTASSIUM CHLORIDE 40 MEQ: 1500 TABLET, EXTENDED RELEASE ORAL at 10:07

## 2018-07-24 RX ADMIN — CEFTRIAXONE SODIUM 1 G: 1 INJECTION, POWDER, FOR SOLUTION INTRAMUSCULAR; INTRAVENOUS at 06:07

## 2018-07-24 RX ADMIN — SODIUM CHLORIDE: 0.9 INJECTION, SOLUTION INTRAVENOUS at 05:07

## 2018-07-24 RX ADMIN — CYANOCOBALAMIN TAB 250 MCG 500 MCG: 250 TAB at 09:07

## 2018-07-24 RX ADMIN — Medication 125 MG: at 12:07

## 2018-07-24 RX ADMIN — ENOXAPARIN SODIUM 40 MG: 100 INJECTION SUBCUTANEOUS at 05:07

## 2018-07-24 RX ADMIN — ATORVASTATIN CALCIUM 40 MG: 20 TABLET, FILM COATED ORAL at 09:07

## 2018-07-24 RX ADMIN — QUETIAPINE FUMARATE 25 MG: 25 TABLET ORAL at 01:07

## 2018-07-24 RX ADMIN — Medication 125 MG: at 05:07

## 2018-07-24 RX ADMIN — DONEPEZIL HYDROCHLORIDE 10 MG: 5 TABLET, FILM COATED ORAL at 09:07

## 2018-07-24 RX ADMIN — Medication 5 UNITS: at 01:07

## 2018-07-24 RX ADMIN — ASPIRIN 81 MG: 81 TABLET, COATED ORAL at 09:07

## 2018-07-24 NOTE — CONSULTS
[facility]  Infectious Disease  Consult Note    Patient Name: Arina Adame  MRN: [id number]  Admission Date: 7/23/2018  Hospital Length of Stay: 1 days  Attending Physician: Washington Quintana MD  Primary Care Provider: Zeinab Meier MD       Inpatient consult to Infectious Diseases  Consult performed by: KAY FLOREZ  Consult ordered by: LEX HAYDEN consult received. Start contact precautions for C. Diff. Currently on oral Vancomycin therapy. Given RUL cavitary lung lesion on CT, would obtain 3 AFB sputum cultures if possible.     Will review chart with full consult note and recommendations to follow.      Thank you,  Kay Florez PA-C  [phone]

## 2018-07-24 NOTE — HPI
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 Her most recent episode of C. Diff was in June. She was discharged on June 26th with a plan to complete 14 days of oral vancomycin. She states she completed vancomycin for C.diff last Thursday. Shortly after, her daughter states she has had severe foul smelling watery diarrhea since completing the oral vancomycin. She has been more confused at night for the past 2 nights. Yesterday her daughter noticed she had a  fever of >101 and called EMS. In ED, patient was febrile to 101.4, hypotensive, tachycardic, and lethargic. WBC 30K, lactate 2.6. UA with 20 WBC, occasional bacteria. She was started on IV fluids and empiric antibiotics.  Blood cultures are negative. Urine culture pending. C diff +. CT chest showed diffuse tubular bronchiectasis as well as peripheral reticular opacities and centrilobular pulmonary nodules with tree-in-bud configuration suggesting small airways infection/inflammation. Also present is a 2.4 cm right upper lobe cavitary lesion. Sputum cultures ordered, including AFB. Quant gold ordered. ID consulted for antibiotic recommendations.    Patient seen at bedside with daughter. She denies current cough or sputum production. No abdominal pain, N/V, headache, CP, SOB, hemoptysis. She continues to have foul smelling watery diarrhea along with poor appetite. Otherwise, appears well.

## 2018-07-24 NOTE — ASSESSMENT & PLAN NOTE
- recent hx of C diff colitis s/p PO vancomycin therapy, now presenting with diarrhea, concerning for recurrence   - f/u stool studies  - WBC 30K  - C dfif studies pending  - Received 2 mg cefepime in ED  - 750 mg vanc IV in Ed  - 125 QID vanc oral

## 2018-07-24 NOTE — PT/OT/SLP EVAL
Occupational Therapy   Evaluation    Name: Arina Adame  MRN: [id number]  Admitting Diagnosis:  Clostridium difficile infection      Recommendations:     Discharge Recommendations: nursing facility, skilled  Discharge Equipment Recommendations:  none  Barriers to discharge:  Decreased caregiver support    History:     Occupational Profile:  Living Environment: pt lives with daughter and son in law in 1 story house with no steps   Previous level of function: Pt with overall mod A for safe self care completion   Assistance upon Discharge: daughter is available for assist     Past Medical History:   Diagnosis Date    Alzheimer disease     Arthritis     B12 nutritional deficiency 8/6/2012    C. 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  Pain/Comfort:  · Pain Rating 1: 0/10    Patients cultural, spiritual, Lutheran conflicts given the current situation: none stated     Objective:     Patient found with:  (lines intact )    General Precautions: Standard, fall   Orthopedic Precautions:N/A   Braces: N/A     Occupational Performance:    Bed Mobility:    · Patient  completed Rolling/Turning to Left with  minimum assistance  · Patient completed Rolling/Turning to Right with minimum assistance  · Patient completed Scooting/Bridging with minimum assistance    Activities of Daily Living:  · Pt with Mod/Max self care completion     Cognitive/Visual Perceptual:  Cognitive/Psychosocial Skills:     -       Oriented to: Person and Place   -       Follows Commands/attention:Follows two-step commands  -       Communication: clear/fluent  -       Memory: No Deficits noted  -       Safety awareness/insight to disability: impaired   -       Mood/Affect/Coping skills/emotional control: Appropriate to situation    Physical Exam:  Upper Extremity Range of Motion:     -       Right Upper Extremity: WFL  -       Left Upper Extremity: WFL  Upper Extremity Strength:    -       Right Upper Extremity: WFL  -       Left Upper Extremity: WFL    Patient left supine with all lines intact    AMPAC 6 Click:  AMPAC Total Score: 14    Treatment & Education:  Evaluation complete.  Pt educated on safety, role of OT, importance of increased participation in self care for gains , expectations for participation, expectations for gains, POC, energy conservation, caregiver strain. White board updated.     Education:    Assessment:     Arina Adame is a 89 y.o. female with a medical diagnosis of Clostridium difficile infection.  She presents with daughter in room.  Pt on airborne precautions with daughter in room without protection.  Educated pt and daughter on use.  Pt with baseline Mod A for safe self care completion with daily self care task and pt and daughter have routine to ensure pt with maximum functional performance   Pt with potential for improved participation and return to PLOF .  Performance deficits affecting function are weakness, impaired endurance, impaired self care skills, impaired functional mobilty, gait instability, impaired balance, impaired cognition.      Rehab Prognosis:  Fair ;  "patient would benefit from acute skilled OT services to address these deficits and reach maximum level of function.         Clinical Decision Makin.  OT Mod:  "Pt evaluation falls under moderate complexity for evaluation coding due to identification of 3-5 performance deficits noted as stated above. Eval required Min/Mod assistance to complete on this date and detailed assessment(s) were utilized. Moreover, an expanded review of history and occupational profile obtained with additional review of cognitive, physical and psychosocial hx."     Plan:     Patient to be seen 3 x/week to address the above listed problems via self-care/home management, therapeutic activities, therapeutic exercises  · Plan of Care Expires: 18  · Plan of Care Reviewed with: patient    This Plan of care has been discussed with the patient who was involved in its development and understands and is in agreement with the identified goals and treatment plan    GOALS:    Occupational Therapy Goals        Problem: Occupational Therapy Goal    Goal Priority Disciplines Outcome Interventions   Occupational Therapy Goal     OT, PT/OT Ongoing (interventions implemented as appropriate)    Description:  Goals to be met by: 8/15  Patient will increase functional independence with ADLs by performing:    Feeding with Set-up Assistance.  UE Dressing with Contact Guard Assistance.  LE Dressing with Moderate Assistance.  Grooming while seated with Contact Guard Assistance.  Toileting from toilet with Minimal Assistance for hygiene and clothing management.                       Time Tracking:     OT Date of Treatment: 18  OT Start Time: 1135  OT Stop Time: 1150  OT Total Time (min): 15 min    Billable Minutes:Evaluation 15    Ruth Pina, OT  2018    "

## 2018-07-24 NOTE — CONSULTS
[facility]  Infectious Disease  Consult Note    Patient Name: Arina Adame  MRN: [id number]  Admission Date: 7/23/2018  Hospital Length of Stay: 1 days  Attending Physician: Washington Quintana MD  Primary Care Provider: Zeinab Meier MD     Isolation Status: Airborne and Special Contact    Patient information was obtained from patient and past medical records.      Consults  Assessment/Plan:     * Clostridium difficile infection       89 year old female with history of Alzheimer disease, recurrent UTIs and recurrent C difficile colitis admitted with fevers, diarrhea and vomiting after recently completing a 14 day course of oral vancomycin. In ED, patient was septic with fever up to 101.4 and an elevated WBC count. She was started on IV fluids and empiric antibiotics. Blood cultures are negative. Urine culture pending. C diff +. CT chest showed diffuse tubular bronchiectasis as well as peripheral reticular opacities and centrilobular pulmonary nodules with tree-in-bud configuration suggesting small airways infection/inflammation. Also present is a 2.4 cm right upper lobe cavitary lesion. Sputum cultures ordered, including AFB. Quant gold ordered. ID consulted for antibiotic recommendations.      Plan  - This is her third occurrence of C. Diff colitis. Continue oral Vancomycin for now.  - From a communicable standpoint - recommend further evaluation of the cavitary lung lesion with AFB sputum cultures and send a sputum specimen for a MTB PCR. May need respiratory therapy involvement to help produce specimen. T spot ordered.  - Ok to continue Ceftriaxone for possible UTI pending urine culture results.  - At this time, do not suspect septic picture is secondary to a lung source.   - Topical antifungal to groin rash.  - ID will continue to follow.             Thank you for the consult. Please call for any questions.  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 Her most recent episode of C. Diff was in June. She was discharged on June 26th with a plan to complete 14 days of oral vancomycin. She states she completed vancomycin for C.diff last Thursday. Shortly after, her daughter states she has had severe foul smelling watery diarrhea since completing the oral vancomycin. She has been more confused at night for the past 2 nights. Yesterday her daughter noticed she had a  fever of >101 and called EMS. In ED, patient was febrile to 101.4, hypotensive, tachycardic, and lethargic. WBC 30K, lactate 2.6. UA with 20 WBC, occasional bacteria. She was started on IV fluids and empiric antibiotics.  Blood cultures are negative. Urine culture pending. C diff +. CT chest showed diffuse tubular bronchiectasis as well as peripheral reticular opacities and centrilobular pulmonary nodules with tree-in-bud configuration suggesting small airways infection/inflammation. Also present is a 2.4 cm right upper lobe cavitary lesion. Sputum cultures ordered, including AFB. Quant gold ordered. ID consulted for antibiotic recommendations.    Patient seen at bedside with daughter. She denies current cough or sputum production. No abdominal pain, N/V, headache, CP, SOB, hemoptysis. She continues to have foul smelling watery diarrhea along with poor appetite. Otherwise, appears well.    Past Medical History:   Diagnosis Date    Alzheimer disease     Arthritis     B12 nutritional deficiency 8/6/2012    C. difficile colitis     5/2018    Carotid arterial disease 8/27/2013    Cataract     Colon cancer     Elevated blood pressure (not hypertension) 3/17/2014    Epiretinal membrane     Essential hypertension  3/16/2016    Hearing loss 3/17/2014    History of colon cancer 5/28/2015    [place] (hard of hearing)     Hypothyroidism 11/21/2012    IGT (impaired glucose tolerance) 2/11/2015    Macrocytosis 11/21/2012    Nevus of choroid     Osteoporosis, 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       Past Surgical History:   Procedure Laterality Date    BREAST SURGERY      CATARACT EXTRACTION W/  INTRAOCULAR LENS IMPLANT Bilateral n/a    OU    COLECTOMY      DENTAL SURGERY      right ankle surgery         Review of patient's allergies indicates:   Allergen Reactions    Codeine      Other reaction(s): Unknown    Iodine      Other reaction(s): Unknown    Penicillins      Other reaction(s): Unknown       Medications:  Prescriptions Prior to Admission   Medication Sig    aspirin (ECOTRIN) 81 MG EC tablet Take 1 tablet (81 mg total) by mouth once daily.    atorvastatin (LIPITOR) 40 MG tablet TAKE 1 TABLET (40 MG TOTAL) BY MOUTH ONCE DAILY.    CALCIUM CITRATE/VITAMIN D3 (CITRACAL + D MAXIMUM ORAL) Take 1 tablet by mouth once daily.     cholecalciferol, vitamin D3, (VITAMIN D3) 1,000 unit capsule Take 1,000 Units by mouth once daily.      cyanocobalamin (VITAMIN B-12) 500 MCG tablet Take 500 mcg by mouth once daily.     donepezil (ARICEPT) 10 MG tablet Take 1 tablet (10 mg total) by mouth every evening.    levothyroxine (SYNTHROID) 100 MCG tablet 1 Tablet Oral every morning except 1.5 pills on Sundays.    multivitamin (ONE DAILY MULTIVITAMIN) per tablet Take 1 tablet by mouth once daily.    QUEtiapine (SEROQUEL) 25 MG Tab TAKE 1 TABLET (25 MG TOTAL) BY MOUTH NIGHTLY AS NEEDED. (Patient taking differently: Take 25 mg by mouth every evening. )    sertraline (ZOLOFT) 25 MG tablet Take 25 mg by mouth once daily.    sertraline (ZOLOFT) 50 MG tablet Take 50 mg by mouth once daily.    UNABLE TO FIND Metamucil daily and Probiotic Florastor one daily OTC    vancomycin oral  solution 25mg/mL Take 1 teaspoonful (5 ml) by mouth 4 times daily until 7/7/2018    walker (ULTRA-LIGHT ROLLATOR) Misc 1 each by Misc.(Non-Drug; Combo Route) route once daily.     Antibiotics     Start     Stop Route Frequency Ordered    07/24/18 1930  ceFEPIme injection 2 g      -- IV Daily 07/24/18 0329    07/24/18 0600  vancomycin 250mg / 10ml oral suspension 125 mg      08/03 0559 Oral Every 6 hours 07/24/18 0242        Antifungals     None        Antivirals     None           Immunization History   Administered Date(s) Administered    Influenza - High Dose 09/17/2012, 09/23/2013, 09/28/2015, 10/18/2016, 10/18/2017    PPD Test 06/26/2018, 07/24/2018    Pneumococcal Conjugate - 13 Valent 10/01/2015    Pneumococcal Polysaccharide - 23 Valent 01/01/2008    Tdap 04/08/2016    Zoster 10/18/2016    influenza - Quadrivalent 09/24/2014       Family History     Problem Relation (Age of Onset)    Hip fracture Mother    Thyroid disease Sister        Social History     Social History    Marital status:      Spouse name: N/A    Number of children: N/A    Years of education: N/A     Social History Main Topics    Smoking status: Former Smoker     Quit date: 6/24/1970    Smokeless tobacco: Former User    Alcohol use No    Drug use: No    Sexual activity: Not Asked     Other Topics Concern    None     Social History Narrative    Daughter lives with her.      Review of Systems   Constitutional: Positive for activity change, appetite change, fatigue and fever. Negative for chills.   HENT: Positive for hearing loss. Negative for congestion, drooling, mouth sores and sore throat.    Eyes: Negative for visual disturbance.   Respiratory: Negative for cough, chest tightness and shortness of breath.    Cardiovascular: Negative for chest pain and leg swelling.   Gastrointestinal: Positive for diarrhea and nausea. Negative for abdominal distention, abdominal pain and vomiting.   Genitourinary: Negative for  difficulty urinating, dysuria and hematuria.   Musculoskeletal: Negative for arthralgias, back pain and gait problem.   Skin: Positive for rash (groin) and wound (sacral). Negative for color change and pallor.   Neurological: Positive for weakness. Negative for dizziness and headaches.   Hematological: Negative for adenopathy.   Psychiatric/Behavioral: Negative for agitation. The patient is not nervous/anxious.      Objective:     Vital Signs (Most Recent):  Temp: 97.4 °F (36.3 °C) (07/24/18 0849)  Pulse: 69 (07/24/18 1153)  Resp: 16 (07/24/18 0849)  BP: (!) 113/55 (07/24/18 0849)  SpO2: 97 % (07/24/18 0849) Vital Signs (24h Range):  Temp:  [97.4 °F (36.3 °C)-101.4 °F (38.6 °C)] 97.4 °F (36.3 °C)  Pulse:  [] 69  Resp:  [16-18] 16  SpO2:  [96 %-100 %] 97 %  BP: ()/(55-86) 113/55     Weight: 48.1 kg (106 lb 0.7 oz)  Body mass index is 19.4 kg/m².    Estimated Creatinine Clearance: 32.2 mL/min (based on SCr of 0.9 mg/dL).    Physical Exam   Constitutional: She is oriented to person, place, and time. No distress.   HENT:   Head: Normocephalic and atraumatic.   Mouth/Throat: No oropharyngeal exudate.   Eyes: Conjunctivae are normal. No scleral icterus.   Neck: Neck supple.   Cardiovascular: Normal rate, regular rhythm and normal heart sounds.    Pulmonary/Chest: Effort normal. No respiratory distress. She has wheezes.   On RA   Abdominal: Soft. Bowel sounds are normal. She exhibits no distension. There is no tenderness.   Genitourinary:   Genitourinary Comments: Erythematous rash to groin    Musculoskeletal: She exhibits no edema or tenderness.   Lymphadenopathy:     She has no cervical adenopathy.   Neurological: She is alert and oriented to person, place, and time. No cranial nerve deficit.   Skin: Skin is warm and dry. She is not diaphoretic. No erythema.   Psychiatric: She has a normal mood and affect. Her behavior is normal.   Nursing note and vitals reviewed.      Significant Labs: All pertinent labs  within the past 24 hours have been reviewed.    Significant Imaging: I have reviewed all pertinent imaging results/findings within the past 24 hours.

## 2018-07-24 NOTE — MEDICAL/APP STUDENT
[facility]  History & Physical    Patient Name: Arina Adame  MRN: [id number]  Admission Date: 07/23/2018  Attending Physician:     PCP:     Zeinab Meier MD    CC:     Chief Complaint   Patient presents with    Fever     fever, weakness, lethargic, tachycardic onset symptoms 3 days ago     HISTORY OF PRESENT ILLNESS:     Arina Adame is a 89 y.o. female has a past medical history of Alzheimer disease; C. difficile colitis; Carotid arterial disease; Cataract; Essential hypertension; Colon cancer; Hypothyroidism; impaired glucose tolerance; Osteoporosis, brought to the ED by her daughter today complaining of fever, diarrhea and vomiting.  At ED, Arina Adame was also hypotensive, tachycardic, and lethargic, CBC, CMP, troponin, BNP, urinalysis, blood/urinary culture, CT chest and Chest X-ray was ordered, concerning recurrently UTI, diff infection, and pneumonia.     Arina Adame has limited ability to give a full medical history.  Collateral information from her daughter was obtained.  Daughter noticed Arina Adame's change of bowel movement last Thursday when she changed her diaper.  The diarrhea has been watery, gold in color, mucousy with C. diff smell.  She became increasingly lethargic.  This morning, Arina Adame was unable to get up from bed, did not take any medication or food, vomited once after taking some water.  She slept all day.  When daughter saw her in the late afternoon, Arina Adame was shivering, had a temperature of 101.9, with some rattle breathing sound.  She took her to the ED. She s/p 2 episodes of C-diff infections with UTI in the past.  Each episode lasted for about 7-9 days.  She was discharged from hospital of her last episode on June 26th, and relapsed right after she complete a home course of PO vancomycin for C.diff last Thursday.     Past Surgical History:   Procedure Laterality Date    BREAST SURGERY      CATARACT EXTRACTION W/  INTRAOCULAR  LENS IMPLANT Bilateral n/a    OU    COLECTOMY      DENTAL SURGERY      right ankle surgery         REVIEW OF SYSTEMS:     -- Constitutional: No fever or chills.  -- Eyes: No visual changes, diplopia, pain, tearing, blind spots, or discharge.   -- Ears, nose, mouth, throat, and face: No congestion, sore throat, epistaxis, d/c, bleeding gums, neck stiffness masses, or dental issues.  -- Respiratory: No cough, shortness of breath, hemoptysis, stridor, wheezing, or night sweats.  -- Cardiovascular: No chest pain, LAIRD, syncope, PND, edema, cyanosis, or palpitations.   -- Gastrointestinal: No vomiting, abdominal pain, hematemesis, melena, dyspepsia, or change in bowel habits.  -- Genitourinary: No hematuria, dysuria, frequency, urgency, nocturia, polyuria, stones, or incontinence.  -- Integument/breast: No rash, pruritis, pigmentation changes, dryness, or changes in hair  -- Hematologic/lymphatic: No easy bruising or lymphadenopathy.   -- Musculoskeletal: No acute arthralgias, acute myalgias, joint swelling, acute limitations of ROM, or acute muscular weakness.  -- Neurological: No seizures, headaches, incoordination, paraesthesias, ataxia, vertigo, or tremors.  -- Behavioral/Psych: No auditory or visual hallucinations, depression, or suicidal/homicidal ideations.  -- Endocrine: No heat or cold intolerance, polydipsia, or unintentional weight gain / loss.  -- Allergy/Immunologic: No recurrent infections or adverse reaction to food, insects, or difficulty breathing.    Pain Scale  *** on scale of 1 to 10    PAST MEDICAL / SURGICAL HISTORY:     Past Medical History:   Diagnosis Date    Alzheimer disease     Arthritis     B12 nutritional deficiency 8/6/2012    C. difficile colitis     5/2018    Carotid arterial disease 8/27/2013    Cataract     Colon cancer     Elevated blood pressure (not hypertension) 3/17/2014    Epiretinal membrane     Essential hypertension 3/16/2016    Hearing loss 3/17/2014    History  of colon cancer 5/28/2015    [employer] (hard of hearing)     Hypothyroidism 11/21/2012    IGT (impaired glucose tolerance) 2/11/2015    Macrocytosis 11/21/2012    Nevus of choroid     Osteoporosis, 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     Past Surgical History:   Procedure Laterality Date    BREAST SURGERY      CATARACT EXTRACTION W/  INTRAOCULAR LENS IMPLANT Bilateral n/a    OU    COLECTOMY      DENTAL SURGERY      right ankle surgery           FAMILY HISTORY:     Family History   Problem Relation Age of Onset    Hip fracture Mother     Thyroid disease Sister         Thyroidectomy    Diabetes Neg Hx     Amblyopia Neg Hx     Blindness Neg Hx     Cataracts Neg Hx     Glaucoma Neg Hx     Macular degeneration Neg Hx     Retinal detachment Neg Hx     Strabismus Neg Hx          SOCIAL HISTORY:     Social History     Social History    Marital status:      Spouse name: N/A    Number of children: N/A    Years of education: N/A     Social History Main Topics    Smoking status: Former Smoker     Quit date: 6/24/1970    Smokeless tobacco: Former User    Alcohol use No    Drug use: No    Sexual activity: Not Asked     Other Topics Concern    None     Social History Narrative    Daughter lives with her.      ALLERGIES:       Review of patient's allergies indicates:   Allergen Reactions    Codeine      Other reaction(s): Unknown    Iodine      Other reaction(s): Unknown    Penicillins      Other reaction(s): Unknown     HEALTH SCREENING:     Influenza vaccine ***not up-to-date for this season.  Prevnar 13 pneumonia vaccine = ***no evidence of previous vaccination found in the medical record    HOME MEDICATIONS:     Prior to Admission medications    Medication Sig Start Date End Date Taking? Authorizing Provider   aspirin (ECOTRIN) 81 MG EC tablet Take 1 tablet (81 mg total) by mouth once daily. 6/26/18 6/26/19  Anitra Bergeron MD   atorvastatin  (LIPITOR) 40 MG tablet TAKE 1 TABLET (40 MG TOTAL) BY MOUTH ONCE DAILY. 12/19/17   Zeinab Meier MD   CALCIUM CITRATE/VITAMIN D3 (CITRACAL + D MAXIMUM ORAL) Take 1 tablet by mouth once daily.     Historical Provider, MD   cholecalciferol, vitamin D3, (VITAMIN D3) 1,000 unit capsule Take 1,000 Units by mouth once daily.      Historical Provider, MD   cyanocobalamin (VITAMIN B-12) 500 MCG tablet Take 500 mcg by mouth once daily.     Historical Provider, MD   donepezil (ARICEPT) 10 MG tablet Take 1 tablet (10 mg total) by mouth every evening. 5/29/18   Zeinab Meier MD   levothyroxine (SYNTHROID) 100 MCG tablet 1 Tablet Oral every morning except 1.5 pills on Sundays. 5/29/18   Zeinab Meier MD   multivitamin (ONE DAILY MULTIVITAMIN) per tablet Take 1 tablet by mouth once daily.    Historical Provider, MD   QUEtiapine (SEROQUEL) 25 MG Tab TAKE 1 TABLET (25 MG TOTAL) BY MOUTH NIGHTLY AS NEEDED.  Patient taking differently: Take 25 mg by mouth every evening.  4/30/18   Zeinab Meier MD   sertraline (ZOLOFT) 25 MG tablet Take 25 mg by mouth once daily.    Historical Provider, MD   sertraline (ZOLOFT) 50 MG tablet Take 50 mg by mouth once daily.    Historical Provider, MD   UNABLE TO FIND Metamucil daily and Probiotic Florastor one daily OTC 7/17/18   Nahomy Zamarripa MD   vancomycin oral solution 25mg/mL Take 1 teaspoonful (5 ml) by mouth 4 times daily until 7/7/2018 6/29/18   MD humphrey Walsh (ULTRA-LIGHT ROLLATOR) Misc 1 each by Misc.(Non-Drug; Combo Route) route once daily. 11/30/16   Zeinab Meier MD          HOSPITAL MEDICATIONS:     Scheduled Meds:   Continuous Infusions:   PRN Meds:     PHYSICAL EXAM:     Wt Readings from Last 1 Encounters:   07/23/18 1544 45.4 kg (100 lb)     Body mass index is 18.89 kg/m².  Vitals:    07/23/18 1930 07/23/18 1931 07/23/18 2001 07/23/18 2134   BP:  (!) 144/70 112/62 (!) 97/55   BP Location:    Left arm   Patient Position:    Lying   Pulse: (!) 113   (!) 115   Resp: 18   18   Temp:    98.7  °F (37.1 °C)   TempSrc:    Axillary   SpO2:  100% 97% 96%   Weight:            ***  -- General appearance: well developed. appears stated age   -- Head: normocephalic, atraumatic   -- Eyes: conjunctivae clear. Extraocular muscles intact  -- Nose: Nares normal. Septum midline.   -- Mouth/Throat: lips, mucosa, and tongue normal. no throat erythema.   -- Neck: supple, symmetrical, trachea midline, no JVD and thyroid not grossly enlarged, appears symmetric  -- Lungs: clear to auscultation bilaterally. normal respiratory effort. No use of accessory muscles.   -- Chest wall: no tenderness. equal bilateral chest rise   -- Heart: regular rate and regular rhythm. S1, S2 normal.  no click, rub or gallop   -- Abdomen: soft, non-tender, non-distended, non-tympanic; bowel sounds normal; no masses  -- Extremities: no cyanosis, clubbing or edema.   -- Pulses: 2+ and symmetric   -- Skin: color normal, texture normal, turgor normal. No rashes or lesions.   -- Neurologic: Normal strength and tone. No focal numbness or weakness. CNII-XII intact. Jovanni coma scale: eyes open spontaneously-4, oriented & converses-5, obeys commands-6.      LABORATORY STUDIES:     Recent Results (from the past 36 hour(s))   CBC auto differential    Collection Time: 07/23/18  4:13 PM   Result Value Ref Range    WBC 30.31 (H) 3.90 - 12.70 K/uL    RBC 3.96 (L) 4.00 - 5.40 M/uL    Hemoglobin 11.9 (L) 12.0 - 16.0 g/dL    Hematocrit 38.2 37.0 - 48.5 %    MCV 97 82 - 98 fL    MCH 30.1 27.0 - 31.0 pg    MCHC 31.2 (L) 32.0 - 36.0 g/dL    RDW 14.6 (H) 11.5 - 14.5 %    Platelets 276 150 - 350 K/uL    MPV 10.4 9.2 - 12.9 fL    Immature Granulocytes 0.7 (H) 0.0 - 0.5 %    Gran # (ANC) 27.4 (H) 1.8 - 7.7 K/uL    Immature Grans (Abs) 0.20 (H) 0.00 - 0.04 K/uL    Lymph # 0.8 (L) 1.0 - 4.8 K/uL    Mono # 1.9 (H) 0.3 - 1.0 K/uL    Eos # 0.0 0.0 - 0.5 K/uL    Baso # 0.05 0.00 - 0.20 K/uL    nRBC 0 0 /100 WBC    Gran% 90.4 (H) 38.0 - 73.0 %    Lymph% 2.6 (L) 18.0 - 48.0 %     Mono% 6.1 4.0 - 15.0 %    Eosinophil% 0.0 0.0 - 8.0 %    Basophil% 0.2 0.0 - 1.9 %    Aniso Slight     Poik Slight     Poly Occasional     Ovalocytes Occasional     Differential Method auto    Comprehensive metabolic panel    Collection Time: 07/23/18  4:13 PM   Result Value Ref Range    Sodium 137 136 - 145 mmol/L    Potassium 3.8 3.5 - 5.1 mmol/L    Chloride 103 95 - 110 mmol/L    CO2 24 23 - 29 mmol/L    Glucose 128 (H) 70 - 110 mg/dL    BUN, Bld 17 8 - 23 mg/dL    Creatinine 1.1 0.5 - 1.4 mg/dL    Calcium 8.9 8.7 - 10.5 mg/dL    Total Protein 6.4 6.0 - 8.4 g/dL    Albumin 2.8 (L) 3.5 - 5.2 g/dL    Total Bilirubin 1.1 (H) 0.1 - 1.0 mg/dL    Alkaline Phosphatase 104 55 - 135 U/L    AST 18 10 - 40 U/L    ALT 12 10 - 44 U/L    Anion Gap 10 8 - 16 mmol/L    eGFR if African American 51.4 (A) >60 mL/min/1.73 m^2    eGFR if non  44.6 (A) >60 mL/min/1.73 m^2   Lactic acid, plasma #1    Collection Time: 07/23/18  4:13 PM   Result Value Ref Range    Lactate (Lactic Acid) 1.4 0.5 - 2.2 mmol/L   Magnesium    Collection Time: 07/23/18  4:13 PM   Result Value Ref Range    Magnesium 1.9 1.6 - 2.6 mg/dL   Phosphorus    Collection Time: 07/23/18  4:13 PM   Result Value Ref Range    Phosphorus 3.0 2.7 - 4.5 mg/dL   Protime-INR    Collection Time: 07/23/18  4:13 PM   Result Value Ref Range    Prothrombin Time 11.4 9.0 - 12.5 sec    INR 1.1 0.8 - 1.2   Brain natriuretic peptide    Collection Time: 07/23/18  4:13 PM   Result Value Ref Range     (H) 0 - 99 pg/mL   Troponin I    Collection Time: 07/23/18  4:13 PM   Result Value Ref Range    Troponin I <0.006 0.000 - 0.026 ng/mL   Procalcitonin    Collection Time: 07/23/18  4:13 PM   Result Value Ref Range    Procalcitonin 0.22 <0.25 ng/mL   Urinalysis, Reflex to Urine Culture Urine, Catheterized    Collection Time: 07/23/18  4:44 PM   Result Value Ref Range    Specimen UA Urine, Catheterized     Color, UA Danika Yellow, Straw, Danika    Appearance, UA Cloudy (A)  Clear    pH, UA 5.0 5.0 - 8.0    Specific Gravity, UA 1.030 1.005 - 1.030    Protein, UA 2+ (A) Negative    Glucose, UA Negative Negative    Ketones, UA Negative Negative    Bilirubin (UA) 1+ (A) Negative    Occult Blood UA Negative Negative    Nitrite, UA Negative Negative    Urobilinogen, UA Negative <2.0 EU/dL    Leukocytes, UA 2+ (A) Negative   Urinalysis Microscopic    Collection Time: 07/23/18  4:44 PM   Result Value Ref Range    RBC, UA 7 (H) 0 - 4 /hpf    WBC, UA 20 (H) 0 - 5 /hpf    WBC Clumps, UA Few (A) None-Rare    Bacteria, UA Occasional None-Occ /hpf    Yeast, UA Few (A) None    Squam Epithel, UA 5 /hpf    Hyaline Casts, UA 55 (A) 0-1/lpf /lpf    Microscopic Comment SEE COMMENT    Lactic acid, plasma #2    Collection Time: 07/23/18  8:10 PM   Result Value Ref Range    Lactate (Lactic Acid) 2.6 (H) 0.5 - 2.2 mmol/L        Lab Results   Component Value Date    INR 1.1 07/23/2018     Lab Results   Component Value Date    HGBA1C 5.3 05/26/2018     No results for input(s): POCTGLUCOSE in the last 72 hours.        MICROBIOLOGY DATA:     Urine Culture, Routine   Date Value Ref Range Status   06/23/2018 KLEBSIELLA OXYTOCA  >100,000 cfu/ml    Final   06/11/2018 KLEBSIELLA PNEUMONIAE  >100,000 cfu/ml    Final   02/14/2018 KLEBSIELLA PNEUMONIAE  >100,000 cfu/ml    Final   11/08/2017 KLEBSIELLA PNEUMONIAE  >100,000 cfu/ml    Final   10/18/2017   Final    VIRIDANS STREPTOCOCCUS GROUP  > 100,000 cfu/ml  Susceptibility testing not routinely performed         Microbiology x 7d:   Microbiology Results (last 7 days)     Procedure Component Value Units Date/Time    Clostridium difficile EIA [[id number]] Collected:  07/23/18 1645    Order Status:  Sent Specimen:  Stool from Stool Updated:  07/23/18 1834    Urine culture [[id number]] Collected:  07/23/18 1644    Order Status:  No result Specimen:  Urine Updated:  07/23/18 1813    Blood culture x two cultures. Draw prior to antibiotics. [[id number]] Collected:  07/23/18 2859     Order Status:  Sent Specimen:  Blood from Peripheral, Upper Arm, Left Updated:  07/23/18 1644    Blood culture x two cultures. Draw prior to antibiotics. [[id number]] Collected:  07/23/18 1613    Order Status:  Sent Specimen:  Blood from Peripheral, Upper Arm, Right Updated:  07/23/18 1633            IMAGING:     Imaging Results          CT Chest Without Contrast (Final result)     Abnormal  Result time 07/23/18 21:01:04    Final result by Celio Wagoner MD (07/23/18 21:01:04)                 Impression:      Constellation of findings suggesting small airways infection/inflammation including tubular bronchiectasis, peripheral reticulation, and tree-in-bud configuration of centrilobular pulmonary nodules.    2.4 cm right upper lobe cavitary lesion.  In light of above findings, this lesion likely represents an infectious/inflammatory process such as MAC, tuberculosis, or granulomatosis with polyangiitis, however neoplasm cannot be entirely excluded.  Recommend consultation with pulmonology for further evaluation.    T11 mild compression deformity, stable from May 26, 2018.    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    Cholelithiasis.    This report was flagged in Epic as abnormal.    Electronically signed by resident: Alexey Murguia  Date:    07/23/2018  Time:    19:29    Electronically signed by: Celio Wagoner MD  Date:    07/23/2018  Time:    21:01             Narrative:    EXAMINATION:  CT CHEST WITHOUT CONTRAST    CLINICAL HISTORY:  leukocytosis of 30 and concern for complex PNA;    TECHNIQUE:  Low dose axial images, sagittal and coronal reformations were obtained from the thoracic inlet to the lung bases. Contrast was not administered.    COMPARISON:  CT abdomen pelvis without contrast 05/26/2018    FINDINGS:  Base of neck: There is prominent vascular calcification involving the bilateral carotid bifurcations.  Remaining neck soft tissues are unremarkable.    Thoracic soft tissues: There are  scattered calcifications within the right breast tissue.    Aorta: Left-sided three-vessel aortic arch contains moderate calcific atherosclerosis.  Thoracic aorta maintains appropriate caliber, contour, and course.    Pulmonary vasculature: Pulmonary arteries distribute appropriately.  There are 4 pulmonary veins.    Heart: No cardiac enlargement or pericardial effusion.    Marjorie/Mediastinum: No significant lymphadenopathy.    Airways: Trachea is midline and the proximal airways are patent.    Lungs/Pleura: The lungs are symmetrically expanded noting right apical bullous disease.  There is diffuse tubular bronchiectasis as well as peripheral reticular opacities and centrilobular pulmonary nodules with tree-in-bud configuration suggesting small airways infection/inflammation.  Tree-in-bud nodules best appreciated within the right middle lobe (axial series 2, image 100).  Within the posterior segment of the right upper lobe there is a cavitary lesion measuring approximately 2.4 x 2.3 cm (axial series 2, image 54).  No additional cavitation is present.  No pneumothorax or pleural effusion.    Esophagus: Normal in course and caliber.  There is a moderately sized hiatal hernia.    Upper Abdomen: There is stable aneurysmal dilation of the partially imaged abdominal aorta at the level of the renal arteries measuring up to 3.3 cm (axial series 2, image 145).  There is cholelithiasis.    Bones: There is moderate levo scoliotic curvature of the thoracic spine.  There is mild compression deformity of the T11 vertebral body similar to prior examination dated 05/26/2018.  Remaining osseous structures demonstrate age-appropriate degenerative change.  No evidence for acute fracture or dislocation.                               X-Ray Chest AP Portable (Final result)  Result time 07/23/18 17:25:47    Final result by Hero Sawyer Jr., MD (07/23/18 17:25:47)                 Impression:      Abnormal interstitial pattern accentuated  compared to prior.  Congestive failure and developing bilateral infiltrates need to be considered.      Electronically signed by: Hero Sawyer MD  Date:    07/23/2018  Time:    17:25             Narrative:    EXAMINATION:  XR CHEST AP PORTABLE    CLINICAL HISTORY:  Sepsis;    TECHNIQUE:  Single frontal view of the chest was performed.    COMPARISON:  June 23, 2018.    FINDINGS:  Patient is rotated to the right.  Heart is enlarged.  Diffuse abnormal increase in the interstitial markings.  No confluent consolidation there are some patchy increased markings at the left base.  Degenerative change in the spine.                                  CONSULTS:     None       ASSESSMENT & PLAN:     Primary Diagnosis:  <principal problem not specified>    Active Hospital Problems    Diagnosis  POA    Sepsis [A41.9]  Yes    Age-related physical debility [R54]  Yes      Resolved Hospital Problems    Diagnosis Date Resolved POA   No resolved problems to display.         VTE Risk Mitigation     None        Adult PRN medications available   DVT prophylaxis given       DISPOSITION:     Will admit to the Hospital Medicine service for further evaluation and treatment.    Chart reviewed and updated where applicable.    ===============================================================    ***Signature

## 2018-07-24 NOTE — ASSESSMENT & PLAN NOTE
89 year old female with history of Alzheimer disease, recurrent UTIs and recurrent C difficile colitis admitted with fevers, diarrhea and vomiting after recently completing a 14 day course of oral vancomycin. In ED, patient was septic with fever up to 101.4 and an elevated WBC count. She was started on IV fluids and empiric antibiotics. Blood cultures are negative. Urine culture pending. C diff +. CT chest showed diffuse tubular bronchiectasis as well as peripheral reticular opacities and centrilobular pulmonary nodules with tree-in-bud configuration suggesting small airways infection/inflammation. Also present is a 2.4 cm right upper lobe cavitary lesion. Sputum cultures ordered, including AFB. Quant gold ordered. ID consulted for antibiotic recommendations.      Plan  - This is her third occurrence of C. Diff colitis. Continue oral Vancomycin for now.  - From a communicable standpoint - recommend further evaluation of the cavitary lung lesion with AFB sputum cultures and send a sputum specimen for a MTB PCR. May need respiratory therapy involvement to help produce specimen. T spot ordered.  - Ok to continue empiric antibiotics for possible PNA for now pending respiratory cultures, although suspect septic picture is not secondary to a lung source.   - Topical antifungal to groin rash.  - ID will continue to follow.

## 2018-07-24 NOTE — SUBJECTIVE & OBJECTIVE
Past Medical History:   Diagnosis Date    Alzheimer disease     Arthritis     B12 nutritional deficiency 8/6/2012    C. 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       Past Surgical History:   Procedure Laterality Date    BREAST SURGERY      CATARACT EXTRACTION W/  INTRAOCULAR LENS IMPLANT Bilateral n/a    OU    COLECTOMY      DENTAL SURGERY      right ankle surgery         Review of patient's allergies indicates:   Allergen Reactions    Codeine      Other reaction(s): Unknown    Iodine      Other reaction(s): Unknown    Penicillins      Other reaction(s): Unknown       Medications:  Prescriptions Prior to Admission   Medication Sig    aspirin (ECOTRIN) 81 MG EC tablet Take 1 tablet (81 mg total) by mouth once daily.    atorvastatin (LIPITOR) 40 MG tablet TAKE 1 TABLET (40 MG TOTAL) BY MOUTH ONCE DAILY.    CALCIUM CITRATE/VITAMIN D3 (CITRACAL + D MAXIMUM ORAL) Take 1 tablet by mouth once daily.     cholecalciferol, vitamin D3, (VITAMIN D3) 1,000 unit capsule Take 1,000 Units by mouth once daily.      cyanocobalamin (VITAMIN B-12) 500 MCG tablet Take 500 mcg by mouth once daily.     donepezil (ARICEPT) 10 MG tablet Take 1 tablet (10 mg total) by mouth every evening.    levothyroxine (SYNTHROID) 100 MCG tablet 1 Tablet Oral every morning except 1.5 pills on Sundays.    multivitamin (ONE DAILY MULTIVITAMIN) per tablet Take 1 tablet by mouth once daily.    QUEtiapine (SEROQUEL) 25 MG Tab TAKE 1 TABLET (25  MG TOTAL) BY MOUTH NIGHTLY AS NEEDED. (Patient taking differently: Take 25 mg by mouth every evening. )    sertraline (ZOLOFT) 25 MG tablet Take 25 mg by mouth once daily.    sertraline (ZOLOFT) 50 MG tablet Take 50 mg by mouth once daily.    UNABLE TO FIND Metamucil daily and Probiotic Florastor one daily OTC    vancomycin oral solution 25mg/mL Take 1 teaspoonful (5 ml) by mouth 4 times daily until 7/7/2018    walker (ULTRA-LIGHT ROLLATOR) Misc 1 each by Misc.(Non-Drug; Combo Route) route once daily.     Antibiotics     Start     Stop Route Frequency Ordered    07/24/18 1930  ceFEPIme injection 2 g      -- IV Daily 07/24/18 0329    07/24/18 0600  vancomycin 250mg / 10ml oral suspension 125 mg      08/03 0559 Oral Every 6 hours 07/24/18 0242        Antifungals     None        Antivirals     None           Immunization History   Administered Date(s) Administered    Influenza - High Dose 09/17/2012, 09/23/2013, 09/28/2015, 10/18/2016, 10/18/2017    PPD Test 06/26/2018, 07/24/2018    Pneumococcal Conjugate - 13 Valent 10/01/2015    Pneumococcal Polysaccharide - 23 Valent 01/01/2008    Tdap 04/08/2016    Zoster 10/18/2016    influenza - Quadrivalent 09/24/2014       Family History     Problem Relation (Age of Onset)    Hip fracture Mother    Thyroid disease Sister        Social History     Social History    Marital status:      Spouse name: N/A    Number of children: N/A    Years of education: N/A     Social History Main Topics    Smoking status: Former Smoker     Quit date: 6/24/1970    Smokeless tobacco: Former User    Alcohol use No    Drug use: No    Sexual activity: Not Asked     Other Topics Concern    None     Social History Narrative    Daughter lives with her.      Review of Systems   Constitutional: Positive for activity change, appetite change, fatigue and fever. Negative for chills.   HENT: Positive for hearing loss. Negative for congestion, drooling, mouth sores and sore throat.     Eyes: Negative for visual disturbance.   Respiratory: Negative for cough, chest tightness and shortness of breath.    Cardiovascular: Negative for chest pain and leg swelling.   Gastrointestinal: Positive for diarrhea and nausea. Negative for abdominal distention, abdominal pain and vomiting.   Genitourinary: Negative for difficulty urinating, dysuria and hematuria.   Musculoskeletal: Negative for arthralgias, back pain and gait problem.   Skin: Positive for rash (groin) and wound (sacral). Negative for color change and pallor.   Neurological: Positive for weakness. Negative for dizziness and headaches.   Hematological: Negative for adenopathy.   Psychiatric/Behavioral: Negative for agitation. The patient is not nervous/anxious.      Objective:     Vital Signs (Most Recent):  Temp: 97.4 °F (36.3 °C) (07/24/18 0849)  Pulse: 69 (07/24/18 1153)  Resp: 16 (07/24/18 0849)  BP: (!) 113/55 (07/24/18 0849)  SpO2: 97 % (07/24/18 0849) Vital Signs (24h Range):  Temp:  [97.4 °F (36.3 °C)-101.4 °F (38.6 °C)] 97.4 °F (36.3 °C)  Pulse:  [] 69  Resp:  [16-18] 16  SpO2:  [96 %-100 %] 97 %  BP: ()/(55-86) 113/55     Weight: 48.1 kg (106 lb 0.7 oz)  Body mass index is 19.4 kg/m².    Estimated Creatinine Clearance: 32.2 mL/min (based on SCr of 0.9 mg/dL).    Physical Exam   Constitutional: She is oriented to person, place, and time. No distress.   HENT:   Head: Normocephalic and atraumatic.   Mouth/Throat: No oropharyngeal exudate.   Eyes: Conjunctivae are normal. No scleral icterus.   Neck: Neck supple.   Cardiovascular: Normal rate, regular rhythm and normal heart sounds.    Pulmonary/Chest: Effort normal. No respiratory distress. She has wheezes.   On RA   Abdominal: Soft. Bowel sounds are normal. She exhibits no distension. There is no tenderness.   Genitourinary:   Genitourinary Comments: Erythematous rash to groin    Musculoskeletal: She exhibits no edema or tenderness.   Lymphadenopathy:     She has no cervical  adenopathy.   Neurological: She is alert and oriented to person, place, and time. No cranial nerve deficit.   Skin: Skin is warm and dry. She is not diaphoretic. No erythema.   Psychiatric: She has a normal mood and affect. Her behavior is normal.   Nursing note and vitals reviewed.      Significant Labs: All pertinent labs within the past 24 hours have been reviewed.    Significant Imaging: I have reviewed all pertinent imaging results/findings within the past 24 hours.

## 2018-07-24 NOTE — ASSESSMENT & PLAN NOTE
CT significant for findings suggesting small airways infection/inflammation including tubular bronchiectasis, peripheral reticulation, and tree-in-bud configuration of centrilobular pulmonary nodules.  2.4 cm right upper lobe cavitary lesion.  In light of above findings, this lesion likely represents an infectious/inflammatory process such as MAC, tuberculosis, or granulomatosis with polyangiitis, however neoplasm cannot be entirely excluded.  Recommend consultation with pulmonology for further evaluation.  T11 mild compression deformity, stable from May 26, 2018.  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  Cholelithiasis.  - AFB stain of expectorant sputum  - TB skin test  - QfGold

## 2018-07-24 NOTE — H&P
[facility] Hospital Medicine  History & Physical    Patient Name: Arina Adame  MRN: [id number]  Admission Date: 7/23/2018  Attending Physician: Washington Quintana MD   Primary Care Provider: Zeinab Meier MD    [facility] Medicine Team: [facility] MED 4 Sivakumar Zuñiga MD     Patient information was obtained from relative(s) and ER records.     Subjective:     Principal Problem:Clostridium difficile infection    Chief Complaint:   Chief Complaint   Patient presents with    Fever     fever, weakness, lethargic, tachycardic onset symptoms 3 days ago    Diarrhea        HPI: HPI: Arina Adame is a 89 y.o. female has a past medical history of Alzheimer disease; C.    difficile colitis; Carotid arterial disease; Cataract; Essential hypertension; Colon cancer;    Hypothyroidism; impaired glucose tolerance; Osteoporosis, brought to the ED by her daughter today    complaining of fever, diarrhea and vomiting.  At ED, Arina Adame was also hypotensive,    tachycardic, and lethargic. She has limited ability to give a full medical history.     Collateral information from her daughter was obtained. She was discharged from hospital on her    last C diff episode on June 26th, and relapsed right after she completed a home course of PO    vancomycin for C.diff last Thursday. though this does not add up to a proper time course for 14    day PO as instructed. she has been more confused at night for the past 2 nights. Daughter noticed    Arina Adame's change of bowel movement last Thursday when she changed her diaper.  The    diarrhea has been watery, gold in color, mucousy with C. diff smell.  Today, she was much more    fatigued than normal and would not get out of bed. This afternoon, daughter noticed she had a    fever of >101 and called EMS. EMS noted pt to be febrile, tachycardic and hypotensive and    administered 500cc of NS. In ed pt is rousable but disoriented. She became increasingly     lethargic. This morning pt did not take any medication or food, vomited once after taking some    water. daughter noted a temperature of 101.9 yesterday at home, with some rattle breathing sound.    She s/p 2 episodes of C-diff infections with UTI in the past.  Each episode lasted for about 7-9    days.       Past Medical History:   Diagnosis Date    Alzheimer disease     Arthritis     B12 nutritional deficiency 8/6/2012    C. difficile colitis     5/2018    Carotid arterial disease 8/27/2013    Cataract     Colon cancer     Elevated blood pressure (not hypertension) 3/17/2014    Epiretinal membrane     Essential hypertension 3/16/2016    Hearing loss 3/17/2014    History of colon cancer 5/28/2015    [place]     Hypothyroidism 11/21/2012    IGT (impaired glucose tolerance) 2/11/2015    Macrocytosis 11/21/2012    Nevus of choroid     Osteoporosis, 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       Past Surgical History:   Procedure Laterality Date    BREAST SURGERY      CATARACT EXTRACTION W/  INTRAOCULAR LENS IMPLANT Bilateral n/a    OU    COLECTOMY      DENTAL SURGERY      right ankle surgery         Review of patient's allergies indicates:   Allergen Reactions    Codeine      Other reaction(s): Unknown    Iodine      Other reaction(s): Unknown    Penicillins      Other reaction(s): Unknown       No current facility-administered medications on file prior to encounter.      Current Outpatient Prescriptions on File Prior to Encounter   Medication Sig    aspirin (ECOTRIN) 81 MG EC tablet Take 1 tablet (81 mg total) by mouth once daily.    atorvastatin (LIPITOR) 40 MG tablet TAKE 1 TABLET (40 MG TOTAL) BY MOUTH ONCE DAILY.    CALCIUM CITRATE/VITAMIN D3 (CITRACAL + D MAXIMUM ORAL) Take 1 tablet by mouth once daily.     cholecalciferol, vitamin D3, (VITAMIN D3) 1,000 unit capsule Take 1,000 Units by mouth once daily.       cyanocobalamin (VITAMIN B-12) 500 MCG tablet Take 500 mcg by mouth once daily.     donepezil (ARICEPT) 10 MG tablet Take 1 tablet (10 mg total) by mouth every evening.    levothyroxine (SYNTHROID) 100 MCG tablet 1 Tablet Oral every morning except 1.5 pills on Sundays.    multivitamin (ONE DAILY MULTIVITAMIN) per tablet Take 1 tablet by mouth once daily.    QUEtiapine (SEROQUEL) 25 MG Tab TAKE 1 TABLET (25 MG TOTAL) BY MOUTH NIGHTLY AS NEEDED. (Patient taking differently: Take 25 mg by mouth every evening. )    sertraline (ZOLOFT) 25 MG tablet Take 25 mg by mouth once daily.    sertraline (ZOLOFT) 50 MG tablet Take 50 mg by mouth once daily.    UNABLE TO FIND Metamucil daily and Probiotic Florastor one daily OTC    vancomycin oral solution 25mg/mL Take 1 teaspoonful (5 ml) by mouth 4 times daily until 7/7/2018    walker (ULTRA-LIGHT ROLLATOR) Misc 1 each by Misc.(Non-Drug; Combo Route) route once daily.     Family History     Problem Relation (Age of Onset)    Hip fracture Mother    Thyroid disease Sister        Social History Main Topics    Smoking status: Former Smoker     Quit date: 6/24/1970    Smokeless tobacco: Former User    Alcohol use No    Drug use: No    Sexual activity: Not on file     Review of Systems   Constitutional: Positive for activity change, fatigue and fever.   HENT: Negative for congestion, drooling and mouth sores.    Eyes: Negative for visual disturbance.   Respiratory: Positive for cough. Negative for apnea, chest tightness and shortness of breath.    Cardiovascular: Negative for chest pain and leg swelling.   Gastrointestinal: Positive for diarrhea, nausea and vomiting. Negative for abdominal distention and abdominal pain.   Endocrine: Positive for polyuria.   Genitourinary: Negative for difficulty urinating and dysuria.   Musculoskeletal: Negative for arthralgias.   Skin: Negative for color change and pallor.   Allergic/Immunologic: Negative for environmental allergies and  food allergies.   Neurological: Negative for dizziness, facial asymmetry and headaches.   Hematological: Negative for adenopathy.   Psychiatric/Behavioral: Negative for agitation.     Objective:     Vital Signs (Most Recent):  Temp: 98.7 °F (37.1 °C) (07/23/18 2134)  Pulse: (!) 115 (07/23/18 2134)  Resp: 18 (07/23/18 2134)  BP: (!) 97/55 (07/23/18 2134)  SpO2: 96 % (07/23/18 2134) Vital Signs (24h Range):  Temp:  [97.6 °F (36.4 °C)-101.4 °F (38.6 °C)] 98.7 °F (37.1 °C)  Pulse:  [108-125] 115  Resp:  [16-18] 18  SpO2:  [96 %-100 %] 96 %  BP: ()/(55-86) 97/55     Weight: 45.4 kg (100 lb)  Body mass index is 18.89 kg/m².    Physical Exam   Constitutional: She is oriented to person, place, and time. No distress.   HENT:   Head: Normocephalic and atraumatic.   Eyes: EOM are normal. Pupils are equal, round, and reactive to light.   Neck: Normal range of motion. Neck supple.   Cardiovascular: Normal rate, regular rhythm and normal heart sounds.    Pulmonary/Chest: Effort normal. She has wheezes.   Abdominal: Soft. Bowel sounds are normal. She exhibits no distension. There is no tenderness.   Musculoskeletal: She exhibits no edema or tenderness.   Neurological: She is alert and oriented to person, place, and time. She has normal strength. No cranial nerve deficit or sensory deficit. GCS eye subscore is 4. GCS verbal subscore is 5. GCS motor subscore is 6.   Skin: Skin is warm and dry. Rash noted. She is not diaphoretic.        Psychiatric: She has a normal mood and affect.   Nursing note and vitals reviewed.        CRANIAL NERVES     CN III, IV, VI   Pupils are equal, round, and reactive to light.  Extraocular motions are normal.        Significant Labs:   Recent Lab Results       07/23/18 2010 07/23/18  1644 07/23/18  1627 07/23/18  1613      Immature Granulocytes    0.7(H)     Immature Grans (Abs)    0.20  Comment:  Mild elevation in immature granulocytes is non specific and   can be seen in a variety of conditions  including stress response,   acute inflammation, trauma and pregnancy. Correlation with other   laboratory and clinical findings is essential.  (H)     Procalcitonin    0.22  Comment:  Please re-baseline procalcitonin if a patient is transferred from  other facilities to [facility].  A concentration < 0.25 ng/mL represents a low risk bacterial   infection.  Procalcitonin may not be accurate among patients with localized   infection, recent trauma or major surgery, immunosuppressed state,   invasive fungal infection, renal dysfunction. Decisions regarding   initiation or continuation of antibiotic therapy should not be based   solely on procalcitonin levels.       Albumin    2.8(L)     Alkaline Phosphatase    104     ALT    12     Anion Gap    10     Aniso    Slight     Appearance, UA  Cloudy(A)       AST    18     Bacteria, UA  Occasional       Baso #    0.05     Basophil%    0.2     Bilirubin (UA)  1+  Comment:  Positive urine bilirubin is not confirmed. Correlate with   serum bilirubin and clinical presentation.  (A)       Total Bilirubin    1.1  Comment:  For infants and newborns, interpretation of results should be based  on gestational age, weight and in agreement with clinical  observations.  Premature Infant recommended reference ranges:  Up to 24 hours.............<8.0 mg/dL  Up to 48 hours............<12.0 mg/dL  3-5 days..................<15.0 mg/dL  6-29 days.................<15.0 mg/dL  (H)     Blood Culture, Routine   No Growth to date[P] No Growth to date[P]     BNP    125  Comment:  Values of less than 100 pg/ml are consistent with non-CHF populations.(H)     BUN, Bld    17     Calcium    8.9     Chloride    103     CO2    24     Color, UA  Danika       Creatinine    1.1     Differential Method    auto     eGFR if     51.4(A)     eGFR if non     44.6  Comment:  Calculation used to obtain the estimated glomerular filtration  rate (eGFR) is the CKD-EPI equation.   (A)      Eos #    0.0     Eosinophil%    0.0     Glucose    128(H)     Glucose, UA  Negative       Gran # (ANC)    27.4(H)     Gran%    90.4(H)     Hematocrit    38.2     Hemoglobin    11.9(L)     Hyaline Casts, UA  55(A)       Coumadin Monitoring INR    1.1  Comment:  Coumadin Therapy:  2.0 - 3.0 for INR for all indicators except mechanical heart valves  and antiphospholipid syndromes which should use 2.5 - 3.5.       Ketones, UA  Negative       Lactate, Braxton 2.6  Comment:  Falsely low lactic acid results can be found in samples   containing >=13.0 mg/dL total bilirubin and/or >=3.5 mg/dL   direct bilirubin.  (H)   1.4  Comment:  Falsely low lactic acid results can be found in samples   containing >=13.0 mg/dL total bilirubin and/or >=3.5 mg/dL   direct bilirubin.       Leukocytes, UA  2+(A)       Lymph #    0.8(L)     Lymph%    2.6(L)     Magnesium    1.9     MCH    30.1     MCHC    31.2(L)     MCV    97     Microscopic Comment  SEE COMMENT  Comment:  Other formed elements not mentioned in the report are not   present in the microscopic examination.          Mono #    1.9(H)     Mono%    6.1     MPV    10.4     Nitrite, UA  Negative       nRBC    0     Occult Blood UA  Negative       Ovalocytes    Occasional     pH, UA  5.0       Phosphorus    3.0     Platelets    276     Poik    Slight     Poly    Occasional     Potassium    3.8     Total Protein    6.4     Protein, UA  2+  Comment:  Recommend a 24 hour urine protein or a urine   protein/creatinine ratio if globulin induced proteinuria is  clinically suspected.  (A)       Protime    11.4     RBC    3.96(L)     RBC, UA  7(H)       RDW    14.6(H)     Sodium    137     Specific [place], UA  1.030       Specimen UA  Urine, Catheterized       Squam Epithel, UA  5       Troponin I    <0.006  Comment:  The reference interval for Troponin I represents the 99th percentile   cutoff   for our facility and is consistent with 3rd generation assay   performance.       Urobilinogen,  UA  Negative       WBC Clumps, UA  Few(A)       WBC, UA  20(H)       WBC    30.31(H)     Yeast, UA  Few(A)             Significant Imaging: CT: I have reviewed all pertinent results/findings within the past 24 hours and my personal findings are:  Constellation of findings suggesting small airways infection/inflammation including tubular bronchiectasis, peripheral reticulation, and tree-in-bud configuration of centrilobular pulmonary nodules. 2.4 cm right upper lobe cavitary lesion.  In light of above findings, this lesion likely represents an infectious/inflammatory process such as MAC, tuberculosis, or granulomatosis with polyangiitis, however neoplasm cannot be entirely excluded.  Recommend consultation with pulmonology for further evaluation. T11 mild compression deformity, stable from May 26, 2018. 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 Cholelithiasis    Assessment/Plan:     * Clostridium difficile infection      See diarrhea of presumed infectious origin        Cavitary lesion of lung      CT significant for findings suggesting small airways infection/inflammation including tubular bronchiectasis, peripheral reticulation, and tree-in-bud configuration of centrilobular pulmonary nodules.  2.4 cm right upper lobe cavitary lesion.  In light of above findings, this lesion likely represents an infectious/inflammatory process such as MAC, tuberculosis, or granulomatosis with polyangiitis, however neoplasm cannot be entirely excluded.  Recommend consultation with pulmonology for further evaluation.  T11 mild compression deformity, stable from May 26, 2018.  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  Cholelithiasis.  - AFB stain of expectorant sputum  - TB skin test  - QfGold         UTI (urinary tract infection)      U/A + for UTI - see initial UA results  Bactrim 800 160 BID x7 days started         Wound of skin      Consult placed to wound care for unhealing pressure ulcer in gluteal fold          Sepsis      Received 2 mg cefepime, 750 mg vanc IV in ED  Febrile, hypotensive, tachycardic in ED, responded to IV fluids  30K WBC count   Daily CBC with diff   On oral vanc 125 QID  On bactrim 800 160 BID x7 days for UTI        Age-related physical debility      PT/OT consult         Diarrhea of presumed infectious origin    - recent hx of C diff colitis s/p PO vancomycin therapy, now presenting with diarrhea, concerning for recurrence   - f/u stool studies  - WBC 30K  - C dfif studies pending  - Received 2 mg cefepime in ED  - 750 mg vanc IV in Ed  - 125 QID vanc oral          Dementia      Continue home donepezil  Continue home seroquil  Delirium precautions  zoloft stopped by PCP        Carotid arterial disease      ASA 81  Statin          Hypothyroidism      Continue home levothyroxine          VTE Risk Mitigation         Ordered     enoxaparin injection 40 mg  Daily      07/23/18 2332     IP VTE HIGH RISK PATIENT  Once      07/24/18 0034     Place GERARD hose  Until discontinued      07/23/18 2332             Sivakumar Zuñiga MD  Department of Hospital Medicine   [facility]

## 2018-07-24 NOTE — HPI
Arina Adame is a 89 y.o. female has a past medical history of Alzheimer disease; C. difficile colitis; Carotid arterial disease; Cataract; Essential hypertension; Colon cancer; Hypothyroidism; impaired glucose tolerance; Osteoporosis, brought to the ED by her daughter today complaining of fever, diarrhea and vomiting.  At ED, Arina Adame was also hypotensive, tachycardic, and lethargic, CBC, CMP, troponin, BNP, urinalysis, blood/urinary culture, CT chest and Chest X-ray was ordered, concerning recurrently UTI, diff infection, and pneumonia.      Arina Adame has limited ability to give a full medical history.  Collateral information from her daughter was obtained. She was discharged from hospital of her last episode on June 26th, and relapsed right after she complete a home course of PO vancomycin for C.diff last Thursday. She was brought to the ED by EMS for fever. Daughter reports that she has been more confused at night for the past 2 nights. Today, she was much more fatigued than normal and would not get out of bed. This afternoon, daughter noticed she had a fever of >101 and called EMS. EMS noted pt to be febrile, tachycardic and hypotensive and administered 500cc of NS. On exam pt is rousable but disoriented. Daughter notes that she has a bed sore near her gluteal fold that has not healed.   Daughter noticed Arina Adame's change of bowel movement last Thursday when she changed her diaper.  The diarrhea has been watery, gold in color, mucousy with C. diff smell.  She became increasingly lethargic.  This morning, Arina Adame was unable to get up from bed, did not take any medication or food, vomited once after taking some water.  She slept all day.  When daughter saw her in the late afternoon, Arina Adame was shivering, had a temperature of 101.9, with some rattle breathing sound.  She took her to the ED. She s/p 2 episodes of C-diff infections with UTI in the past.  Each  episode lasted for about 7-9 days.

## 2018-07-24 NOTE — H&P
[facility] Hospital Medicine  History & Physical    Patient Name: Arina Adame  MRN: [id number]  Admission Date: 7/23/2018  Attending Physician: Washington Quintana MD   Primary Care Provider: Zeinab Meier MD    [facility] Medicine Team: [facility] HOSP MED 4 Sivakumar Zuñiga MD     Patient information was obtained from relative(s) and ER records.     Subjective:     Principal Problem:Clostridium difficile infection    Chief Complaint:   Chief Complaint   Patient presents with    Fever     fever, weakness, lethargic, tachycardic onset symptoms 3 days ago    Diarrhea        HPI: Arina Adame is a 89 y.o. female has a past medical history of Alzheimer disease; C. difficile colitis; Carotid arterial disease; Cataract; Essential hypertension; Colon cancer; Hypothyroidism; impaired glucose tolerance; Osteoporosis, brought to the ED by her daughter today complaining of fever, diarrhea and vomiting.  At ED, Arina Adame was also hypotensive, tachycardic, and lethargic, CBC, CMP, troponin, BNP, urinalysis, blood/urinary culture, CT chest and Chest X-ray was ordered, concerning recurrently UTI, diff infection, and pneumonia.   Arina Adame has limited ability to give a full medical history.  Collateral information from her daughter was obtained. She was discharged from hospital of her last episode on June 26th, and relapsed right after she complete a home course of PO vancomycin for C.diff last Thursday.  Daughter reports that she has been more confused at night for the past 2 nights. Daughter noticed Arina Adame's change of bowel movement last Thursday when she changed her diaper.  The diarrhea has been watery, gold in color, mucousy with C. diff smell.  Today, she was much more fatigued than normal and would not get out of bed. This afternoon, daughter noticed she had a fever of >101 and called EMS. EMS noted pt to be febrile, tachycardic and hypotensive and administered 500cc of NS.  On exam pt is rousable but disoriented. Daughter notes that she has a bed sore near her gluteal fold that has not healed.   She became increasingly lethargic. This morning, Arina Adame was unable to get up from bed, did not take any medication or food, vomited once after taking some water. When daughter saw her in the late afternoon, Arina Adame was shivering, had a temperature of 101.9, with some rattle breathing sound. She s/p 2 episodes of C-diff infections with UTI in the past.  Each episode lasted for about 7-9 days.            No new subjective & objective note has been filed under this hospital service since the last note was generated.    Assessment/Plan:     * Clostridium difficile infection      See diarrhea of presumed infectious origin        Cavitary lesion of lung      CT significant for findings suggesting small airways infection/inflammation including tubular bronchiectasis, peripheral reticulation, and tree-in-bud configuration of centrilobular pulmonary nodules.  2.4 cm right upper lobe cavitary lesion.  In light of above findings, this lesion likely represents an infectious/inflammatory process such as MAC, tuberculosis, or granulomatosis with polyangiitis, however neoplasm cannot be entirely excluded.  Recommend consultation with pulmonology for further evaluation.  T11 mild compression deformity, stable from May 26, 2018.  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  Cholelithiasis.  - AFB stain of expectorant sputum  - TB skin test  - QfGold         UTI (urinary tract infection)      U/A + for UTI - see initial UA results  Bactrim 800 160 BID x7 days started         Wound of skin      Consult placed to wound care for unhealing pressure ulcer in gluteal fold         Sepsis      Received 2 mg cefepime, 750 mg vanc IV in ED  Febrile, hypotensive, tachycardic in ED, responded to IV fluids  30K WBC count   Daily CBC with diff   On oral vanc 125 QID  On bactrim 800 160 BID x7 days  for UTI        Age-related physical debility      PT/OT consult         Diarrhea of presumed infectious origin    - recent hx of C diff colitis s/p PO vancomycin therapy, now presenting with diarrhea, concerning for recurrence   - f/u stool studies  - WBC 30K  - C dfif studies pending  - Received 2 mg cefepime in ED  - 750 mg vanc IV in Ed  - 125 QID vanc oral          Dementia      Continue home donepezil  Continue home seroquil  Delirium precautions  zoloft stopped by PCP        Carotid arterial disease      ASA 81  Statin          Hypothyroidism      Continue home levothyroxine          VTE Risk Mitigation         Ordered     enoxaparin injection 40 mg  Daily      07/23/18 2332     IP VTE HIGH RISK PATIENT  Once      07/24/18 0034     Place GERARD hose  Until discontinued      07/23/18 2332             Sivakumar Zuñiga MD  Department of Hospital Medicine   [facility]

## 2018-07-24 NOTE — PLAN OF CARE
Problem: Occupational Therapy Goal  Goal: Occupational Therapy Goal  Goals to be met by: 8/15  Patient will increase functional independence with ADLs by performing:    Feeding with Set-up Assistance.  UE Dressing with Contact Guard Assistance.  LE Dressing with Moderate Assistance.  Grooming while seated with Contact Guard Assistance.  Toileting from toilet with Minimal Assistance for hygiene and clothing management.     Outcome: Ongoing (interventions implemented as appropriate)  Evaluation complete and goals set.  Cont with POC  Ruth Pina, OT  7/24/2018

## 2018-07-24 NOTE — PLAN OF CARE
Problem: Patient Care Overview  Goal: Plan of Care Review  Outcome: Ongoing (interventions implemented as appropriate)  Pt remained free of falls and iinjury. Pt makes no statement of pain. Pt remains on isolation for C-DIFF. Bed low and locked, Call light within reach.

## 2018-07-24 NOTE — PROGRESS NOTES
"Consult received for "gluteal fold" Patient with excoriation to the backside secondary to incontinence. This appears to have a fungal component.  Patient is unable to turn independently.      Notified  regarding recommendations, approval noted    Consultant Recommendations:     q2hour turns  Barrier Antifungal cream BID  EHOB overlay    Wound care team to follow prn.       07/24/18 1102       Wound 07/24/18 1102 Incontinence associated dermatitis Buttocks   Date First Assessed/Time First Assessed: 07/24/18 1102   Pre-existing: No  Primary Wound Type: Incontinence associated dermatitis  Location: Buttocks   Wound Image    Wound WDL ex   Dressing Appearance Open to air   Drainage Amount None   Appearance Other (see comments)  (peeling, )   Periwound Area Excoriated   Dressing Change Due 07/24/18     "

## 2018-07-24 NOTE — PLAN OF CARE
Zeinab Meier MD     Payor: [employer] MEDICARE / Plan: [employer] MEDICARE HMO / Product Type: Capitation /      Extended Emergency Contact Information  Primary Emergency Contact: Obdulia Renteria  Address: [address] [place] [address] [place] [address] [facility]  Home Phone: [phone]  Mobile Phone: [phone]  Relation: Daughter        07/24/18 1441   Discharge Assessment   Assessment Type Discharge Planning Assessment   Confirmed/corrected address and phone number on facesheet? Yes   Assessment information obtained from? Caregiver;Medical Record   Expected Length of Stay (days) 3   Communicated expected length of stay with patient/caregiver yes   Prior to hospitilization cognitive status: Not Oriented to Place;Not Oriented to Time   Prior to hospitalization functional status: Assistive Equipment   Current cognitive status: Not Oriented to Place;Not Oriented to Time   Current Functional Status: Assistive Equipment;Needs Assistance   Lives With child(rosi), adult   Able to Return to Prior Arrangements unable to determine at this time (comments)   Is patient able to care for self after discharge? Unable to determine at this time (comments)   Patient's perception of discharge disposition home or selfcare;home health   Readmission Within The Last 30 Days previous discharge plan unsuccessful   If yes, most recent facility name: [facility]   Patient currently being followed by outpatient case management? Yes   If yes, name of outpatient case management following[facility]   Patient currently receives any other outside agency services? Yes   Name and contact number of agency or person providing outside services [facility]   Is it the patient/care giver preference to resume care with the current outside agency? Yes   Equipment Currently Used at Home walker, rolling;shower chair;wheelchair   Do you have any problems affording any of your prescribed medications? No   Is the  patient taking medications as prescribed? yes   Does the patient have transportation home? Yes   Transportation Available family or friend will provide   Does the patient receive services at the [facility]? No   Discharge Plan A Home with family;Home Health   Discharge Plan B Skilled Nursing Facility   Patient/Family In Agreement With Plan yes   Readmission Questionnaire   At the time of your discharge, did someone talk to you about what your health problems were? Yes   At the time of discharge, did someone talk to you about what to watch out for regarding worsening of your health problem? Yes   At the time of discharge, did someone talk to you about what to do if you experienced worsening of your health problem? Yes   At the time of discharge, did someone talk to you about which medication to take when you left the hospital and which ones to stop taking? Yes   At the time of discharge, did someone talk to you about when and where to follow up with a doctor after you left the hospital? Yes   What do you believe caused you to be sick enough to be re-admitted? c diff   How often do you need to have someone help you when you read instructions, pamphlets, or other written material from your doctor or pharmacy? Often   Do you have problems taking your medications as prescribed? No   Do you have any problems affording any of  your prescribed medications? No   Do you have problems obtaining/receiving your medications? No   Does the patient have transportation to healthcare appointments? Yes   Living Arrangements house   Does the patient have family/friends to help with healtcare needs after discharge? yes   Does your caregiver provide all the help you need? Yes   Are you currently feeling confused? No   Are you currently having problems thinking? No   Are you currently having memory problems? No   Have you felt down, depressed, or hopeless? 0   Have you felt little interest or pleasure in doing things? 0   In the last  7 days, my sleep quality was: fair

## 2018-07-24 NOTE — PROGRESS NOTES
7/24/18 Summary: Per chart review patient currently inpatient.    Interventions: will follow-up upon discharge      Plan:   Dementia teaching - continue  Clostridium Dificile - skin care  Fall prevention  - continue  Referral to Palliative Care - DONE  Referral to [facility]

## 2018-07-24 NOTE — H&P
[facility] Hospital Medicine  History & Physical    Patient Name: Arina Adame  MRN: [id number]  Admission Date: 7/23/2018  Attending Physician: Washington Quintana MD   Primary Care Provider: Zeinab Meier MD    [facility] Medicine Team: [facility] HOSP MED 4 Sivakumar Zuñiga MD     Patient information was obtained from relative(s) and ER records.     Subjective:     Principal Problem:Clostridium difficile infection    Chief Complaint:   Chief Complaint   Patient presents with    Fever     fever, weakness, lethargic, tachycardic onset symptoms 3 days ago    Diarrhea        HPI: Arina Adame is a 89 y.o. female has a past medical history of Alzheimer disease; C. difficile colitis; Carotid arterial disease; Cataract; Essential hypertension; Colon cancer; Hypothyroidism; impaired glucose tolerance; Osteoporosis, brought to the ED by her daughter today complaining of fever, diarrhea and vomiting.  At ED, Arina Adame was also hypotensive, tachycardic, and lethargic, CBC, CMP, troponin, BNP, urinalysis, blood/urinary culture, CT chest and Chest X-ray was ordered, concerning recurrently UTI, diff infection, and pneumonia.   Arina Adame has limited ability to give a full medical history.  Collateral information from her daughter was obtained. She was discharged from hospital of her last episode on June 26th, and relapsed right after she complete a home course of PO vancomycin for C.diff last Thursday.  Daughter reports that she has been more confused at night for the past 2 nights. Daughter noticed Arina Adame's change of bowel movement last Thursday when she changed her diaper.  The diarrhea has been watery, gold in color, mucousy with C. diff smell.  Today, she was much more fatigued than normal and would not get out of bed. This afternoon, daughter noticed she had a fever of >101 and called EMS. EMS noted pt to be febrile, tachycardic and hypotensive and administered 500cc of NS.  On exam pt is rousable but disoriented. Daughter notes that she has a bed sore near her gluteal fold that has not healed.   She became increasingly lethargic. This morning, Arina Adame was unable to get up from bed, did not take any medication or food, vomited once after taking some water. When daughter saw her in the late afternoon, Arina Adame was shivering, had a temperature of 101.9, with some rattle breathing sound. She s/p 2 episodes of C-diff infections with UTI in the past.  Each episode lasted for about 7-9 days.            No new subjective & objective note has been filed under this hospital service since the last note was generated.    Assessment/Plan:     * Clostridium difficile infection      See diarrhea of presumed infectious origin        Cavitary lesion of lung      CT significant for findings suggesting small airways infection/inflammation including tubular bronchiectasis, peripheral reticulation, and tree-in-bud configuration of centrilobular pulmonary nodules.  2.4 cm right upper lobe cavitary lesion.  In light of above findings, this lesion likely represents an infectious/inflammatory process such as MAC, tuberculosis, or granulomatosis with polyangiitis, however neoplasm cannot be entirely excluded.  Recommend consultation with pulmonology for further evaluation.  T11 mild compression deformity, stable from May 26, 2018.  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  Cholelithiasis.  - AFB stain of expectorant sputum  - TB skin test  - QfGold         UTI (urinary tract infection)      U/A + for UTI - see initial UA results  Bactrim 800 160 BID x7 days started         Wound of skin      Consult placed to wound care for unhealing pressure ulcer in gluteal fold         Sepsis      Received 2 mg cefepime, 750 mg vanc IV in ED  Febrile, hypotensive, tachycardic in ED, responded to IV fluids  30K WBC count   Daily CBC with diff   On oral vanc 125 QID  On bactrim 800 160 BID x7 days  for UTI        Age-related physical debility      PT/OT consult         Diarrhea of presumed infectious origin    - recent hx of C diff colitis s/p PO vancomycin therapy, now presenting with diarrhea, concerning for recurrence   - f/u stool studies  - WBC 30K  - C dfif studies pending  - Received 2 mg cefepime in ED  - 750 mg vanc IV in Ed  - 125 QID vanc oral          Dementia      Continue home donepezil  Continue home seroquil  Delirium precautions  zoloft stopped by PCP        Carotid arterial disease      ASA 81  Statin          Hypothyroidism      Continue home levothyroxine          VTE Risk Mitigation         Ordered     enoxaparin injection 40 mg  Daily      07/23/18 2332     IP VTE HIGH RISK PATIENT  Once      07/24/18 0034     Place GERARD hose  Until discontinued      07/23/18 2332             Sivakumar Zuñiga MD  Department of Hospital Medicine   [facility]

## 2018-07-24 NOTE — ASSESSMENT & PLAN NOTE
Received 2 mg cefepime, 750 mg vanc IV in ED  Febrile, hypotensive, tachycardic in ED, responded to IV fluids  30K WBC count   Daily CBC with diff   On oral vanc 125 QID  On bactrim 800 160 BID x7 days for UTI

## 2018-07-24 NOTE — SUBJECTIVE & OBJECTIVE
Past Medical History:   Diagnosis Date    Alzheimer disease     Arthritis     B12 nutritional deficiency 8/6/2012    C. 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      Current Outpatient Prescriptions on File Prior to Encounter   Medication Sig    aspirin (ECOTRIN) 81 MG EC tablet Take 1 tablet (81 mg total) by mouth once daily.    atorvastatin (LIPITOR) 40 MG tablet TAKE 1 TABLET (40 MG TOTAL) BY MOUTH ONCE DAILY.    CALCIUM CITRATE/VITAMIN D3 (CITRACAL + D MAXIMUM ORAL) Take 1 tablet by mouth once daily.     cholecalciferol, vitamin D3, (VITAMIN D3) 1,000 unit capsule Take 1,000 Units by mouth once daily.      cyanocobalamin (VITAMIN B-12) 500 MCG tablet Take 500 mcg by mouth once daily.     donepezil (ARICEPT) 10 MG tablet Take 1 tablet (10 mg total) by mouth every evening.    levothyroxine (SYNTHROID) 100 MCG tablet 1 Tablet Oral every morning except 1.5 pills on Sundays.    multivitamin (ONE DAILY MULTIVITAMIN) per tablet  Take 1 tablet by mouth once daily.    QUEtiapine (SEROQUEL) 25 MG Tab TAKE 1 TABLET (25 MG TOTAL) BY MOUTH NIGHTLY AS NEEDED. (Patient taking differently: Take 25 mg by mouth every evening. )    sertraline (ZOLOFT) 25 MG tablet Take 25 mg by mouth once daily.    sertraline (ZOLOFT) 50 MG tablet Take 50 mg by mouth once daily.    UNABLE TO FIND Metamucil daily and Probiotic Florastor one daily OTC    vancomycin oral solution 25mg/mL Take 1 teaspoonful (5 ml) by mouth 4 times daily until 7/7/2018    walker (ULTRA-LIGHT ROLLATOR) Misc 1 each by Misc.(Non-Drug; Combo Route) route once daily.     Family History     Problem Relation (Age of Onset)    Hip fracture Mother    Thyroid disease Sister        Social History Main Topics    Smoking status: Former Smoker     Quit date: 6/24/1970    Smokeless tobacco: Former User    Alcohol use No    Drug use: No    Sexual activity: Not on file     Review of Systems   Constitutional: Positive for activity change, fatigue and fever.   HENT: Negative for congestion, drooling and mouth sores.    Eyes: Negative for visual disturbance.   Respiratory: Positive for cough. Negative for apnea, chest tightness and shortness of breath.    Cardiovascular: Negative for chest pain and leg swelling.   Gastrointestinal: Positive for diarrhea, nausea and vomiting. Negative for abdominal distention and abdominal pain.   Endocrine: Positive for polyuria.   Genitourinary: Negative for difficulty urinating and dysuria.   Musculoskeletal: Negative for arthralgias.   Skin: Negative for color change and pallor.   Allergic/Immunologic: Negative for environmental allergies and food allergies.   Neurological: Negative for dizziness, facial asymmetry and headaches.   Hematological: Negative for adenopathy.   Psychiatric/Behavioral: Negative for agitation.     Objective:     Vital Signs (Most Recent):  Temp: 98.7 °F (37.1 °C) (07/23/18 2134)  Pulse: (!) 115 (07/23/18 2134)  Resp: 18 (07/23/18  2134)  BP: (!) 97/55 (07/23/18 2134)  SpO2: 96 % (07/23/18 2134) Vital Signs (24h Range):  Temp:  [97.6 °F (36.4 °C)-101.4 °F (38.6 °C)] 98.7 °F (37.1 °C)  Pulse:  [108-125] 115  Resp:  [16-18] 18  SpO2:  [96 %-100 %] 96 %  BP: ()/(55-86) 97/55     Weight: 45.4 kg (100 lb)  Body mass index is 18.89 kg/m².    Physical Exam   Constitutional: She is oriented to person, place, and time. No distress.   HENT:   Head: Normocephalic and atraumatic.   Eyes: EOM are normal. Pupils are equal, round, and reactive to light.   Neck: Normal range of motion. Neck supple.   Cardiovascular: Normal rate, regular rhythm and normal heart sounds.    Pulmonary/Chest: Effort normal. She has wheezes.   Abdominal: Soft. Bowel sounds are normal. She exhibits no distension. There is no tenderness.   Musculoskeletal: She exhibits no edema or tenderness.   Neurological: She is alert and oriented to person, place, and time. She has normal strength. No cranial nerve deficit or sensory deficit. GCS eye subscore is 4. GCS verbal subscore is 5. GCS motor subscore is 6.   Skin: Skin is warm and dry. Rash noted. She is not diaphoretic.        Psychiatric: She has a normal mood and affect.   Nursing note and vitals reviewed.        CRANIAL NERVES     CN III, IV, VI   Pupils are equal, round, and reactive to light.  Extraocular motions are normal.        Significant Labs:   Recent Lab Results       07/23/18 2010 07/23/18  1644 07/23/18  1627 07/23/18  1613      Immature Granulocytes    0.7(H)     Immature Grans (Abs)    0.20  Comment:  Mild elevation in immature granulocytes is non specific and   can be seen in a variety of conditions including stress response,   acute inflammation, trauma and pregnancy. Correlation with other   laboratory and clinical findings is essential.  (H)     Procalcitonin    0.22  Comment:  Please re-baseline procalcitonin if a patient is transferred from  other facilities to [facility].  A concentration < 0.25 ng/mL  represents a low risk bacterial   infection.  Procalcitonin may not be accurate among patients with localized   infection, recent trauma or major surgery, immunosuppressed state,   invasive fungal infection, renal dysfunction. Decisions regarding   initiation or continuation of antibiotic therapy should not be based   solely on procalcitonin levels.       Albumin    2.8(L)     Alkaline Phosphatase    104     ALT    12     Anion Gap    10     Aniso    Slight     Appearance, UA  Cloudy(A)       AST    18     Bacteria, UA  Occasional       Baso #    0.05     Basophil%    0.2     Bilirubin (UA)  1+  Comment:  Positive urine bilirubin is not confirmed. Correlate with   serum bilirubin and clinical presentation.  (A)       Total Bilirubin    1.1  Comment:  For infants and newborns, interpretation of results should be based  on gestational age, weight and in agreement with clinical  observations.  Premature Infant recommended reference ranges:  Up to 24 hours.............<8.0 mg/dL  Up to 48 hours............<12.0 mg/dL  3-5 days..................<15.0 mg/dL  6-29 days.................<15.0 mg/dL  (H)     Blood Culture, Routine   No Growth to date[P] No Growth to date[P]     BNP    125  Comment:  Values of less than 100 pg/ml are consistent with non-CHF populations.(H)     BUN, Bld    17     Calcium    8.9     Chloride    103     CO2    24     Color, UA  Danika       Creatinine    1.1     Differential Method    auto     eGFR if     51.4(A)     eGFR if non     44.6  Comment:  Calculation used to obtain the estimated glomerular filtration  rate (eGFR) is the CKD-EPI equation.   (A)     Eos #    0.0     Eosinophil%    0.0     Glucose    128(H)     Glucose, UA  Negative       Gran # (ANC)    27.4(H)     Gran%    90.4(H)     Hematocrit    38.2     Hemoglobin    11.9(L)     Hyaline Casts, UA  55(A)       Coumadin Monitoring INR    1.1  Comment:  Coumadin Therapy:  2.0 - 3.0 for INR for all  indicators except mechanical heart valves  and antiphospholipid syndromes which should use 2.5 - 3.5.       Ketones, UA  Negative       Lactate, Braxton 2.6  Comment:  Falsely low lactic acid results can be found in samples   containing >=13.0 mg/dL total bilirubin and/or >=3.5 mg/dL   direct bilirubin.  (H)   1.4  Comment:  Falsely low lactic acid results can be found in samples   containing >=13.0 mg/dL total bilirubin and/or >=3.5 mg/dL   direct bilirubin.       Leukocytes, UA  2+(A)       Lymph #    0.8(L)     Lymph%    2.6(L)     Magnesium    1.9     MCH    30.1     MCHC    31.2(L)     MCV    97     Microscopic Comment  SEE COMMENT  Comment:  Other formed elements not mentioned in the report are not   present in the microscopic examination.          Mono #    1.9(H)     Mono%    6.1     MPV    10.4     Nitrite, UA  Negative       nRBC    0     Occult Blood UA  Negative       Ovalocytes    Occasional     pH, UA  5.0       Phosphorus    3.0     Platelets    276     Poik    Slight     Poly    Occasional     Potassium    3.8     Total Protein    6.4     Protein, UA  2+  Comment:  Recommend a 24 hour urine protein or a urine   protein/creatinine ratio if globulin induced proteinuria is  clinically suspected.  (A)       Protime    11.4     RBC    3.96(L)     RBC, UA  7(H)       RDW    14.6(H)     Sodium    137     Specific [place], UA  1.030       Specimen UA  Urine, Catheterized       Squam Epithel, UA  5       Troponin I    <0.006  Comment:  The reference interval for Troponin I represents the 99th percentile   cutoff   for our facility and is consistent with 3rd generation assay   performance.       Urobilinogen, UA  Negative       WBC Clumps, UA  Few(A)       WBC, UA  20(H)       WBC    30.31(H)     Yeast, UA  Few(A)             Significant Imaging: CT: I have reviewed all pertinent results/findings within the past 24 hours and my personal findings are:  Constellation of findings suggesting small airways  infection/inflammation including tubular bronchiectasis, peripheral reticulation, and tree-in-bud configuration of centrilobular pulmonary nodules. 2.4 cm right upper lobe cavitary lesion.  In light of above findings, this lesion likely represents an infectious/inflammatory process such as MAC, tuberculosis, or granulomatosis with polyangiitis, however neoplasm cannot be entirely excluded.  Recommend consultation with pulmonology for further evaluation. T11 mild compression deformity, stable from May 26, 2018. 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 Cholelithiasis

## 2018-07-25 PROBLEM — G93.40 ENCEPHALOPATHY: Status: ACTIVE | Noted: 2018-07-25
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
## 2018-07-25 PROBLEM — N39.0 UTI (URINARY TRACT INFECTION): Status: RESOLVED | Noted: 2018-07-24 | Resolved: 2018-07-25

## 2018-07-25 PROBLEM — R19.7 DIARRHEA OF PRESUMED INFECTIOUS ORIGIN: Status: RESOLVED | Noted: 2018-05-26 | Resolved: 2018-07-25

## 2018-07-25 PROBLEM — B36.9 FUNGAL DERMATITIS: Status: ACTIVE | Noted: 2018-07-24

## 2018-07-25 LAB
ANION GAP SERPL CALC-SCNC: 7 MMOL/L
BASOPHILS # BLD AUTO: 0.06 K/UL
BASOPHILS NFR BLD: 0.2 %
BUN SERPL-MCNC: 16 MG/DL
CALCIUM SERPL-MCNC: 8.2 MG/DL
CHLORIDE SERPL-SCNC: 113 MMOL/L
CO2 SERPL-SCNC: 21 MMOL/L
CREAT SERPL-MCNC: 0.8 MG/DL
DIFFERENTIAL METHOD: ABNORMAL
EOSINOPHIL # BLD AUTO: 0.3 K/UL
EOSINOPHIL NFR BLD: 1.2 %
ERYTHROCYTE [DISTWIDTH] IN BLOOD BY AUTOMATED COUNT: 14.8 %
EST. GFR  (AFRICAN AMERICAN): >60 ML/MIN/1.73 M^2
EST. GFR  (NON AFRICAN AMERICAN): >60 ML/MIN/1.73 M^2
GIANT PLATELETS BLD QL SMEAR: PRESENT
GLUCOSE SERPL-MCNC: 74 MG/DL
HCT VFR BLD AUTO: 36.2 %
HGB BLD-MCNC: 11.4 G/DL
IMM GRANULOCYTES # BLD AUTO: 0.37 K/UL
IMM GRANULOCYTES NFR BLD AUTO: 1.4 %
LYMPHOCYTES # BLD AUTO: 1.5 K/UL
LYMPHOCYTES NFR BLD: 5.9 %
MAGNESIUM SERPL-MCNC: 1.6 MG/DL
MCH RBC QN AUTO: 30.7 PG
MCHC RBC AUTO-ENTMCNC: 31.5 G/DL
MCV RBC AUTO: 98 FL
MITOGEN IGNF BCKGRD COR BLD-ACNC: 0.08 IU/ML
MITOGEN NIL: 0.75 IU/ML
MONOCYTES # BLD AUTO: 1.1 K/UL
MONOCYTES NFR BLD: 4.4 %
NEUTROPHILS # BLD AUTO: 22.4 K/UL
NEUTROPHILS NFR BLD: 86.9 %
NRBC BLD-RTO: 0 /100 WBC
PHOSPHATE SERPL-MCNC: 1.6 MG/DL
PLATELET # BLD AUTO: 240 K/UL
PLATELET BLD QL SMEAR: ABNORMAL
PMV BLD AUTO: 11.1 FL
POTASSIUM SERPL-SCNC: 3.1 MMOL/L
RBC # BLD AUTO: 3.71 M/UL
SODIUM SERPL-SCNC: 141 MMOL/L
TB GOLD PLUS: NEGATIVE
TB1 - NIL: 0.03 IU/ML
TB2 - NIL: 0 IU/ML
WBC # BLD AUTO: 25.74 K/UL

## 2018-07-25 PROCEDURE — 87205 SMEAR GRAM STAIN: CPT

## 2018-07-25 PROCEDURE — 25000003 PHARM REV CODE 250: Performed by: HOSPITALIST

## 2018-07-25 PROCEDURE — 31720 CLEARANCE OF AIRWAYS: CPT

## 2018-07-25 PROCEDURE — 25000242 PHARM REV CODE 250 ALT 637 W/ HCPCS: Performed by: STUDENT IN AN ORGANIZED HEALTH CARE EDUCATION/TRAINING PROGRAM

## 2018-07-25 PROCEDURE — 87106 FUNGI IDENTIFICATION YEAST: CPT

## 2018-07-25 PROCEDURE — 36415 COLL VENOUS BLD VENIPUNCTURE: CPT

## 2018-07-25 PROCEDURE — 99900026 HC AIRWAY MAINTENANCE (STAT)

## 2018-07-25 PROCEDURE — 99233 SBSQ HOSP IP/OBS HIGH 50: CPT | Mod: ,,, | Performed by: INTERNAL MEDICINE

## 2018-07-25 PROCEDURE — 87015 SPECIMEN INFECT AGNT CONCNTJ: CPT

## 2018-07-25 PROCEDURE — 89220 SPUTUM SPECIMEN COLLECTION: CPT

## 2018-07-25 PROCEDURE — 87116 MYCOBACTERIA CULTURE: CPT

## 2018-07-25 PROCEDURE — 93005 ELECTROCARDIOGRAM TRACING: CPT

## 2018-07-25 PROCEDURE — 25000003 PHARM REV CODE 250: Performed by: STUDENT IN AN ORGANIZED HEALTH CARE EDUCATION/TRAINING PROGRAM

## 2018-07-25 PROCEDURE — 93010 ELECTROCARDIOGRAM REPORT: CPT | Mod: ,,, | Performed by: INTERNAL MEDICINE

## 2018-07-25 PROCEDURE — 83735 ASSAY OF MAGNESIUM: CPT

## 2018-07-25 PROCEDURE — 99232 SBSQ HOSP IP/OBS MODERATE 35: CPT | Mod: GC,,, | Performed by: HOSPITALIST

## 2018-07-25 PROCEDURE — 87556 M.TUBERCULO DNA AMP PROBE: CPT

## 2018-07-25 PROCEDURE — 84100 ASSAY OF PHOSPHORUS: CPT

## 2018-07-25 PROCEDURE — 63600175 PHARM REV CODE 636 W HCPCS: Performed by: STUDENT IN AN ORGANIZED HEALTH CARE EDUCATION/TRAINING PROGRAM

## 2018-07-25 PROCEDURE — 85025 COMPLETE CBC W/AUTO DIFF WBC: CPT

## 2018-07-25 PROCEDURE — 11000001 HC ACUTE MED/SURG PRIVATE ROOM

## 2018-07-25 PROCEDURE — 87070 CULTURE OTHR SPECIMN AEROBIC: CPT

## 2018-07-25 PROCEDURE — 80048 BASIC METABOLIC PNL TOTAL CA: CPT

## 2018-07-25 PROCEDURE — 94640 AIRWAY INHALATION TREATMENT: CPT

## 2018-07-25 PROCEDURE — 87206 SMEAR FLUORESCENT/ACID STAI: CPT

## 2018-07-25 RX ORDER — SODIUM CHLORIDE 9 MG/ML
INJECTION, SOLUTION INTRAVENOUS CONTINUOUS
Status: ACTIVE | OUTPATIENT
Start: 2018-07-25 | End: 2018-07-26

## 2018-07-25 RX ORDER — SODIUM,POTASSIUM PHOSPHATES 280-250MG
2 POWDER IN PACKET (EA) ORAL
Status: COMPLETED | OUTPATIENT
Start: 2018-07-25 | End: 2018-07-25

## 2018-07-25 RX ORDER — MAGNESIUM SULFATE HEPTAHYDRATE 40 MG/ML
2 INJECTION, SOLUTION INTRAVENOUS ONCE
Status: COMPLETED | OUTPATIENT
Start: 2018-07-25 | End: 2018-07-25

## 2018-07-25 RX ORDER — POTASSIUM CHLORIDE 20 MEQ/1
40 TABLET, EXTENDED RELEASE ORAL ONCE
Status: COMPLETED | OUTPATIENT
Start: 2018-07-25 | End: 2018-07-25

## 2018-07-25 RX ADMIN — Medication 1000 UNITS: at 08:07

## 2018-07-25 RX ADMIN — SODIUM CHLORIDE: 0.9 INJECTION, SOLUTION INTRAVENOUS at 06:07

## 2018-07-25 RX ADMIN — Medication 125 MG: at 05:07

## 2018-07-25 RX ADMIN — Medication 125 MG: at 11:07

## 2018-07-25 RX ADMIN — SODIUM CHLORIDE SOLN NEBU 3%: 3 NEBU SOLN at 01:07

## 2018-07-25 RX ADMIN — DONEPEZIL HYDROCHLORIDE 10 MG: 5 TABLET, FILM COATED ORAL at 08:07

## 2018-07-25 RX ADMIN — LEVOTHYROXINE SODIUM 100 MCG: 100 TABLET ORAL at 05:07

## 2018-07-25 RX ADMIN — POTASSIUM & SODIUM PHOSPHATES POWDER PACK 280-160-250 MG 2 PACKET: 280-160-250 PACK at 12:07

## 2018-07-25 RX ADMIN — POTASSIUM & SODIUM PHOSPHATES POWDER PACK 280-160-250 MG 2 PACKET: 280-160-250 PACK at 08:07

## 2018-07-25 RX ADMIN — MICONAZOLE NITRATE: 20 OINTMENT TOPICAL at 09:07

## 2018-07-25 RX ADMIN — ENOXAPARIN SODIUM 40 MG: 100 INJECTION SUBCUTANEOUS at 05:07

## 2018-07-25 RX ADMIN — POTASSIUM CHLORIDE 40 MEQ: 1500 TABLET, EXTENDED RELEASE ORAL at 05:07

## 2018-07-25 RX ADMIN — SODIUM CHLORIDE: 0.9 INJECTION, SOLUTION INTRAVENOUS at 05:07

## 2018-07-25 RX ADMIN — MICONAZOLE NITRATE: 20 OINTMENT TOPICAL at 08:07

## 2018-07-25 RX ADMIN — Medication 125 MG: at 12:07

## 2018-07-25 RX ADMIN — ASPIRIN 81 MG: 81 TABLET, COATED ORAL at 08:07

## 2018-07-25 RX ADMIN — MAGNESIUM SULFATE IN WATER 2 G: 40 INJECTION, SOLUTION INTRAVENOUS at 12:07

## 2018-07-25 RX ADMIN — CYANOCOBALAMIN TAB 250 MCG 500 MCG: 250 TAB at 08:07

## 2018-07-25 RX ADMIN — ATORVASTATIN CALCIUM 40 MG: 20 TABLET, FILM COATED ORAL at 08:07

## 2018-07-25 RX ADMIN — POTASSIUM & SODIUM PHOSPHATES POWDER PACK 280-160-250 MG 2 PACKET: 280-160-250 PACK at 05:07

## 2018-07-25 RX ADMIN — QUETIAPINE FUMARATE 25 MG: 25 TABLET ORAL at 08:07

## 2018-07-25 NOTE — SUBJECTIVE & OBJECTIVE
Past Medical History:   Diagnosis Date    Alzheimer disease     Arthritis     B12 nutritional deficiency 8/6/2012    C. 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       Past Surgical History:   Procedure Laterality Date    BREAST SURGERY      CATARACT EXTRACTION W/  INTRAOCULAR LENS IMPLANT Bilateral n/a    OU    COLECTOMY      DENTAL SURGERY      right ankle surgery         Review of patient's allergies indicates:   Allergen Reactions    Codeine      Other reaction(s): Unknown    Iodine      Other reaction(s): Unknown    Penicillins      Other reaction(s): Unknown       Family History     Problem Relation (Age of Onset)    Hip fracture Mother    Thyroid disease Sister        Social History Main Topics    Smoking status: Former Smoker     Quit date: 6/24/1970    Smokeless tobacco: Former User    Alcohol use No    Drug use: No    Sexual activity: Not on file         Review of Systems   Constitutional: Negative for activity change, fatigue and fever.   Respiratory: Negative for cough, choking, shortness of breath and wheezing.    Cardiovascular: Negative for chest pain, palpitations and leg swelling.   Gastrointestinal: Positive for diarrhea. Negative for abdominal pain.   Psychiatric/Behavioral: Positive for confusion.     Objective:     Vital Signs (Most Recent):  Temp: 97.5 °F (36.4 °C) (07/25/18 1400)  Pulse: 89 (07/25/18 1400)  Resp: 15 (07/25/18 1400)  BP: 120/74 (07/25/18 1400)  SpO2: 96 % (07/25/18  1400) Vital Signs (24h Range):  Temp:  [97 °F (36.1 °C)-98.1 °F (36.7 °C)] 97.5 °F (36.4 °C)  Pulse:  [] 89  Resp:  [14-22] 15  SpO2:  [96 %-100 %] 96 %  BP: (116-150)/(67-92) 120/74     Weight: 48.1 kg (106 lb 0.7 oz)  Body mass index is 19.4 kg/m².    No intake or output data in the 24 hours ending 07/25/18 1713    Physical Exam   Constitutional:   Elderly, frail     HENT:   Head: Normocephalic and atraumatic.   Nose: Nose normal.   Eyes: EOM are normal. Pupils are equal, round, and reactive to light.   Neck: Normal range of motion. No JVD present.   Cardiovascular: Normal rate, regular rhythm, normal heart sounds and intact distal pulses.  Exam reveals no gallop and no friction rub.    No murmur heard.  Pulmonary/Chest: Effort normal and breath sounds normal. No stridor. No respiratory distress. She has no wheezes. She has no rales. She exhibits no tenderness.   Abdominal: Soft. Bowel sounds are normal. She exhibits no distension. There is no tenderness.   Musculoskeletal: Normal range of motion.   Neurological: She is alert.   Demented at baseline, does not know time, place, or situation   Psychiatric: She has a normal mood and affect. Her behavior is normal. Judgment and thought content normal.       Vents:       Lines/Drains/Airways     Peripheral Intravenous Line                 Peripheral IV - Single Lumen 07/25/18 1027 Left Antecubital less than 1 day                Significant Labs:    CBC/Anemia Profile:    Recent Labs  Lab 07/24/18  0427 07/25/18  0638   WBC 33.75* 25.74*   HGB 11.3* 11.4*   HCT 36.4* 36.2*    240   MCV 99* 98   RDW 14.9* 14.8*        Chemistries:    Recent Labs  Lab 07/24/18  0426 07/25/18  0638    141   K 3.2* 3.1*    113*   CO2 20* 21*   BUN 20 16   CREATININE 0.9 0.8   CALCIUM 8.4* 8.2*   MG 1.7 1.6   PHOS 3.3 1.6*       Recent Lab Results       07/25/18  1330 07/25/18  0638      Immature Granulocytes  1.4(H)     Immature Grans (Abs)  0.37  Comment:  Mild  elevation in immature granulocytes is non specific and   can be seen in a variety of conditions including stress response,   acute inflammation, trauma and pregnancy. Correlation with other   laboratory and clinical findings is essential.  (H)     Anion Gap  7(L)     Baso #  0.06     Basophil%  0.2     BUN, Bld  16     Calcium  8.2(L)     Chloride  113(H)     CO2  21(L)     Creatinine  0.8     Differential Method  Automated     eGFR if African American  >60.0     eGFR if non   >60.0  Comment:  Calculation used to obtain the estimated glomerular filtration  rate (eGFR) is the CKD-EPI equation.        Eos #  0.3     Eosinophil%  1.2     Large/Giant Platelets  Present     Glucose  74     Gran # (ANC)  22.4(H)     Gran%  86.9(H)     Hematocrit  36.2(L)     Hemoglobin  11.4(L)     Lymph #  1.5     Lymph%  5.9(L)     Magnesium  1.6     MCH  30.7     MCHC  31.5(L)     MCV  98     Mono #  1.1(H)     Mono%  4.4     MPV  11.1     MTB Specimen Source Sputum, Expectorated      nRBC  0     Phosphorus  1.6(L)     Platelet Estimate  Appears normal     Platelets  240     Potassium  3.1(L)     RBC  3.71(L)     RDW  14.8(H)     Sodium  141     WBC  25.74(H)           Significant Imaging:   I have reviewed all pertinent imaging results/findings within the past 24 hours.

## 2018-07-25 NOTE — PT/OT/SLP PROGRESS
Physical Therapy      Patient Name:  Arina Adame   MRN:  [id number]    PT orders acknowledged and attempted. Pt had fall in AM and scheduled for R hip x-ray. Hold until R hip cleared. Will re-attempt when next scheduled.     CHELSEY MATOS, PT   7/25/2018

## 2018-07-25 NOTE — SUBJECTIVE & OBJECTIVE
Interval History: Patient with no new complaints this morning. Unable to obtain sputum samples for AFB clearance, patient having minimal secretions even with induced sputums. Still having diarrhea  This morning patient was found on the floor. No clear trauma but had complained to the nurse of some right hip pain.     Review of Systems   Constitutional: Positive for activity change, fatigue and fever.   HENT: Negative for congestion, drooling and mouth sores.    Eyes: Negative for visual disturbance.   Respiratory: Negative for apnea, cough, chest tightness and shortness of breath.    Cardiovascular: Negative for chest pain and leg swelling.   Gastrointestinal: Positive for diarrhea. Negative for abdominal distention, abdominal pain, nausea and vomiting.   Genitourinary: Negative for difficulty urinating and dysuria.   Musculoskeletal: Negative for arthralgias.   Skin: Negative for color change and pallor.   Allergic/Immunologic: Negative for environmental allergies and food allergies.   Neurological: Negative for dizziness, facial asymmetry and headaches.   Hematological: Negative for adenopathy.   Psychiatric/Behavioral: Negative for agitation.     Objective:     Vital Signs (Most Recent):  Temp: 97.5 °F (36.4 °C) (07/25/18 1400)  Pulse: 89 (07/25/18 1400)  Resp: 15 (07/25/18 1400)  BP: 120/74 (07/25/18 1400)  SpO2: 96 % (07/25/18 1400) Vital Signs (24h Range):  Temp:  [97 °F (36.1 °C)-98.1 °F (36.7 °C)] 97.5 °F (36.4 °C)  Pulse:  [] 89  Resp:  [14-22] 15  SpO2:  [94 %-100 %] 96 %  BP: (116-150)/(61-92) 120/74     Weight: 48.1 kg (106 lb 0.7 oz)  Body mass index is 19.4 kg/m².  No intake or output data in the 24 hours ending 07/25/18 1633   Physical Exam   Constitutional: No distress.   HENT:   Head: Normocephalic and atraumatic.   Mouth/Throat: No oropharyngeal exudate.   Eyes: Conjunctivae are normal. No scleral icterus.   Neck: Normal range of motion. Neck supple.   Prominence of right sterno-clavicular  junction   Cardiovascular: Normal rate, regular rhythm and normal heart sounds.    Pulmonary/Chest: Effort normal. No respiratory distress. She has no wheezes. She has no rales.   Abdominal: Soft. Bowel sounds are normal. She exhibits no distension. There is no tenderness.   Musculoskeletal: She exhibits no edema or tenderness.   Full ROM of bilateral hips active and passive. No trauma. Pelvis stable.    Neurological: She is alert. She has normal strength. GCS eye subscore is 4. GCS verbal subscore is 5. GCS motor subscore is 6.   Skin: Skin is warm and dry. She is not diaphoretic.   Psychiatric:   Patient seems more confused today, tells me she went to [employer] this morning.    Nursing note and vitals reviewed.      Significant Labs:   Recent Lab Results       07/25/18  1330 07/25/18  0638      Immature Granulocytes  1.4(H)     Immature Grans (Abs)  0.37  Comment:  Mild elevation in immature granulocytes is non specific and   can be seen in a variety of conditions including stress response,   acute inflammation, trauma and pregnancy. Correlation with other   laboratory and clinical findings is essential.  (H)     Anion Gap  7(L)     Baso #  0.06     Basophil%  0.2     BUN, Bld  16     Calcium  8.2(L)     Chloride  113(H)     CO2  21(L)     Creatinine  0.8     Differential Method  Automated     eGFR if African American  >60.0     eGFR if non   >60.0  Comment:  Calculation used to obtain the estimated glomerular filtration  rate (eGFR) is the CKD-EPI equation.        Eos #  0.3     Eosinophil%  1.2     Large/Giant Platelets  Present     Glucose  74     Gran # (ANC)  22.4(H)     Gran%  86.9(H)     Hematocrit  36.2(L)     Hemoglobin  11.4(L)     Lymph #  1.5     Lymph%  5.9(L)     Magnesium  1.6     MCH  30.7     MCHC  31.5(L)     MCV  98     Mono #  1.1(H)     Mono%  4.4     MPV  11.1     MTB Specimen Source Sputum, Expectorated      nRBC  0     Phosphorus  1.6(L)     Platelet Estimate  Appears normal      Platelets  240     Potassium  3.1(L)     RBC  3.71(L)     RDW  14.8(H)     Sodium  141     WBC  25.74(H)

## 2018-07-25 NOTE — PROGRESS NOTES
[facility] Hospital Medicine  Progress Note    Patient Name: Arina Adame  MRN: [id number]  Patient Class: IP- Inpatient   Admission Date: 7/23/2018  Length of Stay: 2 days  Attending Physician: Washington Quintana MD  Primary Care Provider: Zeinab Meier MD    [facility] Medicine Team: [facility] 4 Kiran Clement MD    Subjective:     Principal Problem:Clostridium difficile infection    HPI:  HPI: Arina Adame is a 89 y.o. female has a past medical history of Alzheimer disease; C.    difficile colitis; Carotid arterial disease; Cataract; Essential hypertension; Colon cancer;    Hypothyroidism; impaired glucose tolerance; Osteoporosis, brought to the ED by her daughter today    complaining of fever, diarrhea and vomiting.  At ED, Arina Adame was also hypotensive,    tachycardic, and lethargic. She has limited ability to give a full medical history.     Collateral information from her daughter was obtained. She was discharged from hospital on her    last C diff episode on June 26th, and relapsed right after she completed a home course of PO    vancomycin for C.diff last Thursday. though this does not add up to a proper time course for 14    day PO as instructed. she has been more confused at night for the past 2 nights. Daughter noticed    Arina Adame's change of bowel movement last Thursday when she changed her diaper.  The    diarrhea has been watery, gold in color, mucousy with C. diff smell.  Today, she was much more    fatigued than normal and would not get out of bed. This afternoon, daughter noticed she had a    fever of >101 and called EMS. EMS noted pt to be febrile, tachycardic and hypotensive and    administered 500cc of NS. In ed pt is rousable but disoriented. She became increasingly    lethargic. This morning pt did not take any medication or food, vomited once after taking some    water. daughter noted a temperature of 101.9 yesterday at home, with some rattle  breathing sound.    She s/p 2 episodes of C-diff infections with UTI in the past.  Each episode lasted for about 7-9    days.       Interval History: Patient with no new complaints this morning. Unable to obtain sputum samples for AFB clearance, patient having minimal secretions even with induced sputums. Still having diarrhea  This morning patient was found on the floor. No clear trauma but had complained to the nurse of some right hip pain.     Review of Systems   Constitutional: Positive for activity change, fatigue and fever.   HENT: Negative for congestion, drooling and mouth sores.    Eyes: Negative for visual disturbance.   Respiratory: Negative for apnea, cough, chest tightness and shortness of breath.    Cardiovascular: Negative for chest pain and leg swelling.   Gastrointestinal: Positive for diarrhea. Negative for abdominal distention, abdominal pain, nausea and vomiting.   Genitourinary: Negative for difficulty urinating and dysuria.   Musculoskeletal: Negative for arthralgias.   Skin: Negative for color change and pallor.   Allergic/Immunologic: Negative for environmental allergies and food allergies.   Neurological: Negative for dizziness, facial asymmetry and headaches.   Hematological: Negative for adenopathy.   Psychiatric/Behavioral: Negative for agitation.     Objective:     Vital Signs (Most Recent):  Temp: 97.5 °F (36.4 °C) (07/25/18 1400)  Pulse: 89 (07/25/18 1400)  Resp: 15 (07/25/18 1400)  BP: 120/74 (07/25/18 1400)  SpO2: 96 % (07/25/18 1400) Vital Signs (24h Range):  Temp:  [97 °F (36.1 °C)-98.1 °F (36.7 °C)] 97.5 °F (36.4 °C)  Pulse:  [] 89  Resp:  [14-22] 15  SpO2:  [94 %-100 %] 96 %  BP: (116-150)/(61-92) 120/74     Weight: 48.1 kg (106 lb 0.7 oz)  Body mass index is 19.4 kg/m².  No intake or output data in the 24 hours ending 07/25/18 1633   Physical Exam   Constitutional: No distress.   HENT:   Head: Normocephalic and atraumatic.   Mouth/Throat: No oropharyngeal exudate.   Eyes:  Conjunctivae are normal. No scleral icterus.   Neck: Normal range of motion. Neck supple.   Prominence of right sterno-clavicular junction   Cardiovascular: Normal rate, regular rhythm and normal heart sounds.    Pulmonary/Chest: Effort normal. No respiratory distress. She has no wheezes. She has no rales.   Abdominal: Soft. Bowel sounds are normal. She exhibits no distension. There is no tenderness.   Musculoskeletal: She exhibits no edema or tenderness.   Full ROM of bilateral hips active and passive. No trauma. Pelvis stable.    Neurological: She is alert. She has normal strength. GCS eye subscore is 4. GCS verbal subscore is 5. GCS motor subscore is 6.   Skin: Skin is warm and dry. She is not diaphoretic.   Psychiatric:   Patient seems more confused today, tells me she went to [employer] this morning.    Nursing note and vitals reviewed.      Significant Labs:   Recent Lab Results       07/25/18  1330 07/25/18  0638      Immature Granulocytes  1.4(H)     Immature Grans (Abs)  0.37  Comment:  Mild elevation in immature granulocytes is non specific and   can be seen in a variety of conditions including stress response,   acute inflammation, trauma and pregnancy. Correlation with other   laboratory and clinical findings is essential.  (H)     Anion Gap  7(L)     Baso #  0.06     Basophil%  0.2     BUN, Bld  16     Calcium  8.2(L)     Chloride  113(H)     CO2  21(L)     Creatinine  0.8     Differential Method  Automated     eGFR if African American  >60.0     eGFR if non   >60.0  Comment:  Calculation used to obtain the estimated glomerular filtration  rate (eGFR) is the CKD-EPI equation.        Eos #  0.3     Eosinophil%  1.2     Large/Giant Platelets  Present     Glucose  74     Gran # (ANC)  22.4(H)     Gran%  86.9(H)     Hematocrit  36.2(L)     Hemoglobin  11.4(L)     Lymph #  1.5     Lymph%  5.9(L)     Magnesium  1.6     MCH  30.7     MCHC  31.5(L)     MCV  98     Mono #  1.1(H)     Mono%  4.4      MPV  11.1     MTB Specimen Source Sputum, Expectorated      nRBC  0     Phosphorus  1.6(L)     Platelet Estimate  Appears normal     Platelets  240     Potassium  3.1(L)     RBC  3.71(L)     RDW  14.8(H)     Sodium  141     WBC  25.74(H)             Assessment/Plan:      * Clostridium difficile infection    Patient with recurrence of C.diff.   ID consulted, appreciate recs.   Will treat with PO Vanc for at least 21 days and arrange for trial enrollment for fecal transplant as o/p        Encephalopathy    DEMENTIA    Patient presented with some confusion, seems worse today. Was on floor in room, now with telesitter.   Patient with multiple risk factors for infectious encephalopathy and delirium including advanced age, pre-existing dementia, active infection and diarrhea.   Delirium precautions, telesitter.   Treating infection          Fall    Found on floor in room today, no signs of trauma but xray hip and CT head performed due to frailty and risk for complications  Xray shows no acute changes  Will follow up CT head, difficult to arrange  2/2 multiple isolation precautions.           Cavitary lesion of lung    Need to rule out TB  Patient with minimal coughing/sputum production.   Unable to get sample with induced sputum.   Appreciate pulmonary recs. Will continue to try to get induced sample but may require bronchoscopy.         Fungal dermatitis    Treating with topical antifungal, appreciate Wound Care recs        Sepsis    Patient with leukocytosis and febrile on presentation.   WBC trending down but still elevated in 20s.   Active C.diff as source. Initially concerned about lung and uti but no indication that these are contributing.   Still working up for C.dif.   Patient with some sinus tach today. Will give fluids for hypovolemia.         Age-related physical debility    PT/OT consult   PT deferred today after fall, will see tomorrow.         Dementia      Continue home donepezil  Continue home  seroquil  Delirium precautions  zoloft stopped by PCP        CAD (coronary artery disease)    Continue ASA 81, Statin          Hypothyroidism    Continue home levothyroxine          VTE Risk Mitigation         Ordered     enoxaparin injection 40 mg  Daily      07/23/18 2332     IP VTE HIGH RISK PATIENT  Once      07/24/18 0034     Place GERARD hose  Until discontinued      07/23/18 2332          Kiran Clement MD   Internal Medicine PGY-2  [phone]

## 2018-07-25 NOTE — ASSESSMENT & PLAN NOTE
Need to rule out TB  Patient with minimal coughing/sputum production.   Unable to get sample with induced sputum.   Appreciate pulmonary recs. Will continue to try to get induced sample but may require bronchoscopy.

## 2018-07-25 NOTE — CONSULTS
[facility]  Pulmonology  Consult Note    Patient Name: Arina Adame  MRN: [id number]  Admission Date: 7/23/2018  Hospital Length of Stay: 2 days  Code Status: DNR  Attending Physician: Washington Quintana MD  Primary Care Provider: Zeinab Meier MD   Principal Problem: Clostridium difficile infection    Inpatient consult to Pulmonology  Consult performed by: RICHARD POSADA  Consult ordered by: TED LOZADA        Subjective:     HPI:  Ms. Adame is an 89 year old female with recurrent UTIs, recurrent C. Diff infections, and alzheimer's dementia, who is currently admitted for a recurrent bout of c. Diff.  She received a chest x-ray which revealed a RUL cavitary mass and pulmonary was consulted for further evaluation.  She is unable to produce sputum and concern exists for tb, atypical infection, or malignancy.  Bronchoscopy requested for BAL of area.  Patient is demented at baseline and currently more confused than usual per daughter.  The daughter was spoken to on the phone by myself and she explains that she is ok with proceeding with bronchoscopy, but if the lesion proves cancerous, then they do not want to treat it.  Their main concern is bringing her back home with contagious illness and they want to rule out TB.  So far, quantiferon gold negative and most recent skin test negative per daughter.    Past Medical History:   Diagnosis Date    Alzheimer disease     Arthritis     B12 nutritional deficiency 8/6/2012    C. difficile colitis     5/2018    Carotid arterial disease 8/27/2013    Cataract     Colon cancer     Elevated blood pressure (not hypertension) 3/17/2014    Epiretinal membrane     Essential hypertension 3/16/2016    Hearing loss 3/17/2014    History of colon cancer 5/28/2015    Grindstone (hard of hearing)     Hypothyroidism 11/21/2012    IGT (impaired glucose tolerance) 2/11/2015    Macrocytosis 11/21/2012    Nevus of choroid     Osteoporosis, 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     Vertigo 8/27/2013    Vitamin D deficiency disease 8/6/2012       Past Surgical History:   Procedure Laterality Date    BREAST SURGERY      CATARACT EXTRACTION W/  INTRAOCULAR LENS IMPLANT Bilateral n/a    OU    COLECTOMY      DENTAL SURGERY      right ankle surgery         Review of patient's allergies indicates:   Allergen Reactions    Codeine      Other reaction(s): Unknown    Iodine      Other reaction(s): Unknown    Penicillins      Other reaction(s): Unknown       Family History     Problem Relation (Age of Onset)    Hip fracture Mother    Thyroid disease Sister        Social History Main Topics    Smoking status: Former Smoker     Quit date: 6/24/1970    Smokeless tobacco: Former User    Alcohol use No    Drug use: No    Sexual activity: Not on file         Review of Systems   Constitutional: Negative for activity change, fatigue and fever.   Respiratory: Negative for cough, choking, shortness of breath and wheezing.    Cardiovascular: Negative for chest pain, palpitations and leg swelling.   Gastrointestinal: Positive for diarrhea. Negative for abdominal pain.   Psychiatric/Behavioral: Positive for confusion.     Objective:     Vital Signs (Most Recent):  Temp: 97.5 °F (36.4 °C) (07/25/18 1400)  Pulse: 89 (07/25/18 1400)  Resp: 15 (07/25/18 1400)  BP: 120/74 (07/25/18 1400)  SpO2: 96 % (07/25/18 1400) Vital Signs (24h Range):  Temp:  [97 °F (36.1 °C)-98.1 °F (36.7 °C)] 97.5 °F (36.4 °C)  Pulse:  [] 89  Resp:  [14-22] 15  SpO2:  [96 %-100 %] 96 %  BP: (116-150)/(67-92) 120/74     Weight: 48.1 kg (106 lb 0.7 oz)  Body mass index is 19.4 kg/m².    No intake or output data in the 24 hours ending 07/25/18 4523    Physical Exam   Constitutional:   Elderly, frail     HENT:   Head: Normocephalic and atraumatic.   Nose: Nose normal.   Eyes: EOM are normal. Pupils are equal, round, and reactive to light.   Neck: Normal range of motion. No JVD present.   Cardiovascular: Normal rate, regular rhythm,  normal heart sounds and intact distal pulses.  Exam reveals no gallop and no friction rub.    No murmur heard.  Pulmonary/Chest: Effort normal and breath sounds normal. No stridor. No respiratory distress. She has no wheezes. She has no rales. She exhibits no tenderness.   Abdominal: Soft. Bowel sounds are normal. She exhibits no distension. There is no tenderness.   Musculoskeletal: Normal range of motion.   Neurological: She is alert.   Demented at baseline, does not know time, place, or situation   Psychiatric: She has a normal mood and affect. Her behavior is normal. Judgment and thought content normal.       Vents:       Lines/Drains/Airways     Peripheral Intravenous Line                 Peripheral IV - Single Lumen 07/25/18 1027 Left Antecubital less than 1 day                Significant Labs:    CBC/Anemia Profile:    Recent Labs  Lab 07/24/18  0427 07/25/18  0638   WBC 33.75* 25.74*   HGB 11.3* 11.4*   HCT 36.4* 36.2*    240   MCV 99* 98   RDW 14.9* 14.8*        Chemistries:    Recent Labs  Lab 07/24/18  0426 07/25/18  0638    141   K 3.2* 3.1*    113*   CO2 20* 21*   BUN 20 16   CREATININE 0.9 0.8   CALCIUM 8.4* 8.2*   MG 1.7 1.6   PHOS 3.3 1.6*       Recent Lab Results       07/25/18  1330 07/25/18  0638      Immature Granulocytes  1.4(H)     Immature Grans (Abs)  0.37  Comment:  Mild elevation in immature granulocytes is non specific and   can be seen in a variety of conditions including stress response,   acute inflammation, trauma and pregnancy. Correlation with other   laboratory and clinical findings is essential.  (H)     Anion Gap  7(L)     Baso #  0.06     Basophil%  0.2     BUN, Bld  16     Calcium  8.2(L)     Chloride  113(H)     CO2  21(L)     Creatinine  0.8     Differential Method  Automated     eGFR if African American  >60.0     eGFR if non   >60.0  Comment:  Calculation used to obtain the estimated glomerular filtration  rate (eGFR) is the CKD-EPI  equation.        Eos #  0.3     Eosinophil%  1.2     Large/Giant Platelets  Present     Glucose  74     Gran # (ANC)  22.4(H)     Gran%  86.9(H)     Hematocrit  36.2(L)     Hemoglobin  11.4(L)     Lymph #  1.5     Lymph%  5.9(L)     Magnesium  1.6     MCH  30.7     MCHC  31.5(L)     MCV  98     Mono #  1.1(H)     Mono%  4.4     MPV  11.1     MTB Specimen Source Sputum, Expectorated      nRBC  0     Phosphorus  1.6(L)     Platelet Estimate  Appears normal     Platelets  240     Potassium  3.1(L)     RBC  3.71(L)     RDW  14.8(H)     Sodium  141     WBC  25.74(H)           Significant Imaging:   I have reviewed all pertinent imaging results/findings within the past 24 hours.    Assessment/Plan:     Cavitary lesion of lung    Differential includes infection, Tb, atypical infection or malignancy.  - consider bronchoscopy on Friday.  - planning to talk with family tomorrow for consent.  - continue to attempt to expectorate sputum for AFB smears, as this would be preferred to an invasive procedure.  - the main objective is sputum collection for cultures, family doesn't want to treat cancer, so reason for bronchoscopy would be solely for obtaining BAL if sputum is unable to be collected.                Thank you for your consult. I will follow-up with patient. Please contact us if you have any additional questions.     Fercho Johnson MD  Pulmonology  [facility]

## 2018-07-25 NOTE — ASSESSMENT & PLAN NOTE
Found on floor in room today, no signs of trauma but xray hip and CT head performed due to frailty and risk for complications  Xray shows no acute changes  Will follow up CT head, difficult to arrange  2/2 multiple isolation precautions.

## 2018-07-25 NOTE — ASSESSMENT & PLAN NOTE
Patient with leukocytosis and febrile on presentation.   WBC trending down but still elevated in 20s.   Active C.diff as source. Initially concerned about lung and uti but no indication that these are contributing.   Still working up for C.dif.   Patient with some sinus tach today. Will give fluids for hypovolemia.

## 2018-07-25 NOTE — ASSESSMENT & PLAN NOTE
Differential includes infection, Tb, atypical infection or malignancy.  - consider bronchoscopy on Friday.  - planning to talk with family tomorrow for consent.  - continue to attempt to expectorate sputum for AFB smears, as this would be preferred to an invasive procedure.  - the main objective is sputum collection for cultures, family doesn't want to treat cancer, so reason for bronchoscopy would be solely for obtaining BAL if sputum is unable to be collected.

## 2018-07-25 NOTE — ASSESSMENT & PLAN NOTE
Patient with recurrence of C.diff.   ID consulted, appreciate recs.   Will treat with PO Vanc for at least 21 days and arrange for trial enrollment for fecal transplant as o/p

## 2018-07-25 NOTE — PLAN OF CARE
Problem: Patient Care Overview  Goal: Plan of Care Review  Outcome: Ongoing (interventions implemented as appropriate)  Pt free of falls/injuries throughout the shift. Bed locked, in lowest position, call bell within reach. Pt afebrile, pain assessed & denied. VSS, pt in no distress, bed alarm on, will continue to monitor.

## 2018-07-25 NOTE — ASSESSMENT & PLAN NOTE
DEMENTIA    Patient presented with some confusion, seems worse today. Was on floor in room, now with telesitter.   Patient with multiple risk factors for infectious encephalopathy and delirium including advanced age, pre-existing dementia, active infection and diarrhea.   Delirium precautions, telesitter.   Treating infection

## 2018-07-25 NOTE — HPI
Ms. Adame is an 89 year old female with recurrent UTIs, recurrent C. Diff infections, and alzheimer's dementia, who is currently admitted for a recurrent bout of c. Diff.  She received a chest x-ray which revealed a RUL cavitary mass and pulmonary was consulted for further evaluation.  She is unable to produce sputum and concern exists for tb, atypical infection, or malignancy.  Bronchoscopy requested for BAL of area.  Patient is demented at baseline and currently more confused than usual per daughter.  The daughter was spoken to on the phone by myself and she explains that she is ok with proceeding with bronchoscopy, but if the lesion proves cancerous, then they do not want to treat it.  Their main concern is bringing her back home with contagious illness and they want to rule out TB.  So far, quantiferon gold negative and most recent skin test negative per daughter.  Pending PCR.      Patient's daughter reportedly not desiring invasive procedures.  Therefore would continue attempting to fulfill ID recs of TB PCR, 3 AFB smears.

## 2018-07-25 NOTE — PROGRESS NOTES
Pt found on floor at 10:35am.  Bed alarm was alarming upon room entry.  NABIL Moura charge nurse notified and at bedside.  Pt assisted to bed.  Pt complains of right hip pain.  She denies hitting her head or any LOC.  No deformities or skin tears noted.  Pt able to perform passive ROM to hip without exacerbated pain.  VSS.  Telesitter ordered.  IM 4 paged to notify.
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
## 2018-07-26 PROBLEM — R41.0 DELIRIUM: Status: ACTIVE | Noted: 2018-07-25

## 2018-07-26 PROBLEM — J84.9 ILD (INTERSTITIAL LUNG DISEASE): Status: ACTIVE | Noted: 2018-07-26

## 2018-07-26 LAB
ANION GAP SERPL CALC-SCNC: 6 MMOL/L
BASOPHILS # BLD AUTO: 0.06 K/UL
BASOPHILS NFR BLD: 0.3 %
BUN SERPL-MCNC: 7 MG/DL
CALCIUM SERPL-MCNC: 8.5 MG/DL
CHLORIDE SERPL-SCNC: 108 MMOL/L
CO2 SERPL-SCNC: 27 MMOL/L
CREAT SERPL-MCNC: 0.7 MG/DL
DIFFERENTIAL METHOD: ABNORMAL
EOSINOPHIL # BLD AUTO: 0.2 K/UL
EOSINOPHIL NFR BLD: 1.2 %
ERYTHROCYTE [DISTWIDTH] IN BLOOD BY AUTOMATED COUNT: 14.6 %
EST. GFR  (AFRICAN AMERICAN): >60 ML/MIN/1.73 M^2
EST. GFR  (NON AFRICAN AMERICAN): >60 ML/MIN/1.73 M^2
GLUCOSE SERPL-MCNC: 103 MG/DL
HCT VFR BLD AUTO: 38.7 %
HGB BLD-MCNC: 12.4 G/DL
IMM GRANULOCYTES # BLD AUTO: 0.2 K/UL
IMM GRANULOCYTES NFR BLD AUTO: 1.2 %
LYMPHOCYTES # BLD AUTO: 1.2 K/UL
LYMPHOCYTES NFR BLD: 6.9 %
MAGNESIUM SERPL-MCNC: 2.1 MG/DL
MCH RBC QN AUTO: 30.4 PG
MCHC RBC AUTO-ENTMCNC: 32 G/DL
MCV RBC AUTO: 95 FL
MONOCYTES # BLD AUTO: 0.7 K/UL
MONOCYTES NFR BLD: 4.1 %
NEUTROPHILS # BLD AUTO: 14.9 K/UL
NEUTROPHILS NFR BLD: 86.3 %
NRBC BLD-RTO: 0 /100 WBC
PHOSPHATE SERPL-MCNC: 1.6 MG/DL
PLATELET # BLD AUTO: 277 K/UL
PMV BLD AUTO: 10.7 FL
POTASSIUM SERPL-SCNC: 3.5 MMOL/L
RBC # BLD AUTO: 4.08 M/UL
SODIUM SERPL-SCNC: 141 MMOL/L
T-SPOT TB SCREENING TEST: NORMAL
WBC # BLD AUTO: 17.21 K/UL

## 2018-07-26 PROCEDURE — 80048 BASIC METABOLIC PNL TOTAL CA: CPT

## 2018-07-26 PROCEDURE — 84100 ASSAY OF PHOSPHORUS: CPT

## 2018-07-26 PROCEDURE — 25000242 PHARM REV CODE 250 ALT 637 W/ HCPCS: Performed by: HOSPITALIST

## 2018-07-26 PROCEDURE — 87015 SPECIMEN INFECT AGNT CONCNTJ: CPT

## 2018-07-26 PROCEDURE — 97161 PT EVAL LOW COMPLEX 20 MIN: CPT

## 2018-07-26 PROCEDURE — 25000003 PHARM REV CODE 250: Performed by: STUDENT IN AN ORGANIZED HEALTH CARE EDUCATION/TRAINING PROGRAM

## 2018-07-26 PROCEDURE — 99232 SBSQ HOSP IP/OBS MODERATE 35: CPT | Mod: GC,,, | Performed by: HOSPITALIST

## 2018-07-26 PROCEDURE — 85025 COMPLETE CBC W/AUTO DIFF WBC: CPT

## 2018-07-26 PROCEDURE — 63600175 PHARM REV CODE 636 W HCPCS: Performed by: STUDENT IN AN ORGANIZED HEALTH CARE EDUCATION/TRAINING PROGRAM

## 2018-07-26 PROCEDURE — 83735 ASSAY OF MAGNESIUM: CPT

## 2018-07-26 PROCEDURE — 94761 N-INVAS EAR/PLS OXIMETRY MLT: CPT

## 2018-07-26 PROCEDURE — 99223 1ST HOSP IP/OBS HIGH 75: CPT | Mod: ,,, | Performed by: INTERNAL MEDICINE

## 2018-07-26 PROCEDURE — 87206 SMEAR FLUORESCENT/ACID STAI: CPT

## 2018-07-26 PROCEDURE — 11000001 HC ACUTE MED/SURG PRIVATE ROOM

## 2018-07-26 PROCEDURE — 31720 CLEARANCE OF AIRWAYS: CPT

## 2018-07-26 PROCEDURE — 36415 COLL VENOUS BLD VENIPUNCTURE: CPT

## 2018-07-26 PROCEDURE — 87116 MYCOBACTERIA CULTURE: CPT

## 2018-07-26 PROCEDURE — 25000003 PHARM REV CODE 250: Performed by: HOSPITALIST

## 2018-07-26 PROCEDURE — 94640 AIRWAY INHALATION TREATMENT: CPT

## 2018-07-26 PROCEDURE — 97535 SELF CARE MNGMENT TRAINING: CPT

## 2018-07-26 RX ORDER — SODIUM CHLORIDE 9 MG/ML
INJECTION, SOLUTION INTRAVENOUS CONTINUOUS
Status: ACTIVE | OUTPATIENT
Start: 2018-07-26 | End: 2018-07-27

## 2018-07-26 RX ORDER — SODIUM CHLORIDE FOR INHALATION 3 %
4 VIAL, NEBULIZER (ML) INHALATION ONCE
Status: COMPLETED | OUTPATIENT
Start: 2018-07-26 | End: 2018-07-26

## 2018-07-26 RX ADMIN — Medication 125 MG: at 11:07

## 2018-07-26 RX ADMIN — POTASSIUM PHOSPHATE, MONOBASIC AND POTASSIUM PHOSPHATE, DIBASIC 30 MMOL: 224; 236 INJECTION, SOLUTION, CONCENTRATE INTRAVENOUS at 11:07

## 2018-07-26 RX ADMIN — ATORVASTATIN CALCIUM 40 MG: 20 TABLET, FILM COATED ORAL at 09:07

## 2018-07-26 RX ADMIN — ASPIRIN 81 MG: 81 TABLET, COATED ORAL at 09:07

## 2018-07-26 RX ADMIN — ENOXAPARIN SODIUM 40 MG: 100 INJECTION SUBCUTANEOUS at 05:07

## 2018-07-26 RX ADMIN — MICONAZOLE NITRATE: 20 OINTMENT TOPICAL at 09:07

## 2018-07-26 RX ADMIN — QUETIAPINE FUMARATE 25 MG: 25 TABLET ORAL at 09:07

## 2018-07-26 RX ADMIN — LEVOTHYROXINE SODIUM 100 MCG: 100 TABLET ORAL at 05:07

## 2018-07-26 RX ADMIN — Medication 125 MG: at 05:07

## 2018-07-26 RX ADMIN — CYANOCOBALAMIN TAB 250 MCG 500 MCG: 250 TAB at 09:07

## 2018-07-26 RX ADMIN — SODIUM CHLORIDE: 0.9 INJECTION, SOLUTION INTRAVENOUS at 05:07

## 2018-07-26 RX ADMIN — Medication 1000 UNITS: at 09:07

## 2018-07-26 RX ADMIN — Medication 125 MG: at 12:07

## 2018-07-26 RX ADMIN — SODIUM CHLORIDE SOLN NEBU 3% 4 ML: 3 NEBU SOLN at 09:07

## 2018-07-26 RX ADMIN — DONEPEZIL HYDROCHLORIDE 10 MG: 5 TABLET, FILM COATED ORAL at 09:07

## 2018-07-26 NOTE — PT/OT/SLP EVAL
Physical Therapy Evaluation    Patient Name:  Arina Adame   MRN:  [id number]    Recommendations:     Discharge Recommendations:  nursing facility, skilled (in NH)   Discharge Equipment Recommendations: none   Barriers to discharge: Decreased caregiver support    Assessment:     Arina Adame is a 89 y.o. female admitted with a medical diagnosis of Clostridium difficile infection.  She presents with the following impairments/functional limitations:  decreased upper extremity function, gait instability, weakness, impaired endurance, impaired balance, decreased lower extremity function, decreased safety awareness, impaired cognition, impaired functional mobilty. Pt performed bed mobility max A and sat EOB for ~10min with CGA/SBA requiring verbal and tactile cues for upright posture. Pt performed sit to stand transfers total A. Pt will benefit from skilled PT to improve deficits and increase overall functional mobility.     Rehab Prognosis:  Fair; patient would benefit from acute skilled PT services to address these deficits and reach maximum level of function.      Recent Surgery: * No surgery found *      Plan:     During this hospitalization, patient to be seen 3 x/week to address the above listed problems via gait training, therapeutic activities, therapeutic exercises, neuromuscular re-education, wheelchair management/training  · Plan of Care Expires:  08/26/18   Plan of Care Reviewed with: patient    Subjective     Communicated with RN prior to session.  Patient found supine in bed upon PT entry to room, agreeable to evaluation.      Chief Complaint: NA  Pain/Comfort:  · Pain Rating 1: 0/10  · Pain Rating Post-Intervention 1: 0/10    Patients cultural, spiritual, Pent[employer]al conflicts given the current situation:      Living Environment:  Pt unable to reports history and no family members present. Pt history from previous admit-  Pt lives with her daughter and son-in-law in a 1-story home, no steps. She has  a tub/shower combo with a seat.  Previous level of function: Pt required min-mod assist from her daughter for all ADL tasks. She no longer performs most IADL tasks. She utilizes a RW for functional mobility in her home and takes the wheelchair for community mobility.  Patient has the following equipment: walker, rolling, wheelchair, shower chair.  DME owned (not currently used): none.  Upon discharge, patient will have assistance from daughter and son-in-law but unknown if 24hr A is available    Objective:     Patient found with: telemetry     General Precautions: Standard, fall   Orthopedic Precautions:N/A   Braces: N/A     Exams:  · Cognitive Exam:  Patient is oriented to Person and follows 80% of simple, 1-step commands   · Gross Motor Coordination:  WFL  · Postural Exam:  Patient presented with the following abnormalities:    · -       Rounded shoulders  · -       Forward head  · Sensation:    · -       Intact  light/touch B LE  · Skin Integrity/Edema:      · -       Skin integrity: Visible skin intact  · RLE ROM: WFL  · RLE Strength: gross 3+/5  · LLE ROM: WFL  · LLE Strength: gross 3+/5    Functional Mobility:  Bed Mobility:     · Supine to Sit: maximal assistance  · Sit to Supine: maximal assistance    Transfers:     · Sit to Stand:  total assistance with no AD; x2 trials    AM-PAC 6 CLICK MOBILITY  Total Score:10       Therapeutic Activities and Exercises:  Pt sat EOB for ~10min with CGA/SBA requiring verbal and tactile cues for upright posture.  Pt able to perform B UE reaching CGA but unable to WS outside MANFRED.  Pt stood EOB total A, requiring total A for WS; unable to clear B feet from floor.  Pt performed seated scooting EOB with max A.   Pt positioned with bed in chair position and breakfast set up; RN notified pt need for assist with meals.     Patient left with bed in chair position with all lines intact, bed alarm on, call button in reach and RN notified.    GOALS:    Physical Therapy Goals         Problem: Physical Therapy Goal    Goal Priority Disciplines Outcome Goal Variances Interventions   Physical Therapy Goal     PT/OT, PT Ongoing (interventions implemented as appropriate)     Description:  Goals to be met by: 2018      Patient will increase functional independence with mobility by performin. Supine to sit with MInimal Assistance  2. Sit to supine with MInimal Assistance  3. Sit to stand transfer with Moderate Assistance  4. Bed to chair transfer with Moderate Assistance  5. Gait  x 10 feet with Maximum Assistance.   6. Wheelchair propulsion x100 feet with Minimal Assistance using bilateral uppper extremities  7. Lower extremity exercise program x15 reps per handout, with assistance as needed                      History:     Past Medical History:   Diagnosis Date    Alzheimer disease     Arthritis     B12 nutritional deficiency 2012    C. difficile colitis     2018    Carotid arterial disease 2013    Cataract     Colon cancer     Elevated blood pressure (not hypertension) 3/17/2014    Epiretinal membrane     Essential hypertension 3/16/2016    Hearing loss 3/17/2014    History of colon cancer 2015    [place] (hard of hearing)     Hypothyroidism 2012    IGT (impaired glucose tolerance) 2015    Macrocytosis 2012    Nevus of choroid     Osteoporosis, postmenopausal 2012    Vertigo 2013    Vitamin D deficiency disease 2012       Past Surgical History:   Procedure Laterality Date    BREAST SURGERY      CATARACT EXTRACTION W/  INTRAOCULAR LENS IMPLANT Bilateral n/a    OU    COLECTOMY      DENTAL SURGERY      right ankle surgery         Clinical Decision Making:     Decision Making/ Complexity Score   On examination of body system using standardized tests and measures patient presents with 1-2 elements from any of the following: body structures and functions, activity limitations, and/or participation restrictions.  Leading to a  clinical presentation that is considered stable and/or uncomplicated                              Clinical Decision Making  (Eval Complexity):  Low- 87828     Time Tracking:     PT Received On: 07/26/18  PT Start Time: 0858     PT Stop Time: 0914  PT Total Time (min): 16 min     Billable Minutes: Evaluation 16      CHELSEY MATOS, PT  07/26/2018

## 2018-07-26 NOTE — PLAN OF CARE
Problem: Occupational Therapy Goal  Goal: Occupational Therapy Goal  Goals to be met by: 8/15  Patient will increase functional independence with ADLs by performing:    Feeding with Set-up Assistance.  UE Dressing with Contact Guard Assistance.  LE Dressing with Moderate Assistance.  Grooming while seated with Contact Guard Assistance.  Toileting from toilet with Minimal Assistance for hygiene and clothing management.      Outcome: Ongoing (interventions implemented as appropriate)  Goals remain appropriate. Pt progressing well in POC. HERVE Garza 7/26/2018

## 2018-07-26 NOTE — PROGRESS NOTES
[facility]  Pulmonology  Progress Note    Patient Name: Arina Adame  MRN: [id number]  Admission Date: 7/23/2018  Hospital Length of Stay: 3 days  Code Status: DNR  Attending Provider: Washington Quintana MD  Primary Care Provider: Zeinab Meier MD   Principal Problem: Clostridium difficile infection    Subjective:     Past Medical History:   Diagnosis Date    Alzheimer disease     Arthritis     B12 nutritional deficiency 8/6/2012    C. difficile colitis     5/2018    Carotid arterial disease 8/27/2013    Cataract     Colon cancer     Elevated blood pressure (not hypertension) 3/17/2014    Epiretinal membrane     Essential hypertension 3/16/2016    Hearing loss 3/17/2014    History of colon cancer 5/28/2015    [place] (hard of hearing)     Hypothyroidism 11/21/2012    IGT (impaired glucose tolerance) 2/11/2015    Macrocytosis 11/21/2012    Nevus of choroid     Osteoporosis, 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       Past Surgical History:   Procedure Laterality Date    BREAST SURGERY      CATARACT EXTRACTION W/  INTRAOCULAR LENS IMPLANT Bilateral n/a    OU    COLECTOMY      DENTAL SURGERY      right ankle surgery         Review of patient's allergies indicates:   Allergen Reactions    Codeine      Other reaction(s): Unknown    Iodine      Other reaction(s): Unknown    Penicillins      Other reaction(s): Unknown       Family History     Problem Relation (Age of Onset)    Hip fracture Mother    Thyroid disease Sister        Social History Main Topics    Smoking status: Former Smoker     Quit date: 6/24/1970    Smokeless tobacco: Former User    Alcohol use No    Drug use: No    Sexual activity: Not on file         Review of Systems   Constitutional: Negative for activity change, fatigue and fever.   Respiratory: Negative for cough, choking, shortness of breath and wheezing.    Cardiovascular: Negative for chest pain,  palpitations and leg swelling.   Gastrointestinal: Positive for diarrhea. Negative for abdominal pain.   Psychiatric/Behavioral: Positive for confusion.     Objective:     Vital Signs (Most Recent):  Temp: 96.9 °F (36.1 °C) (07/26/18 0500)  Pulse: 73 (07/26/18 1059)  Resp: 15 (07/26/18 0500)  BP: (!) 167/101 (07/26/18 0500)  SpO2: 95 % (07/26/18 0057) Vital Signs (24h Range):  Temp:  [96.8 °F (36 °C)-98.1 °F (36.7 °C)] 96.9 °F (36.1 °C)  Pulse:  [] 73  Resp:  [14-21] 15  SpO2:  [95 %-97 %] 95 %  BP: (114-167)/() 167/101     Weight: 48.1 kg (106 lb 0.7 oz)  Body mass index is 19.4 kg/m².      Intake/Output Summary (Last 24 hours) at 07/26/18 1202  Last data filed at 07/26/18 0900   Gross per 24 hour   Intake              400 ml   Output                0 ml   Net              400 ml       Physical Exam   Constitutional:   Elderly, frail     HENT:   Head: Normocephalic and atraumatic.   Nose: Nose normal.   Eyes: EOM are normal. Pupils are equal, round, and reactive to light.   Neck: Normal range of motion. No JVD present.   Cardiovascular: Normal rate, regular rhythm, normal heart sounds and intact distal pulses.  Exam reveals no gallop and no friction rub.    No murmur heard.  Pulmonary/Chest: Effort normal and breath sounds normal. No stridor. No respiratory distress. She has no wheezes. She has no rales. She exhibits no tenderness.   Abdominal: Soft. Bowel sounds are normal. She exhibits no distension. There is no tenderness.   Musculoskeletal: Normal range of motion.   Neurological: She is alert.   Demented at baseline, does not know time, place, or situation   Psychiatric: She has a normal mood and affect. Her behavior is normal. Judgment and thought content normal.       Vents:       Lines/Drains/Airways     Peripheral Intravenous Line                 Peripheral IV - Single Lumen 07/25/18 1027 Left Antecubital 1 day                Significant Labs:    CBC/Anemia Profile:    Recent Labs  Lab  07/25/18  0638 07/26/18  0640   WBC 25.74* 17.21*   HGB 11.4* 12.4   HCT 36.2* 38.7    277   MCV 98 95   RDW 14.8* 14.6*        Chemistries:    Recent Labs  Lab 07/25/18  0638 07/26/18  0640    141   K 3.1* 3.5   * 108   CO2 21* 27   BUN 16 7*   CREATININE 0.8 0.7   CALCIUM 8.2* 8.5*   MG 1.6 2.1   PHOS 1.6* 1.6*       Recent Lab Results       07/26/18  0640 07/25/18  1330      Immature Granulocytes 1.2(H)      Immature Grans (Abs) 0.20  Comment:  Mild elevation in immature granulocytes is non specific and   can be seen in a variety of conditions including stress response,   acute inflammation, trauma and pregnancy. Correlation with other   laboratory and clinical findings is essential.  (H)      Anion Gap 6(L)      Baso # 0.06      Basophil% 0.3      BUN, Bld 7(L)      Calcium 8.5(L)      Chloride 108      CO2 27      Creatinine 0.7      Differential Method Automated      eGFR if African American >60.0      eGFR if non  >60.0  Comment:  Calculation used to obtain the estimated glomerular filtration  rate (eGFR) is the CKD-EPI equation.         Eos # 0.2      Eosinophil% 1.2      Glucose 103      Gram Stain (Respiratory)  <10 epithelial cells per low power field.       Rare WBC's       No organisms seen     Gran # (ANC) 14.9(H)      Gran% 86.3(H)      Hematocrit 38.7      Hemoglobin 12.4      Lymph # 1.2      Lymph% 6.9(L)      Magnesium 2.1      MCH 30.4      MCHC 32.0      MCV 95      Mono # 0.7      Mono% 4.1      MPV 10.7      MTB Specimen Source  Sputum, Expectorated     nRBC 0      Phosphorus 1.6(L)      Platelets 277      Potassium 3.5      RBC 4.08      RDW 14.6(H)      Sodium 141      WBC 17.21(H)            Significant Imaging:   I have reviewed all pertinent imaging results/findings within the past 24 hours.    Assessment/Plan:     Cavitary lesion of lung    Differential includes infection, Tb, atypical infection or malignancy.  - consider bronchoscopy early next week if  sputum production is not present.  - will obtain consent with family by phone.  - continue to attempt to expectorate sputum for AFB smears, as this would be preferred to an invasive procedure.  - please ask RT to administer hypertonic saline with aerosol for induced sputum.  - the main objective is sputum collection for cultures, family doesn't want to treat cancer, so reason for bronchoscopy would be solely for obtaining BAL if sputum is unable to be collected.  - given advanced age, alzheimer's dementia, and reported agitation with sedative medications per daughter, it would be ideal to avoid this procedure if possible.                 Fercho Johnson MD  Pulmonology  [facility]

## 2018-07-26 NOTE — ASSESSMENT & PLAN NOTE
Differential includes infection, Tb, atypical infection or malignancy.  - consider bronchoscopy early next week if sputum production is not present.  - will obtain consent with family by phone.  - continue to attempt to expectorate sputum for AFB smears, as this would be preferred to an invasive procedure.  - please ask RT to administer hypertonic saline with aerosol for induced sputum.  - the main objective is sputum collection for cultures, family doesn't want to treat cancer, so reason for bronchoscopy would be solely for obtaining BAL if sputum is unable to be collected.  - given advanced age, alzheimer's dementia, and reported agitation with sedative medications per daughter, it would be ideal to avoid this procedure if possible.

## 2018-07-26 NOTE — ASSESSMENT & PLAN NOTE
Need to rule out TB  Patient with minimal coughing/sputum production but may be able to complete AFB rule out. Other studies also sent which would provide alternative clearance. If still unable to clear, may undergo bronchoscopy. Appreciate pulmonary assistance.

## 2018-07-26 NOTE — PROGRESS NOTES
[facility] Medicine  Progress Note    Patient Name: Arina Adame  MRN: [id number]  Patient Class: IP- Inpatient   Admission Date: 7/23/2018  Length of Stay: 3 days  Attending Physician: Washington Quintana MD  Primary Care Provider: Zeinab Meier MD    [facility] Medicine Team: [facility] HOSP MED 4 Kiran Clement MD    Subjective:     Principal Problem:Clostridium difficile infection    HPI:  Arina Adame is a 89 y.o. female has a past medical history of Alzheimer disease; C. difficile colitis; Carotid arterial disease; Cataract; Essential hypertension; Colon cancer; Hypothyroidism; impaired glucose tolerance; Osteoporosis, brought to the ED by her daughter today complaining of fever, diarrhea and vomiting.  At ED, Arina Adame was also hypotensive, tachycardic, and lethargic, CBC, CMP, troponin, BNP, urinalysis, blood/urinary culture, CT chest and Chest X-ray was ordered, concerning recurrently UTI, diff infection, and pneumonia.      Arina Adame has limited ability to give a full medical history.  Collateral information from her daughter was obtained. She was discharged from hospital of her last episode on June 26th, and relapsed right after she complete a home course of PO vancomycin for C.diff last Thursday. She was brought to the ED by EMS for fever. Daughter reports that she has been more confused at night for the past 2 nights. Today, she was much more fatigued than normal and would not get out of bed. This afternoon, daughter noticed she had a fever of >101 and called EMS. EMS noted pt to be febrile, tachycardic and hypotensive and administered 500cc of NS. On exam pt is rousable but disoriented. Daughter notes that she has a bed sore near her gluteal fold that has not healed.   Daughter noticed Arina Adame's change of bowel movement last Thursday when she changed her diaper.  The diarrhea has been watery, gold in color, mucousy with C. diff smell.  She became  increasingly lethargic.  This morning, Arina Adame was unable to get up from bed, did not take any medication or food, vomited once after taking some water.  She slept all day.  When daughter saw her in the late afternoon, Arina Adame was shivering, had a temperature of 101.9, with some rattle breathing sound.  She took her to the ED. She s/p 2 episodes of C-diff infections with UTI in the past.  Each episode lasted for about 7-9 days.          Interval History: Patient very confused this morning. Able to get 2 AFBs and also pursuing alternate studies per ID recs.       Review of Systems   Constitutional: Positive for activity change, fatigue and fever.   HENT: Negative for congestion, drooling and mouth sores.    Eyes: Negative for visual disturbance.   Respiratory: Negative for apnea, cough, chest tightness and shortness of breath.    Cardiovascular: Negative for chest pain and leg swelling.   Gastrointestinal: Positive for diarrhea. Negative for abdominal distention, abdominal pain, nausea and vomiting.   Genitourinary: Negative for difficulty urinating and dysuria.   Musculoskeletal: Negative for arthralgias.   Skin: Negative for color change and pallor.   Allergic/Immunologic: Negative for environmental allergies and food allergies.   Neurological: Negative for dizziness, facial asymmetry and headaches.   Hematological: Negative for adenopathy.   Psychiatric/Behavioral: Negative for agitation.     Objective:     Vital Signs (Most Recent):  Temp: 98.5 °F (36.9 °C) (07/26/18 1549)  Pulse: (!) 115 (07/26/18 1705)  Resp: 16 (07/26/18 1705)  BP: (!) 148/91 (07/26/18 1705)  SpO2: 97 % (07/26/18 1705) Vital Signs (24h Range):  Temp:  [96.8 °F (36 °C)-98.5 °F (36.9 °C)] 98.5 °F (36.9 °C)  Pulse:  [] 115  Resp:  [15-21] 16  SpO2:  [95 %-99 %] 97 %  BP: (114-167)/() 148/91     Weight: 48.1 kg (106 lb 0.7 oz)  Body mass index is 19.4 kg/m².    Intake/Output Summary (Last 24 hours) at 07/26/18  1835  Last data filed at 07/26/18 1833   Gross per 24 hour   Intake              800 ml   Output                0 ml   Net              800 ml      Physical Exam   Constitutional: No distress.   HENT:   Head: Normocephalic and atraumatic.   Mouth/Throat: No oropharyngeal exudate.   Eyes: Conjunctivae are normal. No scleral icterus.   Neck: Normal range of motion. Neck supple.   Prominence of right sterno-clavicular junction   Cardiovascular: Normal rate, regular rhythm and normal heart sounds.    Intermittent tachycardia today, not present on exam this morning.    Pulmonary/Chest: Effort normal. No respiratory distress. She has no wheezes. She has no rales.   Abdominal: Soft. Bowel sounds are normal. She exhibits no distension. There is no tenderness.   Musculoskeletal: She exhibits no edema or tenderness.   Neurological: She is alert. She has normal strength. GCS eye subscore is 4. GCS verbal subscore is 5. GCS motor subscore is 6.   Skin: Skin is warm and dry. She is not diaphoretic.   Psychiatric:   Confusion worsened today.    Nursing note and vitals reviewed.      Significant Labs:   Recent Lab Results       07/26/18  0640      Immature Granulocytes 1.2(H)     Immature Grans (Abs) 0.20  Comment:  Mild elevation in immature granulocytes is non specific and   can be seen in a variety of conditions including stress response,   acute inflammation, trauma and pregnancy. Correlation with other   laboratory and clinical findings is essential.  (H)     Anion Gap 6(L)     Baso # 0.06     Basophil% 0.3     BUN, Bld 7(L)     Calcium 8.5(L)     Chloride 108     CO2 27     Creatinine 0.7     Differential Method Automated     eGFR if African American >60.0     eGFR if non  >60.0  Comment:  Calculation used to obtain the estimated glomerular filtration  rate (eGFR) is the CKD-EPI equation.        Eos # 0.2     Eosinophil% 1.2     Glucose 103     Gran # (ANC) 14.9(H)     Gran% 86.3(H)     Hematocrit 38.7      Hemoglobin 12.4     Lymph # 1.2     Lymph% 6.9(L)     Magnesium 2.1     MCH 30.4     MCHC 32.0     MCV 95     Mono # 0.7     Mono% 4.1     MPV 10.7     nRBC 0     Phosphorus 1.6(L)     Platelets 277     Potassium 3.5     RBC 4.08     RDW 14.6(H)     Sodium 141     WBC 17.21(H)             Assessment/Plan:      * Clostridium difficile infection    Patient with recurrence of C.diff.   ID consulted, appreciate recs.   Will treat with PO Vanc for at least 21 days and arrange for trial enrollment for fecal transplant as o/p        Delirium    DEMENTIA    Patient presented with some confusion, seems worse today.   Patient with multiple risk factors for infectious encephalopathy and delirium including advanced age, pre-existing dementia, active infection and diarrhea.   Delirium precautions, telesitter.   Treating infection          Cavitary lesion of lung    Need to rule out TB  Patient with minimal coughing/sputum production but may be able to complete AFB rule out. Other studies also sent which would provide alternative clearance. If still unable to clear, may undergo bronchoscopy. Appreciate pulmonary assistance.         Fungal dermatitis    Treating with topical antifungal, appreciate Wound Care recs        Sepsis    Patient with leukocytosis and febrile on presentation.   WBC continues downtrending.  Active C.diff as source. Initially concerned about lung and uti but no indication that these are contributing.   Still working up for TB  Intermittently tachy today, will give more fluid tonight.         Age-related physical debility    Appreciate PT/OT recs        Dementia      Continue home donepezil  Continue home seroquil  Delirium precautions  zoloft stopped by PCP        CAD (coronary artery disease)    Continue ASA 81, Statin          Hypothyroidism    Continue home levothyroxine          VTE Risk Mitigation         Ordered     enoxaparin injection 40 mg  Daily      07/23/18 2332     IP VTE HIGH RISK PATIENT  Once       07/24/18 0034     Place GERARD hose  Until discontinued      07/23/18 4108          Kiran Clement MD   Internal Medicine PGY-2  [phone]

## 2018-07-26 NOTE — PT/OT/SLP PROGRESS
Occupational Therapy   Treatment    Name: Arina Adame  MRN: [id number]  Admitting Diagnosis:  Clostridium difficile infection       Recommendations:     Discharge Recommendations:  SNF in NH  Discharge Equipment Recommendations:  none  Barriers to discharge:  Decreased caregiver support    Subjective     Communicated with: RN prior to session and PT regarding functional performance during session prior to OT attempt.   Pain/Comfort:  · Pain Rating 1: 0/10    Patients cultural, spiritual, Christian conflicts given the current situation: none reported     Objective:     Patient found with: telemetry, peripheral IV    General Precautions: Standard, fall, contact, airborne (delirium; DNR)   Orthopedic Precautions:N/A   Braces: N/A     Occupational Performance:    Bed Mobility:    · Patient completed Rolling/Turning to Left with  moderate assistance  · Patient completed Scooting/Bridging with minimum assistance and max cueing and in forward plane to EOB   · Total A for drawsheet t/f to HOB while supine  · Patient completed Supine to Sit with moderate assistance and HOB elevated   · CGA-min A for sitting balance while EOB due to posterior lean  · Patient completed Sit to Supine with maximal assistance and HOB flat      Functional Mobility/Transfers:  · Patient completed Sit <> Stand Transfer with moderate assistance and x2 trials from EOB  with  no assistive device   · Functional Mobility: ~4 lateral steps to L with mod A for weight shift and balance and no AD--demo posterior lean in standing    Activities of Daily Living:  · Feeding:  stand by assistance to feed self breakfast and drink from straw after setup while seated EOB   · Upper Body Dressing: minimum assistance to doff/don gown while seated EOB    · Grooming: SBA to clean hands with wipe while seated EOB--mod verbal cues for initiation and termination of task    Patient left with bed in chair position with all lines intact, call button in reach and bed alarm  on    [facility] 6 Click:  [facility] Total Score: 15    Treatment & Education:  -Edu for OT role and POC  -Edu for importance of OOB activity and impact on healing  -Pt pleasantly confused during session and only oriented to self   -daily orientation verbally given and pointed out on whiteboard and blinds opened due to delirium precautions   -Pt able to independently report she fell   -Pt able to follow ~80% of simple one step commands with repetition and visual/tactile cues   -Questions and concerns addressed within OT scope of practice   Education:    Assessment:     Arina Adame is a 89 y.o. female with a medical diagnosis of Clostridium difficile infection.  She presents with decreased functional independence in various areas of her life. She demo good motivation and willingness to participate. She is pleasantly confused, but able to actively participate and follow commands appropriately with cueing. She demo increased ADL skills as she was able to feed herself with fork after setup. She demo posterior lean in sitting and standing, requiring assist to correct. Pt demo decreased endurance to functional task. Pt would continue to benefit from skilled OT services for maximum functional outcome and safety. Performance deficits affecting function are weakness, impaired endurance, impaired self care skills, impaired functional mobilty, gait instability, impaired balance, decreased coordination, decreased lower extremity function, decreased upper extremity function, decreased safety awareness, impaired cardiopulmonary response to activity.      Rehab Prognosis:  good; patient would benefit from acute skilled OT services to address these deficits and reach maximum level of function.       Plan:     Patient to be seen 3 x/week to address the above listed problems via self-care/home management, therapeutic activities, therapeutic exercises  · Plan of Care Expires: 08/24/18  · Plan of Care Reviewed with: patient    This Plan  of care has been discussed with the patient who was involved in its development and understands and is in agreement with the identified goals and treatment plan    GOALS:    Occupational Therapy Goals        Problem: Occupational Therapy Goal    Goal Priority Disciplines Outcome Interventions   Occupational Therapy Goal     OT, PT/OT Ongoing (interventions implemented as appropriate)    Description:  Goals to be met by: 8/15  Patient will increase functional independence with ADLs by performing:    Feeding with Set-up Assistance.  UE Dressing with Contact Guard Assistance.  LE Dressing with Moderate Assistance.  Grooming while seated with Contact Guard Assistance.  Toileting from toilet with Minimal Assistance for hygiene and clothing management.                       Time Tracking:     OT Date of Treatment: 07/26/18  OT Start Time: 0928  OT Stop Time: 0956  OT Total Time (min): 28 min    Billable Minutes:Self Care/Home Management 28    HERVE Garza  7/26/2018

## 2018-07-26 NOTE — PLAN OF CARE
Problem: Fall Risk (Adult)  Goal: Identify Related Risk Factors and Signs and Symptoms  Related risk factors and signs and symptoms are identified upon initiation of Human Response Clinical Practice Guideline (CPG)   Outcome: Ongoing (interventions implemented as appropriate)   07/26/18 1759   Fall Risk   Related Risk Factors (Fall Risk) age-related changes;bladder function altered;depression/anxiety;fatigue/slow reaction;gait/mobility problems;history of falls;homeostatic imbalance;polypharmacy;sensory deficits;environment unfamiliar   Signs and Symptoms (Fall Risk) presence of risk factors     Goal: Absence of Falls  Patient will demonstrate the desired outcomes by discharge/transition of care.   Outcome: Ongoing (interventions implemented as appropriate)   07/26/18 1759   Fall Risk (Adult)   Absence of Falls making progress toward outcome

## 2018-07-26 NOTE — SUBJECTIVE & OBJECTIVE
Interval History: Patient very confused this morning. Able to get 2 AFBs and also pursuing alternate studies per ID recs.       Review of Systems   Constitutional: Positive for activity change, fatigue and fever.   HENT: Negative for congestion, drooling and mouth sores.    Eyes: Negative for visual disturbance.   Respiratory: Negative for apnea, cough, chest tightness and shortness of breath.    Cardiovascular: Negative for chest pain and leg swelling.   Gastrointestinal: Positive for diarrhea. Negative for abdominal distention, abdominal pain, nausea and vomiting.   Genitourinary: Negative for difficulty urinating and dysuria.   Musculoskeletal: Negative for arthralgias.   Skin: Negative for color change and pallor.   Allergic/Immunologic: Negative for environmental allergies and food allergies.   Neurological: Negative for dizziness, facial asymmetry and headaches.   Hematological: Negative for adenopathy.   Psychiatric/Behavioral: Negative for agitation.     Objective:     Vital Signs (Most Recent):  Temp: 98.5 °F (36.9 °C) (07/26/18 1549)  Pulse: (!) 115 (07/26/18 1705)  Resp: 16 (07/26/18 1705)  BP: (!) 148/91 (07/26/18 1705)  SpO2: 97 % (07/26/18 1705) Vital Signs (24h Range):  Temp:  [96.8 °F (36 °C)-98.5 °F (36.9 °C)] 98.5 °F (36.9 °C)  Pulse:  [] 115  Resp:  [15-21] 16  SpO2:  [95 %-99 %] 97 %  BP: (114-167)/() 148/91     Weight: 48.1 kg (106 lb 0.7 oz)  Body mass index is 19.4 kg/m².    Intake/Output Summary (Last 24 hours) at 07/26/18 1835  Last data filed at 07/26/18 1833   Gross per 24 hour   Intake              800 ml   Output                0 ml   Net              800 ml      Physical Exam   Constitutional: No distress.   HENT:   Head: Normocephalic and atraumatic.   Mouth/Throat: No oropharyngeal exudate.   Eyes: Conjunctivae are normal. No scleral icterus.   Neck: Normal range of motion. Neck supple.   Prominence of right sterno-clavicular junction   Cardiovascular: Normal rate, regular  rhythm and normal heart sounds.    Intermittent tachycardia today, not present on exam this morning.    Pulmonary/Chest: Effort normal. No respiratory distress. She has no wheezes. She has no rales.   Abdominal: Soft. Bowel sounds are normal. She exhibits no distension. There is no tenderness.   Musculoskeletal: She exhibits no edema or tenderness.   Neurological: She is alert. She has normal strength. GCS eye subscore is 4. GCS verbal subscore is 5. GCS motor subscore is 6.   Skin: Skin is warm and dry. She is not diaphoretic.   Psychiatric:   Confusion worsened today.    Nursing note and vitals reviewed.      Significant Labs:   Recent Lab Results       07/26/18  0640      Immature Granulocytes 1.2(H)     Immature Grans (Abs) 0.20  Comment:  Mild elevation in immature granulocytes is non specific and   can be seen in a variety of conditions including stress response,   acute inflammation, trauma and pregnancy. Correlation with other   laboratory and clinical findings is essential.  (H)     Anion Gap 6(L)     Baso # 0.06     Basophil% 0.3     BUN, Bld 7(L)     Calcium 8.5(L)     Chloride 108     CO2 27     Creatinine 0.7     Differential Method Automated     eGFR if African American >60.0     eGFR if non  >60.0  Comment:  Calculation used to obtain the estimated glomerular filtration  rate (eGFR) is the CKD-EPI equation.        Eos # 0.2     Eosinophil% 1.2     Glucose 103     Gran # (ANC) 14.9(H)     Gran% 86.3(H)     Hematocrit 38.7     Hemoglobin 12.4     Lymph # 1.2     Lymph% 6.9(L)     Magnesium 2.1     MCH 30.4     MCHC 32.0     MCV 95     Mono # 0.7     Mono% 4.1     MPV 10.7     nRBC 0     Phosphorus 1.6(L)     Platelets 277     Potassium 3.5     RBC 4.08     RDW 14.6(H)     Sodium 141     WBC 17.21(H)

## 2018-07-26 NOTE — PLAN OF CARE
Problem: Physical Therapy Goal  Goal: Physical Therapy Goal  Goals to be met by: 2018      Patient will increase functional independence with mobility by performin. Supine to sit with MInimal Assistance  2. Sit to supine with MInimal Assistance  3. Sit to stand transfer with Moderate Assistance  4. Bed to chair transfer with Moderate Assistance  5. Gait  x 10 feet with Maximum Assistance.   6. Wheelchair propulsion x100 feet with Minimal Assistance using bilateral uppper extremities  7. Lower extremity exercise program x15 reps per handout, with assistance as needed    Outcome: Ongoing (interventions implemented as appropriate)  Pt evaluation complete; pt goals set.    CHELSEY MATOS, PT  2018

## 2018-07-26 NOTE — SUBJECTIVE & OBJECTIVE
Interval History: No AE to ON.  Afebrile and WBC decreasing..  Unable to obtain history reliably.    Review of Systems   Unable to perform ROS: Dementia     Objective:     Vital Signs (Most Recent):  Temp: 96.8 °F (36 °C) (07/25/18 1938)  Pulse: 77 (07/25/18 1900)  Resp: 17 (07/25/18 1800)  BP: 138/86 (07/25/18 1800)  SpO2: 97 % (07/25/18 1800) Vital Signs (24h Range):  Temp:  [96.8 °F (36 °C)-98.1 °F (36.7 °C)] 96.8 °F (36 °C)  Pulse:  [] 77  Resp:  [14-22] 17  SpO2:  [96 %-100 %] 97 %  BP: (116-150)/(67-92) 138/86     Weight: 48.1 kg (106 lb 0.7 oz)  Body mass index is 19.4 kg/m².    Estimated Creatinine Clearance: 36.2 mL/min (based on SCr of 0.8 mg/dL).    Physical Exam   Constitutional: She is oriented to person, place, and time. No distress.   HENT:   Head: Normocephalic and atraumatic.   Mouth/Throat: No oropharyngeal exudate.   Eyes: Conjunctivae are normal. No scleral icterus.   Neck: Neck supple.   Cardiovascular: Regular rhythm and normal heart sounds.    Tachy     Pulmonary/Chest: Effort normal. No respiratory distress. She has no wheezes.   On RA   Abdominal: Soft. Bowel sounds are normal. She exhibits no distension. There is no tenderness.   Genitourinary:   Genitourinary Comments: Erythematous rash to groin    Musculoskeletal: She exhibits no edema or tenderness.   Lymphadenopathy:     She has no cervical adenopathy.   Neurological: She is alert and oriented to person, place, and time. No cranial nerve deficit.   Skin: Skin is warm and dry. She is not diaphoretic. No erythema.   Psychiatric: She has a normal mood and affect. Her behavior is normal.   Nursing note and vitals reviewed.      Significant Labs:   Blood Culture:   Recent Labs  Lab 05/26/18  1539 05/26/18  1634 06/23/18  2023 07/23/18  1613 07/23/18  1627   LABBLOO No growth after 5 days. No growth after 5 days. No growth after 5 days.  No growth after 5 days. No Growth to date  No Growth to date  No Growth to date No Growth to date   No Growth to date  No Growth to date     CBC:   Recent Labs  Lab 07/24/18  0427 07/25/18  0638   WBC 33.75* 25.74*   HGB 11.3* 11.4*   HCT 36.4* 36.2*    240     CMP:   Recent Labs  Lab 07/24/18  0426 07/25/18  0638    141   K 3.2* 3.1*    113*   CO2 20* 21*   GLU 94 74   BUN 20 16   CREATININE 0.9 0.8   CALCIUM 8.4* 8.2*   ANIONGAP 13 7*   EGFRNONAA 56.9* >60.0     Respiratory Culture:   Recent Labs  Lab 07/25/18  1330   GSRESP <10 epithelial cells per low power field.  Rare WBC's  No organisms seen     Wound Culture: No results for input(s): LABAERO in the last 4320 hours.  All pertinent labs within the past 24 hours have been reviewed.    Significant Imaging: I have reviewed all pertinent imaging results/findings within the past 24 hours.   X-Ray Hip 2 or 3 views Right [[id number]] Resulted: 07/25/18 1630   Order Status: Completed Updated: 07/25/18 1633   Narrative:     EXAMINATION:  XR HIP 2 VIEW RIGHT    CLINICAL HISTORY:  Fall, right hip pain;    TECHNIQUE:  AP view of the pelvis and frog leg lateral view of the right hip were performed.    COMPARISON:  10/18/2017.    FINDINGS:  Comparing the current study to the prior examination of 10/18/2017 reveals stable advanced degenerative changes in each hip and no recent fracture, dislocation or radiopaque retained foreign body.    However variety of processes that cause hip and pelvic pain can be occult on radiographs.  If there is persistent concern for hip or pelvic injury, or if the patient has difficulty bearing weight, I recommend prompt CT for further assessment.  If there is concern for soft tissue, marrow are cartilage abnormality, I recommend MRI.   Impression:       Please see above.      Electronically signed by: Evangelina Parks MD  Date: 07/25/2018  Time: 16:30   CT Chest Without Contrast [[id number]] (Abnormal) Resulted: 07/23/18 2101   Order Status: Completed Updated: 07/23/18 2103   Narrative:     EXAMINATION:  CT CHEST WITHOUT  CONTRAST    CLINICAL HISTORY:  leukocytosis of 30 and concern for complex PNA;    TECHNIQUE:  Low dose axial images, sagittal and coronal reformations were obtained from the thoracic inlet to the lung bases. Contrast was not administered.    COMPARISON:  CT abdomen pelvis without contrast 05/26/2018    FINDINGS:  Base of neck: There is prominent vascular calcification involving the bilateral carotid bifurcations.  Remaining neck soft tissues are unremarkable.    Thoracic soft tissues: There are scattered calcifications within the right breast tissue.    Aorta: Left-sided three-vessel aortic arch contains moderate calcific atherosclerosis.  Thoracic aorta maintains appropriate caliber, contour, and course.    Pulmonary vasculature: Pulmonary arteries distribute appropriately.  There are 4 pulmonary veins.    Heart: No cardiac enlargement or pericardial effusion.    Marjorie/Mediastinum: No significant lymphadenopathy.    Airways: Trachea is midline and the proximal airways are patent.    Lungs/Pleura: The lungs are symmetrically expanded noting right apical bullous disease.  There is diffuse tubular bronchiectasis as well as peripheral reticular opacities and centrilobular pulmonary nodules with tree-in-bud configuration suggesting small airways infection/inflammation.  Tree-in-bud nodules best appreciated within the right middle lobe (axial series 2, image 100).  Within the posterior segment of the right upper lobe there is a cavitary lesion measuring approximately 2.4 x 2.3 cm (axial series 2, image 54).  No additional cavitation is present.  No pneumothorax or pleural effusion.    Esophagus: Normal in course and caliber.  There is a moderately sized hiatal hernia.    Upper Abdomen: There is stable aneurysmal dilation of the partially imaged abdominal aorta at the level of the renal arteries measuring up to 3.3 cm (axial series 2, image 145).  There is cholelithiasis.    Bones: There is moderate levo scoliotic  curvature of the thoracic spine.  There is mild compression deformity of the T11 vertebral body similar to prior examination dated 05/26/2018.  Remaining osseous structures demonstrate age-appropriate degenerative change.  No evidence for acute fracture or dislocation.   Impression:       Constellation of findings suggesting small airways infection/inflammation including tubular bronchiectasis, peripheral reticulation, and tree-in-bud configuration of centrilobular pulmonary nodules.    2.4 cm right upper lobe cavitary lesion.  In light of above findings, this lesion likely represents an infectious/inflammatory process such as MAC, tuberculosis, or granulomatosis with polyangiitis, however neoplasm cannot be entirely excluded.  Recommend consultation with pulmonology for further evaluation.    T11 mild compression deformity, stable from May 26, 2018.    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    Cholelithiasis.    This report was flagged in Epic as abnormal.    Electronically signed by resident: Alexey Murguia  Date: 07/23/2018  Time: 19:29    Electronically signed by: Celio Wagoner MD  Date: 07/23/2018  Time: 21:01   X-Ray Chest AP Portable [[id number]] Resulted: 07/23/18 1725   Order Status: Completed Updated: 07/23/18 1728   Narrative:     EXAMINATION:  XR CHEST AP PORTABLE    CLINICAL HISTORY:  Sepsis;    TECHNIQUE:  Single frontal view of the chest was performed.    COMPARISON:  June 23, 2018.    FINDINGS:  Patient is rotated to the right.  Heart is enlarged.  Diffuse abnormal increase in the interstitial markings.  No confluent consolidation there are some patchy increased markings at the left base.  Degenerative change in the spine.   Impression:       Abnormal interstitial pattern accentuated compared to prior.  Congestive failure and developing bilateral infiltrates need to be considered.      Electronically signed by: Hero Sawyer MD  Date: 07/23/2018  Time: 17:25

## 2018-07-26 NOTE — ASSESSMENT & PLAN NOTE
Patient with leukocytosis and febrile on presentation.   WBC continues downtrending.  Active C.diff as source. Initially concerned about lung and uti but no indication that these are contributing.   Still working up for TB  Intermittently tachy today, will give more fluid tonight.

## 2018-07-26 NOTE — SUBJECTIVE & OBJECTIVE
Past Medical History:   Diagnosis Date    Alzheimer disease     Arthritis     B12 nutritional deficiency 8/6/2012    C. 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       Past Surgical History:   Procedure Laterality Date    BREAST SURGERY      CATARACT EXTRACTION W/  INTRAOCULAR LENS IMPLANT Bilateral n/a    OU    COLECTOMY      DENTAL SURGERY      right ankle surgery         Review of patient's allergies indicates:   Allergen Reactions    Codeine      Other reaction(s): Unknown    Iodine      Other reaction(s): Unknown    Penicillins      Other reaction(s): Unknown       Family History     Problem Relation (Age of Onset)    Hip fracture Mother    Thyroid disease Sister        Social History Main Topics    Smoking status: Former Smoker     Quit date: 6/24/1970    Smokeless tobacco: Former User    Alcohol use No    Drug use: No    Sexual activity: Not on file         Review of Systems   Constitutional: Negative for activity change, fatigue and fever.   Respiratory: Negative for cough, choking, shortness of breath and wheezing.    Cardiovascular: Negative for chest pain, palpitations and leg swelling.   Gastrointestinal: Positive for diarrhea. Negative for abdominal pain.   Psychiatric/Behavioral: Positive for confusion.     Objective:     Vital Signs (Most Recent):  Temp: 96.9 °F (36.1 °C) (07/26/18 0500)  Pulse: 73 (07/26/18 1059)  Resp: 15 (07/26/18 0500)  BP: (!) 167/101 (07/26/18 0500)  SpO2: 95 %  (07/26/18 0057) Vital Signs (24h Range):  Temp:  [96.8 °F (36 °C)-98.1 °F (36.7 °C)] 96.9 °F (36.1 °C)  Pulse:  [] 73  Resp:  [14-21] 15  SpO2:  [95 %-97 %] 95 %  BP: (114-167)/() 167/101     Weight: 48.1 kg (106 lb 0.7 oz)  Body mass index is 19.4 kg/m².      Intake/Output Summary (Last 24 hours) at 07/26/18 1202  Last data filed at 07/26/18 0900   Gross per 24 hour   Intake              400 ml   Output                0 ml   Net              400 ml       Physical Exam   Constitutional:   Elderly, frail     HENT:   Head: Normocephalic and atraumatic.   Nose: Nose normal.   Eyes: EOM are normal. Pupils are equal, round, and reactive to light.   Neck: Normal range of motion. No JVD present.   Cardiovascular: Normal rate, regular rhythm, normal heart sounds and intact distal pulses.  Exam reveals no gallop and no friction rub.    No murmur heard.  Pulmonary/Chest: Effort normal and breath sounds normal. No stridor. No respiratory distress. She has no wheezes. She has no rales. She exhibits no tenderness.   Abdominal: Soft. Bowel sounds are normal. She exhibits no distension. There is no tenderness.   Musculoskeletal: Normal range of motion.   Neurological: She is alert.   Demented at baseline, does not know time, place, or situation   Psychiatric: She has a normal mood and affect. Her behavior is normal. Judgment and thought content normal.       Vents:       Lines/Drains/Airways     Peripheral Intravenous Line                 Peripheral IV - Single Lumen 07/25/18 1027 Left Antecubital 1 day                Significant Labs:    CBC/Anemia Profile:    Recent Labs  Lab 07/25/18  0638 07/26/18  0640   WBC 25.74* 17.21*   HGB 11.4* 12.4   HCT 36.2* 38.7    277   MCV 98 95   RDW 14.8* 14.6*        Chemistries:    Recent Labs  Lab 07/25/18  0638 07/26/18  0640    141   K 3.1* 3.5   * 108   CO2 21* 27   BUN 16 7*   CREATININE 0.8 0.7   CALCIUM 8.2* 8.5*   MG 1.6 2.1   PHOS 1.6* 1.6*       Recent  Lab Results       07/26/18  0640 07/25/18  1330      Immature Granulocytes 1.2(H)      Immature Grans (Abs) 0.20  Comment:  Mild elevation in immature granulocytes is non specific and   can be seen in a variety of conditions including stress response,   acute inflammation, trauma and pregnancy. Correlation with other   laboratory and clinical findings is essential.  (H)      Anion Gap 6(L)      Baso # 0.06      Basophil% 0.3      BUN, Bld 7(L)      Calcium 8.5(L)      Chloride 108      CO2 27      Creatinine 0.7      Differential Method Automated      eGFR if African American >60.0      eGFR if non  >60.0  Comment:  Calculation used to obtain the estimated glomerular filtration  rate (eGFR) is the CKD-EPI equation.         Eos # 0.2      Eosinophil% 1.2      Glucose 103      Gram Stain (Respiratory)  <10 epithelial cells per low power field.       Rare WBC's       No organisms seen     Gran # (ANC) 14.9(H)      Gran% 86.3(H)      Hematocrit 38.7      Hemoglobin 12.4      Lymph # 1.2      Lymph% 6.9(L)      Magnesium 2.1      MCH 30.4      MCHC 32.0      MCV 95      Mono # 0.7      Mono% 4.1      MPV 10.7      MTB Specimen Source  Sputum, Expectorated     nRBC 0      Phosphorus 1.6(L)      Platelets 277      Potassium 3.5      RBC 4.08      RDW 14.6(H)      Sodium 141      WBC 17.21(H)            Significant Imaging:   I have reviewed all pertinent imaging results/findings within the past 24 hours.

## 2018-07-26 NOTE — PROGRESS NOTES
[facility]  Infectious Disease  Progress Note    Patient Name: Arina Adame  MRN: [id number]  Admission Date: 7/23/2018  Length of Stay: 2 days  Attending Physician: Washington Quintana MD  Primary Care Provider: Zeinab Meier MD    Isolation Status: Airborne and Special Contact  Assessment/Plan:      * Clostridium difficile infection       89 year old female with history of Alzheimer disease, recurrent UTIs and recurrent C difficile colitis admitted with fevers, diarrhea and vomiting after recently completing a 14 day course of oral vancomycin. In ED, patient was septic with fever up to 101.4 and an elevated WBC count. She was started on IV fluids and empiric antibiotics. Blood cultures are negative. Urine culture pending. C diff +. CT chest showed diffuse tubular bronchiectasis as well as peripheral reticular opacities and centrilobular pulmonary nodules with tree-in-bud configuration suggesting small airways infection/inflammation. Also present is a 2.4 cm right upper lobe cavitary lesion. Sputum cultures ordered, including AFB. Quant gold ordered. ID consulted for antibiotic recommendations.      Plan  - This is her third occurrence of C. Diff colitis. Continue oral Vancomycin for now x 21d from 7/25.  - From a communicable standpoint - recommend further evaluation of the cavitary lung lesion with AFB sputum cultures and send a sputum specimen for a MTB PCR. May need respiratory therapy involvement to help produce specimen.   - T spot ordered.  - DC cefrtriaxone.   - Topical antifungal to groin rash.  - ID will continue to follow.         stable non septic    Anticipated Disposition: tbd    Thank you for your consult. I will follow-up with patient. Please contact us if you have any additional questions.    BETY Quintero  Infectious Disease  [facility]    Subjective:     Principal Problem:Clostridium difficile infection    HPI: Arina Adame is an 89 year old  female with history of Alzheimer disease, recurrent UTIs and recurrent C difficile colitis (this is her third occurrence) brought to the ED by her daughter yesterday complaining of fever, diarrhea and vomiting. Her most recent episode of C. Diff was in June. She was discharged on June 26th with a plan to complete 14 days of oral vancomycin. She states she completed vancomycin for C.diff last Thursday. Shortly after, her daughter states she has had severe foul smelling watery diarrhea since completing the oral vancomycin. She has been more confused at night for the past 2 nights. Yesterday her daughter noticed she had a  fever of >101 and called EMS. In ED, patient was febrile to 101.4, hypotensive, tachycardic, and lethargic. WBC 30K, lactate 2.6. UA with 20 WBC, occasional bacteria. She was started on IV fluids and empiric antibiotics.  Blood cultures are negative. Urine culture pending. C diff +. CT chest showed diffuse tubular bronchiectasis as well as peripheral reticular opacities and centrilobular pulmonary nodules with tree-in-bud configuration suggesting small airways infection/inflammation. Also present is a 2.4 cm right upper lobe cavitary lesion. Sputum cultures ordered, including AFB. Quant gold ordered. ID consulted for antibiotic recommendations.    Patient seen at bedside with daughter. She denies current cough or sputum production. No abdominal pain, N/V, headache, CP, SOB, hemoptysis. She continues to have foul smelling watery diarrhea along with poor appetite. Otherwise, appears well.  Interval History: No AE to ON.  Afebrile and WBC decreasing..  Unable to obtain history reliably.    Review of Systems   Unable to perform ROS: Dementia     Objective:     Vital Signs (Most Recent):  Temp: 96.8 °F (36 °C) (07/25/18 1938)  Pulse: 77 (07/25/18 1900)  Resp: 17 (07/25/18 1800)  BP: 138/86 (07/25/18 1800)  SpO2: 97 % (07/25/18 1800) Vital Signs (24h Range):  Temp:  [96.8 °F (36 °C)-98.1 °F (36.7 °C)] 96.8  °F (36 °C)  Pulse:  [] 77  Resp:  [14-22] 17  SpO2:  [96 %-100 %] 97 %  BP: (116-150)/(67-92) 138/86     Weight: 48.1 kg (106 lb 0.7 oz)  Body mass index is 19.4 kg/m².    Estimated Creatinine Clearance: 36.2 mL/min (based on SCr of 0.8 mg/dL).    Physical Exam   Constitutional: She is oriented to person, place, and time. No distress.   HENT:   Head: Normocephalic and atraumatic.   Mouth/Throat: No oropharyngeal exudate.   Eyes: Conjunctivae are normal. No scleral icterus.   Neck: Neck supple.   Cardiovascular: Regular rhythm and normal heart sounds.    Tachy     Pulmonary/Chest: Effort normal. No respiratory distress. She has no wheezes.   On RA   Abdominal: Soft. Bowel sounds are normal. She exhibits no distension. There is no tenderness.   Genitourinary:   Genitourinary Comments: Erythematous rash to groin    Musculoskeletal: She exhibits no edema or tenderness.   Lymphadenopathy:     She has no cervical adenopathy.   Neurological: She is alert and oriented to person, place, and time. No cranial nerve deficit.   Skin: Skin is warm and dry. She is not diaphoretic. No erythema.   Psychiatric: She has a normal mood and affect. Her behavior is normal.   Nursing note and vitals reviewed.      Significant Labs:   Blood Culture:   Recent Labs  Lab 05/26/18  1539 05/26/18  1634 06/23/18  2023 07/23/18  1613 07/23/18  1627   LABBLOO No growth after 5 days. No growth after 5 days. No growth after 5 days.  No growth after 5 days. No Growth to date  No Growth to date  No Growth to date No Growth to date  No Growth to date  No Growth to date     CBC:   Recent Labs  Lab 07/24/18  0427 07/25/18  0638   WBC 33.75* 25.74*   HGB 11.3* 11.4*   HCT 36.4* 36.2*    240     CMP:   Recent Labs  Lab 07/24/18  0426 07/25/18  0638    141   K 3.2* 3.1*    113*   CO2 20* 21*   GLU 94 74   BUN 20 16   CREATININE 0.9 0.8   CALCIUM 8.4* 8.2*   ANIONGAP 13 7*   EGFRNONAA 56.9* >60.0     Respiratory Culture:   Recent  Labs  Lab 07/25/18  1330   GSRESP <10 epithelial cells per low power field.  Rare WBC's  No organisms seen     Wound Culture: No results for input(s): LABAERO in the last 4320 hours.  All pertinent labs within the past 24 hours have been reviewed.    Significant Imaging: I have reviewed all pertinent imaging results/findings within the past 24 hours.   X-Ray Hip 2 or 3 views Right [[id number]] Resulted: 07/25/18 1630   Order Status: Completed Updated: 07/25/18 1633   Narrative:     EXAMINATION:  XR HIP 2 VIEW RIGHT    CLINICAL HISTORY:  Fall, right hip pain;    TECHNIQUE:  AP view of the pelvis and frog leg lateral view of the right hip were performed.    COMPARISON:  10/18/2017.    FINDINGS:  Comparing the current study to the prior examination of 10/18/2017 reveals stable advanced degenerative changes in each hip and no recent fracture, dislocation or radiopaque retained foreign body.    However variety of processes that cause hip and pelvic pain can be occult on radiographs.  If there is persistent concern for hip or pelvic injury, or if the patient has difficulty bearing weight, I recommend prompt CT for further assessment.  If there is concern for soft tissue, marrow are cartilage abnormality, I recommend MRI.   Impression:       Please see above.      Electronically signed by: Evangelina Parks MD  Date: 07/25/2018  Time: 16:30   CT Chest Without Contrast [[id number]] (Abnormal) Resulted: 07/23/18 2101   Order Status: Completed Updated: 07/23/18 2103   Narrative:     EXAMINATION:  CT CHEST WITHOUT CONTRAST    CLINICAL HISTORY:  leukocytosis of 30 and concern for complex PNA;    TECHNIQUE:  Low dose axial images, sagittal and coronal reformations were obtained from the thoracic inlet to the lung bases. Contrast was not administered.    COMPARISON:  CT abdomen pelvis without contrast 05/26/2018    FINDINGS:  Base of neck: There is prominent vascular calcification involving the bilateral carotid bifurcations.   Remaining neck soft tissues are unremarkable.    Thoracic soft tissues: There are scattered calcifications within the right breast tissue.    Aorta: Left-sided three-vessel aortic arch contains moderate calcific atherosclerosis.  Thoracic aorta maintains appropriate caliber, contour, and course.    Pulmonary vasculature: Pulmonary arteries distribute appropriately.  There are 4 pulmonary veins.    Heart: No cardiac enlargement or pericardial effusion.    Marjorie/Mediastinum: No significant lymphadenopathy.    Airways: Trachea is midline and the proximal airways are patent.    Lungs/Pleura: The lungs are symmetrically expanded noting right apical bullous disease.  There is diffuse tubular bronchiectasis as well as peripheral reticular opacities and centrilobular pulmonary nodules with tree-in-bud configuration suggesting small airways infection/inflammation.  Tree-in-bud nodules best appreciated within the right middle lobe (axial series 2, image 100).  Within the posterior segment of the right upper lobe there is a cavitary lesion measuring approximately 2.4 x 2.3 cm (axial series 2, image 54).  No additional cavitation is present.  No pneumothorax or pleural effusion.    Esophagus: Normal in course and caliber.  There is a moderately sized hiatal hernia.    Upper Abdomen: There is stable aneurysmal dilation of the partially imaged abdominal aorta at the level of the renal arteries measuring up to 3.3 cm (axial series 2, image 145).  There is cholelithiasis.    Bones: There is moderate levo scoliotic curvature of the thoracic spine.  There is mild compression deformity of the T11 vertebral body similar to prior examination dated 05/26/2018.  Remaining osseous structures demonstrate age-appropriate degenerative change.  No evidence for acute fracture or dislocation.   Impression:       Constellation of findings suggesting small airways infection/inflammation including tubular bronchiectasis, peripheral reticulation,  and tree-in-bud configuration of centrilobular pulmonary nodules.    2.4 cm right upper lobe cavitary lesion.  In light of above findings, this lesion likely represents an infectious/inflammatory process such as MAC, tuberculosis, or granulomatosis with polyangiitis, however neoplasm cannot be entirely excluded.  Recommend consultation with pulmonology for further evaluation.    T11 mild compression deformity, stable from May 26, 2018.    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    Cholelithiasis.    This report was flagged in Epic as abnormal.    Electronically signed by resident: Alexey Murguia  Date: 07/23/2018  Time: 19:29    Electronically signed by: Cleio Wagoner MD  Date: 07/23/2018  Time: 21:01   X-Ray Chest AP Portable [[id number]] Resulted: 07/23/18 1725   Order Status: Completed Updated: 07/23/18 1728   Narrative:     EXAMINATION:  XR CHEST AP PORTABLE    CLINICAL HISTORY:  Sepsis;    TECHNIQUE:  Single frontal view of the chest was performed.    COMPARISON:  June 23, 2018.    FINDINGS:  Patient is rotated to the right.  Heart is enlarged.  Diffuse abnormal increase in the interstitial markings.  No confluent consolidation there are some patchy increased markings at the left base.  Degenerative change in the spine.   Impression:       Abnormal interstitial pattern accentuated compared to prior.  Congestive failure and developing bilateral infiltrates need to be considered.      Electronically signed by: Hero Sawyer MD  Date: 07/23/2018  Time: 17:25

## 2018-07-26 NOTE — PLAN OF CARE
Problem: Patient Care Overview  Goal: Plan of Care Review  Outcome: Ongoing (interventions implemented as appropriate)  Pt free of falls/injuries throughout the shift. Bed locked, in lowest position, call bell within reach. Pt afebrile, pain assessed & denied. VSS, pt in no distress but restless throughout the night, will continue to monitor.

## 2018-07-26 NOTE — ASSESSMENT & PLAN NOTE
89 year old female with history of Alzheimer disease, recurrent UTIs and recurrent C difficile colitis admitted with fevers, diarrhea and vomiting after recently completing a 14 day course of oral vancomycin. In ED, patient was septic with fever up to 101.4 and an elevated WBC count. She was started on IV fluids and empiric antibiotics. Blood cultures are negative. Urine culture pending. C diff +. CT chest showed diffuse tubular bronchiectasis as well as peripheral reticular opacities and centrilobular pulmonary nodules with tree-in-bud configuration suggesting small airways infection/inflammation. Also present is a 2.4 cm right upper lobe cavitary lesion. Sputum cultures ordered, including AFB. Quant gold ordered. ID consulted for antibiotic recommendations.      Plan  - This is her third occurrence of C. Diff colitis. Continue oral Vancomycin for now x 21d from 7/25.  - From a communicable standpoint - recommend further evaluation of the cavitary lung lesion with AFB sputum cultures and send a sputum specimen for a MTB PCR. May need respiratory therapy involvement to help produce specimen.   - T spot ordered.  - DC cefrtriaxone.   - Topical antifungal to groin rash.  - ID will continue to follow.

## 2018-07-26 NOTE — ASSESSMENT & PLAN NOTE
DEMENTIA    Patient presented with some confusion, seems worse today.   Patient with multiple risk factors for infectious encephalopathy and delirium including advanced age, pre-existing dementia, active infection and diarrhea.   Delirium precautions, telesitter.   Treating infection

## 2018-07-27 LAB
ANION GAP SERPL CALC-SCNC: 8 MMOL/L
BASOPHILS # BLD AUTO: 0.09 K/UL
BASOPHILS NFR BLD: 0.9 %
BUN SERPL-MCNC: 7 MG/DL
CALCIUM SERPL-MCNC: 8.4 MG/DL
CHLORIDE SERPL-SCNC: 110 MMOL/L
CO2 SERPL-SCNC: 23 MMOL/L
CREAT SERPL-MCNC: 0.7 MG/DL
DIFFERENTIAL METHOD: ABNORMAL
EOSINOPHIL # BLD AUTO: 0.3 K/UL
EOSINOPHIL NFR BLD: 2.9 %
ERYTHROCYTE [DISTWIDTH] IN BLOOD BY AUTOMATED COUNT: 14.8 %
EST. GFR  (AFRICAN AMERICAN): >60 ML/MIN/1.73 M^2
EST. GFR  (NON AFRICAN AMERICAN): >60 ML/MIN/1.73 M^2
GLUCOSE SERPL-MCNC: 103 MG/DL
HCT VFR BLD AUTO: 36.3 %
HGB BLD-MCNC: 12 G/DL
IMM GRANULOCYTES # BLD AUTO: 0.28 K/UL
IMM GRANULOCYTES NFR BLD AUTO: 2.7 %
LYMPHOCYTES # BLD AUTO: 2.2 K/UL
LYMPHOCYTES NFR BLD: 20.6 %
MAGNESIUM SERPL-MCNC: 1.9 MG/DL
MCH RBC QN AUTO: 31.3 PG
MCHC RBC AUTO-ENTMCNC: 33.1 G/DL
MCV RBC AUTO: 95 FL
MONOCYTES # BLD AUTO: 0.8 K/UL
MONOCYTES NFR BLD: 7.2 %
NEUTROPHILS # BLD AUTO: 6.9 K/UL
NEUTROPHILS NFR BLD: 65.7 %
NRBC BLD-RTO: 0 /100 WBC
PHOSPHATE SERPL-MCNC: 3.2 MG/DL
PLATELET # BLD AUTO: 283 K/UL
PMV BLD AUTO: 10.6 FL
POTASSIUM SERPL-SCNC: 3.4 MMOL/L
RBC # BLD AUTO: 3.83 M/UL
SODIUM SERPL-SCNC: 141 MMOL/L
WBC # BLD AUTO: 10.51 K/UL

## 2018-07-27 PROCEDURE — 83735 ASSAY OF MAGNESIUM: CPT

## 2018-07-27 PROCEDURE — 80048 BASIC METABOLIC PNL TOTAL CA: CPT

## 2018-07-27 PROCEDURE — 25000003 PHARM REV CODE 250: Performed by: HOSPITALIST

## 2018-07-27 PROCEDURE — 25000003 PHARM REV CODE 250: Performed by: STUDENT IN AN ORGANIZED HEALTH CARE EDUCATION/TRAINING PROGRAM

## 2018-07-27 PROCEDURE — 11000001 HC ACUTE MED/SURG PRIVATE ROOM

## 2018-07-27 PROCEDURE — 84100 ASSAY OF PHOSPHORUS: CPT

## 2018-07-27 PROCEDURE — 85025 COMPLETE CBC W/AUTO DIFF WBC: CPT

## 2018-07-27 PROCEDURE — 63600175 PHARM REV CODE 636 W HCPCS: Performed by: STUDENT IN AN ORGANIZED HEALTH CARE EDUCATION/TRAINING PROGRAM

## 2018-07-27 PROCEDURE — 36415 COLL VENOUS BLD VENIPUNCTURE: CPT

## 2018-07-27 PROCEDURE — 99232 SBSQ HOSP IP/OBS MODERATE 35: CPT | Mod: GC,,, | Performed by: HOSPITALIST

## 2018-07-27 RX ORDER — SODIUM CHLORIDE 9 MG/ML
INJECTION, SOLUTION INTRAVENOUS CONTINUOUS
Status: ACTIVE | OUTPATIENT
Start: 2018-07-27 | End: 2018-07-28

## 2018-07-27 RX ORDER — POTASSIUM CHLORIDE 20 MEQ/1
40 TABLET, EXTENDED RELEASE ORAL ONCE
Status: COMPLETED | OUTPATIENT
Start: 2018-07-27 | End: 2018-07-27

## 2018-07-27 RX ADMIN — MICONAZOLE NITRATE: 20 OINTMENT TOPICAL at 09:07

## 2018-07-27 RX ADMIN — ENOXAPARIN SODIUM 40 MG: 100 INJECTION SUBCUTANEOUS at 06:07

## 2018-07-27 RX ADMIN — Medication 125 MG: at 06:07

## 2018-07-27 RX ADMIN — POTASSIUM CHLORIDE 40 MEQ: 1500 TABLET, EXTENDED RELEASE ORAL at 12:07

## 2018-07-27 RX ADMIN — SODIUM CHLORIDE: 0.9 INJECTION, SOLUTION INTRAVENOUS at 06:07

## 2018-07-27 RX ADMIN — ATORVASTATIN CALCIUM 40 MG: 20 TABLET, FILM COATED ORAL at 09:07

## 2018-07-27 RX ADMIN — LEVOTHYROXINE SODIUM 100 MCG: 100 TABLET ORAL at 06:07

## 2018-07-27 RX ADMIN — ASPIRIN 81 MG: 81 TABLET, COATED ORAL at 09:07

## 2018-07-27 RX ADMIN — Medication 1000 UNITS: at 09:07

## 2018-07-27 RX ADMIN — CYANOCOBALAMIN TAB 250 MCG 500 MCG: 250 TAB at 09:07

## 2018-07-27 RX ADMIN — QUETIAPINE FUMARATE 25 MG: 25 TABLET ORAL at 09:07

## 2018-07-27 RX ADMIN — DONEPEZIL HYDROCHLORIDE 10 MG: 5 TABLET, FILM COATED ORAL at 09:07

## 2018-07-27 RX ADMIN — Medication 125 MG: at 12:07

## 2018-07-27 NOTE — PROGRESS NOTES
[facility] Hospital Medicine  Progress Note    Patient Name: Arina Adame  MRN: [id number]  Patient Class: IP- Inpatient   Admission Date: 7/23/2018  Length of Stay: 4 days  Attending Physician: Washington Quintana MD  Primary Care Provider: Zeinab Meier MD    [facility] Medicine Team: [facility] 4 Jennie Casey MD    Subjective:     Principal Problem:Clostridium difficile infection    HPI:  Arina Adame is a 89 y.o. female has a past medical history of Alzheimer disease; C. difficile colitis; Carotid arterial disease; Cataract; Essential hypertension; Colon cancer; Hypothyroidism; impaired glucose tolerance; Osteoporosis, brought to the ED by her daughter today complaining of fever, diarrhea and vomiting.  At ED, Arina Adame was also hypotensive, tachycardic, and lethargic, CBC, CMP, troponin, BNP, urinalysis, blood/urinary culture, CT chest and Chest X-ray was ordered, concerning recurrently UTI, diff infection, and pneumonia.      Arina Adame has limited ability to give a full medical history.  Collateral information from her daughter was obtained. She was discharged from hospital of her last episode on June 26th, and relapsed right after she complete a home course of PO vancomycin for C.diff last Thursday. She was brought to the ED by EMS for fever. Daughter reports that she has been more confused at night for the past 2 nights. Today, she was much more fatigued than normal and would not get out of bed. This afternoon, daughter noticed she had a fever of >101 and called EMS. EMS noted pt to be febrile, tachycardic and hypotensive and administered 500cc of NS. On exam pt is rousable but disoriented. Daughter notes that she has a bed sore near her gluteal fold that has not healed.   Daughter noticed Arina Adaem's change of bowel movement last Thursday when she changed her diaper.  The diarrhea has been watery, gold in color, mucousy with C. diff smell.  She became increasingly  "lethargic.  This morning, Arina Adame was unable to get up from bed, did not take any medication or food, vomited once after taking some water.  She slept all day.  When daughter saw her in the late afternoon, Arina Adame was shivering, had a temperature of 101.9, with some rattle breathing sound.  She took her to the ED. She s/p 2 episodes of C-diff infections with UTI in the past.  Each episode lasted for about 7-9 days.          Hospital Course:  No notes on file    Interval History: Patient confused this morning. Only orientated to person and birthday but not time or place. When asked nurse about diarrhea, patient very "watery", unsure whether it is watery diarrhea or urine. Patient denies having pain. Able to get 2 AFBs and awaiting results of MTB-PCR. T-spot test negative.     Review of Systems   Constitutional: Positive for activity change and fatigue. Negative for fever.   HENT: Negative for congestion, drooling and mouth sores.    Eyes: Negative for visual disturbance.   Respiratory: Negative for apnea, cough, chest tightness and shortness of breath.    Cardiovascular: Negative for chest pain and leg swelling.   Gastrointestinal: Positive for diarrhea. Negative for abdominal distention, abdominal pain, nausea and vomiting.   Genitourinary: Negative for difficulty urinating and dysuria.   Musculoskeletal: Negative for arthralgias.   Skin: Negative for color change and pallor.   Allergic/Immunologic: Negative for environmental allergies and food allergies.   Neurological: Negative for dizziness, facial asymmetry and headaches.   Hematological: Negative for adenopathy.   Psychiatric/Behavioral: Negative for agitation.     Objective:     Vital Signs (Most Recent):  Temp: 97.6 °F (36.4 °C) (07/27/18 0900)  Pulse: 94 (07/27/18 1136)  Resp: 16 (07/27/18 1136)  BP: 130/84 (07/27/18 1136)  SpO2: 97 % (07/27/18 1200) Vital Signs (24h Range):  Temp:  [97.6 °F (36.4 °C)-98.5 °F (36.9 °C)] 97.6 °F (36.4 " °C)  Pulse:  [] 94  Resp:  [15-21] 16  SpO2:  [95 %-99 %] 97 %  BP: (111-187)/() 130/84     Weight: 48.1 kg (106 lb 0.7 oz)  Body mass index is 19.4 kg/m².    Intake/Output Summary (Last 24 hours) at 07/27/18 1258  Last data filed at 07/27/18 0633   Gross per 24 hour   Intake              400 ml   Output                0 ml   Net              400 ml      Physical Exam   Constitutional: No distress.   HENT:   Head: Normocephalic and atraumatic.   Mouth/Throat: No oropharyngeal exudate.   Eyes: Conjunctivae are normal. No scleral icterus.   Neck: Normal range of motion. Neck supple.   Prominence of right sterno-clavicular junction   Cardiovascular: Normal rate, regular rhythm and normal heart sounds.    Intermittent tachycardia today, not present on exam this morning.    Pulmonary/Chest: Effort normal. No respiratory distress. She has no wheezes. She has no rales.   Abdominal: Soft. Bowel sounds are normal. She exhibits no distension. There is no tenderness.   Musculoskeletal: She exhibits no edema or tenderness.   Neurological: She is alert. She has normal strength. GCS eye subscore is 4. GCS verbal subscore is 5. GCS motor subscore is 6.   Skin: Skin is warm and dry. She is not diaphoretic.   Psychiatric:   Patient only orientated to person and birthday, not time or place.   Nursing note and vitals reviewed.      Significant Labs:   BMP:   Recent Labs  Lab 07/27/18  0714         K 3.4*      CO2 23   BUN 7*   CREATININE 0.7   CALCIUM 8.4*   MG 1.9     CBC:   Recent Labs  Lab 07/26/18  0640 07/27/18  0714   WBC 17.21* 10.51   HGB 12.4 12.0   HCT 38.7 36.3*    283           Assessment/Plan:      * Clostridium difficile infection    Patient with recurrence of C.diff.   ID consulted, appreciate recs.   Will treat with PO Vanc for at least 21 days and arrange for trial enrollment for fecal transplant as o/p        Delirium    DEMENTIA    Patient presented with some confusion,  orientated to person and birthday but not time or place  Patient with multiple risk factors for infectious encephalopathy and delirium including advanced age, pre-existing dementia, active infection and diarrhea.   Delirium precautions, telesitter.   Treating infection          Cavitary lesion of lung    Need to rule out TB  Patient with minimal coughing/sputum production but may be able to complete AFB rule out. Other studies also sent which would provide alternative clearance. T-spot test negative; awaiting results of MTB-PCR.  Appreciate pulmonary assistance.         Fungal dermatitis    Treating with topical antifungal, appreciate Wound Care recs        Sepsis    Patient with leukocytosis and febrile on presentation.   WBC continues downtrending.  Active C.diff as source. Initially concerned about lung and uti but no indication that these are contributing.   Still working up for TB  Intermittently tachy today, will give more fluid tonight.         Age-related physical debility    Appreciate PT/OT recs        Dementia    Continue home donepezil  Continue home seroquil  Delirium precautions  zoloft stopped by PCP        CAD (coronary artery disease)    Continue ASA 81, Statin          Hypothyroidism    Continue home levothyroxine          VTE Risk Mitigation         Ordered     enoxaparin injection 40 mg  Daily      07/23/18 2332     IP VTE HIGH RISK PATIENT  Once      07/24/18 0034     Place GERARD hose  Until discontinued      07/23/18 2332              Jennie Casey MD PGY1  Department of Hospital Medicine   [facility]

## 2018-07-27 NOTE — ASSESSMENT & PLAN NOTE
Differential includes infection, Tb, atypical infection or malignancy.  - consider bronchoscopy early next week if sputum production is not present.  - will obtain consent with family by phone if needed.  - continue to attempt to expectorate sputum for AFB smears, as this would be preferred to an invasive procedure.   - continue with RT to administer hypertonic saline with aerosol for induced sputum.  - the main objective is sputum collection for cultures, family doesn't want to treat cancer, so reason for bronchoscopy would be solely for obtaining BAL if sputum is unable to be collected.  - given advanced age, alzheimer's dementia, and reported agitation with sedative medications per daughter, it would be ideal to avoid this procedure if possible.

## 2018-07-27 NOTE — PHYSICIAN QUERY
PT Name: Arina Adame  MR #: [id number]     CDS/: Kathryn Johnson RN CCDS               Contact information: [email or web address]  This form is a permanent document in the medical record.     Query Date: July 27, 2018    By submitting this query, we are merely seeking further clarification of documentation to reflect the severity of illness of your patient. Please utilize your independent clinical judgment when addressing the question(s) below.    The Medical record reflects the following:     Indicators   Supporting Clinical Findings Location in Medical Record   x AMS, Confusion,  LOC, etc.  Patient presented with some confusion, seems worse today.   Patient with multiple risk factors for infectious encephalopathy and delirium including advanced age, pre-existing dementia, active infection and diarrhea.  PN 7/26   x Acute / Chronic Illness Sepsis/C.diff infection/  cavitary lung lesion/  Dementia/CAD/hypothyroid PN 7/26    Radiology Findings      Electrolyte Imbalance     x Medication Continue home donepezil  Continue home seroquil  PO Vanc for at least 21 days  PN 7/26   x Treatment         Delirium precautions, telesitter.   Treating infection    PN 7/26    Other       Encephalopathy- is a general term for any diffuse disease of the brain that alters brain function or structure. Treatment of the cognitive dysfunction varies but is ultimately dependent on the treatment of the underlying condition.    Major Symptoms of Encephalopathy - Decreased level of consciousness, fluctuating alertness/concentration, confusion, agitation, lethargy, somnolence, drowsiness, obtundation, stupor, or coma.         References: National Institutes of Healths (NIH) National [place] of Neurological Disorders and Strokes;  HCPro 2016; Advisory Board     Clinical Guidelines:   These guidelines will set system standards to assist providers in managing, documentation, and coding of encephalopathy. The intent of this document is  to serve as a system guideline, not replace the providers clinical judgment:  Provider, please specify the diagnosis or diagnoses associated with above clinical findings.    [X   ] Metabolic Encephalopathy - Due to electrolye imbalance, metabolic derangements, or infections processes, includes Septic Encephalopthy  [   ] Other Encephalopathy - Includes uremic encephalopathy  [   ] Unspecified Encephalopathy     [   ] Other Neurological Condition-  Includes Post-ictal altered mental status. (please specify condition): _________  [   ] Clinically Undetermined  Please document in your progress notes daily for the duration of treatment until resolved, and include in your discharge summary.

## 2018-07-27 NOTE — PLAN OF CARE
Problem: Patient Care Overview  Goal: Plan of Care Review  Outcome: Ongoing (interventions implemented as appropriate)  Pt oriented to self and place. Pt's appetite is good, adequate fluid intake. Daughter visited this afternoon, MD updated daughter on plan of care. Receiving vanc oral solution. Receiving normal saline at 100 ml/hr. Contact and airborne precautions in place, sputum culture still pending. Incontinence care provided. Redness to sacral area noted, barrier cream applied. One small bowel movement today. Fall precautions in place, call light in reach.

## 2018-07-27 NOTE — ASSESSMENT & PLAN NOTE
Need to rule out TB  Patient with minimal coughing/sputum production but may be able to complete AFB rule out. Other studies also sent which would provide alternative clearance. T-spot test negative; awaiting results of MTB-PCR.  Appreciate pulmonary assistance.

## 2018-07-27 NOTE — PLAN OF CARE
07/27/18 1518   Discharge Reassessment   Assessment Type Discharge Planning Reassessment   Do you have any problems affording any of your prescribed medications? No   Discharge Plan A Home with family;Home Health   Discharge Plan B Skilled Nursing Facility

## 2018-07-27 NOTE — SUBJECTIVE & OBJECTIVE
Past Medical History:   Diagnosis Date    Alzheimer disease     Arthritis     B12 nutritional deficiency 8/6/2012    C. 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       Past Surgical History:   Procedure Laterality Date    BREAST SURGERY      CATARACT EXTRACTION W/  INTRAOCULAR LENS IMPLANT Bilateral n/a    OU    COLECTOMY      DENTAL SURGERY      right ankle surgery         Review of patient's allergies indicates:   Allergen Reactions    Codeine      Other reaction(s): Unknown    Iodine      Other reaction(s): Unknown    Penicillins      Other reaction(s): Unknown       Family History     Problem Relation (Age of Onset)    Hip fracture Mother    Thyroid disease Sister        Social History Main Topics    Smoking status: Former Smoker     Quit date: 6/24/1970    Smokeless tobacco: Former User    Alcohol use No    Drug use: No    Sexual activity: Not on file         Review of Systems   Constitutional: Negative for activity change, fatigue and fever.   Respiratory: Negative for cough, choking, shortness of breath and wheezing.    Cardiovascular: Negative for chest pain, palpitations and leg swelling.   Gastrointestinal: Positive for diarrhea. Negative for abdominal pain.   Psychiatric/Behavioral: Positive for confusion.     Objective:     Vital Signs (Most Recent):  Temp: 98 °F (36.7 °C) (07/27/18 1200)  Pulse: 73 (07/27/18 1200)  Resp: 14 (07/27/18 1200)  BP: 102/66 (07/27/18 1200)  SpO2: 97 % (07/27/18 1200)  Vital Signs (24h Range):  Temp:  [97.6 °F (36.4 °C)-98.5 °F (36.9 °C)] 98 °F (36.7 °C)  Pulse:  [] 73  Resp:  [14-21] 14  SpO2:  [95 %-99 %] 97 %  BP: (102-187)/() 102/66     Weight: 48.1 kg (106 lb 0.7 oz)  Body mass index is 19.4 kg/m².      Intake/Output Summary (Last 24 hours) at 07/27/18 1521  Last data filed at 07/27/18 0633   Gross per 24 hour   Intake              160 ml   Output                0 ml   Net              160 ml       Physical Exam   Constitutional:   Elderly, frail     HENT:   Head: Normocephalic and atraumatic.   Nose: Nose normal.   Eyes: EOM are normal. Pupils are equal, round, and reactive to light.   Neck: Normal range of motion. No JVD present.   Cardiovascular: Normal rate, regular rhythm, normal heart sounds and intact distal pulses.  Exam reveals no gallop and no friction rub.    No murmur heard.  Pulmonary/Chest: Effort normal and breath sounds normal. No stridor. No respiratory distress. She has no wheezes. She has no rales. She exhibits no tenderness.   Abdominal: Soft. Bowel sounds are normal. She exhibits no distension. There is no tenderness.   Musculoskeletal: Normal range of motion.   Neurological: She is alert.   Demented at baseline, does not know time, place, or situation   Psychiatric: She has a normal mood and affect. Her behavior is normal. Judgment and thought content normal.       Vents:       Lines/Drains/Airways     Peripheral Intravenous Line                 Peripheral IV - Single Lumen 07/25/18 1027 Left Antecubital 2 days                Significant Labs:    CBC/Anemia Profile:    Recent Labs  Lab 07/26/18  0640 07/27/18  0714   WBC 17.21* 10.51   HGB 12.4 12.0   HCT 38.7 36.3*    283   MCV 95 95   RDW 14.6* 14.8*        Chemistries:    Recent Labs  Lab 07/26/18  0640 07/27/18  0714    141   K 3.5 3.4*    110   CO2 27 23   BUN 7* 7*   CREATININE 0.7 0.7   CALCIUM 8.5* 8.4*   MG 2.1 1.9   PHOS 1.6* 3.2       Recent Lab Results        07/27/18  0714      Immature Granulocytes 2.7(H)     Immature Grans (Abs) 0.28  Comment:  Mild elevation in immature granulocytes is non specific and   can be seen in a variety of conditions including stress response,   acute inflammation, trauma and pregnancy. Correlation with other   laboratory and clinical findings is essential.  (H)     Anion Gap 8     Baso # 0.09     Basophil% 0.9     BUN, Bld 7(L)     Calcium 8.4(L)     Chloride 110     CO2 23     Creatinine 0.7     Differential Method Automated     eGFR if African American >60.0     eGFR if non  >60.0  Comment:  Calculation used to obtain the estimated glomerular filtration  rate (eGFR) is the CKD-EPI equation.        Eos # 0.3     Eosinophil% 2.9     Glucose 103     Gran # (ANC) 6.9     Gran% 65.7     Hematocrit 36.3(L)     Hemoglobin 12.0     Lymph # 2.2     Lymph% 20.6     Magnesium 1.9     MCH 31.3(H)     MCHC 33.1     MCV 95     Mono # 0.8     Mono% 7.2     MPV 10.6     nRBC 0     Phosphorus 3.2     Platelets 283     Potassium 3.4(L)     RBC 3.83(L)     RDW 14.8(H)     Sodium 141     WBC 10.51           Significant Imaging:   I have reviewed all pertinent imaging results/findings within the past 24 hours.

## 2018-07-27 NOTE — PLAN OF CARE
Problem: Patient Care Overview  Goal: Plan of Care Review  Outcome: Ongoing (interventions implemented as appropriate)  Pt free of falls/injuries throughout the shift. Bed locked, in lowest position, call bell within reach. Pt afebrile, pain assessed & denied. VSS, pt in no distress, Avisys at the bs, cont. Pulse ox on,  will continue to monitor.

## 2018-07-27 NOTE — SUBJECTIVE & OBJECTIVE
"Interval History: Patient confused this morning. Only orientated to person and birthday but not time or place. When asked nurse about diarrhea, patient very "watery", unsure whether it is watery diarrhea or urine. Patient denies having pain. Able to get 2 AFBs and awaiting results of MTB-PCR. T-spot test negative.     Review of Systems   Constitutional: Positive for activity change and fatigue. Negative for fever.   HENT: Negative for congestion, drooling and mouth sores.    Eyes: Negative for visual disturbance.   Respiratory: Negative for apnea, cough, chest tightness and shortness of breath.    Cardiovascular: Negative for chest pain and leg swelling.   Gastrointestinal: Positive for diarrhea. Negative for abdominal distention, abdominal pain, nausea and vomiting.   Genitourinary: Negative for difficulty urinating and dysuria.   Musculoskeletal: Negative for arthralgias.   Skin: Negative for color change and pallor.   Allergic/Immunologic: Negative for environmental allergies and food allergies.   Neurological: Negative for dizziness, facial asymmetry and headaches.   Hematological: Negative for adenopathy.   Psychiatric/Behavioral: Negative for agitation.     Objective:     Vital Signs (Most Recent):  Temp: 97.6 °F (36.4 °C) (07/27/18 0900)  Pulse: 94 (07/27/18 1136)  Resp: 16 (07/27/18 1136)  BP: 130/84 (07/27/18 1136)  SpO2: 97 % (07/27/18 1200) Vital Signs (24h Range):  Temp:  [97.6 °F (36.4 °C)-98.5 °F (36.9 °C)] 97.6 °F (36.4 °C)  Pulse:  [] 94  Resp:  [15-21] 16  SpO2:  [95 %-99 %] 97 %  BP: (111-187)/() 130/84     Weight: 48.1 kg (106 lb 0.7 oz)  Body mass index is 19.4 kg/m².    Intake/Output Summary (Last 24 hours) at 07/27/18 1258  Last data filed at 07/27/18 06   Gross per 24 hour   Intake              400 ml   Output                0 ml   Net              400 ml      Physical Exam   Constitutional: No distress.   HENT:   Head: Normocephalic and atraumatic.   Mouth/Throat: No " oropharyngeal exudate.   Eyes: Conjunctivae are normal. No scleral icterus.   Neck: Normal range of motion. Neck supple.   Prominence of right sterno-clavicular junction   Cardiovascular: Normal rate, regular rhythm and normal heart sounds.    Intermittent tachycardia today, not present on exam this morning.    Pulmonary/Chest: Effort normal. No respiratory distress. She has no wheezes. She has no rales.   Abdominal: Soft. Bowel sounds are normal. She exhibits no distension. There is no tenderness.   Musculoskeletal: She exhibits no edema or tenderness.   Neurological: She is alert. She has normal strength. GCS eye subscore is 4. GCS verbal subscore is 5. GCS motor subscore is 6.   Skin: Skin is warm and dry. She is not diaphoretic.   Psychiatric:   Patient only orientated to person and birthday, not time or place.   Nursing note and vitals reviewed.      Significant Labs:   BMP:   Recent Labs  Lab 07/27/18  0714         K 3.4*      CO2 23   BUN 7*   CREATININE 0.7   CALCIUM 8.4*   MG 1.9     CBC:   Recent Labs  Lab 07/26/18  0640 07/27/18  0714   WBC 17.21* 10.51   HGB 12.4 12.0   HCT 38.7 36.3*    283

## 2018-07-27 NOTE — ASSESSMENT & PLAN NOTE
DEMENTIA    Patient presented with some confusion, orientated to person and birthday but not time or place  Patient with multiple risk factors for infectious encephalopathy and delirium including advanced age, pre-existing dementia, active infection and diarrhea.   Delirium precautions, telesitter.   Treating infection

## 2018-07-27 NOTE — PROGRESS NOTES
[facility]  Pulmonology  Progress Note    Patient Name: Arina Adame  MRN: [id number]  Admission Date: 7/23/2018  Hospital Length of Stay: 4 days  Code Status: DNR  Attending Provider: Washington Quintana MD  Primary Care Provider: Zeinab Meier MD   Principal Problem: Clostridium difficile infection    Subjective:     Past Medical History:   Diagnosis Date    Alzheimer disease     Arthritis     B12 nutritional deficiency 8/6/2012    C. 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       Past Surgical History:   Procedure Laterality Date    BREAST SURGERY      CATARACT EXTRACTION W/  INTRAOCULAR LENS IMPLANT Bilateral n/a    OU    COLECTOMY      DENTAL SURGERY      right ankle surgery         Review of patient's allergies indicates:   Allergen Reactions    Codeine      Other reaction(s): Unknown    Iodine      Other reaction(s): Unknown    Penicillins      Other reaction(s): Unknown       Family History     Problem Relation (Age of Onset)    Hip fracture Mother    Thyroid disease Sister        Social History Main Topics    Smoking status: Former Smoker     Quit date: 6/24/1970    Smokeless tobacco: Former User    Alcohol use No    Drug use: No    Sexual activity: Not on file         Review of Systems   Constitutional: Negative for activity change, fatigue and fever.   Respiratory: Negative for cough, choking, shortness of breath and wheezing.    Cardiovascular: Negative for chest pain,  palpitations and leg swelling.   Gastrointestinal: Positive for diarrhea. Negative for abdominal pain.   Psychiatric/Behavioral: Positive for confusion.     Objective:     Vital Signs (Most Recent):  Temp: 98 °F (36.7 °C) (07/27/18 1200)  Pulse: 73 (07/27/18 1200)  Resp: 14 (07/27/18 1200)  BP: 102/66 (07/27/18 1200)  SpO2: 97 % (07/27/18 1200) Vital Signs (24h Range):  Temp:  [97.6 °F (36.4 °C)-98.5 °F (36.9 °C)] 98 °F (36.7 °C)  Pulse:  [] 73  Resp:  [14-21] 14  SpO2:  [95 %-99 %] 97 %  BP: (102-187)/() 102/66     Weight: 48.1 kg (106 lb 0.7 oz)  Body mass index is 19.4 kg/m².      Intake/Output Summary (Last 24 hours) at 07/27/18 1521  Last data filed at 07/27/18 0633   Gross per 24 hour   Intake              160 ml   Output                0 ml   Net              160 ml       Physical Exam   Constitutional:   Elderly, frail     HENT:   Head: Normocephalic and atraumatic.   Nose: Nose normal.   Eyes: EOM are normal. Pupils are equal, round, and reactive to light.   Neck: Normal range of motion. No JVD present.   Cardiovascular: Normal rate, regular rhythm, normal heart sounds and intact distal pulses.  Exam reveals no gallop and no friction rub.    No murmur heard.  Pulmonary/Chest: Effort normal and breath sounds normal. No stridor. No respiratory distress. She has no wheezes. She has no rales. She exhibits no tenderness.   Abdominal: Soft. Bowel sounds are normal. She exhibits no distension. There is no tenderness.   Musculoskeletal: Normal range of motion.   Neurological: She is alert.   Demented at baseline, does not know time, place, or situation   Psychiatric: She has a normal mood and affect. Her behavior is normal. Judgment and thought content normal.       Vents:       Lines/Drains/Airways     Peripheral Intravenous Line                 Peripheral IV - Single Lumen 07/25/18 1027 Left Antecubital 2 days                Significant Labs:    CBC/Anemia Profile:    Recent Labs  Lab 07/26/18  0640  07/27/18  0714   WBC 17.21* 10.51   HGB 12.4 12.0   HCT 38.7 36.3*    283   MCV 95 95   RDW 14.6* 14.8*        Chemistries:    Recent Labs  Lab 07/26/18  0640 07/27/18  0714    141   K 3.5 3.4*    110   CO2 27 23   BUN 7* 7*   CREATININE 0.7 0.7   CALCIUM 8.5* 8.4*   MG 2.1 1.9   PHOS 1.6* 3.2       Recent Lab Results       07/27/18  0714      Immature Granulocytes 2.7(H)     Immature Grans (Abs) 0.28  Comment:  Mild elevation in immature granulocytes is non specific and   can be seen in a variety of conditions including stress response,   acute inflammation, trauma and pregnancy. Correlation with other   laboratory and clinical findings is essential.  (H)     Anion Gap 8     Baso # 0.09     Basophil% 0.9     BUN, Bld 7(L)     Calcium 8.4(L)     Chloride 110     CO2 23     Creatinine 0.7     Differential Method Automated     eGFR if African American >60.0     eGFR if non  >60.0  Comment:  Calculation used to obtain the estimated glomerular filtration  rate (eGFR) is the CKD-EPI equation.        Eos # 0.3     Eosinophil% 2.9     Glucose 103     Gran # (ANC) 6.9     Gran% 65.7     Hematocrit 36.3(L)     Hemoglobin 12.0     Lymph # 2.2     Lymph% 20.6     Magnesium 1.9     MCH 31.3(H)     MCHC 33.1     MCV 95     Mono # 0.8     Mono% 7.2     MPV 10.6     nRBC 0     Phosphorus 3.2     Platelets 283     Potassium 3.4(L)     RBC 3.83(L)     RDW 14.8(H)     Sodium 141     WBC 10.51           Significant Imaging:   I have reviewed all pertinent imaging results/findings within the past 24 hours.    Assessment/Plan:     Cavitary lesion of lung    Differential includes infection, Tb, atypical infection or malignancy.  - consider bronchoscopy early next week if sputum production is not present.  - will obtain consent with family by phone if needed.  - continue to attempt to expectorate sputum for AFB smears, as this would be preferred to an invasive procedure.   - continue with RT to  administer hypertonic saline with aerosol for induced sputum.  - the main objective is sputum collection for cultures, family doesn't want to treat cancer, so reason for bronchoscopy would be solely for obtaining BAL if sputum is unable to be collected.  - given advanced age, alzheimer's dementia, and reported agitation with sedative medications per daughter, it would be ideal to avoid this procedure if possible.                 Fercho Johnson MD  Pulmonology  [facility]

## 2018-07-27 NOTE — PHYSICIAN QUERY
PT Name: Arina Adame  MR #: [id number]     Physician Query Form - Documentation Clarification      CDS/: Kathryn Johnson RN CCDS                Contact information: [email or web address]    This form is a permanent document in the medical record.     Query Date: July 27, 2018    By submitting this query, we are merely seeking further clarification of documentation. Please utilize your independent clinical judgment when addressing the question(s) below.    The Medical record reflects the following:    Supporting Clinical Findings Location in Medical Record   Weight: 48.1 kg (106 lb 0.7 oz)  Height: 5 ft  2 in  Body mass index is 19.4 kg/m²       Anthropometric flowsheet 7/24   Frail    Poor appetite    Clostridium difficile infection         Pulm PN 7/26    PN 7/25    H&P/PN 7/24-27                                                                            Doctor, Please specify diagnosis or diagnoses associated with above clinical findings.    Provider Use Only     [ X ] Underweight     [  ] Other (please specify)______________                                                                                                                           [  ] Clinically undetermined

## 2018-07-28 PROBLEM — A41.9 SEPSIS: Status: RESOLVED | Noted: 2018-07-23 | Resolved: 2018-07-28

## 2018-07-28 LAB
ANION GAP SERPL CALC-SCNC: 6 MMOL/L
ANISOCYTOSIS BLD QL SMEAR: SLIGHT
BACTERIA BLD CULT: NORMAL
BACTERIA BLD CULT: NORMAL
BACTERIA SPEC AEROBE CULT: NORMAL
BASOPHILS # BLD AUTO: ABNORMAL K/UL
BASOPHILS NFR BLD: 0 %
BUN SERPL-MCNC: 10 MG/DL
CALCIUM SERPL-MCNC: 8.9 MG/DL
CHLORIDE SERPL-SCNC: 113 MMOL/L
CO2 SERPL-SCNC: 24 MMOL/L
CREAT SERPL-MCNC: 0.8 MG/DL
DIFFERENTIAL METHOD: ABNORMAL
EOSINOPHIL # BLD AUTO: ABNORMAL K/UL
EOSINOPHIL NFR BLD: 2 %
ERYTHROCYTE [DISTWIDTH] IN BLOOD BY AUTOMATED COUNT: 15.1 %
EST. GFR  (AFRICAN AMERICAN): >60 ML/MIN/1.73 M^2
EST. GFR  (NON AFRICAN AMERICAN): >60 ML/MIN/1.73 M^2
GLUCOSE SERPL-MCNC: 96 MG/DL
GRAM STN SPEC: NORMAL
HCT VFR BLD AUTO: 37.4 %
HGB BLD-MCNC: 11.4 G/DL
HYPOCHROMIA BLD QL SMEAR: ABNORMAL
IMM GRANULOCYTES # BLD AUTO: ABNORMAL K/UL
IMM GRANULOCYTES NFR BLD AUTO: ABNORMAL %
LYMPHOCYTES # BLD AUTO: ABNORMAL K/UL
LYMPHOCYTES NFR BLD: 19 %
M TB CMPLX DNA SPEC QL NAA+PROBE: NORMAL
MAGNESIUM SERPL-MCNC: 1.9 MG/DL
MCH RBC QN AUTO: 29.8 PG
MCHC RBC AUTO-ENTMCNC: 30.5 G/DL
MCV RBC AUTO: 98 FL
METAMYELOCYTES NFR BLD MANUAL: 1 %
MONOCYTES # BLD AUTO: ABNORMAL K/UL
MONOCYTES NFR BLD: 8 %
MYELOCYTES NFR BLD MANUAL: 1 %
NEUTROPHILS NFR BLD: 69 %
NRBC BLD-RTO: 0 /100 WBC
OVALOCYTES BLD QL SMEAR: ABNORMAL
PHOSPHATE SERPL-MCNC: 3.1 MG/DL
PLATELET # BLD AUTO: 289 K/UL
PMV BLD AUTO: 10.5 FL
POIKILOCYTOSIS BLD QL SMEAR: SLIGHT
POLYCHROMASIA BLD QL SMEAR: ABNORMAL
POTASSIUM SERPL-SCNC: 4.3 MMOL/L
RBC # BLD AUTO: 3.83 M/UL
SODIUM SERPL-SCNC: 143 MMOL/L
SPECIMEN SOURCE: NORMAL
WBC # BLD AUTO: 10.43 K/UL

## 2018-07-28 PROCEDURE — 87015 SPECIMEN INFECT AGNT CONCNTJ: CPT

## 2018-07-28 PROCEDURE — 84100 ASSAY OF PHOSPHORUS: CPT

## 2018-07-28 PROCEDURE — 94640 AIRWAY INHALATION TREATMENT: CPT

## 2018-07-28 PROCEDURE — 87206 SMEAR FLUORESCENT/ACID STAI: CPT

## 2018-07-28 PROCEDURE — 83735 ASSAY OF MAGNESIUM: CPT

## 2018-07-28 PROCEDURE — 85027 COMPLETE CBC AUTOMATED: CPT

## 2018-07-28 PROCEDURE — 85007 BL SMEAR W/DIFF WBC COUNT: CPT

## 2018-07-28 PROCEDURE — 25000003 PHARM REV CODE 250: Performed by: STUDENT IN AN ORGANIZED HEALTH CARE EDUCATION/TRAINING PROGRAM

## 2018-07-28 PROCEDURE — 25000242 PHARM REV CODE 250 ALT 637 W/ HCPCS: Performed by: STUDENT IN AN ORGANIZED HEALTH CARE EDUCATION/TRAINING PROGRAM

## 2018-07-28 PROCEDURE — 87116 MYCOBACTERIA CULTURE: CPT

## 2018-07-28 PROCEDURE — 80048 BASIC METABOLIC PNL TOTAL CA: CPT

## 2018-07-28 PROCEDURE — 99231 SBSQ HOSP IP/OBS SF/LOW 25: CPT | Mod: GC,,, | Performed by: HOSPITALIST

## 2018-07-28 PROCEDURE — 63600175 PHARM REV CODE 636 W HCPCS: Performed by: STUDENT IN AN ORGANIZED HEALTH CARE EDUCATION/TRAINING PROGRAM

## 2018-07-28 PROCEDURE — 36415 COLL VENOUS BLD VENIPUNCTURE: CPT

## 2018-07-28 PROCEDURE — 11000001 HC ACUTE MED/SURG PRIVATE ROOM

## 2018-07-28 RX ADMIN — QUETIAPINE FUMARATE 25 MG: 25 TABLET ORAL at 09:07

## 2018-07-28 RX ADMIN — ASPIRIN 81 MG: 81 TABLET, COATED ORAL at 09:07

## 2018-07-28 RX ADMIN — ATORVASTATIN CALCIUM 40 MG: 20 TABLET, FILM COATED ORAL at 09:07

## 2018-07-28 RX ADMIN — CYANOCOBALAMIN TAB 250 MCG 500 MCG: 250 TAB at 09:07

## 2018-07-28 RX ADMIN — Medication 125 MG: at 12:07

## 2018-07-28 RX ADMIN — DONEPEZIL HYDROCHLORIDE 10 MG: 5 TABLET, FILM COATED ORAL at 09:07

## 2018-07-28 RX ADMIN — Medication 1000 UNITS: at 09:07

## 2018-07-28 RX ADMIN — MICONAZOLE NITRATE: 20 OINTMENT TOPICAL at 09:07

## 2018-07-28 RX ADMIN — LEVOTHYROXINE SODIUM 100 MCG: 100 TABLET ORAL at 05:07

## 2018-07-28 RX ADMIN — Medication 125 MG: at 05:07

## 2018-07-28 RX ADMIN — ENOXAPARIN SODIUM 40 MG: 100 INJECTION SUBCUTANEOUS at 05:07

## 2018-07-28 RX ADMIN — SODIUM CHLORIDE SOLN NEBU 3%: 3 NEBU SOLN at 11:07

## 2018-07-28 RX ADMIN — Medication 125 MG: at 11:07

## 2018-07-28 NOTE — HOSPITAL COURSE
Patient transferred to hospital floor for further evaluation and management. ID consulted. Recommended to treat C. Diff infection with PO Vancomycin for at least 21 days. Cavitary lesion of lung was concerning for possible TB. Obtained labs to rule out.

## 2018-07-28 NOTE — ASSESSMENT & PLAN NOTE
DEMENTIA    Patient presented with some confusion, orientated to person but not time or place  Patient with multiple risk factors for infectious encephalopathy and delirium including advanced age, pre-existing dementia, active infection and diarrhea.   Delirium precautions, telesitter.   Treating infection

## 2018-07-28 NOTE — SUBJECTIVE & OBJECTIVE
Interval History: Patient more awake and alert this morning. Orientated to person but not time or place (i.e. [facility]). Per nurse yesterday, patient had small BM, smear semi-formed. Patient denies having pain. Able to get 2 AFBs and need to obtain 3rd sputum collection to rule out pulm TB.    Review of Systems   Constitutional: Positive for activity change and fatigue. Negative for fever.   Respiratory: Negative for apnea, cough, chest tightness and shortness of breath.    Cardiovascular: Negative for chest pain and leg swelling.   Gastrointestinal: Positive for diarrhea. Negative for abdominal distention, abdominal pain, nausea and vomiting.   Musculoskeletal: Negative for arthralgias.   Skin: Negative for color change and pallor.   Neurological: Negative for dizziness and headaches.   Psychiatric/Behavioral: Positive for confusion. Negative for agitation.     Objective:     Vital Signs (Most Recent):  Temp: 98.2 °F (36.8 °C) (07/28/18 0600)  Pulse: 62 (07/28/18 0600)  Resp: 14 (07/28/18 0600)  BP: (!) 147/94 (07/28/18 0600)  SpO2: (!) 91 % (07/28/18 0649) Vital Signs (24h Range):  Temp:  [98 °F (36.7 °C)-98.8 °F (37.1 °C)] 98.2 °F (36.8 °C)  Pulse:  [] 62  Resp:  [13-20] 14  SpO2:  [91 %-98 %] 91 %  BP: (102-151)/(66-97) 147/94     Weight: 48.1 kg (106 lb 0.7 oz)  Body mass index is 19.4 kg/m².  No intake or output data in the 24 hours ending 07/28/18 0925   Physical Exam   Constitutional: No distress.   HENT:   Head: Normocephalic and atraumatic.   Mouth/Throat: No oropharyngeal exudate.   Eyes: Conjunctivae are normal. No scleral icterus.   Neck: Normal range of motion. Neck supple.   Prominence of right sterno-clavicular junction   Cardiovascular: Normal rate, regular rhythm and normal heart sounds.    Intermittent tachycardia today, not present on exam this morning.    Pulmonary/Chest: Effort normal. No respiratory distress. She has no wheezes. She has no rales.   Abdominal: Soft. Bowel sounds are  normal. She exhibits no distension. There is no tenderness.   Musculoskeletal: She exhibits no edema or tenderness.   Neurological: She is alert. She has normal strength. GCS eye subscore is 4. GCS verbal subscore is 5. GCS motor subscore is 6.   Skin: Skin is warm and dry. She is not diaphoretic.   Psychiatric:   Patient only orientated to person, not time or place.   Nursing note and vitals reviewed.      Significant Labs:   BMP:   Recent Labs  Lab 07/28/18  0713   GLU 96      K 4.3   *   CO2 24   BUN 10   CREATININE 0.8   CALCIUM 8.9   MG 1.9     CBC:   Recent Labs  Lab 07/27/18  0714   WBC 10.51   HGB 12.0   HCT 36.3*

## 2018-07-28 NOTE — PROGRESS NOTES
[facility] Hospital Medicine  Progress Note    Patient Name: Arina Adame  MRN: [id number]  Patient Class: IP- Inpatient   Admission Date: 7/23/2018  Length of Stay: 5 days  Attending Physician: Washington Quintana MD  Primary Care Provider: Zeinab Meier MD    [facility] Medicine Team: [facility]OSP MED 4 Jennie Casey MD    Subjective:     Principal Problem:Clostridium difficile infection    HPI:  Arina Adame is a 89 y.o. female has a past medical history of Alzheimer disease; C. difficile colitis; Carotid arterial disease; Cataract; Essential hypertension; Colon cancer; Hypothyroidism; impaired glucose tolerance; Osteoporosis, brought to the ED by her daughter today complaining of fever, diarrhea and vomiting.  At ED, Arina Adame was also hypotensive, tachycardic, and lethargic, CBC, CMP, troponin, BNP, urinalysis, blood/urinary culture, CT chest and Chest X-ray was ordered, concerning recurrently UTI, diff infection, and pneumonia.      Arina Adame has limited ability to give a full medical history.  Collateral information from her daughter was obtained. She was discharged from hospital of her last episode on June 26th, and relapsed right after she complete a home course of PO vancomycin for C.diff last Thursday. She was brought to the ED by EMS for fever. Daughter reports that she has been more confused at night for the past 2 nights. Today, she was much more fatigued than normal and would not get out of bed. This afternoon, daughter noticed she had a fever of >101 and called EMS. EMS noted pt to be febrile, tachycardic and hypotensive and administered 500cc of NS. On exam pt is rousable but disoriented. Daughter notes that she has a bed sore near her gluteal fold that has not healed.   Daughter noticed Arina Adame's change of bowel movement last Thursday when she changed her diaper.  The diarrhea has been watery, gold in color, mucousy with C. diff smell.  She became increasingly  lethargic.  This morning, Arina Adame was unable to get up from bed, did not take any medication or food, vomited once after taking some water.  She slept all day.  When daughter saw her in the late afternoon, Arina Adame was shivering, had a temperature of 101.9, with some rattle breathing sound.  She took her to the ED. She s/p 2 episodes of C-diff infections with UTI in the past.  Each episode lasted for about 7-9 days.          Hospital Course:  Patient transferred to hospital floor for further evaluation and management. ID consulted. Recommended to treat C. Diff infection with PO Vancomycin for at least 21 days. Cavitary lesion of lung was concerning for possible TB. Obtained labs to rule out.     Interval History: Patient more awake and alert this morning. Orientated to person but not time or place (i.e. [facility]). Per nurse yesterday, patient had small BM, smear semi-formed. Patient denies having pain. Able to get 2 AFBs and need to obtain 3rd sputum collection to rule out pulm TB.    Review of Systems   Constitutional: Positive for activity change and fatigue. Negative for fever.   Respiratory: Negative for apnea, cough, chest tightness and shortness of breath.    Cardiovascular: Negative for chest pain and leg swelling.   Gastrointestinal: Positive for diarrhea. Negative for abdominal distention, abdominal pain, nausea and vomiting.   Musculoskeletal: Negative for arthralgias.   Skin: Negative for color change and pallor.   Neurological: Negative for dizziness and headaches.   Psychiatric/Behavioral: Positive for confusion. Negative for agitation.     Objective:     Vital Signs (Most Recent):  Temp: 98.2 °F (36.8 °C) (07/28/18 0600)  Pulse: 62 (07/28/18 0600)  Resp: 14 (07/28/18 0600)  BP: (!) 147/94 (07/28/18 0600)  SpO2: (!) 91 % (07/28/18 0649) Vital Signs (24h Range):  Temp:  [98 °F (36.7 °C)-98.8 °F (37.1 °C)] 98.2 °F (36.8 °C)  Pulse:  [] 62  Resp:  [13-20] 14  SpO2:  [91 %-98 %] 91  %  BP: (102-151)/(66-97) 147/94     Weight: 48.1 kg (106 lb 0.7 oz)  Body mass index is 19.4 kg/m².  No intake or output data in the 24 hours ending 07/28/18 0925   Physical Exam   Constitutional: No distress.   HENT:   Head: Normocephalic and atraumatic.   Mouth/Throat: No oropharyngeal exudate.   Eyes: Conjunctivae are normal. No scleral icterus.   Neck: Normal range of motion. Neck supple.   Prominence of right sterno-clavicular junction   Cardiovascular: Normal rate, regular rhythm and normal heart sounds.    Intermittent tachycardia today, not present on exam this morning.    Pulmonary/Chest: Effort normal. No respiratory distress. She has no wheezes. She has no rales.   Abdominal: Soft. Bowel sounds are normal. She exhibits no distension. There is no tenderness.   Musculoskeletal: She exhibits no edema or tenderness.   Neurological: She is alert. She has normal strength. GCS eye subscore is 4. GCS verbal subscore is 5. GCS motor subscore is 6.   Skin: Skin is warm and dry. She is not diaphoretic.   Psychiatric:   Patient only orientated to person, not time or place.   Nursing note and vitals reviewed.      Significant Labs:   BMP:   Recent Labs  Lab 07/28/18  0713   GLU 96      K 4.3   *   CO2 24   BUN 10   CREATININE 0.8   CALCIUM 8.9   MG 1.9     CBC:   Recent Labs  Lab 07/27/18  0714   WBC 10.51   HGB 12.0   HCT 36.3*              Assessment/Plan:      * Clostridium difficile infection    Patient with recurrence of C.diff.   ID consulted, appreciate recs.   Will treat with PO Vanc for at least 21 days   Possible trial enrollment for fecal transplant as o/p        Delirium    DEMENTIA    Patient presented with some confusion, orientated to person but not time or place  Patient with multiple risk factors for infectious encephalopathy and delirium including advanced age, pre-existing dementia, active infection and diarrhea.   Delirium precautions, telesitter.   Treating infection           Cavitary lesion of lung    Need to rule out TB  Patient with minimal coughing/sputum production but may be able to complete AFB rule out. Other studies also sent which would provide alternative clearance. T-spot test negative; need to obtain 3rd sputum sample for AFB smear.  Appreciate pulmonary assistance.         Fungal dermatitis    Treating with topical antifungal, appreciate Wound Care recs        Sepsis    Patient with leukocytosis and febrile on presentation.   WBC continues downtrending. Has resolved.  Active C.diff as source. Initially concerned about lung and uti but no indication that these are contributing.   Still working up for TB          Age-related physical debility    Appreciate PT/OT recs        Dementia    Continue home donepezil  Continue home seroquil  Delirium precautions  zoloft stopped by PCP        CAD (coronary artery disease)    Continue ASA 81, Statin          Hypothyroidism    Continue home levothyroxine          VTE Risk Mitigation         Ordered     enoxaparin injection 40 mg  Daily      07/23/18 2332     IP VTE HIGH RISK PATIENT  Once      07/24/18 0034     Place GERARD hose  Until discontinued      07/23/18 2332              Jennie Casey MD PGY1  Department of Hospital Medicine   [facility]

## 2018-07-28 NOTE — PLAN OF CARE
Problem: Fall Risk (Adult)  Goal: Absence of Falls  Patient will demonstrate the desired outcomes by discharge/transition of care.   Outcome: Ongoing (interventions implemented as appropriate)  Patient has had no falls this shift.  Bed is lowest position and locked.  Avasys in room.  Patient has rested well this shift with no c/o or s/s of pain or discomfort.

## 2018-07-28 NOTE — ASSESSMENT & PLAN NOTE
Patient with recurrence of C.diff.   ID consulted, appreciate recs.   Will treat with PO Vanc for at least 21 days   Possible trial enrollment for fecal transplant as o/p

## 2018-07-28 NOTE — ASSESSMENT & PLAN NOTE
Patient with leukocytosis and febrile on presentation.   WBC continues downtrending. Has resolved.  Active C.diff as source. Initially concerned about lung and uti but no indication that these are contributing.   Still working up for TB

## 2018-07-28 NOTE — ASSESSMENT & PLAN NOTE
Need to rule out TB  Patient with minimal coughing/sputum production but may be able to complete AFB rule out. Other studies also sent which would provide alternative clearance. T-spot test negative; need to obtain 3rd sputum sample for AFB smear.  Appreciate pulmonary assistance.

## 2018-07-29 LAB
ANION GAP SERPL CALC-SCNC: 7 MMOL/L
ANISOCYTOSIS BLD QL SMEAR: SLIGHT
BASOPHILS # BLD AUTO: ABNORMAL K/UL
BASOPHILS NFR BLD: 0 %
BUN SERPL-MCNC: 11 MG/DL
CALCIUM SERPL-MCNC: 10 MG/DL
CHLORIDE SERPL-SCNC: 108 MMOL/L
CO2 SERPL-SCNC: 24 MMOL/L
CREAT SERPL-MCNC: 0.8 MG/DL
DIFFERENTIAL METHOD: ABNORMAL
EOSINOPHIL # BLD AUTO: ABNORMAL K/UL
EOSINOPHIL NFR BLD: 1 %
ERYTHROCYTE [DISTWIDTH] IN BLOOD BY AUTOMATED COUNT: 14.8 %
EST. GFR  (AFRICAN AMERICAN): >60 ML/MIN/1.73 M^2
EST. GFR  (NON AFRICAN AMERICAN): >60 ML/MIN/1.73 M^2
GLUCOSE SERPL-MCNC: 99 MG/DL
HCT VFR BLD AUTO: 41.2 %
HGB BLD-MCNC: 13.1 G/DL
HYPOCHROMIA BLD QL SMEAR: ABNORMAL
IMM GRANULOCYTES # BLD AUTO: ABNORMAL K/UL
IMM GRANULOCYTES NFR BLD AUTO: ABNORMAL %
LYMPHOCYTES # BLD AUTO: ABNORMAL K/UL
LYMPHOCYTES NFR BLD: 18 %
MAGNESIUM SERPL-MCNC: 1.8 MG/DL
MCH RBC QN AUTO: 30.5 PG
MCHC RBC AUTO-ENTMCNC: 31.8 G/DL
MCV RBC AUTO: 96 FL
MONOCYTES # BLD AUTO: ABNORMAL K/UL
MONOCYTES NFR BLD: 7 %
MYELOCYTES NFR BLD MANUAL: 2 %
NEUTROPHILS NFR BLD: 72 %
NRBC BLD-RTO: 0 /100 WBC
OVALOCYTES BLD QL SMEAR: ABNORMAL
PHOSPHATE SERPL-MCNC: 3.4 MG/DL
PLATELET # BLD AUTO: 296 K/UL
PLATELET BLD QL SMEAR: ABNORMAL
PMV BLD AUTO: 10.4 FL
POIKILOCYTOSIS BLD QL SMEAR: SLIGHT
POLYCHROMASIA BLD QL SMEAR: ABNORMAL
POTASSIUM SERPL-SCNC: 4.2 MMOL/L
RBC # BLD AUTO: 4.29 M/UL
SODIUM SERPL-SCNC: 139 MMOL/L
WBC # BLD AUTO: 10.87 K/UL

## 2018-07-29 PROCEDURE — 84100 ASSAY OF PHOSPHORUS: CPT

## 2018-07-29 PROCEDURE — 36415 COLL VENOUS BLD VENIPUNCTURE: CPT

## 2018-07-29 PROCEDURE — 85027 COMPLETE CBC AUTOMATED: CPT

## 2018-07-29 PROCEDURE — 25000003 PHARM REV CODE 250: Performed by: STUDENT IN AN ORGANIZED HEALTH CARE EDUCATION/TRAINING PROGRAM

## 2018-07-29 PROCEDURE — 85007 BL SMEAR W/DIFF WBC COUNT: CPT

## 2018-07-29 PROCEDURE — 80048 BASIC METABOLIC PNL TOTAL CA: CPT

## 2018-07-29 PROCEDURE — 99232 SBSQ HOSP IP/OBS MODERATE 35: CPT | Mod: GC,,, | Performed by: HOSPITALIST

## 2018-07-29 PROCEDURE — 11000001 HC ACUTE MED/SURG PRIVATE ROOM

## 2018-07-29 PROCEDURE — 63600175 PHARM REV CODE 636 W HCPCS: Performed by: STUDENT IN AN ORGANIZED HEALTH CARE EDUCATION/TRAINING PROGRAM

## 2018-07-29 PROCEDURE — 83735 ASSAY OF MAGNESIUM: CPT

## 2018-07-29 RX ORDER — AMLODIPINE BESYLATE 10 MG/1
10 TABLET ORAL ONCE
Status: COMPLETED | OUTPATIENT
Start: 2018-07-29 | End: 2018-07-29

## 2018-07-29 RX ADMIN — Medication 125 MG: at 05:07

## 2018-07-29 RX ADMIN — AMLODIPINE BESYLATE 10 MG: 10 TABLET ORAL at 06:07

## 2018-07-29 RX ADMIN — DONEPEZIL HYDROCHLORIDE 10 MG: 5 TABLET, FILM COATED ORAL at 09:07

## 2018-07-29 RX ADMIN — ATORVASTATIN CALCIUM 40 MG: 20 TABLET, FILM COATED ORAL at 09:07

## 2018-07-29 RX ADMIN — QUETIAPINE FUMARATE 25 MG: 25 TABLET ORAL at 09:07

## 2018-07-29 RX ADMIN — ASPIRIN 81 MG: 81 TABLET, COATED ORAL at 09:07

## 2018-07-29 RX ADMIN — Medication 1000 UNITS: at 09:07

## 2018-07-29 RX ADMIN — LEVOTHYROXINE SODIUM 100 MCG: 100 TABLET ORAL at 05:07

## 2018-07-29 RX ADMIN — CYANOCOBALAMIN TAB 250 MCG 500 MCG: 250 TAB at 09:07

## 2018-07-29 RX ADMIN — MICONAZOLE NITRATE: 20 OINTMENT TOPICAL at 09:07

## 2018-07-29 RX ADMIN — Medication 125 MG: at 11:07

## 2018-07-29 RX ADMIN — ENOXAPARIN SODIUM 40 MG: 100 INJECTION SUBCUTANEOUS at 04:07

## 2018-07-29 NOTE — ASSESSMENT & PLAN NOTE
Need to rule out TB  Patient with minimal coughing/sputum production but may be able to complete AFB rule out. T-spot test negative; awaiting result of 3rd sputum sample for AFB smear.  Appreciate pulmonary assistance.

## 2018-07-29 NOTE — NURSING
Gave Amlodipine 10mg po per orders.  Explained to patient that her blood pressure is high and explained about the medication.  Patient continues to deny discomfort and no change in mental status.  Remains alert yet disoriented to place and time.

## 2018-07-29 NOTE — SUBJECTIVE & OBJECTIVE
Interval History: NAEON. Patient is oriented to self, month, [facility]. Spoke with RN who reports no BM last night or this morning. Patient reports improved appetite. Denies abdominal pain. Awaiting result of 3rd AFB  to rule out pulm TB. Will reach out to patient's daughter regarding possible SNF vs. Home health.     Review of Systems   Constitutional: Positive for activity change and fatigue. Negative for fever.   Respiratory: Negative for apnea, cough, chest tightness and shortness of breath.    Cardiovascular: Negative for chest pain and leg swelling.   Gastrointestinal: Negative for abdominal distention, abdominal pain, diarrhea, nausea and vomiting.   Musculoskeletal: Negative for arthralgias.   Skin: Negative for color change and pallor.   Neurological: Negative for dizziness and headaches.   Psychiatric/Behavioral: Positive for confusion. Negative for agitation.     Objective:     Vital Signs (Most Recent):  Temp: 98 °F (36.7 °C) (07/29/18 0530)  Pulse: 63 (07/29/18 0816)  Resp: 13 (07/29/18 0816)  BP: (!) 168/88 (07/29/18 0816)  SpO2: 96 % (07/29/18 0816) Vital Signs (24h Range):  Temp:  [97.9 °F (36.6 °C)-98 °F (36.7 °C)] 98 °F (36.7 °C)  Pulse:  [] 63  Resp:  [10-22] 13  SpO2:  [95 %-100 %] 96 %  BP: (133-199)/() 168/88     Weight: 48.1 kg (106 lb 0.7 oz)  Body mass index is 19.4 kg/m².  No intake or output data in the 24 hours ending 07/29/18 0935   Physical Exam   Constitutional: No distress.   HENT:   Head: Normocephalic and atraumatic.   Mouth/Throat: No oropharyngeal exudate.   Eyes: Conjunctivae are normal. No scleral icterus.   Neck: Normal range of motion. Neck supple.   Prominence of right sterno-clavicular junction   Cardiovascular: Normal rate, regular rhythm and normal heart sounds.    Intermittent tachycardia today, not present on exam this morning.    Pulmonary/Chest: Effort normal. No respiratory distress. She has no wheezes. She has no rales.   Abdominal: Soft. Bowel sounds are  normal. She exhibits no distension. There is no tenderness.   Musculoskeletal: She exhibits no edema or tenderness.   Neurological: She is alert. She has normal strength. GCS eye subscore is 4. GCS verbal subscore is 5. GCS motor subscore is 6.   Skin: Skin is warm and dry. She is not diaphoretic.   Psychiatric:   Patient orientated to person, month, and [facility].   Nursing note and vitals reviewed.      Significant Labs:   BMP:   Recent Labs  Lab 07/29/18  0555   GLU 99      K 4.2      CO2 24   BUN 11   CREATININE 0.8   CALCIUM 10.0   MG 1.8     CBC:   Recent Labs  Lab 07/28/18  0713 07/29/18  0554   WBC 10.43 10.87   HGB 11.4* 13.1   HCT 37.4 41.2    296

## 2018-07-29 NOTE — PROGRESS NOTES
[facility] Hospital Medicine  Progress Note    Patient Name: Arina Adame  MRN: [id number]  Patient Class: IP- Inpatient   Admission Date: 7/23/2018  Length of Stay: 6 days  Attending Physician: Washington Quintana MD  Primary Care Provider: Zeinab Meier MD    [facility] Medicine Team: [facility]OSP MED 4 Jennie Casey MD    Subjective:     Principal Problem:Clostridium difficile infection    HPI:  Arina Adame is a 89 y.o. female has a past medical history of Alzheimer disease; C. difficile colitis; Carotid arterial disease; Cataract; Essential hypertension; Colon cancer; Hypothyroidism; impaired glucose tolerance; Osteoporosis, brought to the ED by her daughter today complaining of fever, diarrhea and vomiting.  At ED, Arina Adame was also hypotensive, tachycardic, and lethargic, CBC, CMP, troponin, BNP, urinalysis, blood/urinary culture, CT chest and Chest X-ray was ordered, concerning recurrently UTI, diff infection, and pneumonia.      Arina Adame has limited ability to give a full medical history.  Collateral information from her daughter was obtained. She was discharged from hospital of her last episode on June 26th, and relapsed right after she complete a home course of PO vancomycin for C.diff last Thursday. She was brought to the ED by EMS for fever. Daughter reports that she has been more confused at night for the past 2 nights. Today, she was much more fatigued than normal and would not get out of bed. This afternoon, daughter noticed she had a fever of >101 and called EMS. EMS noted pt to be febrile, tachycardic and hypotensive and administered 500cc of NS. On exam pt is rousable but disoriented. Daughter notes that she has a bed sore near her gluteal fold that has not healed.   Daughter noticed Arina Adame's change of bowel movement last Thursday when she changed her diaper.  The diarrhea has been watery, gold in color, mucousy with C. diff smell.  She became increasingly  lethargic.  This morning, Arina Adame was unable to get up from bed, did not take any medication or food, vomited once after taking some water.  She slept all day.  When daughter saw her in the late afternoon, Arina Adame was shivering, had a temperature of 101.9, with some rattle breathing sound.  She took her to the ED. She s/p 2 episodes of C-diff infections with UTI in the past.  Each episode lasted for about 7-9 days.          Hospital Course:  Patient transferred to hospital floor for further evaluation and management. ID consulted. Recommended to treat C. Diff infection with PO Vancomycin for at least 21 days. Cavitary lesion of lung was concerning for possible TB. Obtained labs to rule out.     Interval History: NAEON. Patient is oriented to self, [facility], [facility]. Spoke with RN who reports no BM last night or this morning. Patient reports improved appetite. Denies abdominal pain. Awaiting result of 3rd AFB  to rule out pulm TB. Will reach out to patient's daughter regarding possible SNF vs. Home health.     Review of Systems   Constitutional: Positive for activity change and fatigue. Negative for fever.   Respiratory: Negative for apnea, cough, chest tightness and shortness of breath.    Cardiovascular: Negative for chest pain and leg swelling.   Gastrointestinal: Negative for abdominal distention, abdominal pain, diarrhea, nausea and vomiting.   Musculoskeletal: Negative for arthralgias.   Skin: Negative for color change and pallor.   Neurological: Negative for dizziness and headaches.   Psychiatric/Behavioral: Positive for confusion. Negative for agitation.     Objective:     Vital Signs (Most Recent):  Temp: 98 °F (36.7 °C) (07/29/18 0530)  Pulse: 63 (07/29/18 0816)  Resp: 13 (07/29/18 0816)  BP: (!) 168/88 (07/29/18 0816)  SpO2: 96 % (07/29/18 0816) Vital Signs (24h Range):  Temp:  [97.9 °F (36.6 °C)-98 °F (36.7 °C)] 98 °F (36.7 °C)  Pulse:  [] 63  Resp:  [10-22] 13  SpO2:  [95 %-100 %]  96 %  BP: (133-199)/() 168/88     Weight: 48.1 kg (106 lb 0.7 oz)  Body mass index is 19.4 kg/m².  No intake or output data in the 24 hours ending 07/29/18 0935   Physical Exam   Constitutional: No distress.   HENT:   Head: Normocephalic and atraumatic.   Mouth/Throat: No oropharyngeal exudate.   Eyes: Conjunctivae are normal. No scleral icterus.   Neck: Normal range of motion. Neck supple.   Prominence of right sterno-clavicular junction   Cardiovascular: Normal rate, regular rhythm and normal heart sounds.    Intermittent tachycardia today, not present on exam this morning.    Pulmonary/Chest: Effort normal. No respiratory distress. She has no wheezes. She has no rales.   Abdominal: Soft. Bowel sounds are normal. She exhibits no distension. There is no tenderness.   Musculoskeletal: She exhibits no edema or tenderness.   Neurological: She is alert. She has normal strength. GCS eye subscore is 4. GCS verbal subscore is 5. GCS motor subscore is 6.   Skin: Skin is warm and dry. She is not diaphoretic.   Psychiatric:   Patient orientated to person, month, and [facility].   Nursing note and vitals reviewed.      Significant Labs:   BMP:   Recent Labs  Lab 07/29/18  0555   GLU 99      K 4.2      CO2 24   BUN 11   CREATININE 0.8   CALCIUM 10.0   MG 1.8     CBC:   Recent Labs  Lab 07/28/18  0713 07/29/18  0554   WBC 10.43 10.87   HGB 11.4* 13.1   HCT 37.4 41.2    296           Assessment/Plan:      * Clostridium difficile infection    Patient with recurrence of C.diff.   ID consulted, appreciate recs.   Will treat with PO Vanc for at least 21 days   Possible trial enrollment for fecal transplant as o/p        Cavitary lesion of lung    Need to rule out TB  Patient with minimal coughing/sputum production but may be able to complete AFB rule out. T-spot test negative; awaiting result of 3rd sputum sample for AFB smear.  Appreciate pulmonary assistance.         Delirium    DEMENTIA    Patient presented  "with some confusion, orientated to person but not time or place  Patient with multiple risk factors for infectious encephalopathy and delirium including advanced age, pre-existing dementia, active infection and diarrhea.   Delirium precautions, telesitter.   Treating infection          Dementia    See "delirium"  - Continue home donepezil, home seroquil  - Delirium precautions  - Zoloft stopped by PCP        CAD (coronary artery disease)    Continue ASA 81, Statin          Fungal dermatitis    Treating with topical antifungal, appreciate Wound Care recs        Age-related physical debility    Appreciate PT/OT recs        Hypothyroidism    Continue home levothyroxine          VTE Risk Mitigation         Ordered     enoxaparin injection 40 mg  Daily      07/23/18 2332     IP VTE HIGH RISK PATIENT  Once      07/24/18 0034     Place GERARD hose  Until discontinued      07/23/18 2332              Jennie Casey MD PGY1  Department of Hospital Medicine   [facility]  "

## 2018-07-29 NOTE — PLAN OF CARE
Problem: Fall Risk (Adult)  Goal: Absence of Falls  Patient will demonstrate the desired outcomes by discharge/transition of care.   Outcome: Ongoing (interventions implemented as appropriate)  Patient has had no falls this shift.  Avasys monitoring at bedside.  Bed alarm on.  Remains on contact isolation for C-diff and has not had a bowel movement this shift.  Incontinent of urine and dependent on staff for incontinent care.  Patient has denied pain or discomfort this shift.  Airborne precautions as well until TB work up completed.  Patient has rested well this shift.

## 2018-07-29 NOTE — ASSESSMENT & PLAN NOTE
"See "delirium"  - Continue home donepezil, home seroquil  - Delirium precautions  - Zoloft stopped by PCP  "

## 2018-07-29 NOTE — NURSING
Patient has elevated blood pressures.  Checked manually at 0530 and B/P 188/96.  At 0600 B/P 199/105.  Call to Team 4 and informed of patient status.  No change in mental status and patient denies pain or discomfort.

## 2018-07-30 ENCOUNTER — ANESTHESIA EVENT (OUTPATIENT)
Dept: ENDOSCOPY | Facility: HOSPITAL | Age: 83
DRG: 871 | End: 2018-07-30
Payer: MEDICARE

## 2018-07-30 ENCOUNTER — OUTPATIENT CASE MANAGEMENT (OUTPATIENT)
Dept: ADMINISTRATIVE | Facility: OTHER | Age: 83
End: 2018-07-30

## 2018-07-30 LAB
ANION GAP SERPL CALC-SCNC: 9 MMOL/L
ANISOCYTOSIS BLD QL SMEAR: SLIGHT
BASOPHILS # BLD AUTO: ABNORMAL K/UL
BASOPHILS NFR BLD: 0 %
BUN SERPL-MCNC: 16 MG/DL
CALCIUM SERPL-MCNC: 9.4 MG/DL
CHLORIDE SERPL-SCNC: 104 MMOL/L
CO2 SERPL-SCNC: 22 MMOL/L
CREAT SERPL-MCNC: 0.8 MG/DL
CRP SERPL-MCNC: 6.6 MG/L
DIFFERENTIAL METHOD: ABNORMAL
EOSINOPHIL # BLD AUTO: ABNORMAL K/UL
EOSINOPHIL NFR BLD: 4 %
ERYTHROCYTE [DISTWIDTH] IN BLOOD BY AUTOMATED COUNT: 14.7 %
EST. GFR  (AFRICAN AMERICAN): >60 ML/MIN/1.73 M^2
EST. GFR  (NON AFRICAN AMERICAN): >60 ML/MIN/1.73 M^2
GLUCOSE SERPL-MCNC: 114 MG/DL
HCT VFR BLD AUTO: 38.2 %
HGB BLD-MCNC: 12.3 G/DL
IMM GRANULOCYTES # BLD AUTO: ABNORMAL K/UL
IMM GRANULOCYTES NFR BLD AUTO: ABNORMAL %
LYMPHOCYTES # BLD AUTO: ABNORMAL K/UL
LYMPHOCYTES NFR BLD: 22 %
MAGNESIUM SERPL-MCNC: 1.7 MG/DL
MCH RBC QN AUTO: 30.4 PG
MCHC RBC AUTO-ENTMCNC: 32.2 G/DL
MCV RBC AUTO: 95 FL
METAMYELOCYTES NFR BLD MANUAL: 4 %
MONOCYTES # BLD AUTO: ABNORMAL K/UL
MONOCYTES NFR BLD: 7 %
MYELOCYTES NFR BLD MANUAL: 1 %
NEUTROPHILS NFR BLD: 60 %
NEUTS BAND NFR BLD MANUAL: 2 %
NRBC BLD-RTO: 0 /100 WBC
OVALOCYTES BLD QL SMEAR: ABNORMAL
PHOSPHATE SERPL-MCNC: 4.1 MG/DL
PLATELET # BLD AUTO: 319 K/UL
PMV BLD AUTO: 10.4 FL
POIKILOCYTOSIS BLD QL SMEAR: SLIGHT
POLYCHROMASIA BLD QL SMEAR: ABNORMAL
POTASSIUM SERPL-SCNC: 3.6 MMOL/L
RBC # BLD AUTO: 4.04 M/UL
SODIUM SERPL-SCNC: 135 MMOL/L
WBC # BLD AUTO: 12.73 K/UL

## 2018-07-30 PROCEDURE — 99222 1ST HOSP IP/OBS MODERATE 55: CPT | Mod: GC,,, | Performed by: INTERNAL MEDICINE

## 2018-07-30 PROCEDURE — 86140 C-REACTIVE PROTEIN: CPT

## 2018-07-30 PROCEDURE — G8987 SELF CARE CURRENT STATUS: HCPCS | Mod: CK

## 2018-07-30 PROCEDURE — 85027 COMPLETE CBC AUTOMATED: CPT

## 2018-07-30 PROCEDURE — 80048 BASIC METABOLIC PNL TOTAL CA: CPT

## 2018-07-30 PROCEDURE — 25000003 PHARM REV CODE 250: Performed by: STUDENT IN AN ORGANIZED HEALTH CARE EDUCATION/TRAINING PROGRAM

## 2018-07-30 PROCEDURE — 25000003 PHARM REV CODE 250: Performed by: INTERNAL MEDICINE

## 2018-07-30 PROCEDURE — 97110 THERAPEUTIC EXERCISES: CPT

## 2018-07-30 PROCEDURE — 36415 COLL VENOUS BLD VENIPUNCTURE: CPT

## 2018-07-30 PROCEDURE — 11000001 HC ACUTE MED/SURG PRIVATE ROOM

## 2018-07-30 PROCEDURE — 97530 THERAPEUTIC ACTIVITIES: CPT

## 2018-07-30 PROCEDURE — 80074 ACUTE HEPATITIS PANEL: CPT

## 2018-07-30 PROCEDURE — 84100 ASSAY OF PHOSPHORUS: CPT

## 2018-07-30 PROCEDURE — 99232 SBSQ HOSP IP/OBS MODERATE 35: CPT | Mod: GC,,, | Performed by: HOSPITALIST

## 2018-07-30 PROCEDURE — G8988 SELF CARE GOAL STATUS: HCPCS | Mod: CJ

## 2018-07-30 PROCEDURE — 86592 SYPHILIS TEST NON-TREP QUAL: CPT

## 2018-07-30 PROCEDURE — 97535 SELF CARE MNGMENT TRAINING: CPT

## 2018-07-30 PROCEDURE — 86703 HIV-1/HIV-2 1 RESULT ANTBDY: CPT

## 2018-07-30 PROCEDURE — 85007 BL SMEAR W/DIFF WBC COUNT: CPT

## 2018-07-30 PROCEDURE — 83735 ASSAY OF MAGNESIUM: CPT

## 2018-07-30 RX ORDER — POLYETHYLENE GLYCOL 3350, SODIUM SULFATE ANHYDROUS, SODIUM BICARBONATE, SODIUM CHLORIDE, POTASSIUM CHLORIDE 236; 22.74; 6.74; 5.86; 2.97 G/4L; G/4L; G/4L; G/4L; G/4L
4000 POWDER, FOR SOLUTION ORAL ONCE
Status: COMPLETED | OUTPATIENT
Start: 2018-07-30 | End: 2018-07-30

## 2018-07-30 RX ORDER — SODIUM CHLORIDE 9 MG/ML
INJECTION, SOLUTION INTRAVENOUS CONTINUOUS
Status: ACTIVE | OUTPATIENT
Start: 2018-07-30 | End: 2018-07-30

## 2018-07-30 RX ADMIN — Medication 125 MG: at 12:07

## 2018-07-30 RX ADMIN — DONEPEZIL HYDROCHLORIDE 10 MG: 5 TABLET, FILM COATED ORAL at 09:07

## 2018-07-30 RX ADMIN — POLYETHYLENE GLYCOL 3350, SODIUM SULFATE ANHYDROUS, SODIUM BICARBONATE, SODIUM CHLORIDE, POTASSIUM CHLORIDE 4000 ML: 236; 22.74; 6.74; 5.86; 2.97 POWDER, FOR SOLUTION ORAL at 06:07

## 2018-07-30 RX ADMIN — LEVOTHYROXINE SODIUM 100 MCG: 100 TABLET ORAL at 05:07

## 2018-07-30 RX ADMIN — ATORVASTATIN CALCIUM 40 MG: 20 TABLET, FILM COATED ORAL at 09:07

## 2018-07-30 RX ADMIN — Medication 125 MG: at 05:07

## 2018-07-30 RX ADMIN — Medication 1000 UNITS: at 09:07

## 2018-07-30 RX ADMIN — ASPIRIN 81 MG: 81 TABLET, COATED ORAL at 09:07

## 2018-07-30 RX ADMIN — SODIUM CHLORIDE: 0.9 INJECTION, SOLUTION INTRAVENOUS at 08:07

## 2018-07-30 RX ADMIN — CYANOCOBALAMIN TAB 250 MCG 500 MCG: 250 TAB at 09:07

## 2018-07-30 RX ADMIN — QUETIAPINE FUMARATE 25 MG: 25 TABLET ORAL at 09:07

## 2018-07-30 RX ADMIN — MICONAZOLE NITRATE: 20 OINTMENT TOPICAL at 09:07

## 2018-07-30 RX ADMIN — Medication 125 MG: at 11:07

## 2018-07-30 NOTE — PLAN OF CARE
Problem: Occupational Therapy Goal  Goal: Occupational Therapy Goal  Goals to be met by: 8/15  Patient will increase functional independence with ADLs by performing:    Feeding with Set-up Assistance.  UE Dressing with Contact Guard Assistance.  LE Dressing with Moderate Assistance. -- Met (socks) 7/30  Grooming while seated with Contact Guard Assistance.  Toileting from toilet with Minimal Assistance for hygiene and clothing management.      Outcome: Ongoing (interventions implemented as appropriate)  Pt progressing well towards functional outcomes.     Comments: Cont OT POC

## 2018-07-30 NOTE — PROGRESS NOTES
[facility] Hospital Medicine  Progress Note    Patient Name: Arina Adame  MRN: [id number]  Patient Class: IP- Inpatient   Admission Date: 7/23/2018  Length of Stay: 7 days  Attending Physician: Washington Quintana MD  Primary Care Provider: Zeinab Meier MD    [facility] Medicine Team: [facility] MED 4 Ingrid Vallejo MD    Subjective:     Principal Problem:Clostridium difficile infection    HPI:  Arina Adame is a 89 y.o. female has a past medical history of Alzheimer disease; C. difficile colitis; Carotid arterial disease; Cataract; Essential hypertension; Colon cancer; Hypothyroidism; impaired glucose tolerance; Osteoporosis, brought to the ED by her daughter today complaining of fever, diarrhea and vomiting.  At ED, Arina Adame was also hypotensive, tachycardic, and lethargic, CBC, CMP, troponin, BNP, urinalysis, blood/urinary culture, CT chest and Chest X-ray was ordered, concerning recurrently UTI, diff infection, and pneumonia.      Arina Adame has limited ability to give a full medical history.  Collateral information from her daughter was obtained. She was discharged from hospital of her last episode on June 26th, and relapsed right after she complete a home course of PO vancomycin for C.diff last Thursday. She was brought to the ED by EMS for fever. Daughter reports that she has been more confused at night for the past 2 nights. Today, she was much more fatigued than normal and would not get out of bed. This afternoon, daughter noticed she had a fever of >101 and called EMS. EMS noted pt to be febrile, tachycardic and hypotensive and administered 500cc of NS. On exam pt is rousable but disoriented. Daughter notes that she has a bed sore near her gluteal fold that has not healed.   Daughter noticed Arina Adame's change of bowel movement last Thursday when she changed her diaper.  The diarrhea has been watery, gold in color, mucousy with C. diff smell.  She became  increasingly lethargic.  This morning, Arina Adame was unable to get up from bed, did not take any medication or food, vomited once after taking some water.  She slept all day.  When daughter saw her in the late afternoon, Arina Adame was shivering, had a temperature of 101.9, with some rattle breathing sound.  She took her to the ED. She s/p 2 episodes of C-diff infections with UTI in the past.  Each episode lasted for about 7-9 days.          Hospital Course:  Patient transferred to hospital floor for further evaluation and management. ID consulted. Recommended to treat C. Diff infection with PO Vancomycin for at least 21 days. Cavitary lesion of lung was concerning for possible TB. AFB x 2 neg, 3rd AFB pending.     Interval History: NAEON. Patient is oriented self x 3. States last BM was yesterday. Denies abdominal pain, constipation, nausea, and vomiting. Awaiting result of 3rd AFB still pending to rule out pulm TB. Will plan for possible placement, will discuss with patient's daughter regarding possible SNF vs. Home health.     Review of Systems   Constitutional: Positive for activity change and fatigue. Negative for fever.   Respiratory: Negative for apnea, cough, chest tightness and shortness of breath.    Cardiovascular: Negative for chest pain and leg swelling.   Gastrointestinal: Negative for abdominal distention, abdominal pain, diarrhea, nausea and vomiting.   Musculoskeletal: Negative for arthralgias.   Skin: Negative for color change and pallor.   Neurological: Negative for dizziness and headaches.   Psychiatric/Behavioral: Positive for confusion. Negative for agitation.     Objective:     Vital Signs (Most Recent):  Temp: 97.4 °F (36.3 °C) (07/30/18 1100)  Pulse: 79 (07/30/18 1045)  Resp: 19 (07/30/18 1045)  BP: 125/80 (07/30/18 1045)  SpO2: 96 % (07/30/18 1122) Vital Signs (24h Range):  Temp:  [97.4 °F (36.3 °C)-97.8 °F (36.6 °C)] 97.4 °F (36.3 °C)  Pulse:  [] 79  Resp:  [13-20]  19  SpO2:  [95 %-97 %] 96 %  BP: (125-158)/() 125/80     Weight: 48.1 kg (106 lb 0.7 oz)  Body mass index is 19.4 kg/m².  No intake or output data in the 24 hours ending 07/30/18 1148   Physical Exam   Constitutional: No distress.   HENT:   Head: Normocephalic and atraumatic.   Mouth/Throat: No oropharyngeal exudate.   Eyes: Conjunctivae are normal. No scleral icterus.   Neck: Normal range of motion. Neck supple.    Cardiovascular: Normal rate, regular rhythm and normal heart sounds.     Pulmonary/Chest: Effort normal. No respiratory distress. She has no wheezes. She has no rales.   Abdominal: Soft. Bowel sounds are normal. She exhibits no distension. There is no tenderness.   Musculoskeletal: She exhibits no edema or tenderness.   Neurological: She is alert. She has normal strength. GCS eye subscore is 4. GCS verbal subscore is 5. GCS motor subscore is 6.   Skin: Skin is warm and dry. She is not diaphoretic.   Psychiatric: oriented x 3   Nursing note and vitals reviewed.      Significant Labs:   BMP:     Recent Labs  Lab 07/30/18  0702   *   *   K 3.6      CO2 22*   BUN 16   CREATININE 0.8   CALCIUM 9.4   MG 1.7     CBC:     Recent Labs  Lab 07/29/18  0554 07/30/18  0702   WBC 10.87 12.73*   HGB 13.1 12.3   HCT 41.2 38.2    319           Assessment/Plan:      * Clostridium difficile infection    Patient with recurrence of C.diff.   ID consulted, appreciate recs.   Will treat with PO Vanc for at least 21 days, day 7 of Vanc  Per daughter, GI plans to do colonoscopy with fecal transplant tomorrow       Delirium    DEMENTIA    Patient presented with some confusion, orientated to person but not time or place  Patient with multiple risk factors for infectious encephalopathy and delirium including advanced age, pre-existing dementia, active infection and diarrhea.   Delirium precautions, telesitter.   Treating infection, Day 7 of vancomycin          Cavitary lesion of lung    Need to rule  "out TB  Patient with minimal coughing/sputum production but may be able to complete AFB rule out. T-spot test negative  Awaiting result of 3rd sputum sample for AFB smear.  Appreciate pulmonary assistance.         Fungal dermatitis    Treating with topical antifungal, appreciate Wound Care recs        Age-related physical debility    Appreciate PT/OT recs        Dementia    See "delirium"  - Continue home donepezil, home seroquil  - Delirium precautions  - Zoloft stopped by PCP        CAD (coronary artery disease)    Continue ASA 81, Statin          Hypothyroidism    Continue home levothyroxine          VTE Risk Mitigation         Ordered     enoxaparin injection 40 mg  Daily      07/23/18 2332     IP VTE HIGH RISK PATIENT  Once      07/24/18 0034     Place GERARD hose  Until discontinued      07/23/18 2332              Ingrid Vallejo MD  Department of Hospital Medicine   [facility]  "

## 2018-07-30 NOTE — SUBJECTIVE & OBJECTIVE
Interval History: NAEON. Patient is oriented to self, month, [facility].  who reports 2 BM last night or this morning. Denies abdominal pain. Awaiting result of 3rd AFB  to rule out pulm TB. Will reach out to patient's daughter regarding possible SNF vs. Home health.     Review of Systems   Constitutional: Positive for activity change and fatigue. Negative for fever.   Respiratory: Negative for apnea, cough, chest tightness and shortness of breath.    Cardiovascular: Negative for chest pain and leg swelling.   Gastrointestinal: Negative for abdominal distention, abdominal pain, diarrhea, nausea and vomiting.   Musculoskeletal: Negative for arthralgias.   Skin: Negative for color change and pallor.   Neurological: Negative for dizziness and headaches.   Psychiatric/Behavioral: Positive for confusion. Negative for agitation.     Objective:     Vital Signs (Most Recent):  Temp: 97.4 °F (36.3 °C) (07/30/18 1100)  Pulse: 79 (07/30/18 1045)  Resp: 19 (07/30/18 1045)  BP: 125/80 (07/30/18 1045)  SpO2: 96 % (07/30/18 1122) Vital Signs (24h Range):  Temp:  [97.4 °F (36.3 °C)-97.8 °F (36.6 °C)] 97.4 °F (36.3 °C)  Pulse:  [] 79  Resp:  [13-20] 19  SpO2:  [95 %-97 %] 96 %  BP: (125-158)/() 125/80     Weight: 48.1 kg (106 lb 0.7 oz)  Body mass index is 19.4 kg/m².  No intake or output data in the 24 hours ending 07/30/18 1148   Physical Exam   Constitutional: No distress.   HENT:   Head: Normocephalic and atraumatic.   Mouth/Throat: No oropharyngeal exudate.   Eyes: Conjunctivae are normal. No scleral icterus.   Neck: Normal range of motion. Neck supple.   Prominence of right sterno-clavicular junction   Cardiovascular: Normal rate, regular rhythm and normal heart sounds.    Intermittent tachycardia today, not present on exam this morning.    Pulmonary/Chest: Effort normal. No respiratory distress. She has no wheezes. She has no rales.   Abdominal: Soft. Bowel sounds are normal. She exhibits no distension. There is no  tenderness.   Musculoskeletal: She exhibits no edema or tenderness.   Neurological: She is alert. She has normal strength. GCS eye subscore is 4. GCS verbal subscore is 5. GCS motor subscore is 6.   Skin: Skin is warm and dry. She is not diaphoretic.   Psychiatric:   Patient orientated to person, month, and [facility].   Nursing note and vitals reviewed.      Significant Labs:   BMP:     Recent Labs  Lab 07/30/18  0702   *   *   K 3.6      CO2 22*   BUN 16   CREATININE 0.8   CALCIUM 9.4   MG 1.7     CBC:     Recent Labs  Lab 07/29/18  0554 07/30/18  0702   WBC 10.87 12.73*   HGB 13.1 12.3   HCT 41.2 38.2    319       Interval History: NAEON. Patient is oriented to self, month, [facility]. Spoke with RN who reports no BM last night or this morning. Patient reports improved appetite. Denies abdominal pain. Awaiting result of 3rd AFB  to rule out pulm TB. Will reach out to patient's daughter regarding possible SNF vs. Home health.     Review of Systems   Constitutional: Negative for activity change, fatigue and fever.   Respiratory: Negative for apnea, cough, chest tightness and shortness of breath.    Cardiovascular: Negative for chest pain and leg swelling.   Gastrointestinal: Negative for abdominal distention, abdominal pain, diarrhea, nausea and vomiting.   Musculoskeletal: Negative for arthralgias.   Skin: Negative for color change and pallor.   Neurological: Negative for dizziness and headaches.   Psychiatric/Behavioral: Negative for agitation and confusion.     Objective:     Vital Signs (Most Recent):  Temp: 97.4 °F (36.3 °C) (07/30/18 1100)  Pulse: 79 (07/30/18 1045)  Resp: 19 (07/30/18 1045)  BP: 125/80 (07/30/18 1045)  SpO2: 97 % (07/30/18 1045) Vital Signs (24h Range):  Temp:  [97.4 °F (36.3 °C)-97.8 °F (36.6 °C)] 97.4 °F (36.3 °C)  Pulse:  [] 79  Resp:  [13-20] 19  SpO2:  [95 %-97 %] 97 %  BP: (125-158)/() 125/80     Weight: 48.1 kg (106 lb 0.7 oz)  Body mass index is 19.4  kg/m².  No intake or output data in the 24 hours ending 07/30/18 1114   Physical Exam   Constitutional: No distress.   HENT:   Head: Normocephalic and atraumatic.   Mouth/Throat: No oropharyngeal exudate.   Eyes: Conjunctivae are normal. No scleral icterus.   Neck: Normal range of motion. Neck supple.   Prominence of right sterno-clavicular junction   Cardiovascular: Normal rate, regular rhythm and normal heart sounds.    Pulmonary/Chest: Effort normal. No respiratory distress. She has no wheezes. She has no rales.   Abdominal: Soft. Bowel sounds are normal. She exhibits no distension. There is no tenderness.   Musculoskeletal: She exhibits no edema or tenderness.   Neurological: She is alert. She has normal strength. GCS eye subscore is 4. GCS verbal subscore is 5. GCS motor subscore is 6.   Skin: Skin is warm and dry. She is not diaphoretic.   Psychiatric:   Patient orientated to person, month, and [facility].   Nursing note and vitals reviewed.      Significant Labs:   BMP:     Recent Labs  Lab 07/30/18  0702   *   *   K 3.6      CO2 22*   BUN 16   CREATININE 0.8   CALCIUM 9.4   MG 1.7     CBC:     Recent Labs  Lab 07/29/18  0554 07/30/18  0702   WBC 10.87 12.73*   HGB 13.1 12.3   HCT 41.2 38.2    319

## 2018-07-30 NOTE — CONSULTS
[facility]  Gastroenterology  Consult Note    Patient Name: Arina Adame  MRN: [id number]  Admission Date: 7/23/2018  Hospital Length of Stay: 7 days  Code Status: DNR   Attending Provider: Washington Quintana MD   Consulting Provider: Sofya Vergara MD  Primary Care Physician: Zeinab Meier MD  Principal Problem:Clostridium difficile infection    Inpatient consult to Gastroenterology  Consult performed by: SOFYA VERGARA  Consult ordered by: TED LOZADA        Subjective:     HPI:  This is a 88 y/o lady with hx of CRC s/p LAR with Dr. Pa in 1997, last colonoscopy normal in 2010, recurrent Cdiff colitis, CAD who presents for lethargy and fevers found to have recurrent Cdiff.  We are asked to eval for fecal transplantation.  Hx limited as patient demented but obtained mainly from chart, also from daughter over phone.    This is her 2nd recurrence of Cdiff and currently living in nursing home. Her daughter is struggling to care for her as her  is also very ill with cancer and this has become a very difficult situation.   She recently was discharged from hospital on her last C diff episode on June 26th, and relapsed right after she completed a home course of PO. Her prior and initial episode was on hospitalization here in 5/2018 and she was Rx PO vancomycin at that time as well.    Of note, she has a cavitary lung lesion which has currently been evaluated with negative AFB sputum x 3. Pulmonary has decided against bronchoscopy as she would likely not be a good candidate for any potential long term Abx given her hx of recurrent cdiff.      Past Medical History:   Diagnosis Date    Alzheimer disease     Arthritis     B12 nutritional deficiency 8/6/2012    C. difficile colitis     5/2018    Carotid arterial disease 8/27/2013    Cataract     Colon cancer     Elevated blood pressure (not hypertension) 3/17/2014    Epiretinal membrane     Essential hypertension 3/16/2016     Hearing loss 3/17/2014    History of colon cancer 5/28/2015    [place] (hard of hearing)     Hypothyroidism 11/21/2012    IGT (impaired glucose tolerance) 2/11/2015    Macrocytosis 11/21/2012    Nevus of choroid     Osteoporosis, 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       Past Surgical History:   Procedure Laterality Date    BREAST SURGERY      CATARACT EXTRACTION W/  INTRAOCULAR LENS IMPLANT Bilateral n/a    OU    COLECTOMY      DENTAL SURGERY      right ankle surgery         Review of patient's allergies indicates:   Allergen Reactions    Codeine      Other reaction(s): Unknown    Iodine      Other reaction(s): Unknown    Penicillins      Other reaction(s): Unknown     Family History     Problem Relation (Age of Onset)    Hip fracture Mother    Thyroid disease Sister        Social History Main Topics    Smoking status: Former Smoker     Quit date: 6/24/1970    Smokeless tobacco: Former User    Alcohol use No    Drug use: No    Sexual activity: Not on file     Review of Systems   Unable to perform ROS: Dementia     Objective:     Vital Signs (Most Recent):  Temp: 97.4 °F (36.3 °C) (07/30/18 1100)  Pulse: 79 (07/30/18 1045)  Resp: 19 (07/30/18 1045)  BP: 125/80 (07/30/18 1045)  SpO2: 96 % (07/30/18 1122) Vital Signs (24h Range):  Temp:  [97.4 °F (36.3 °C)-97.8 °F (36.6 °C)] 97.4 °F (36.3 °C)  Pulse:  [] 79  Resp:  [13-20] 19  SpO2:  [95 %-97 %] 96 %  BP: (125-158)/() 125/80     Weight: 48.1 kg (106 lb 0.7 oz) (07/24/18 0200)  Body mass index is 19.4 kg/m².    No intake or output data in the 24 hours ending 07/30/18 1423    Lines/Drains/Airways     Peripheral Intravenous Line                 Peripheral IV - Single Lumen 07/29/18 1800 Right Antecubital less than 1 day                Physical Exam   Constitutional:   Pleasant ; pale but otherwise well appearing   HENT:   Head: Normocephalic and atraumatic.   Eyes: Conjunctivae are normal. No  scleral icterus.   Neck: Neck supple.   Cardiovascular: Normal rate and regular rhythm.    Pulmonary/Chest: Breath sounds normal. No stridor. No respiratory distress.   Abdominal: Soft. She exhibits no distension. There is no tenderness. There is no rebound and no guarding.   Musculoskeletal: She exhibits no tenderness or deformity.   Neurological: She is alert.   Disoriented to time ; oriented to place and person   Skin: Skin is warm and dry. No rash noted.   Psychiatric: She has a normal mood and affect.   Vitals reviewed.      Significant Labs:  Amylase: No results for input(s): AMYLASE in the last 48 hours.  Blood Culture: No results for input(s): LABBLOO in the last 48 hours.  CBC:   Recent Labs  Lab 07/29/18  0554 07/30/18  0702   WBC 10.87 12.73*   HGB 13.1 12.3   HCT 41.2 38.2    319     CMP:   Recent Labs  Lab 07/30/18  0702   *   CALCIUM 9.4   *   K 3.6   CO2 22*      BUN 16   CREATININE 0.8     Coagulation: No results for input(s): PT, INR, APTT in the last 48 hours.  CRP: No results for input(s): CRP in the last 48 hours.  ESR: No results for input(s): SEDRATE in the last 48 hours.  H.Pylori Ab IgG: No results for input(s): HPYLORIIGG in the last 48 hours.  Lipase: No results for input(s): LIPASE in the last 48 hours.  Liver Function Test: No results for input(s): ALT, AST, ALKPHOS, BILITOT, PROT, ALBUMIN in the last 48 hours.  Peritoneal Fluid Cultures: No results for input(s): AEROBICCULTU, LABGRAM in the last 48 hours.  Stool C. diff: No results for input(s): CDIFFICILEAN, CDIFFTOX in the last 48 hours.    Significant Imaging:  Imaging results within the past 24 hours have been reviewed.    Assessment/Plan:     * Clostridium difficile infection    Now with a 2nd recurrence. Hx of LAR surgery due to colorectal cancer.   We feel it is reasonable to pursue fecal transplant.    Recs:  Clear liquids today  NPO past Mn  I will order split dose bowel prep  I have also ordered  pre-fecal transplant screening labs to include: HIV, RPR, hepatitits panel  Check CRP pre transplant and 2 days post transplant  I have stopped the PO vancomycin to allow approx 24 hours washout period.    Discussed over phone with daughter who agrees to this plan of care.            Thank you for your consult. I will follow-up with patient. Please contact us if you have any additional questions.    Martell Vergara MD  Gastroenterology  [facility]

## 2018-07-30 NOTE — PROGRESS NOTES
7/30/18 Summary: Per chart review patient currently inpatient.    Interventions: Will follow-up upon discharge      Plan:   Dementia teaching - continue  Clostridium Dificile - skin care  Fall prevention  - continue  Referral to Palliative Care - DONE  Referral to [facility]

## 2018-07-30 NOTE — HPI
This is a 88 y/o lady with hx of CRC s/p LAR with Dr. Pa in 1997, last colonoscopy normal in 2010, recurrent Cdiff colitis, CAD who presents for lethargy and fevers found to have recurrent Cdiff.  We are asked to eval for fecal transplantation.  Hx limited as patient demented but obtained mainly from chart, also from daughter over phone.    This is her 2nd recurrence of Cdiff and currently living in nursing home. Her daughter is struggling to care for her as her  is also very ill with cancer and this has become a very difficult situation.   She recently was discharged from hospital on her last C diff episode on June 26th, and relapsed right after she completed a home course of PO. Her prior and initial episode was on hospitalization here in 5/2018 and she was Rx PO vancomycin at that time as well.    Of note, she has a cavitary lung lesion which has currently been evaluated with negative AFB sputum x 3. Pulmonary has decided against bronchoscopy as she would likely not be a good candidate for any potential long term Abx given her hx of recurrent cdiff.

## 2018-07-30 NOTE — PT/OT/SLP PROGRESS
Occupational Therapy   Treatment    Name: Arina Adame  MRN: [id number]  Admitting Diagnosis:  Clostridium difficile infection       Recommendations:     Discharge Recommendations: nursing facility, skilled  Discharge Equipment Recommendations:  none  Barriers to discharge:  Decreased caregiver support    Subjective     Communicated with: RN prior to session.  Pain/Comfort:  · Pain Rating 1: 0/10  · Pain Rating Post-Intervention 1: 0/10    Patients cultural, spiritual, Buddhism conflicts given the current situation: none stated     Objective:     Patient found with: telemetry, peripheral IV, pulse ox (continuous), blood pressure cuff, bed alarm    General Precautions: Standard, fall, contact, airborne (delirium, DNR)   Orthopedic Precautions:N/A   Braces: N/A     Occupational Performance:    Bed Mobility:    · Patient completed Scooting/Bridging with minimum assistance  · Patient completed Supine to Sit with contact guard assistance  · Patient completed Sit to Supine with contact guard assistance     Functional Mobility/Transfers:  · Patient completed Sit <> Stand Transfer with minimum assistance and moderate assistance  with  no assistive device   · Functional Mobility: Pt ambulated 4-5 lateral steps to HOB w/ HHA, no AD. Pt did not display LOB but noticeably fatigued.     Activities of Daily Living:  · Lower Body Dressing: stand by assistance donning socks sitting EOB    Patient left HOB elevated with all lines intact, call button in reach and bed alarm on    [facility] 6 Click: Self-care  [facility] Total Score: 16    Treatment & Education:  - OT POC  - Importance of OOB Activity w/ staff assist to maximize recovery   - Safety w/ functional mobility  - improved sitting balance requiring SBA   - pt displayed a posterior lean in standing  Education:    Assessment:     Arina Adame is a 89 y.o. female with a medical diagnosis of Clostridium difficile infection.  She presents with the following performance deficits  affecting function:  weakness, impaired endurance, impaired self care skills, impaired balance, impaired functional mobilty, gait instability, impaired cardiopulmonary response to activity, decreased safety awareness, impaired coordination.      Pt tolerated session well. Pt demonstrated improved self-care and mobility skills w/ LB dressing, bed mobility, and ambulation. Pt displayed improved sitting balance requiring no physical assist but continues to present w/ a posterior lean in standing. Pt completed 3 trials of sit<>stand w/ Min - Mod A for balance but unable to continue 2/2 fatigue. Pt continues to require minimal redirection during task performances. She continues to benefit from skilled OT to address the above listed impairments.     Rehab Prognosis:  Good; patient would benefit from acute skilled OT services to address these deficits and reach maximum level of function.       Plan:     Patient to be seen 3 x/week to address the above listed problems via self-care/home management, therapeutic activities, therapeutic exercises  · Plan of Care Expires: 08/24/18  · Plan of Care Reviewed with: patient    This Plan of care has been discussed with the patient who was involved in its development and understands and is in agreement with the identified goals and treatment plan    GOALS:    Occupational Therapy Goals        Problem: Occupational Therapy Goal    Goal Priority Disciplines Outcome Interventions   Occupational Therapy Goal     OT, PT/OT Ongoing (interventions implemented as appropriate)    Description:  Goals to be met by: 8/15  Patient will increase functional independence with ADLs by performing:    Feeding with Set-up Assistance.  UE Dressing with Contact Guard Assistance.  LE Dressing with Moderate Assistance. -- Met (socks) 7/30  Grooming while seated with Contact Guard Assistance.  Toileting from toilet with Minimal Assistance for hygiene and clothing management.                        Time  Tracking:     OT Date of Treatment: 07/30/18  OT Start Time: 1045  OT Stop Time: 1108  OT Total Time (min): 23 min    Billable Minutes:Self Care/Home Management 8  Therapeutic Activity 15    Audrey Hernández OT  7/30/2018

## 2018-07-30 NOTE — ASSESSMENT & PLAN NOTE
Now with a 2nd recurrence. Hx of LAR surgery due to colorectal cancer.   We feel it is reasonable to pursue fecal transplant.    Recs:  Clear liquids today  NPO past Mn  I will order split dose bowel prep  I have also ordered pre-fecal transplant screening labs to include: HIV, RPR, hepatitits panel  Check CRP pre transplant and 2 days post transplant  I have stopped the PO vancomycin to allow approx 24 hours washout period.    Discussed over phone with daughter who agrees to this plan of care.

## 2018-07-30 NOTE — PT/OT/SLP PROGRESS
Physical Therapy Treatment    Patient Name:  Arina Adame   MRN:  [id number]    Recommendations:     Discharge Recommendations:  nursing facility, skilled (long term)   Discharge Equipment Recommendations: bedside commode   Barriers to discharge: Decreased caregiver support requires assist for mobility    Assessment:     Arina Adame is a 89 y.o. female admitted with a medical diagnosis of Clostridium difficile infection.  She presents with the following impairments/functional limitations:  weakness, impaired self care skills, impaired functional mobilty, gait instability, impaired balance, decreased safety awareness, impaired cardiopulmonary response to activity . Pt is motivated to perform OOB mobility.    Rehab Prognosis:  good; patient would benefit from acute skilled PT services to address these deficits and reach maximum level of function.      Recent Surgery: * No surgery found *      Plan:     During this hospitalization, patient to be seen 3 x/week to address the above listed problems via gait training, therapeutic activities, therapeutic exercises, neuromuscular re-education  · Plan of Care Expires:  08/26/18   Plan of Care Reviewed with: patient    Subjective     Communicated with nurse prior to session.  Patient found supine upon PT entry to room, agreeable to treatment.    Pt states she needs to be cleaned    Pain/Comfort:  · Pain Rating 1: 0/10  · Pain Rating Post-Intervention 1: 0/10    Patients cultural, spiritual, Restorationism conflicts given the current situation: none noted    Objective:     Patient found with: telemetry, peripheral IV (AVS camera)     General Precautions: Standard, fall, contact, airborne (pt on contact isol for c-diff; airborne for possible TB)   Orthopedic Precautions:N/A   Braces: N/A     Functional Mobility:  · Bed Mobility:     · Supine to Sit: maximal assistance  · Sit to Supine: total assistance  · Transfers:     · Sit to Stand:  maximal assistance with hand-held  assist  · Bed to Chair: maximal assistance with  hand-held assist  using  Stand Pivot; bed to bedside chair; bedside chair to bedside commode; bedside commode to bedside chair; bedside chair to bed  · Gait: 3 steps x 2 trials to/from bedside chair with HHA with decreased clearance of B feet during swing phase, cross over gait at one point, and with posterior instability.  · Balance: static sitting balance fair (-)    AM-PAC 6 CLICK MOBILITY  Turning over in bed (including adjusting bedclothes, sheets and blankets)?: 2  Sitting down on and standing up from a chair with arms (e.g., wheelchair, bedside commode, etc.): 2  Moving from lying on back to sitting on the side of the bed?: 2  Moving to and from a bed to a chair (including a wheelchair)?: 2  Need to walk in hospital room?: 2  Climbing 3-5 steps with a railing?: 1  Basic Mobility Total Score: 11     Therapeutic Activities and Exercises:   Pt performed B LE exs in sitting x 15 reps: hip flex, knee ext, and ankle pumps (difficulty with AP on L foot)    Patient left supine (remained up in bedside chair/commode ~ 25 min during treatment)with all lines intact, call button in reach and nurse notified..    GOALS:    Physical Therapy Goals        Problem: Physical Therapy Goal    Goal Priority Disciplines Outcome Goal Variances Interventions   Physical Therapy Goal     PT/OT, PT Ongoing (interventions implemented as appropriate)     Description:  Goals to be met by: 2018      Patient will increase functional independence with mobility by performin. Supine to sit with MInimal Assistance-not met  2. Sit to supine with MInimal Assistance-not met  3. Sit to stand transfer with Moderate Assistance-not met  4. Bed to chair transfer with Moderate Assistance-not met  5. Gait  x 10 feet with Maximum Assistance. -not met  6. Wheelchair propulsion x100 feet with Minimal Assistance using bilateral uppper extremities-not met  7. Lower extremity exercise program x15 reps  per handout, with assistance as needed-not met                       Time Tracking:     PT Received On: 07/30/18  PT Start Time: 0945     PT Stop Time: 1020  PT Total Time (min): 35 min     Billable Minutes: Therapeutic Activity 20 and Therapeutic Exercise 15    Treatment Type: Treatment  PT/PTA: PT           Nahomy George, PT  07/30/2018

## 2018-07-30 NOTE — PLAN OF CARE
Problem: Fall Risk (Adult)  Goal: Absence of Falls  Patient will demonstrate the desired outcomes by discharge/transition of care.   Outcome: Ongoing (interventions implemented as appropriate)  Patient has had no falls Avasys at bedside.  Bed alarm on.  Bed in lowest position and locked.  Patient has rested well this shift with no s/s of pain or discomfort.

## 2018-07-30 NOTE — PLAN OF CARE
Problem: Physical Therapy Goal  Goal: Physical Therapy Goal  Goals to be met by: 2018      Patient will increase functional independence with mobility by performin. Supine to sit with MInimal Assistance-not met  2. Sit to supine with MInimal Assistance-not met  3. Sit to stand transfer with Moderate Assistance-not met  4. Bed to chair transfer with Moderate Assistance-not met  5. Gait  x 10 feet with Maximum Assistance. -not met  6. Wheelchair propulsion x100 feet with Minimal Assistance using bilateral uppper extremities-not met  7. Lower extremity exercise program x15 reps per handout, with assistance as needed-not met     Outcome: Ongoing (interventions implemented as appropriate)  Pt's goals remain appropriate and pt will continue to benefit from skilled PT services to work towards improved functional mobility including: bed mobility, transfers, w/c mobility, and gait.   Nahomy George, PT  2018

## 2018-07-31 ENCOUNTER — ANESTHESIA (OUTPATIENT)
Dept: ENDOSCOPY | Facility: HOSPITAL | Age: 83
DRG: 871 | End: 2018-07-31
Payer: MEDICARE

## 2018-07-31 PROBLEM — A41.9 SEPSIS: Status: ACTIVE | Noted: 2018-07-31

## 2018-07-31 LAB
ANION GAP SERPL CALC-SCNC: 6 MMOL/L
ANISOCYTOSIS BLD QL SMEAR: SLIGHT
BASOPHILS # BLD AUTO: ABNORMAL K/UL
BASOPHILS NFR BLD: 0 %
BUN SERPL-MCNC: 9 MG/DL
CALCIUM SERPL-MCNC: 9.5 MG/DL
CHLORIDE SERPL-SCNC: 105 MMOL/L
CO2 SERPL-SCNC: 29 MMOL/L
CREAT SERPL-MCNC: 0.7 MG/DL
DIFFERENTIAL METHOD: ABNORMAL
EOSINOPHIL # BLD AUTO: ABNORMAL K/UL
EOSINOPHIL NFR BLD: 3 %
ERYTHROCYTE [DISTWIDTH] IN BLOOD BY AUTOMATED COUNT: 14.6 %
EST. GFR  (AFRICAN AMERICAN): >60 ML/MIN/1.73 M^2
EST. GFR  (NON AFRICAN AMERICAN): >60 ML/MIN/1.73 M^2
GLUCOSE SERPL-MCNC: 96 MG/DL
HAV IGM SERPL QL IA: NEGATIVE
HBV CORE IGM SERPL QL IA: NEGATIVE
HBV SURFACE AG SERPL QL IA: NEGATIVE
HCT VFR BLD AUTO: 41.3 %
HCV AB SERPL QL IA: NEGATIVE
HGB BLD-MCNC: 13.4 G/DL
HIV 1+2 AB+HIV1 P24 AG SERPL QL IA: NEGATIVE
HYPOCHROMIA BLD QL SMEAR: ABNORMAL
IMM GRANULOCYTES # BLD AUTO: ABNORMAL K/UL
IMM GRANULOCYTES NFR BLD AUTO: ABNORMAL %
LYMPHOCYTES # BLD AUTO: ABNORMAL K/UL
LYMPHOCYTES NFR BLD: 15 %
MAGNESIUM SERPL-MCNC: 1.8 MG/DL
MCH RBC QN AUTO: 30.7 PG
MCHC RBC AUTO-ENTMCNC: 32.4 G/DL
MCV RBC AUTO: 95 FL
MONOCYTES # BLD AUTO: ABNORMAL K/UL
MONOCYTES NFR BLD: 11 %
MYELOCYTES NFR BLD MANUAL: 1 %
NEUTROPHILS NFR BLD: 70 %
NRBC BLD-RTO: 0 /100 WBC
OVALOCYTES BLD QL SMEAR: ABNORMAL
PHOSPHATE SERPL-MCNC: 3.1 MG/DL
PLATELET # BLD AUTO: 335 K/UL
PLATELET BLD QL SMEAR: ABNORMAL
PMV BLD AUTO: 10.6 FL
POIKILOCYTOSIS BLD QL SMEAR: SLIGHT
POLYCHROMASIA BLD QL SMEAR: ABNORMAL
POTASSIUM SERPL-SCNC: 3.7 MMOL/L
RBC # BLD AUTO: 4.36 M/UL
RPR SER QL: NORMAL
SODIUM SERPL-SCNC: 140 MMOL/L
WBC # BLD AUTO: 13.25 K/UL

## 2018-07-31 PROCEDURE — A4216 STERILE WATER/SALINE, 10 ML: HCPCS | Performed by: STUDENT IN AN ORGANIZED HEALTH CARE EDUCATION/TRAINING PROGRAM

## 2018-07-31 PROCEDURE — 80048 BASIC METABOLIC PNL TOTAL CA: CPT

## 2018-07-31 PROCEDURE — 37000009 HC ANESTHESIA EA ADD 15 MINS: Performed by: INTERNAL MEDICINE

## 2018-07-31 PROCEDURE — 3E0H8GC INTRODUCTION OF OTHER THERAPEUTIC SUBSTANCE INTO LOWER GI, VIA NATURAL OR ARTIFICIAL OPENING ENDOSCOPIC: ICD-10-PCS | Performed by: INTERNAL MEDICINE

## 2018-07-31 PROCEDURE — 37000008 HC ANESTHESIA 1ST 15 MINUTES: Performed by: INTERNAL MEDICINE

## 2018-07-31 PROCEDURE — 45378 DIAGNOSTIC COLONOSCOPY: CPT | Mod: GC,,, | Performed by: INTERNAL MEDICINE

## 2018-07-31 PROCEDURE — 85007 BL SMEAR W/DIFF WBC COUNT: CPT

## 2018-07-31 PROCEDURE — 84100 ASSAY OF PHOSPHORUS: CPT

## 2018-07-31 PROCEDURE — D9220A PRA ANESTHESIA: Mod: ,,, | Performed by: ANESTHESIOLOGY

## 2018-07-31 PROCEDURE — 99232 SBSQ HOSP IP/OBS MODERATE 35: CPT | Mod: GC,,, | Performed by: HOSPITALIST

## 2018-07-31 PROCEDURE — 25000003 PHARM REV CODE 250: Performed by: NURSE ANESTHETIST, CERTIFIED REGISTERED

## 2018-07-31 PROCEDURE — 45378 DIAGNOSTIC COLONOSCOPY: CPT | Performed by: INTERNAL MEDICINE

## 2018-07-31 PROCEDURE — 85027 COMPLETE CBC AUTOMATED: CPT

## 2018-07-31 PROCEDURE — 63600175 PHARM REV CODE 636 W HCPCS: Performed by: STUDENT IN AN ORGANIZED HEALTH CARE EDUCATION/TRAINING PROGRAM

## 2018-07-31 PROCEDURE — 63600175 PHARM REV CODE 636 W HCPCS: Performed by: NURSE ANESTHETIST, CERTIFIED REGISTERED

## 2018-07-31 PROCEDURE — 25000003 PHARM REV CODE 250: Performed by: STUDENT IN AN ORGANIZED HEALTH CARE EDUCATION/TRAINING PROGRAM

## 2018-07-31 PROCEDURE — 0DJD8ZZ INSPECTION OF LOWER INTESTINAL TRACT, VIA NATURAL OR ARTIFICIAL OPENING ENDOSCOPIC: ICD-10-PCS | Performed by: INTERNAL MEDICINE

## 2018-07-31 PROCEDURE — 11000001 HC ACUTE MED/SURG PRIVATE ROOM

## 2018-07-31 PROCEDURE — 83735 ASSAY OF MAGNESIUM: CPT

## 2018-07-31 PROCEDURE — 36415 COLL VENOUS BLD VENIPUNCTURE: CPT

## 2018-07-31 PROCEDURE — 27201681 HC FECAL MICROBIOTA: Performed by: INTERNAL MEDICINE

## 2018-07-31 RX ORDER — BUTYROSPERMUM PARKII(SHEA BUTTER), SIMMONDSIA CHINENSIS (JOJOBA) SEED OIL, ALOE BARBADENSIS LEAF EXTRACT .01; 1; 3.5 G/100G; G/100G; G/100G
250 LIQUID TOPICAL 2 TIMES DAILY
Status: ON HOLD | COMMUNITY
End: 2018-08-02 | Stop reason: HOSPADM

## 2018-07-31 RX ORDER — SODIUM CHLORIDE 0.9 % (FLUSH) 0.9 %
3 SYRINGE (ML) INJECTION
Status: DISCONTINUED | OUTPATIENT
Start: 2018-07-31 | End: 2018-07-31 | Stop reason: HOSPADM

## 2018-07-31 RX ORDER — HYDROMORPHONE HYDROCHLORIDE 1 MG/ML
0.2 INJECTION, SOLUTION INTRAMUSCULAR; INTRAVENOUS; SUBCUTANEOUS EVERY 5 MIN PRN
Status: DISCONTINUED | OUTPATIENT
Start: 2018-07-31 | End: 2018-07-31 | Stop reason: HOSPADM

## 2018-07-31 RX ORDER — LIDOCAINE HCL/PF 100 MG/5ML
SYRINGE (ML) INTRAVENOUS
Status: DISCONTINUED | OUTPATIENT
Start: 2018-07-31 | End: 2018-07-31

## 2018-07-31 RX ORDER — PROPOFOL 10 MG/ML
VIAL (ML) INTRAVENOUS
Status: DISCONTINUED | OUTPATIENT
Start: 2018-07-31 | End: 2018-07-31

## 2018-07-31 RX ORDER — PHENYLEPHRINE HYDROCHLORIDE 10 MG/ML
INJECTION INTRAVENOUS
Status: DISCONTINUED | OUTPATIENT
Start: 2018-07-31 | End: 2018-07-31

## 2018-07-31 RX ORDER — SODIUM CHLORIDE 9 MG/ML
INJECTION, SOLUTION INTRAVENOUS CONTINUOUS PRN
Status: DISCONTINUED | OUTPATIENT
Start: 2018-07-31 | End: 2018-07-31

## 2018-07-31 RX ORDER — ACETAMINOPHEN 325 MG/1
325 TABLET ORAL DAILY PRN
COMMUNITY

## 2018-07-31 RX ORDER — ENOXAPARIN SODIUM 100 MG/ML
40 INJECTION SUBCUTANEOUS EVERY 24 HOURS
Status: DISCONTINUED | OUTPATIENT
Start: 2018-07-31 | End: 2018-08-02 | Stop reason: HOSPADM

## 2018-07-31 RX ORDER — PROPOFOL 10 MG/ML
VIAL (ML) INTRAVENOUS CONTINUOUS PRN
Status: DISCONTINUED | OUTPATIENT
Start: 2018-07-31 | End: 2018-07-31

## 2018-07-31 RX ADMIN — DONEPEZIL HYDROCHLORIDE 10 MG: 5 TABLET, FILM COATED ORAL at 10:07

## 2018-07-31 RX ADMIN — MICONAZOLE NITRATE: 20 OINTMENT TOPICAL at 09:07

## 2018-07-31 RX ADMIN — CYANOCOBALAMIN TAB 250 MCG 500 MCG: 250 TAB at 09:07

## 2018-07-31 RX ADMIN — QUETIAPINE FUMARATE 25 MG: 25 TABLET ORAL at 10:07

## 2018-07-31 RX ADMIN — PROPOFOL 20 MG: 10 INJECTION, EMULSION INTRAVENOUS at 04:07

## 2018-07-31 RX ADMIN — LIDOCAINE HYDROCHLORIDE 40 MG: 20 INJECTION, SOLUTION INTRAVENOUS at 04:07

## 2018-07-31 RX ADMIN — PHENYLEPHRINE HYDROCHLORIDE 100 MCG: 10 INJECTION INTRAVENOUS at 04:07

## 2018-07-31 RX ADMIN — LEVOTHYROXINE SODIUM 100 MCG: 100 TABLET ORAL at 06:07

## 2018-07-31 RX ADMIN — SODIUM CHLORIDE: 0.9 INJECTION, SOLUTION INTRAVENOUS at 04:07

## 2018-07-31 RX ADMIN — ATORVASTATIN CALCIUM 40 MG: 20 TABLET, FILM COATED ORAL at 09:07

## 2018-07-31 RX ADMIN — ENOXAPARIN SODIUM 40 MG: 100 INJECTION SUBCUTANEOUS at 06:07

## 2018-07-31 RX ADMIN — PROPOFOL 100 MCG/KG/MIN: 10 INJECTION, EMULSION INTRAVENOUS at 04:07

## 2018-07-31 RX ADMIN — Medication 5 ML: at 10:07

## 2018-07-31 RX ADMIN — MICONAZOLE NITRATE: 20 OINTMENT TOPICAL at 10:07

## 2018-07-31 RX ADMIN — ASPIRIN 81 MG: 81 TABLET, COATED ORAL at 09:07

## 2018-07-31 NOTE — DISCHARGE INSTRUCTIONS
Colonoscopy     A camera attached to a flexible tube with a viewing lens is used to take video pictures.     Colonoscopy is a test to view the inside of your lower digestive tract (colon and rectum). Sometimes it can show the last part of the small intestine (ileum). During the test, small pieces of tissue may be removed for testing. This is called a biopsy. Small growths, such as polyps, may also be removed.   Why is colonoscopy done?  The test is done to help look for colon cancer. And it can help find the source of abdominal pain, bleeding, and changes in bowel habits. It may be needed once a year, depending on factors such as your:  · Age  · Health history  · Family health history  · Symptoms  · Results from any prior colonoscopy  Risks and possible complications  These include:  · Bleeding               · A puncture or tear in the colon   · Risks of anesthesia  · A cancer lesion not being seen  Getting ready   To prepare for the test:  · Talk with your healthcare provider about the risks of the test (see below). Also ask your healthcare provider about alternatives to the test.  · Tell your healthcare provider about any medicines you take. Also tell him or her about any health conditions you may have.  · Make sure your rectum and colon are empty for the test. Follow the diet and bowel prep instructions exactly. If you dont, the test may need to be rescheduled.  · Plan for a friend or family member to drive you home after the test.     Colonoscopy provides an inside view of the entire colon.     You may discuss the results with your doctor right away or at a future visit.  During the test   The test is usually done in the hospital on an outpatient basis. This means you go home the same day. The procedure takes about 30 minutes. During that time:  · You are given relaxing (sedating) medicine through an IV line. You may be drowsy, or fully asleep.  · The healthcare provider will first give you a physical exam to  check for anal and rectal problems.  · Then the anus is lubricated and the scope inserted.  · If you are awake, you may have a feeling similar to needing to have a bowel movement. You may also feel pressure as air is pumped into the colon. Its OK to pass gas during the procedure.  · Biopsy, polyp removal, or other treatments may be done during the test.  After the test   You may have gas right after the test. It can help to try to pass it to help prevent later bloating. Your healthcare provider may discuss the results with you right away. Or you may need to schedule a follow-up visit to talk about the results. After the test, you can go back to your normal eating and other activities. You may be tired from the sedation and need to rest for a few hours.  Date Last Reviewed: 11/1/2016 © 2000-2017 [employer]. [address] [place] [address], [place] [address]. All rights reserved. This information is not intended as a substitute for professional medical care. Always follow your healthcare professional's instructions.

## 2018-07-31 NOTE — SUBJECTIVE & OBJECTIVE
Interval History: To Endo suite for fecal transplant today. No complaints this morning. Tolerating prep. Stools more loose while drinking prep.     Review of Systems   Constitutional: Positive for activity change and fatigue. Negative for fever.   Respiratory: Negative for apnea, cough, chest tightness and shortness of breath.    Cardiovascular: Negative for chest pain and leg swelling.   Gastrointestinal: Negative for abdominal distention, abdominal pain, diarrhea, nausea and vomiting.   Musculoskeletal: Negative for arthralgias.   Skin: Negative for color change and pallor.   Neurological: Negative for dizziness and headaches.   Psychiatric/Behavioral: Positive for confusion. Negative for agitation.     Objective:     Vital Signs (Most Recent):  Temp: 97.7 °F (36.5 °C) (07/31/18 1603)  Pulse: 96 (07/31/18 1603)  Resp: 18 (07/31/18 1603)  BP: (!) 137/90 (07/31/18 1603)  SpO2: 99 % (07/31/18 1603) Vital Signs (24h Range):  Temp:  [97.3 °F (36.3 °C)-98.3 °F (36.8 °C)] 97.7 °F (36.5 °C)  Pulse:  [] 96  Resp:  [12-19] 18  SpO2:  [93 %-99 %] 99 %  BP: (119-169)/(73-95) 137/90     Weight: 48.1 kg (106 lb 0.7 oz)  Body mass index is 19.4 kg/m².    Intake/Output Summary (Last 24 hours) at 07/31/18 1631  Last data filed at 07/30/18 2145   Gross per 24 hour   Intake              200 ml   Output                0 ml   Net              200 ml      Physical Exam   Constitutional: No distress.   HENT:   Head: Normocephalic and atraumatic.   Mouth/Throat: No oropharyngeal exudate.   Eyes: Conjunctivae are normal. No scleral icterus.   Neck: Normal range of motion. Neck supple.   Prominence of right sterno-clavicular junction   Cardiovascular: Normal rate, regular rhythm and normal heart sounds.    Pulmonary/Chest: Effort normal. No respiratory distress. She has no wheezes. She has no rales.   Abdominal: Soft. Bowel sounds are normal. She exhibits no distension. There is no tenderness.   Musculoskeletal: She exhibits no edema  or tenderness.   Neurological: She is alert. She has normal strength. GCS eye subscore is 4. GCS verbal subscore is 5. GCS motor subscore is 6.   Skin: Skin is warm and dry. She is not diaphoretic.   Psychiatric:   Patient orientated to person, month, and [facility].   Nursing note and vitals reviewed.      Significant Labs:   BMP:     Recent Labs  Lab 07/31/18  0655   GLU 96      K 3.7      CO2 29   BUN 9   CREATININE 0.7   CALCIUM 9.5   MG 1.8     CBC:     Recent Labs  Lab 07/30/18  0702 07/31/18  0655   WBC 12.73* 13.25*   HGB 12.3 13.4   HCT 38.2 41.3    335

## 2018-07-31 NOTE — ASSESSMENT & PLAN NOTE
Need to rule out TB  Patient with minimal coughing/sputum production but may be able to complete AFB rule out. T-spot test negative; awaiting result of 3rd sputum sample for AFB smear. Unclear if this is adequate for clearance.

## 2018-07-31 NOTE — PROGRESS NOTES
[facility] Hospital Medicine  Progress Note    Patient Name: Arina Adame  MRN: [id number]  Patient Class: IP- Inpatient   Admission Date: 7/23/2018  Length of Stay: 8 days  Attending Physician: Washintgon Quintana MD  Primary Care Provider: Zeinab Meier MD    [facility] Hospital Medicine Team: [facility] HOSP MED 4 Kiran Clement MD    Subjective:     Principal Problem:Clostridium difficile infection    HPI:  Arina Adame is a 89 y.o. female has a past medical history of Alzheimer disease; C. difficile colitis; Carotid arterial disease; Cataract; Essential hypertension; Colon cancer; Hypothyroidism; impaired glucose tolerance; Osteoporosis, brought to the ED by her daughter today complaining of fever, diarrhea and vomiting.  At ED, Arina Adame was also hypotensive, tachycardic, and lethargic, CBC, CMP, troponin, BNP, urinalysis, blood/urinary culture, CT chest and Chest X-ray was ordered, concerning recurrently UTI, diff infection, and pneumonia.      Arina Adame has limited ability to give a full medical history.  Collateral information from her daughter was obtained. She was discharged from hospital of her last episode on June 26th, and relapsed right after she complete a home course of PO vancomycin for C.diff last Thursday. She was brought to the ED by EMS for fever. Daughter reports that she has been more confused at night for the past 2 nights. Today, she was much more fatigued than normal and would not get out of bed. This afternoon, daughter noticed she had a fever of >101 and called EMS. EMS noted pt to be febrile, tachycardic and hypotensive and administered 500cc of NS. On exam pt is rousable but disoriented. Daughter notes that she has a bed sore near her gluteal fold that has not healed.   Daughter noticed Arina Adame's change of bowel movement last Thursday when she changed her diaper.  The diarrhea has been watery, gold in color, mucousy with C. diff smell.  She became  increasingly lethargic.  This morning, Arina Adame was unable to get up from bed, did not take any medication or food, vomited once after taking some water.  She slept all day.  When daughter saw her in the late afternoon, Arina Adame was shivering, had a temperature of 101.9, with some rattle breathing sound.  She took her to the ED. She s/p 2 episodes of C-diff infections with UTI in the past.  Each episode lasted for about 7-9 days.          Hospital Course:  Patient transferred to hospital floor for further evaluation and management. ID consulted. Recommended to treat C. Diff infection with PO Vancomycin for at least 21 days. Cavitary lesion of lung was concerning for possible TB. Obtained labs to rule out.     Interval History: To Endo suite for fecal transplant today. No complaints this morning. Tolerating prep. Stools more loose while drinking prep.     Review of Systems   Constitutional: Positive for activity change and fatigue. Negative for fever.   Respiratory: Negative for apnea, cough, chest tightness and shortness of breath.    Cardiovascular: Negative for chest pain and leg swelling.   Gastrointestinal: Negative for abdominal distention, abdominal pain, diarrhea, nausea and vomiting.   Musculoskeletal: Negative for arthralgias.   Skin: Negative for color change and pallor.   Neurological: Negative for dizziness and headaches.   Psychiatric/Behavioral: Positive for confusion. Negative for agitation.     Objective:     Vital Signs (Most Recent):  Temp: 97.7 °F (36.5 °C) (07/31/18 1603)  Pulse: 96 (07/31/18 1603)  Resp: 18 (07/31/18 1603)  BP: (!) 137/90 (07/31/18 1603)  SpO2: 99 % (07/31/18 1603) Vital Signs (24h Range):  Temp:  [97.3 °F (36.3 °C)-98.3 °F (36.8 °C)] 97.7 °F (36.5 °C)  Pulse:  [] 96  Resp:  [12-19] 18  SpO2:  [93 %-99 %] 99 %  BP: (119-169)/(73-95) 137/90     Weight: 48.1 kg (106 lb 0.7 oz)  Body mass index is 19.4 kg/m².    Intake/Output Summary (Last 24 hours) at  07/31/18 1631  Last data filed at 07/30/18 2145   Gross per 24 hour   Intake              200 ml   Output                0 ml   Net              200 ml      Physical Exam   Constitutional: No distress.   HENT:   Head: Normocephalic and atraumatic.   Mouth/Throat: No oropharyngeal exudate.   Eyes: Conjunctivae are normal. No scleral icterus.   Neck: Normal range of motion. Neck supple.   Prominence of right sterno-clavicular junction   Cardiovascular: Normal rate, regular rhythm and normal heart sounds.    Pulmonary/Chest: Effort normal. No respiratory distress. She has no wheezes. She has no rales.   Abdominal: Soft. Bowel sounds are normal. She exhibits no distension. There is no tenderness.   Musculoskeletal: She exhibits no edema or tenderness.   Neurological: She is alert. She has normal strength. GCS eye subscore is 4. GCS verbal subscore is 5. GCS motor subscore is 6.   Skin: Skin is warm and dry. She is not diaphoretic.   Psychiatric:   Patient orientated to person, month, and [facility].   Nursing note and vitals reviewed.      Significant Labs:   BMP:     Recent Labs  Lab 07/31/18  0655   GLU 96      K 3.7      CO2 29   BUN 9   CREATININE 0.7   CALCIUM 9.5   MG 1.8     CBC:     Recent Labs  Lab 07/30/18  0702 07/31/18  0655   WBC 12.73* 13.25*   HGB 12.3 13.4   HCT 38.2 41.3    335           Assessment/Plan:      * Clostridium difficile infection    Patient with 2nd recurrence of C.diff.   S/p fecal transplant today, appreciate GI assistance        Delirium    DEMENTIA    Patient presented with some confusion, orientated to person but not time or place  Patient with multiple risk factors for infectious encephalopathy and delirium including advanced age, pre-existing dementia, active infection and diarrhea.   Delirium precautions, telesitter.   Treating infection          Cavitary lesion of lung    Need to rule out TB  Patient with minimal coughing/sputum production but may be able to  "complete AFB rule out. T-spot test negative; awaiting result of 3rd sputum sample for AFB smear. Unclear if this is adequate for clearance.         Fungal dermatitis    Treating with topical antifungal, appreciate Wound Care recs        Age-related physical debility    PT/OT recommending SNF. Family prefers HH.         Dementia    See "delirium"  - Continue home donepezil, home seroquil  - Delirium precautions  - Zoloft stopped by PCP        CAD (coronary artery disease)    Continue ASA 81, Statin          Hypothyroidism    Continue home levothyroxine          VTE Risk Mitigation         Ordered     IP VTE HIGH RISK PATIENT  Once      07/24/18 0034     Place GERARD hose  Until discontinued      07/23/18 2336              Kiran Clement MD   Internal Medicine PGY-2  [phone]    "

## 2018-07-31 NOTE — ANESTHESIA PREPROCEDURE EVALUATION
07/30/2018  Arina Adame is a 89 y.o., female with persistent c. Diff infection despite treatment who is now going for fecal transplant. Pt has PMHx of alzheimer's, CAD, and HTN.    Pre-operative evaluation for COLONOSCOPY (N/A)    LDA:  18G R AC    Past Surgical History:   Procedure Laterality Date    BREAST SURGERY      CATARACT EXTRACTION W/  INTRAOCULAR LENS IMPLANT Bilateral n/a    OU    COLECTOMY      DENTAL SURGERY      right ankle surgery           Vital Signs Range (Last 24H):  Temp:  [36.3 °C (97.4 °F)]   Pulse:  []   Resp:  [11-20]   BP: (120-158)/()   SpO2:  [95 %-100 %]       CBC:     Recent Labs  Lab 07/23/18  1613 07/24/18  0427 07/25/18  0638 07/26/18  0640 07/27/18  0714 07/28/18  0713 07/29/18  0554 07/30/18  0702   WBC 30.31* 33.75* 25.74* 17.21* 10.51 10.43 10.87 12.73*   RBC 3.96* 3.67* 3.71* 4.08 3.83* 3.83* 4.29 4.04   HGB 11.9* 11.3* 11.4* 12.4 12.0 11.4* 13.1 12.3   HCT 38.2 36.4* 36.2* 38.7 36.3* 37.4 41.2 38.2    258 240 277 283 289 296 319   MCV 97 99* 98 95 95 98 96 95   MCH 30.1 30.8 30.7 30.4 31.3* 29.8 30.5 30.4   MCHC 31.2* 31.0* 31.5* 32.0 33.1 30.5* 31.8* 32.2       CMP:   Recent Labs  Lab 07/23/18  1613 07/24/18  0426 07/25/18  0638 07/26/18  0640 07/27/18  0714 07/28/18  0713 07/29/18  0555 07/30/18  0702    140 141 141 141 143 139 135*   K 3.8 3.2* 3.1* 3.5 3.4* 4.3 4.2 3.6    107 113* 108 110 113* 108 104   CO2 24 20* 21* 27 23 24 24 22*   BUN 17 20 16 7* 7* 10 11 16   CREATININE 1.1 0.9 0.8 0.7 0.7 0.8 0.8 0.8   * 94 74 103 103 96 99 114*   MG 1.9 1.7 1.6 2.1 1.9 1.9 1.8 1.7   PHOS 3.0 3.3 1.6* 1.6* 3.2 3.1 3.4 4.1   CALCIUM 8.9 8.4* 8.2* 8.5* 8.4* 8.9 10.0 9.4   ALBUMIN 2.8*  --   --   --   --   --   --   --    PROT 6.4  --   --   --   --   --   --   --    ALKPHOS 104  --   --   --   --   --   --   --    ALT 12  --   --    --   --   --   --   --    AST 18  --   --   --   --   --   --   --    BILITOT 1.1*  --   --   --   --   --   --   --        INR:    Recent Labs  Lab 07/23/18  1613   INR 1.1     EKG:  Sinus tachycardia  Otherwise normal ECG  When compared with ECG of 23-JUL-2018 16:22,  No significant change was found    2D Echo:  CONCLUSIONS     1 - Normal left ventricular systolic function (EF 55-60%).     2 - Mild left atrial enlargement.     3 - No wall motion abnormalities.     4 - Normal left ventricular diastolic function.     5 - Normal right ventricular systolic function .     6 - The estimated PA systolic pressure is greater than 18 mmHg.     7 - Mild tricuspid regurgitation.     Anesthesia Evaluation    I have reviewed the Patient Summary Reports.     I have reviewed the Medications.     Review of Systems  Anesthesia Hx:  No problems with previous Anesthesia    Cardiovascular:   Hypertension CAD      Pulmonary:  Pulmonary Normal    Renal/:  Renal/ Normal     Hepatic/GI:  Hepatic/GI Normal    Neurological:   Denies TIA. Denies CVA.    Endocrine:   Hypothyroidism    Psych:   Psychiatric History          Physical Exam  General:  Well nourished    Airway/Jaw/Neck:  Airway Findings: Mouth Opening: Normal Tongue: Normal     Eyes/Ears/Nose:  EYES/EARS/NOSE FINDINGS: Normal    Chest/Lungs:  Chest/Lungs Findings: Normal Respiratory Rate     Heart/Vascular:  Heart Findings: Rate: Normal  Rhythm: Regular Rhythm        Mental Status:  Mental Status Findings:  Confusion         Anesthesia Plan  Type of Anesthesia, risks & benefits discussed:  Anesthesia Type:  general, MAC  Patient's Preference:   Intra-op Monitoring Plan: standard ASA monitors  Intra-op Monitoring Plan Comments:   Post Op Pain Control Plan:   Post Op Pain Control Plan Comments:   Induction:   IV  Beta Blocker:  Patient is not currently on a Beta-Blocker (No further documentation required).       Informed Consent: Patient representative understands risks and  agrees with Anesthesia plan.  Questions answered. Anesthesia consent signed with patient representative.  ASA Score: 3     Day of Surgery Review of History & Physical:    H&P update referred to the surgeon.         Ready For Surgery From Anesthesia Perspective.

## 2018-07-31 NOTE — INTERVAL H&P NOTE
The patient has been examined and the H&P has been reviewed:    I concur with the findings and no changes have occurred since H&P was written.    Anesthesia/Surgery risks, benefits and alternative options discussed and understood by patient/family.          Active Hospital Problems    Diagnosis  POA    *Clostridium difficile infection [B96.89]  Yes    ILD (interstitial lung disease) [J84.9]  Yes    Delirium [R41.0]  Yes    Fungal dermatitis [B36.9]  Yes    Cavitary lesion of lung [J98.4]  Yes    Alteration in skin integrity due to moisture [R23.9]  Yes    Age-related physical debility [R54]  Yes    Dementia [F03.90]  Yes    CAD (coronary artery disease) [I25.10]  Yes    Hypothyroidism [E03.9]  Yes      Resolved Hospital Problems    Diagnosis Date Resolved POA    Fall [W19.XXXA] 07/26/2018 No    Sepsis [A41.9] 07/28/2018 Yes    Diarrhea of presumed infectious origin [R19.7] 07/25/2018 Yes

## 2018-07-31 NOTE — TREATMENT PLAN
Treatment Plan  07/31/2018   4:46 PM    Colonoscopy with FMT completed today.    Impression:             - Patent end-to-end colo-colonic anastomosis,  characterized by healthy appearing mucosa.  - The examination was otherwise normal on direct and retroflexion views.  - Fecal Microbiota Transplant (Bacteriotherapy) performed in the cecum.  - No specimens collected.    Recommendation:         - Return patient to hospital [facility] for ongoing care.  - Repeat colonoscopy is not recommended for surveillance.  - The findings and recommendations were discussed with the designated responsible adult.    Nicolas Hammond M.D.  Gastroenterology Fellow, PGY-V  Pager: [phone]  [facility]

## 2018-07-31 NOTE — H&P (VIEW-ONLY)
[facility]  Gastroenterology  Consult Note    Patient Name: Arina Adame  MRN: [id number]  Admission Date: 7/23/2018  Hospital Length of Stay: 7 days  Code Status: DNR   Attending Provider: Washington Quintana MD   Consulting Provider: Sofya Vergara MD  Primary Care Physician: Zeinab Meier MD  Principal Problem:Clostridium difficile infection    Inpatient consult to Gastroenterology  Consult performed by: SOFYA VERGARA  Consult ordered by: TED LOZADA        Subjective:     HPI:  This is a 89 y/o lady with hx of CRC s/p LAR with Dr. Pa in 1997, last colonoscopy normal in 2010, recurrent Cdiff colitis, CAD who presents for lethargy and fevers found to have recurrent Cdiff.  We are asked to eval for fecal transplantation.  Hx limited as patient demented but obtained mainly from chart, also from daughter over phone.    This is her 2nd recurrence of Cdiff and currently living in nursing home. Her daughter is struggling to care for her as her  is also very ill with cancer and this has become a very difficult situation.   She recently was discharged from hospital on her last C diff episode on June 26th, and relapsed right after she completed a home course of PO. Her prior and initial episode was on hospitalization here in 5/2018 and she was Rx PO vancomycin at that time as well.    Of note, she has a cavitary lung lesion which has currently been evaluated with negative AFB sputum x 3. Pulmonary has decided against bronchoscopy as she would likely not be a good candidate for any potential long term Abx given her hx of recurrent cdiff.      Past Medical History:   Diagnosis Date    Alzheimer disease     Arthritis     B12 nutritional deficiency 8/6/2012    C. difficile colitis     5/2018    Carotid arterial disease 8/27/2013    Cataract     Colon cancer     Elevated blood pressure (not hypertension) 3/17/2014    Epiretinal membrane     Essential hypertension 3/16/2016     Hearing loss 3/17/2014    History of colon cancer 5/28/2015    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       Past Surgical History:   Procedure Laterality Date    BREAST SURGERY      CATARACT EXTRACTION W/  INTRAOCULAR LENS IMPLANT Bilateral n/a    OU    COLECTOMY      DENTAL SURGERY      right ankle surgery         Review of patient's allergies indicates:   Allergen Reactions    Codeine      Other reaction(s): Unknown    Iodine      Other reaction(s): Unknown    Penicillins      Other reaction(s): Unknown     Family History     Problem Relation (Age of Onset)    Hip fracture Mother    Thyroid disease Sister        Social History Main Topics    Smoking status: Former Smoker     Quit date: 6/24/1970    Smokeless tobacco: Former User    Alcohol use No    Drug use: No    Sexual activity: Not on file     Review of Systems   Unable to perform ROS: Dementia     Objective:     Vital Signs (Most Recent):  Temp: 97.4 °F (36.3 °C) (07/30/18 1100)  Pulse: 79 (07/30/18 1045)  Resp: 19 (07/30/18 1045)  BP: 125/80 (07/30/18 1045)  SpO2: 96 % (07/30/18 1122) Vital Signs (24h Range):  Temp:  [97.4 °F (36.3 °C)-97.8 °F (36.6 °C)] 97.4 °F (36.3 °C)  Pulse:  [] 79  Resp:  [13-20] 19  SpO2:  [95 %-97 %] 96 %  BP: (125-158)/() 125/80     Weight: 48.1 kg (106 lb 0.7 oz) (07/24/18 0200)  Body mass index is 19.4 kg/m².    No intake or output data in the 24 hours ending 07/30/18 1423    Lines/Drains/Airways     Peripheral Intravenous Line                 Peripheral IV - Single Lumen 07/29/18 1800 Right Antecubital less than 1 day                Physical Exam   Constitutional:   Pleasant ; pale but otherwise well appearing   HENT:   Head: Normocephalic and atraumatic.   Eyes: Conjunctivae are normal. No  scleral icterus.   Neck: Neck supple.   Cardiovascular: Normal rate and regular rhythm.    Pulmonary/Chest: Breath sounds normal. No stridor. No respiratory distress.   Abdominal: Soft. She exhibits no distension. There is no tenderness. There is no rebound and no guarding.   Musculoskeletal: She exhibits no tenderness or deformity.   Neurological: She is alert.   Disoriented to time ; oriented to place and person   Skin: Skin is warm and dry. No rash noted.   Psychiatric: She has a normal mood and affect.   Vitals reviewed.      Significant Labs:  Amylase: No results for input(s): AMYLASE in the last 48 hours.  Blood Culture: No results for input(s): LABBLOO in the last 48 hours.  CBC:   Recent Labs  Lab 07/29/18  0554 07/30/18  0702   WBC 10.87 12.73*   HGB 13.1 12.3   HCT 41.2 38.2    319     CMP:   Recent Labs  Lab 07/30/18  0702   *   CALCIUM 9.4   *   K 3.6   CO2 22*      BUN 16   CREATININE 0.8     Coagulation: No results for input(s): PT, INR, APTT in the last 48 hours.  CRP: No results for input(s): CRP in the last 48 hours.  ESR: No results for input(s): SEDRATE in the last 48 hours.  H.Pylori Ab IgG: No results for input(s): HPYLORIIGG in the last 48 hours.  Lipase: No results for input(s): LIPASE in the last 48 hours.  Liver Function Test: No results for input(s): ALT, AST, ALKPHOS, BILITOT, PROT, ALBUMIN in the last 48 hours.  Peritoneal Fluid Cultures: No results for input(s): AEROBICCULTU, LABGRAM in the last 48 hours.  Stool C. diff: No results for input(s): CDIFFICILEAN, CDIFFTOX in the last 48 hours.    Significant Imaging:  Imaging results within the past 24 hours have been reviewed.    Assessment/Plan:     * Clostridium difficile infection    Now with a 2nd recurrence. Hx of LAR surgery due to colorectal cancer.   We feel it is reasonable to pursue fecal transplant.    Recs:  Clear liquids today  NPO past Mn  I will order split dose bowel prep  I have also ordered  pre-fecal transplant screening labs to include: HIV, RPR, hepatitits panel  Check CRP pre transplant and 2 days post transplant  I have stopped the PO vancomycin to allow approx 24 hours washout period.    Discussed over phone with daughter who agrees to this plan of care.            Thank you for your consult. I will follow-up with patient. Please contact us if you have any additional questions.    Martell Vergara MD  Gastroenterology  [facility]

## 2018-07-31 NOTE — PLAN OF CARE
Problem: Patient Care Overview  Goal: Plan of Care Review  Outcome: Ongoing (interventions implemented as appropriate)  Ms Santa remains confused. Bed alarm on and AVASYS monitor also on. Pt reminded to stay in bed and let us change her diaper. Drank about 1/2 of Golytely. Passed 3 large stools. Changed and cleaned each time.Continues to drink the Golytely in preparation for colonoscopy. Will continue to monitor.

## 2018-07-31 NOTE — TRANSFER OF CARE
"Anesthesia Transfer of Care Note    Patient: Arina Adame    Procedure(s) Performed: Procedure(s) (LRB):  COLONOSCOPY (N/A)    Patient location: [facility]    Anesthesia Type: general    Transport from OR: Transported from OR on room air with adequate spontaneous ventilation    Post pain: adequate analgesia    Post assessment: no apparent anesthetic complications and tolerated procedure well    Post vital signs: stable    Level of consciousness: sedated and responds to stimulation    Nausea/Vomiting: no nausea/vomiting    Complications: none    Transfer of care protocol was followed      Last vitals:   Visit Vitals  BP (!) 137/90   Pulse 96   Temp 36.5 °C (97.7 °F)   Resp 18   Ht 5' 2" (1.575 m)   Wt 48.1 kg (106 lb 0.7 oz)   SpO2 99%   Breastfeeding? No   BMI 19.40 kg/m²     "

## 2018-07-31 NOTE — NURSING TRANSFER
Nursing Transfer Note      7/31/2018     Transfer To: 1108    Transfer via stretcher    Transfer with mask on pt. For TB.    Transported by [facility]    Medicines sent: n/a    Chart send with patient: Yes    Notified: no family present.    Patient reassessed at: 7/31/18 1722    Report called to NABIL Venegas on 11th floor.

## 2018-07-31 NOTE — [employer] PATIENT INSTRUCTIONS
Discharge Summary/Instructions after an Endoscopic Procedure  Patient Name: Arina Adame  Patient MRN: [id number]  Patient YOB: 1928  Tuesday, July 31, 2018  Justyn Washington MD  RESTRICTIONS:  During your procedure today, you received medications for sedation.  These   medications may affect your judgment, balance and coordination.  Therefore,   for 24 hours, you have the following restrictions:   - DO NOT drive a car, operate machinery, make legal/financial decisions,   sign important papers or drink alcohol.    ACTIVITY:  Today: no heavy lifting, straining or running due to procedural   sedation/anesthesia.  The following day: return to full activity including work.  DIET:  Eat and drink normally unless instructed otherwise.     TREATMENT FOR COMMON SIDE EFFECTS:  - Mild abdominal pain, nausea, belching, bloating or excessive gas:  rest,   eat lightly and use a heating pad.  - Sore Throat: treat with throat lozenges and/or gargle with warm salt   water.  - Because air was used during the procedure, expelling large amounts of air   from your rectum or belching is normal.  - If a bowel prep was taken, you may not have a bowel movement for 1-3 days.    This is normal.  SYMPTOMS TO WATCH FOR AND REPORT TO YOUR PHYSICIAN:  1. Abdominal pain or bloating, other than gas cramps.  2. Chest pain.  3. Back pain.  4. Signs of infection such as: chills or fever occurring within 24 hours   after the procedure.  5. Rectal bleeding, which would show as bright red, maroon, or black stools.   (A tablespoon of blood from the rectum is not serious, especially if   hemorrhoids are present.)  6. Vomiting.  7. Weakness or dizziness.  GO DIRECTLY TO THE NEAREST EMERGENCY ROOM IF YOU HAVE ANY OF THE FOLLOWING:      Difficulty breathing              Chills and/or fever over 101 F   Persistent vomiting and/or vomiting blood   Severe abdominal pain   Severe chest pain   Black, tarry stools   Bleeding- more than one tablespoon   Any  other symptom or condition that you feel may need urgent attention  Your doctor recommends these additional instructions:  If any biopsies were taken, your doctors clinic will contact you in 1 to 2   weeks with any results.  - Return patient to hospital [facility] for ongoing care.   - Repeat colonoscopy is not recommended for surveillance.   - The findings and recommendations were discussed with the designated   responsible adult.   For questions, problems or results please call your physician - Justyn Washington MD at Work:  [phone].  [facility], EMERGENCY ROOM PHONE NUMBER: [phone]  IF A COMPLICATION OR EMERGENCY SITUATION ARISES AND YOU ARE UNABLE TO REACH   YOUR PHYSICIAN - GO DIRECTLY TO THE EMERGENCY ROOM.  Justyn Washington MD  7/31/2018 4:44:24 PM  This report has been verified and signed electronically.  [employer]

## 2018-08-01 ENCOUNTER — TELEPHONE (OUTPATIENT)
Dept: GASTROENTEROLOGY | Facility: CLINIC | Age: 83
End: 2018-08-01

## 2018-08-01 LAB
ANION GAP SERPL CALC-SCNC: 8 MMOL/L
ANISOCYTOSIS BLD QL SMEAR: SLIGHT
BASOPHILS # BLD AUTO: ABNORMAL K/UL
BASOPHILS NFR BLD: 0 %
BUN SERPL-MCNC: 9 MG/DL
CALCIUM SERPL-MCNC: 9.3 MG/DL
CHLORIDE SERPL-SCNC: 107 MMOL/L
CO2 SERPL-SCNC: 26 MMOL/L
CREAT SERPL-MCNC: 0.7 MG/DL
DIFFERENTIAL METHOD: ABNORMAL
EOSINOPHIL # BLD AUTO: ABNORMAL K/UL
EOSINOPHIL NFR BLD: 4 %
ERYTHROCYTE [DISTWIDTH] IN BLOOD BY AUTOMATED COUNT: 14.6 %
EST. GFR  (AFRICAN AMERICAN): >60 ML/MIN/1.73 M^2
EST. GFR  (NON AFRICAN AMERICAN): >60 ML/MIN/1.73 M^2
GLUCOSE SERPL-MCNC: 137 MG/DL
HCT VFR BLD AUTO: 38.8 %
HGB BLD-MCNC: 12.2 G/DL
HYPOCHROMIA BLD QL SMEAR: ABNORMAL
IMM GRANULOCYTES # BLD AUTO: ABNORMAL K/UL
IMM GRANULOCYTES NFR BLD AUTO: ABNORMAL %
LYMPHOCYTES # BLD AUTO: ABNORMAL K/UL
LYMPHOCYTES NFR BLD: 12 %
MAGNESIUM SERPL-MCNC: 1.9 MG/DL
MCH RBC QN AUTO: 30 PG
MCHC RBC AUTO-ENTMCNC: 31.4 G/DL
MCV RBC AUTO: 95 FL
MONOCYTES # BLD AUTO: ABNORMAL K/UL
MONOCYTES NFR BLD: 2 %
MYELOCYTES NFR BLD MANUAL: 2 %
NEUTROPHILS NFR BLD: 79 %
NEUTS BAND NFR BLD MANUAL: 1 %
NRBC BLD-RTO: 0 /100 WBC
OVALOCYTES BLD QL SMEAR: ABNORMAL
PHOSPHATE SERPL-MCNC: 3.6 MG/DL
PLATELET # BLD AUTO: 345 K/UL
PMV BLD AUTO: 10.3 FL
POIKILOCYTOSIS BLD QL SMEAR: SLIGHT
POLYCHROMASIA BLD QL SMEAR: ABNORMAL
POTASSIUM SERPL-SCNC: 3.3 MMOL/L
RBC # BLD AUTO: 4.07 M/UL
SODIUM SERPL-SCNC: 141 MMOL/L
WBC # BLD AUTO: 10.43 K/UL

## 2018-08-01 PROCEDURE — 25000003 PHARM REV CODE 250: Performed by: STUDENT IN AN ORGANIZED HEALTH CARE EDUCATION/TRAINING PROGRAM

## 2018-08-01 PROCEDURE — 63600175 PHARM REV CODE 636 W HCPCS: Performed by: STUDENT IN AN ORGANIZED HEALTH CARE EDUCATION/TRAINING PROGRAM

## 2018-08-01 PROCEDURE — 85007 BL SMEAR W/DIFF WBC COUNT: CPT

## 2018-08-01 PROCEDURE — 80048 BASIC METABOLIC PNL TOTAL CA: CPT

## 2018-08-01 PROCEDURE — 84100 ASSAY OF PHOSPHORUS: CPT

## 2018-08-01 PROCEDURE — 11000001 HC ACUTE MED/SURG PRIVATE ROOM

## 2018-08-01 PROCEDURE — 97530 THERAPEUTIC ACTIVITIES: CPT

## 2018-08-01 PROCEDURE — 83735 ASSAY OF MAGNESIUM: CPT

## 2018-08-01 PROCEDURE — 36415 COLL VENOUS BLD VENIPUNCTURE: CPT

## 2018-08-01 PROCEDURE — 97535 SELF CARE MNGMENT TRAINING: CPT

## 2018-08-01 PROCEDURE — 99231 SBSQ HOSP IP/OBS SF/LOW 25: CPT | Mod: GC,,, | Performed by: HOSPITALIST

## 2018-08-01 PROCEDURE — 85027 COMPLETE CBC AUTOMATED: CPT

## 2018-08-01 RX ORDER — LEVOTHYROXINE SODIUM 150 UG/1
150 TABLET ORAL
Status: DISCONTINUED | OUTPATIENT
Start: 2018-08-05 | End: 2018-08-02 | Stop reason: HOSPADM

## 2018-08-01 RX ORDER — LEVOTHYROXINE SODIUM 100 UG/1
100 TABLET ORAL
Status: DISCONTINUED | OUTPATIENT
Start: 2018-08-04 | End: 2018-08-02 | Stop reason: HOSPADM

## 2018-08-01 RX ORDER — LEVOTHYROXINE SODIUM 100 UG/1
100 TABLET ORAL
Status: DISCONTINUED | OUTPATIENT
Start: 2018-08-02 | End: 2018-08-02 | Stop reason: HOSPADM

## 2018-08-01 RX ADMIN — ENOXAPARIN SODIUM 40 MG: 100 INJECTION SUBCUTANEOUS at 06:08

## 2018-08-01 RX ADMIN — QUETIAPINE FUMARATE 25 MG: 25 TABLET ORAL at 09:08

## 2018-08-01 RX ADMIN — DONEPEZIL HYDROCHLORIDE 10 MG: 5 TABLET, FILM COATED ORAL at 09:08

## 2018-08-01 RX ADMIN — Medication 1000 UNITS: at 08:08

## 2018-08-01 RX ADMIN — ATORVASTATIN CALCIUM 40 MG: 20 TABLET, FILM COATED ORAL at 08:08

## 2018-08-01 RX ADMIN — LEVOTHYROXINE SODIUM 100 MCG: 100 TABLET ORAL at 06:08

## 2018-08-01 RX ADMIN — ASPIRIN 81 MG: 81 TABLET, COATED ORAL at 08:08

## 2018-08-01 RX ADMIN — MICONAZOLE NITRATE: 20 OINTMENT TOPICAL at 09:08

## 2018-08-01 RX ADMIN — THERA TABS 1 TABLET: TAB at 06:08

## 2018-08-01 RX ADMIN — CYANOCOBALAMIN TAB 250 MCG 500 MCG: 250 TAB at 08:08

## 2018-08-01 NOTE — TREATMENT PLAN
GI Follow-up Note    Pt seen / examined this AM.  No acute events overnight.    Vitals:    08/01/18 1106   BP: 104/71   Pulse: 103   Resp: 17   Temp: 97.6 °F (36.4 °C)       Gen: NAD, pleasant  Abd: soft , NT ND     Chart reviewed.  Wbc downtrending.    Plan:  Monitor stool  AVOID abx in future unless absolutely necessary.    Will sign off at this time.  Please call with any additional concerns /questions    Martell Vergara MD  Gastroenterology Fellow (PGY-VI)  Pager: [phone]

## 2018-08-01 NOTE — PHARMACY MED REC
"Admission Medication Reconciliation - Pharmacy Consult Note    The home medication history was taken by Maria Eugenia Yuen, Pharmacy Technician.  Based on information gathered and subsequent review by the clinical pharmacist, the items below may need attention.     You may go to "Admission" then "Reconcile Home Medications" tabs to review and/or act upon these items.     Potentially problematic discrepancies with current MAR  o Patient IS taking the following which was not ordered upon admit  Medication Sig    CALCIUM CITRATE/VITAMIN D3 (CITRACAL + D MAXIMUM ORAL) Take 1 tablet by mouth once daily.     psyllium (METAMUCIL) powder Take by mouth once daily.    Saccharomyces boulardii (FLORASTOR) 250 mg capsule Take 250 mg by mouth 2 (two) times daily.    sertraline HCl (SERTRALINE ORAL) Take 75 mg by mouth once daily.      Please address this information as you see fit.  Feel free to contact us if you have any questions or require assistance.    Debbie Mclean, PharmD, BCPS, Internal Medicine Clinical Pharmacy Specialist  EXT [id number]                  .    .            "

## 2018-08-01 NOTE — PLAN OF CARE
Problem: Physical Therapy Goal  Goal: Physical Therapy Goal  Goals to be met by: 2018      Patient will increase functional independence with mobility by performin. Supine to sit with MInimal Assistance-not met  2. Sit to supine with MInimal Assistance-not met  3. Sit to stand transfer with Moderate Assistance-not met  4. Bed to chair transfer with Moderate Assistance-not met  5. Gait  x 10 feet with Maximum Assistance. -not met  6. Wheelchair propulsion x100 feet with Minimal Assistance using bilateral uppper extremities-not met  7. Lower extremity exercise program x15 reps per handout, with assistance as needed-not met      Outcome: Ongoing (interventions implemented as appropriate)  Pt goals remain appropriate.     CHELSEY MATOS, PT  2018

## 2018-08-01 NOTE — PLAN OF CARE
Cm spoke to the patient's daughter Obdulia regarding the discharge plan. Cm explained that PT/OT are recommending home NH SNF. Cm was told by Obdulia that NH SNF was not an option for her mother. She insisted that the patient go back home with her and [facility]. Cm explained that the patient is requiring maximal assistance from our therapists. Obdulia stated that her mother was walking with a rolling walker and gait belt prior to hospitalization and [facility] has deconditioned the patient to the point she is now. Cm explained that PT and OT have seen the patient every other day or every two days since the patient has been hospitalized. Obdulia stated that anyone can put anything in the chart. Cm asked if the patient had a hospital bed and BSC. Obdulia stated that they have any equipment that she would need already at home. Cm asked again if she is sure that she doesn't want the patient to go to a NH SNF to ensure that Obdulia understood how much help her mother is requiring. Obdulia again expressed her issue with NH SNF and that her mother is not going to a NH SNF. Cm asked if they wanted to continue to use the [facility] in [place] and Obdulia said yes. The discharge plan is home with [facility] in [place] with no DME needs. Cm will continue to follow.     08/01/18 7244   Discharge Reassessment   Assessment Type Discharge Planning Reassessment   Provided patient/caregiver education on the expected discharge date and the discharge plan Yes   Do you have any problems affording any of your prescribed medications? No   Discharge Plan A Home Health;Home with family   Discharge Plan B Skilled Nursing Facility   Patient choice form signed by patient/caregiver No   Can the patient answer the patient profile reliably? No, cognitively impaired   How does the patient rate their overall health at the present time? (JIE)   Describe the patient's ability to walk at the present time. Major restrictions/daily assistance from another person   How often would a  person be available to care for the patient? Whenever needed   Number of comorbid conditions (as recorded on the chart) Two   During the past month, has the patient often been bothered by feeling down, depressed or hopeless? (JIE)   During the past month, has the patient often been bothered by little interest or pleasure in doing things? (JIE)

## 2018-08-01 NOTE — TELEPHONE ENCOUNTER
----- Message from Nicolas Hammond MD sent at 7/31/2018  4:44 PM CDT -----  Enrico Her    Patient had a fecal transplant today and needs follow in 2 months with Dr. Washington. She can be seen in fellows clinic with me and Dr. Washington.    Nicolas Hammond M.D.  Gastroenterology Fellow, PGY-V  Pager: [phone]  [facility]

## 2018-08-01 NOTE — SUBJECTIVE & OBJECTIVE
Interval History: NAEON. Patient is oriented to self, month, [facility].  who reports 2 BM last night or this morning. Denies abdominal pain. Awaiting result of 3rd AFB  to rule out pulm TB. Will reach out to patient's daughter regarding possible SNF vs. Home health.     Review of Systems   Constitutional: Positive for activity change and fatigue. Negative for fever.   Respiratory: Negative for apnea, cough, chest tightness and shortness of breath.    Cardiovascular: Negative for chest pain and leg swelling.   Gastrointestinal: Negative for abdominal distention, abdominal pain, diarrhea, nausea and vomiting.   Musculoskeletal: Negative for arthralgias.   Skin: Negative for color change and pallor.   Neurological: Negative for dizziness and headaches.   Psychiatric/Behavioral: Positive for confusion. Negative for agitation.     Objective:     Vital Signs (Most Recent):  Temp: 97.3 °F (36.3 °C) (08/01/18 1443)  Pulse: 103 (08/01/18 1106)  Resp: 17 (08/01/18 1106)  BP: 104/71 (08/01/18 1106)  SpO2: 99 % (08/01/18 1122) Vital Signs (24h Range):  Temp:  [97.3 °F (36.3 °C)-98.1 °F (36.7 °C)] 97.3 °F (36.3 °C)  Pulse:  [] 103  Resp:  [13-18] 17  SpO2:  [94 %-99 %] 99 %  BP: (104-162)/(71-90) 104/71     Weight: 48.1 kg (106 lb 0.7 oz)  Body mass index is 19.4 kg/m².    Intake/Output Summary (Last 24 hours) at 08/01/18 1637  Last data filed at 07/31/18 1650   Gross per 24 hour   Intake              250 ml   Output                0 ml   Net              250 ml      Physical Exam   Constitutional: No distress.   HENT:   Head: Normocephalic and atraumatic.   Mouth/Throat: No oropharyngeal exudate.   Eyes: Conjunctivae are normal. No scleral icterus.   Neck: Normal range of motion. Neck supple.   Prominence of right sterno-clavicular junction   Cardiovascular: Normal rate, regular rhythm and normal heart sounds.    Intermittent tachycardia today, not present on exam this morning.    Pulmonary/Chest: Effort normal. No  respiratory distress. She has no wheezes. She has no rales.   Abdominal: Soft. Bowel sounds are normal. She exhibits no distension. There is no tenderness.   Musculoskeletal: She exhibits no edema or tenderness.   Neurological: She is alert. She has normal strength. GCS eye subscore is 4. GCS verbal subscore is 5. GCS motor subscore is 6.   Skin: Skin is warm and dry. She is not diaphoretic.   Psychiatric:   Patient orientated to person, month, and [facility].   Nursing note and vitals reviewed.      Significant Labs:   BMP:     Recent Labs  Lab 08/01/18  0643   *      K 3.3*      CO2 26   BUN 9   CREATININE 0.7   CALCIUM 9.3   MG 1.9     CBC:     Recent Labs  Lab 07/31/18  0655 08/01/18  0643   WBC 13.25* 10.43   HGB 13.4 12.2   HCT 41.3 38.8    345       Interval History: NAEON. Patient is oriented to self, month, [facility]. Spoke with RN who reports no BM last night or this morning. Patient reports improved appetite. Denies abdominal pain. Awaiting result of 3rd AFB  to rule out pulm TB. Will reach out to patient's daughter regarding possible SNF vs. Home health.     Review of Systems   Constitutional: Negative for activity change, fatigue and fever.   Respiratory: Negative for apnea, cough, chest tightness and shortness of breath.    Cardiovascular: Negative for chest pain and leg swelling.   Gastrointestinal: Negative for abdominal distention, abdominal pain, diarrhea, nausea and vomiting.   Musculoskeletal: Negative for arthralgias.   Skin: Negative for color change and pallor.   Neurological: Negative for dizziness and headaches.   Psychiatric/Behavioral: Negative for agitation and confusion.     Objective:     Vital Signs (Most Recent):  Temp: 97.3 °F (36.3 °C) (08/01/18 1443)  Pulse: 103 (08/01/18 1106)  Resp: 17 (08/01/18 1106)  BP: 104/71 (08/01/18 1106)  SpO2: 99 % (08/01/18 1122) Vital Signs (24h Range):  Temp:  [97.3 °F (36.3 °C)-98.1 °F (36.7 °C)] 97.3 °F (36.3 °C)  Pulse:   [] 103  Resp:  [13-18] 17  SpO2:  [94 %-99 %] 99 %  BP: (104-162)/(71-90) 104/71     Weight: 48.1 kg (106 lb 0.7 oz)  Body mass index is 19.4 kg/m².    Intake/Output Summary (Last 24 hours) at 08/01/18 1637  Last data filed at 07/31/18 1650   Gross per 24 hour   Intake              250 ml   Output                0 ml   Net              250 ml      Physical Exam   Constitutional: No distress.   HENT:   Head: Normocephalic and atraumatic.   Mouth/Throat: No oropharyngeal exudate.   Eyes: Conjunctivae are normal. No scleral icterus.   Neck: Normal range of motion. Neck supple.   Prominence of right sterno-clavicular junction   Cardiovascular: Normal rate, regular rhythm and normal heart sounds.    Pulmonary/Chest: Effort normal. No respiratory distress. She has no wheezes. She has no rales.   Abdominal: Soft. Bowel sounds are normal. She exhibits no distension. There is no tenderness.   Musculoskeletal: She exhibits no edema or tenderness.   Neurological: She is alert. She has normal strength. GCS eye subscore is 4. GCS verbal subscore is 5. GCS motor subscore is 6.   Skin: Skin is warm and dry. She is not diaphoretic.   Psychiatric:   Patient orientated to person, month, and [facility].   Nursing note and vitals reviewed.      Significant Labs:   BMP:     Recent Labs  Lab 08/01/18  0643   *      K 3.3*      CO2 26   BUN 9   CREATININE 0.7   CALCIUM 9.3   MG 1.9     CBC:     Recent Labs  Lab 07/31/18  0655 08/01/18  0643   WBC 13.25* 10.43   HGB 13.4 12.2   HCT 41.3 38.8    345

## 2018-08-01 NOTE — PLAN OF CARE
Problem: Patient Care Overview  Goal: Plan of Care Review  Outcome: Ongoing (interventions implemented as appropriate)  Ms. Santa slept for the majority of the night. Bed alarm on and pt remained in bed all night. Diaper changed X2 and perineal area healing well. Remains reddened but the ointment is helping to heal up her vaginal area. Will continue to monitor.

## 2018-08-01 NOTE — PT/OT/SLP PROGRESS
Occupational Therapy   Treatment    Name: Arina Adame  MRN: [id number]  Admitting Diagnosis:  Clostridium difficile infection  1 Day Post-Op    Recommendations:     Discharge Recommendations: nursing facility, skilled  Discharge Equipment Recommendations:  bedside commode  Barriers to discharge:  Decreased caregiver support    Subjective     Communicated with: RN prior to session.  Pain/Comfort:  · Pain Rating 1: 0/10  · Pain Rating Post-Intervention 1: 0/10    Patients cultural, spiritual, Mormon conflicts given the current situation: none stated    Objective:     Patient found with:  (lines intact)    General Precautions: Standard, contact, fall, airborne   Orthopedic Precautions:N/A   Braces: N/A     Occupational Performance:    Bed Mobility:    · Patient completed Supine to Sit with moderate assistance and mostly tactile cues  · Patient completed Sit to Supine with SBA and increased time     Functional Mobility/Transfers:  · Patient completed Sit <> Stand Transfer with moderate assistance  with  no assistive device   · Functional Mobility: did not occur; posterior lean and max A to reach true full extension in stance; attempted to side step, but leaned further back, making walking unsafe    Activities of Daily Living:  · Feeding:  Setup A  · Grooming: Setup with washcloth EOB, washed face  · Lower Body Dressing: while supine, able to reach feet and pull socks up (had [facility] doffed while sleeping)      Patient left HOB elevated with all lines intact and call button in reach    AMPA 6 Click:  AMPA Total Score: 16    Treatment & Education:  Pt ed re OT role and POC. Pt performed EOB sitting with sit to stand t/f training, and Mod A. Pt able to stand with Max A for 30 seconds x4 trials. Pt required max encouragement to agree to grooming task, and initially declined the task; but with Setup A, pt was excited to wash her face, with SBA seated EOB.  Pt ed extensively re importance of therapy and OOB  activity.  Education:    Assessment:     Arina Adame is a 89 y.o. female with a medical diagnosis of Clostridium difficile infection.  She presents with the following performance deficits affecting function: weakness, impaired endurance, impaired self care skills, impaired functional mobilty.  Pt participates with encouragement and cues, and requiring setup A for self care tasks. Pt stood with Mod A and posterior lean. Pt is progressing with therapy and would benefit from cont OT to further improve self care performance and safety with mobility.    Rehab Prognosis:  Good; patient would benefit from acute skilled OT services to address these deficits and reach maximum level of function.       Plan:     Patient to be seen 3 x/week to address the above listed problems via self-care/home management, therapeutic activities, therapeutic exercises  · Plan of Care Expires: 08/24/18  · Plan of Care Reviewed with: patient    This Plan of care has been discussed with the patient who was involved in its development and understands and is in agreement with the identified goals and treatment plan    GOALS:    Occupational Therapy Goals        Problem: Occupational Therapy Goal    Goal Priority Disciplines Outcome Interventions   Occupational Therapy Goal     OT, PT/OT Ongoing (interventions implemented as appropriate)    Description:  Goals to be met by: 8/15  Patient will increase functional independence with ADLs by performing:    Feeding with Set-up Assistance.    MET 8/1/2018  UE Dressing with Contact Guard Assistance.  LE Dressing with Moderate Assistance. -- Met (socks) 7/30  Grooming while standing with Contact Guard Assistance.       Toileting from toilet with Minimal Assistance for hygiene and clothing management.                         Time Tracking:     OT Date of Treatment: 08/01/18  OT Start Time: 0900  OT Stop Time: 0938  OT Total Time (min): 38 min    Billable Minutes:Self Care/Home Management 15  minutes  Therapeutic Activity 23 minutes    HERVE Sanchez  8/1/2018  Pager: [phone]

## 2018-08-01 NOTE — ANESTHESIA POSTPROCEDURE EVALUATION
"Anesthesia Post Evaluation    Patient: Arina Adame    Procedure(s) Performed: Procedure(s) (LRB):  COLONOSCOPY (N/A)    Final Anesthesia Type: general  Patient location during evaluation: PACU  Patient participation: Yes- Able to Participate  Level of consciousness: awake and alert  Post-procedure vital signs: reviewed and stable  Pain management: adequate  Airway patency: patent  PONV status at discharge: No PONV  Anesthetic complications: no      Cardiovascular status: blood pressure returned to baseline  Respiratory status: unassisted  Hydration status: euvolemic  Follow-up not needed.        Visit Vitals  /78 (Patient Position: Lying)   Pulse 93   Temp 36.7 °C (98 °F) (Oral)   Resp 14   Ht 5' 2" (1.575 m)   Wt 48.1 kg (106 lb 0.7 oz)   SpO2 96%   Breastfeeding? No   BMI 19.40 kg/m²       Pain/Alis Score: Pain Assessment Performed: Yes (7/31/2018  8:00 PM)  Presence of Pain: denies (7/31/2018  8:00 PM)  Alis Score: 10 (7/31/2018  5:22 PM)      "

## 2018-08-01 NOTE — PLAN OF CARE
Problem: Occupational Therapy Goal  Goal: Occupational Therapy Goal  Goals to be met by: 8/15  Patient will increase functional independence with ADLs by performing:    Feeding with Set-up Assistance.    MET 8/1/2018  UE Dressing with Contact Guard Assistance.  LE Dressing with Moderate Assistance. -- Met (socks) 7/30  Grooming while standing with Contact Guard Assistance.       Toileting from toilet with Minimal Assistance for hygiene and clothing management.       Outcome: Ongoing (interventions implemented as appropriate)  Goals remain appropriate. Cont POC.    HERVE Sanchez  8/1/2018  Pager: [phone]

## 2018-08-01 NOTE — PROGRESS NOTES
[facility] Hospital Medicine  Progress Note    Patient Name: Arina Adame  MRN: [id number]  Patient Class: IP- Inpatient   Admission Date: 7/23/2018  Length of Stay: 9 days  Attending Physician: Washington Quintana MD  Primary Care Provider: Zeinab Meier MD    [facility] Medicine Team: [facility] 4 Ingrid Vallejo MD    Subjective:     Principal Problem:Clostridium difficile infection    HPI:  Arina Adame is a 89 y.o. female has a past medical history of Alzheimer disease; C. difficile colitis; Carotid arterial disease; Cataract; Essential hypertension; Colon cancer; Hypothyroidism; impaired glucose tolerance; Osteoporosis, brought to the ED by her daughter today complaining of fever, diarrhea and vomiting.  At ED, Arina Adame was also hypotensive, tachycardic, and lethargic, CBC, CMP, troponin, BNP, urinalysis, blood/urinary culture, CT chest and Chest X-ray was ordered, concerning recurrently UTI, diff infection, and pneumonia.      Arina Adame has limited ability to give a full medical history.  Collateral information from her daughter was obtained. She was discharged from hospital of her last episode on June 26th, and relapsed right after she complete a home course of PO vancomycin for C.diff last Thursday. She was brought to the ED by EMS for fever. Daughter reports that she has been more confused at night for the past 2 nights. Today, she was much more fatigued than normal and would not get out of bed. This afternoon, daughter noticed she had a fever of >101 and called EMS. EMS noted pt to be febrile, tachycardic and hypotensive and administered 500cc of NS. On exam pt is rousable but disoriented. Daughter notes that she has a bed sore near her gluteal fold that has not healed.   Daughter noticed Arina Adame's change of bowel movement last Thursday when she changed her diaper.  The diarrhea has been watery, gold in color, mucousy with C. diff smell.  She became  increasingly lethargic.  This morning, Arina Adame was unable to get up from bed, did not take any medication or food, vomited once after taking some water.  She slept all day.  When daughter saw her in the late afternoon, Arina Adame was shivering, had a temperature of 101.9, with some rattle breathing sound.  She took her to the ED. She s/p 2 episodes of C-diff infections with UTI in the past.  Each episode lasted for about 7-9 days.          Hospital Course:  Patient transferred to hospital floor for further evaluation and management. ID consulted. Recommended to treat C. Diff infection with PO Vancomycin for at least 21 days. Cavitary lesion of lung was concerning for possible TB. Obtained labs to rule out.     Interval History: NAEON. Patient is oriented to self, month, [facility].  who reports 2 BM last night or this morning. Denies abdominal pain. Awaiting result of 3rd AFB  to rule out pulm TB. Will reach out to patient's daughter regarding possible SNF vs. Home health.     Review of Systems   Constitutional: Positive for activity change and fatigue. Negative for fever.   Respiratory: Negative for apnea, cough, chest tightness and shortness of breath.    Cardiovascular: Negative for chest pain and leg swelling.   Gastrointestinal: Negative for abdominal distention, abdominal pain, diarrhea, nausea and vomiting.   Musculoskeletal: Negative for arthralgias.   Skin: Negative for color change and pallor.   Neurological: Negative for dizziness and headaches.   Psychiatric/Behavioral: Positive for confusion. Negative for agitation.     Objective:     Vital Signs (Most Recent):  Temp: 97.3 °F (36.3 °C) (08/01/18 1443)  Pulse: 103 (08/01/18 1106)  Resp: 17 (08/01/18 1106)  BP: 104/71 (08/01/18 1106)  SpO2: 99 % (08/01/18 1122) Vital Signs (24h Range):  Temp:  [97.3 °F (36.3 °C)-98.1 °F (36.7 °C)] 97.3 °F (36.3 °C)  Pulse:  [] 103  Resp:  [13-18] 17  SpO2:  [94 %-99 %] 99 %  BP: (104-162)/(71-90)  104/71     Weight: 48.1 kg (106 lb 0.7 oz)  Body mass index is 19.4 kg/m².    Intake/Output Summary (Last 24 hours) at 08/01/18 1637  Last data filed at 07/31/18 1650   Gross per 24 hour   Intake              250 ml   Output                0 ml   Net              250 ml      Physical Exam   Constitutional: No distress.   HENT:   Head: Normocephalic and atraumatic.   Mouth/Throat: No oropharyngeal exudate.   Eyes: Conjunctivae are normal. No scleral icterus.   Neck: Normal range of motion. Neck supple.   Prominence of right sterno-clavicular junction   Cardiovascular: Normal rate, regular rhythm and normal heart sounds.    Intermittent tachycardia today, not present on exam this morning.    Pulmonary/Chest: Effort normal. No respiratory distress. She has no wheezes. She has no rales.   Abdominal: Soft. Bowel sounds are normal. She exhibits no distension. There is no tenderness.   Musculoskeletal: She exhibits no edema or tenderness.   Neurological: She is alert. She has normal strength. GCS eye subscore is 4. GCS verbal subscore is 5. GCS motor subscore is 6.   Skin: Skin is warm and dry. She is not diaphoretic.   Psychiatric:   Patient orientated to person, month, and [facility].   Nursing note and vitals reviewed.      Significant Labs:   BMP:     Recent Labs  Lab 08/01/18  0643   *      K 3.3*      CO2 26   BUN 9   CREATININE 0.7   CALCIUM 9.3   MG 1.9     CBC:     Recent Labs  Lab 07/31/18  0655 08/01/18  0643   WBC 13.25* 10.43   HGB 13.4 12.2   HCT 41.3 38.8    345       Interval History: NAEON. Patient is oriented to self, month, [facility]. States doing well with no current complaints. Denies fever, chills, abdominal pain, nausea, and vomiting.      Review of Systems   Constitutional: Negative for activity change, fatigue and fever.   Respiratory: Negative for apnea, cough, chest tightness and shortness of breath.    Cardiovascular: Negative for chest pain and leg swelling.    Gastrointestinal: Negative for abdominal distention, abdominal pain, diarrhea, nausea and vomiting.   Musculoskeletal: Negative for arthralgias.   Skin: Negative for color change and pallor.   Neurological: Negative for dizziness and headaches.   Psychiatric/Behavioral: Negative for agitation and confusion.     Objective:     Vital Signs (Most Recent):  Temp: 97.3 °F (36.3 °C) (08/01/18 1443)  Pulse: 103 (08/01/18 1106)  Resp: 17 (08/01/18 1106)  BP: 104/71 (08/01/18 1106)  SpO2: 99 % (08/01/18 1122) Vital Signs (24h Range):  Temp:  [97.3 °F (36.3 °C)-98.1 °F (36.7 °C)] 97.3 °F (36.3 °C)  Pulse:  [] 103  Resp:  [13-18] 17  SpO2:  [94 %-99 %] 99 %  BP: (104-162)/(71-90) 104/71     Weight: 48.1 kg (106 lb 0.7 oz)  Body mass index is 19.4 kg/m².    Intake/Output Summary (Last 24 hours) at 08/01/18 1637  Last data filed at 07/31/18 1650   Gross per 24 hour   Intake              250 ml   Output                0 ml   Net              250 ml      Physical Exam   Constitutional: No distress.   HENT:   Head: Normocephalic and atraumatic.   Mouth/Throat: No oropharyngeal exudate.   Eyes: Conjunctivae are normal. No scleral icterus.   Neck: Normal range of motion. Neck supple.   Prominence of right sterno-clavicular junction   Cardiovascular: Normal rate, regular rhythm and normal heart sounds.    Pulmonary/Chest: Effort normal. No respiratory distress. She has no wheezes. She has no rales.   Abdominal: Soft. Bowel sounds are normal. She exhibits no distension. There is no tenderness.   Musculoskeletal: She exhibits no edema or tenderness.   Neurological: She is alert. She has normal strength. GCS eye subscore is 4. GCS verbal subscore is 5. GCS motor subscore is 6.   Skin: Skin is warm and dry. She is not diaphoretic.   Psychiatric:   Patient orientated to person, month, and [facility].   Nursing note and vitals reviewed.      Significant Labs:   BMP:     Recent Labs  Lab 08/01/18  0643   *      K 3.3*   CL  "107   CO2 26   BUN 9   CREATININE 0.7   CALCIUM 9.3   MG 1.9     CBC:     Recent Labs  Lab 07/31/18  0655 08/01/18  0643   WBC 13.25* 10.43   HGB 13.4 12.2   HCT 41.3 38.8    345           Assessment/Plan:      * Clostridium difficile infection    Patient with 2nd recurrence of C.diff.   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   Delirium precautions, telesitter.   Treating infection          Cavitary lesion of lung    TB negative due to T-spot test negative, negative quant test, and negative sputum sample for AFB smear x 3.   -Precautions discontinued        Fungal dermatitis    Treating with topical antifungal, appreciate Wound Care recs        Age-related physical debility    PT/OT recommending SNF. Family prefers HH.         Dementia    See "delirium"  - Continue home donepezil, home seroquil  - Delirium precautions  - Zoloft stopped by PCP        CAD (coronary artery disease)    Continue ASA 81, Statin          Hypothyroidism    Continue home levothyroxine          VTE Risk Mitigation         Ordered     enoxaparin injection 40 mg  Daily      07/31/18 1707     IP VTE HIGH RISK PATIENT  Once      07/24/18 0034     Place GERARD hose  Until discontinued      07/23/18 7212              Ingrid Vallejo MD  Department of Hospital Medicine   [facility]  "

## 2018-08-01 NOTE — PT/OT/SLP PROGRESS
Physical Therapy Treatment    Patient Name:  Arina Adame   MRN:  [id number]    Recommendations:     Discharge Recommendations:  nursing facility, skilled (in NH)   Discharge Equipment Recommendations: bedside commode, hospital bed   Barriers to discharge: Decreased caregiver support    Assessment:     Arina Adame is a 89 y.o. female admitted with a medical diagnosis of Clostridium difficile infection.  She presents with the following impairments/functional limitations:  weakness, impaired functional mobilty, impaired balance, impaired cognition, decreased coordination, gait instability, impaired endurance, impaired self care skills, decreased lower extremity function, decreased upper extremity function, decreased safety awareness. Pt performed bed mobility and transfers max A. Pt sat EOB for ~15min with SBA/CGA, verbal and tactile cues for upright posture. Pt will continue to benefit from skilled PT to improve deficits and increase overall functional mobility.     Rehab Prognosis:  Fair; patient would benefit from acute skilled PT services to address these deficits and reach maximum level of function.      Recent Surgery: Procedure(s) (LRB):  COLONOSCOPY (N/A) 1 Day Post-Op    Plan:     During this hospitalization, patient to be seen 3 x/week to address the above listed problems via gait training, therapeutic activities, therapeutic exercises, neuromuscular re-education, wheelchair management/training  · Plan of Care Expires:  08/26/18   Plan of Care Reviewed with: patient    Subjective     Communicated with RN prior to session.  Patient found supine in bed upon PT entry to room, agreeable to treatment.      Chief Complaint: NA  Pain/Comfort:  · Pain Rating 1: 0/10  · Pain Rating Post-Intervention 1: 0/10    Patients cultural, spiritual, Muslim conflicts given the current situation: none noted    Objective:     Patient found with: telemetry, pulse ox (continuous)     General Precautions: Standard, fall,  special contact   Orthopedic Precautions:N/A   Braces: N/A     Functional Mobility:  Bed Mobility:     · Supine to Sit: maximal assistance  · Sit to Supine: maximal assistance    Transfers:     · Sit to Stand:  maximal assistance with no AD; from EOB x2 trials  · Seated scooting EOB: max A towards R x3 trials       AM-PAC 6 CLICK MOBILITY  Turning over in bed (including adjusting bedclothes, sheets and blankets)?: 2  Sitting down on and standing up from a chair with arms (e.g., wheelchair, bedside commode, etc.): 2  Moving from lying on back to sitting on the side of the bed?: 2  Moving to and from a bed to a chair (including a wheelchair)?: 2  Need to walk in hospital room?: 1  Climbing 3-5 steps with a railing?: 1  Basic Mobility Total Score: 10       Therapeutic Activities and Exercises:  Pt sat EOB for ~15min with SBA/CGA, verbal and tactile cues for upright posture.   Pt educated on safety with mobility.     Patient left HOB elevated with all lines intact, call button in reach and RN notified..    GOALS:    Physical Therapy Goals        Problem: Physical Therapy Goal    Goal Priority Disciplines Outcome Goal Variances Interventions   Physical Therapy Goal     PT/OT, PT Ongoing (interventions implemented as appropriate)     Description:  Goals to be met by: 2018      Patient will increase functional independence with mobility by performin. Supine to sit with MInimal Assistance-not met  2. Sit to supine with MInimal Assistance-not met  3. Sit to stand transfer with Moderate Assistance-not met  4. Bed to chair transfer with Moderate Assistance-not met  5. Gait  x 10 feet with Maximum Assistance. -not met  6. Wheelchair propulsion x100 feet with Minimal Assistance using bilateral uppper extremities-not met  7. Lower extremity exercise program x15 reps per handout, with assistance as needed-not met                       Time Tracking:     PT Received On: 18  PT Start Time: 1049     PT Stop Time:  1113  PT Total Time (min): 24 min     Billable Minutes: Therapeutic Activity 24    Treatment Type: Treatment  PT/PTA: PT     PTA Visit Number: 0     CHELSEY MATOS, PT  08/01/2018

## 2018-08-02 ENCOUNTER — OUTPATIENT CASE MANAGEMENT (OUTPATIENT)
Dept: ADMINISTRATIVE | Facility: OTHER | Age: 83
End: 2018-08-02

## 2018-08-02 VITALS
BODY MASS INDEX: 19.52 KG/M2 | HEIGHT: 62 IN | DIASTOLIC BLOOD PRESSURE: 100 MMHG | WEIGHT: 106.06 LBS | SYSTOLIC BLOOD PRESSURE: 158 MMHG | RESPIRATION RATE: 16 BRPM | HEART RATE: 107 BPM | TEMPERATURE: 98 F | OXYGEN SATURATION: 95 %

## 2018-08-02 LAB
ANION GAP SERPL CALC-SCNC: 7 MMOL/L
BASOPHILS # BLD AUTO: 0.06 K/UL
BASOPHILS NFR BLD: 0.5 %
BUN SERPL-MCNC: 19 MG/DL
CALCIUM SERPL-MCNC: 9.5 MG/DL
CHLORIDE SERPL-SCNC: 108 MMOL/L
CO2 SERPL-SCNC: 26 MMOL/L
CREAT SERPL-MCNC: 0.7 MG/DL
CRP SERPL-MCNC: 5.45 MG/L
DIFFERENTIAL METHOD: ABNORMAL
EOSINOPHIL # BLD AUTO: 0.3 K/UL
EOSINOPHIL NFR BLD: 2.3 %
ERYTHROCYTE [DISTWIDTH] IN BLOOD BY AUTOMATED COUNT: 15 %
EST. GFR  (AFRICAN AMERICAN): >60 ML/MIN/1.73 M^2
EST. GFR  (NON AFRICAN AMERICAN): >60 ML/MIN/1.73 M^2
GLUCOSE SERPL-MCNC: 103 MG/DL
HCT VFR BLD AUTO: 36.8 %
HGB BLD-MCNC: 11.8 G/DL
IMM GRANULOCYTES # BLD AUTO: 0.28 K/UL
IMM GRANULOCYTES NFR BLD AUTO: 2.1 %
LYMPHOCYTES # BLD AUTO: 2 K/UL
LYMPHOCYTES NFR BLD: 15.1 %
MAGNESIUM SERPL-MCNC: 1.8 MG/DL
MCH RBC QN AUTO: 30.5 PG
MCHC RBC AUTO-ENTMCNC: 32.1 G/DL
MCV RBC AUTO: 95 FL
MONOCYTES # BLD AUTO: 0.8 K/UL
MONOCYTES NFR BLD: 6.2 %
NEUTROPHILS # BLD AUTO: 9.8 K/UL
NEUTROPHILS NFR BLD: 73.8 %
NRBC BLD-RTO: 0 /100 WBC
PHOSPHATE SERPL-MCNC: 3.4 MG/DL
PLATELET # BLD AUTO: 315 K/UL
PMV BLD AUTO: 10.4 FL
POTASSIUM SERPL-SCNC: 3.5 MMOL/L
RBC # BLD AUTO: 3.87 M/UL
SODIUM SERPL-SCNC: 141 MMOL/L
WBC # BLD AUTO: 13.23 K/UL

## 2018-08-02 PROCEDURE — 80048 BASIC METABOLIC PNL TOTAL CA: CPT

## 2018-08-02 PROCEDURE — 86141 C-REACTIVE PROTEIN HS: CPT

## 2018-08-02 PROCEDURE — 83735 ASSAY OF MAGNESIUM: CPT

## 2018-08-02 PROCEDURE — 25000003 PHARM REV CODE 250: Performed by: STUDENT IN AN ORGANIZED HEALTH CARE EDUCATION/TRAINING PROGRAM

## 2018-08-02 PROCEDURE — 84100 ASSAY OF PHOSPHORUS: CPT

## 2018-08-02 PROCEDURE — 99239 HOSP IP/OBS DSCHRG MGMT >30: CPT | Mod: GC,,, | Performed by: HOSPITALIST

## 2018-08-02 PROCEDURE — 85025 COMPLETE CBC W/AUTO DIFF WBC: CPT

## 2018-08-02 PROCEDURE — 36415 COLL VENOUS BLD VENIPUNCTURE: CPT

## 2018-08-02 RX ADMIN — MICONAZOLE NITRATE: 20 OINTMENT TOPICAL at 09:08

## 2018-08-02 RX ADMIN — CYANOCOBALAMIN TAB 250 MCG 500 MCG: 250 TAB at 09:08

## 2018-08-02 RX ADMIN — THERA TABS 1 TABLET: TAB at 09:08

## 2018-08-02 RX ADMIN — Medication 1000 UNITS: at 09:08

## 2018-08-02 RX ADMIN — ASPIRIN 81 MG: 81 TABLET, COATED ORAL at 09:08

## 2018-08-02 RX ADMIN — ATORVASTATIN CALCIUM 40 MG: 20 TABLET, FILM COATED ORAL at 09:08

## 2018-08-02 NOTE — PLAN OF CARE
[facility]    HOME HEALTH ORDERS  FACE TO FACE ENCOUNTER    Patient Name: Arina Adame  YOB: 1928    PCP: Zeinab Meier MD   PCP Address: [address] / [place] [address]  PCP Phone Number: [phone]  PCP Fax: [phone]    Encounter Date: 08/02/2018    Admit to Home Health    Diagnoses:  Active Hospital Problems    Diagnosis  POA    *Clostridium difficile infection [B96.89]  Yes    Sepsis [A41.9]  Yes    ILD (interstitial lung disease) [J84.9]  Yes    Delirium [R41.0]  Yes    Fungal dermatitis [B36.9]  Yes    Cavitary lesion of lung [J98.4]  Yes    Alteration in skin integrity due to moisture [R23.9]  Yes    Age-related physical debility [R54]  Yes    Dementia [F03.90]  Yes    CAD (coronary artery disease) [I25.10]  Yes    Hypothyroidism [E03.9]  Yes      Resolved Hospital Problems    Diagnosis Date Resolved POA    Fall [W19.XXXA] 07/26/2018 No    Sepsis [A41.9] 07/28/2018 Yes    Diarrhea of presumed infectious origin [R19.7] 07/25/2018 Yes       Future Appointments  Date Time Provider Department Center   8/15/2018 1:30 PM KEE Swain [facility] ID [address]   10/1/2018 2:00 PM Nicolas Hammond MD [facility] GA[facility] [address]           I have seen and examined this patient face to face today. My clinical findings that support the need for the home health skilled services and home bound status are the following:  Weakness/numbness causing balance and gait disturbance due to Weakness/Debility making it taxing to leave home.    Allergies:  Review of patient's allergies indicates:   Allergen Reactions    Codeine      Other reaction(s): Unknown    Iodine      Other reaction(s): Unknown    Penicillins      Other reaction(s): Unknown       Diet: regular diet    Activities: activity as tolerated    Nursing:   SN to complete comprehensive assessment including routine vital signs. Instruct on disease process and s/s of complications to report to MD. Review/verify  medication list sent home with the patient at time of discharge  and instruct patient/caregiver as needed. Frequency may be adjusted depending on start of care date.    Notify MD if SBP > 160 or < 90; DBP > 90 or < 50; HR > 120 or < 50; Temp > 101;      CONSULTS:    Physical Therapy to evaluate and treat. Evaluate for home safety and equipment needs; Establish/upgrade home exercise program. Perform / instruct on therapeutic exercises, gait training, transfer training, and Range of Motion.  Occupational Therapy to evaluate and treat. Evaluate home environment for safety and equipment needs. Perform/Instruct on transfers, ADL training, ROM, and therapeutic exercises.    MISCELLANEOUS CARE:  N/A    WOUND CARE ORDERS  n/a      Medications: Review discharge medications with patient and family and provide education.      Current Discharge Medication List      CONTINUE these medications which have NOT CHANGED    Details   acetaminophen (TYLENOL) 325 MG tablet Take 325 mg by mouth daily as needed for Pain.      aspirin (ECOTRIN) 81 MG EC tablet Take 1 tablet (81 mg total) by mouth once daily.  Refills: 0      atorvastatin (LIPITOR) 40 MG tablet TAKE 1 TABLET (40 MG TOTAL) BY MOUTH ONCE DAILY.  Qty: 90 tablet, Refills: 3    Associated Diagnoses: Mixed hyperlipidemia      CALCIUM CITRATE/VITAMIN D3 (CITRACAL + D MAXIMUM ORAL) Take 1 tablet by mouth once daily.       cholecalciferol, vitamin D3, (VITAMIN D3) 1,000 unit capsule Take 1,000 Units by mouth once daily.        cyanocobalamin (VITAMIN B-12) 500 MCG tablet Take 500 mcg by mouth once daily.       donepezil (ARICEPT) 10 MG tablet Take 1 tablet (10 mg total) by mouth every evening.  Qty: 90 tablet, Refills: 3    Associated Diagnoses: Late onset Alzheimer's disease without behavioral disturbance      levothyroxine (SYNTHROID) 100 MCG tablet 1 Tablet Oral every morning except 1.5 pills on Sundays.  Qty: 90 tablet, Refills: 3      multivitamin (ONE DAILY MULTIVITAMIN) per  tablet Take 1 tablet by mouth once daily.      psyllium (METAMUCIL) powder Take by mouth once daily.      QUEtiapine (SEROQUEL) 25 MG Tab TAKE 1 TABLET (25 MG TOTAL) BY MOUTH NIGHTLY AS NEEDED.  Qty: 90 tablet, Refills: 3    Associated Diagnoses: Late onset Alzheimer's disease without behavioral disturbance; Insomnia, unspecified type             sertraline HCl (SERTRALINE ORAL) Take 75 mg by mouth once daily.              I certify that this patient is confined to her home and needs intermittent skilled nursing care, physical therapy and occupational therapy.

## 2018-08-02 NOTE — PLAN OF CARE
08/02/18 1403   Final Note   Assessment Type Final Discharge Note   Discharge Disposition Home-Health     Patient is discharged to home today with [facility]. Bacilio sent the referral in [facility]. Bacilio spoke to Veronique Rn with [facility] whom stated that they got the referral and will set up . Bacilio spoke to the patient's daughter Obdulia regarding the patient's discharge today. She was in agreement with discharge today and will provide transportation home.

## 2018-08-06 ENCOUNTER — OUTPATIENT CASE MANAGEMENT (OUTPATIENT)
Dept: ADMINISTRATIVE | Facility: OTHER | Age: 83
End: 2018-08-06

## 2018-08-06 NOTE — PROGRESS NOTES
8/6/18 Summary: Follow-up call attempted with patient's daughter Obdulia Renteria. In-coming call from patient's daughter. Mrs. Steiner crying and verbalizing she is having hard time dealing with home situation as she has no family no support system. 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 States her dementia does not allow her to do much for herself and she can leave her home alone for few hours. States [facility] did home visit today and recommended  visit to assist with resources. Caregiver/daughter's spouse who also lives with patient has cancer and having memory issues, sleep walking, etc. His cancer is progressing quickly. Patient has been referred to palliative care.       Interventions: reviewed discharge instructions. Discussed caregiver burnout and resources. Discussed need to find personal care assistance at home, through [place] [facility] A[facility] respite or private or family/friend/community. Daughter very negative and stating she needs to care for her spouse first. Called [facility] [phone] spoke with Veronique  who states they have no orders for SW in home. Urgent in-basket message sent to PCP. Called daughter again to discuss SNF placement at [facility] as she declines Nursing Home placement nor anything affiliated with NH. Daughter states state law does not allow 4 side rails to be up in bed nor use of gait bell. Discussed hospice to get more resources but daughter feels neither patient nor her spouse are ready for it. Called Kirstin with [facility] SNF who verifies above and states only option is Alzheimer's Unit. [facility] SNF can't take dementia patient as they are not prepared to handle 24 hr sitter nor 24 hr restraints are not allowed. Discussed case with Taqueria Marti [facility] LCSW. Called Palliative Care message left for Ara informing patient is now home.       Plan:   Dementia teaching - continue  Clostridium Dificile - skin care  Fall  prevention  - continue  Referral to Palliative Care - DONE  Referral to OPCM  - DONE

## 2018-08-06 NOTE — PHYSICIAN QUERY
PT Name: Arina Adame  MR #: [id number]     Physician Query Form - Diagnosis Clarification      CDS/: Kathryn Johnson RN CCDS                Contact information: [email or web address]    This form is a permanent document in the medical record.     Query Date: August 6, 2018    By submitting this query, we are merely seeking further clarification of documentation.  Please utilize your independent clinical judgment when addressing the question(s) below.     The medical record contains the following:      Findings Supporting Clinical Information Location in Medical Record   Pneumonia     Sepsis/C.diff colitis -likely multifactorial,C.Diff and presumed PNA  CXR was concerning for possible infection and did not want to necessarily start abx due to C. Diff, however CT chest ordered showed a RUL cavitary opacity/lesion; multiple possibilities as to causes; will start on cefepime and have ID consulted to help further evaluate   Current radiographs show parenchymal lung abnormalities which are consistent with chronic ILD with nagy-pleural reticulations.      Leading considerations for RUL cavitary lesion include lung abscess/chronic lung infection, mycobacterial infection (tuberculosis much less likely), or malignancy    ILD (interstitial lung disease)     H&P 7/24                  Pulm PN 7/26        Pulm PN 7/27          DC summary 8/2     Please clarify if the _Pneumonia_ diagnosis has been:    [  ] Ruled In  [  ] Ruled In, Now Resolved  [X  ] Ruled Out  [  ] Clinically insignificant  [  ] Clinically undetermined  [  ] Other/Clarification of findings (please specify)_______________________________    Please document in your progress notes daily for the duration of treatment, until resolved, and include in your discharge summary.

## 2018-08-07 ENCOUNTER — TELEPHONE (OUTPATIENT)
Dept: INTERNAL MEDICINE | Facility: CLINIC | Age: 83
End: 2018-08-07

## 2018-08-07 DIAGNOSIS — R54 AGE-RELATED PHYSICAL DEBILITY: ICD-10-CM

## 2018-08-07 DIAGNOSIS — F01.50 VASCULAR DEMENTIA WITHOUT BEHAVIORAL DISTURBANCE: Primary | ICD-10-CM

## 2018-08-07 NOTE — TELEPHONE ENCOUNTER
----- Message from Ira Guzman MA sent at 8/6/2018  3:20 PM CDT -----      ----- Message -----  From: Dhara Yeager RN  Sent: 8/6/2018   3:00 PM  To: Renea Arriola Staff    Dr Meier or covering PCP, Patient needs  consult. Daughter struggling caring for patient as daughter's spouse is battling cancer.  Can you please fax orders to [facility] [phone]. Thank you.

## 2018-08-15 ENCOUNTER — OUTPATIENT CASE MANAGEMENT (OUTPATIENT)
Dept: ADMINISTRATIVE | Facility: OTHER | Age: 83
End: 2018-08-15

## 2018-08-15 NOTE — PROGRESS NOTES
8/15/18 Summary: Follow-up call with patient's daughter Obdulia Renteria who reports patient remains with diarrhea and things at home have become very difficult for her so she has agreed to start hospice at home for patient. States she meet with Home Health . States choose [facility]. States she still has not made decision for  as he has cancer and does not yet know if they will go Palliative care/Hospice at this time. States she has unloaded with her best friend who is at her side in home as we speak as Hospice is there trying to admit her mother/patient.        Interventions:  Active listening provided. Discussed caregiver burnout and resources. [place] teaching: reviewed ERIKA. Will follow-up as requested by daughter in 2 weeks to see how they are doing with Hospice.     Plan:   Dementia teaching - continue  Clostridium Dificile - skin care  Fall prevention  - continue  Referral to Palliative Care - DONE  Referral to [facility]  - DONE

## 2018-08-16 ENCOUNTER — TELEPHONE (OUTPATIENT)
Dept: INTERNAL MEDICINE | Facility: CLINIC | Age: 83
End: 2018-08-16

## 2018-08-16 DIAGNOSIS — G30.1 LATE ONSET ALZHEIMER'S DISEASE WITH BEHAVIORAL DISTURBANCE: ICD-10-CM

## 2018-08-16 DIAGNOSIS — R41.3 MEMORY LOSS: Primary | ICD-10-CM

## 2018-08-16 DIAGNOSIS — R54 AGE-RELATED PHYSICAL DEBILITY: ICD-10-CM

## 2018-08-16 DIAGNOSIS — F02.818 LATE ONSET ALZHEIMER'S DISEASE WITH BEHAVIORAL DISTURBANCE: ICD-10-CM

## 2018-08-16 NOTE — TELEPHONE ENCOUNTER
----- Message from Caity Wick sent at 8/16/2018  9:52 AM CDT -----  Contact: [facility]/[phone]      ----- Message -----  From: Dana Mabry  Sent: 8/16/2018   9:23 AM  To: Renea Arriola Staff    Type:Home Health(orders,updates,clarifications,etc)    Home Health Agency/Nurse: [facility]    Phone number:[phone]    Reason for call:    Comments: family is requesting order for . Please call and advise. Thank you

## 2018-08-16 NOTE — TELEPHONE ENCOUNTER
[facility] calling stating patients family requesting orders for hospice. Patients family says she needs more care then they can provide. They are interested in [facility] but needs orders to discharge

## 2018-08-28 ENCOUNTER — OUTPATIENT CASE MANAGEMENT (OUTPATIENT)
Dept: ADMINISTRATIVE | Facility: OTHER | Age: 83
End: 2018-08-28

## 2018-08-28 NOTE — PROGRESS NOTES
"18 Summary: Follow-up with patient's daughter Obdulia Renteria. Daughter Obdulia very upset with [facility]. States her spouse  on  after being discharged home from hospital with a prognosis to live for 3 to 4 days. States "they sent him home and  within 10 minutes of getting into his bed". States the "nurse had told me to spend the night, I think she knew he did not have much left but they sent him home". States he was "diagnosed with cancer on May and was a curable cancer". "Six weeks later cancer was spreading and still no chemo". States "care was dropped by Dr Romero as he let Nurse Practitioner handle care". States he was the life of my life, we didn't have children, and I am the only child. "Once my mother passes I'm left alone". "[facility] dropped the ball. He was my support. [facility] has gotten too big, the suits ruined the hospital, no apology has been given, I will not pay his hospital bill. We were  for 37 yrs. I cannot sleep since he ". I do take oxycodone, trazadone, fentanyl, wellbutrin and prozac. Furthermore, our income will decrease by $2,000 per month as I will not get his income and he had no life insurance policy. States she shut down [facility] last night as they cannot provide what she needs. States they can bathe her 2 to 3 times per week. What she wants/needs is someone to come for few hours during the day and care for her mother/patient, feed her, change pull-ups, etc, not just sit with her.        Interventions:  Active listening provided. Discussed caregiver burnout and resources. Discussed grief programs. Discussed referral to [facility] LCSW to assist with resources. States the "[place] on aging only sits, cannot help and I cannot afford help". Discussed allowing hospice to come and provide respite care. Daughter feels she can do a better job. States having people come in and out is also hard as she has to coordinate opening doors, etc. States I don't like having " strangers in my home. Discussed talking to patient relations regarding her spouse. Obdulia states she has spoken to them twice and they even sent security on her second visit. Offered to call PCP, make appointment for her but she states our PCP is still out on maternity leave and not sure I want to see someone else. Daughter verbalizes gratitude to nurses caring for her spouse and OPCM for listening but declines wanting further OPCM services at this time. States there is nothing else you can offer. No SNF facility will allow gait belts on my mother and I will not place her in a Nursing Home. Called [facility], spoke with Griffin Loco [phone] who does state daughter told admissions person last night that they are not interested in their services. Explained caregiver burnout and grief and he will have  call daughter in AM to review all possible programs available to assist them. Will close case as daughter does not want OPCM to manage. Note routed to PCP. Discussed with Supervisor.

## 2018-08-29 ENCOUNTER — TELEPHONE (OUTPATIENT)
Dept: GASTROENTEROLOGY | Facility: CLINIC | Age: 83
End: 2018-08-29

## 2018-08-29 RX ORDER — SERTRALINE HYDROCHLORIDE 50 MG/1
TABLET, FILM COATED ORAL
Qty: 90 TABLET | Refills: 3 | Status: SHIPPED | OUTPATIENT
Start: 2018-08-29

## 2018-08-29 NOTE — TELEPHONE ENCOUNTER
----- Message from Argenis Cole sent at 8/29/2018 10:29 AM CDT -----  Contact: Obdulia Renteria, daughter, [phone]  Obdulia daughter called stating pt above needs to be seen next week. She states pt is having serious diarrhea problems and is losing a lot of nutrients. She said this cannot wait till 10/1 b/c she is scared that her mother will pass away soon from all of her symptoms and she is very nervous about what may happen    Contact:  Obdulia Renteria, daughter, [phone]

## 2018-08-30 ENCOUNTER — TELEPHONE (OUTPATIENT)
Dept: ADMINISTRATIVE | Facility: CLINIC | Age: 83
End: 2018-08-30

## 2018-09-06 ENCOUNTER — OFFICE VISIT (OUTPATIENT)
Dept: GASTROENTEROLOGY | Facility: CLINIC | Age: 83
End: 2018-09-06
Payer: MEDICARE

## 2018-09-06 VITALS
HEART RATE: 102 BPM | WEIGHT: 108 LBS | HEIGHT: 60 IN | SYSTOLIC BLOOD PRESSURE: 123 MMHG | DIASTOLIC BLOOD PRESSURE: 82 MMHG | BODY MASS INDEX: 21.2 KG/M2

## 2018-09-06 DIAGNOSIS — A04.72 C. DIFFICILE DIARRHEA: Primary | ICD-10-CM

## 2018-09-06 PROCEDURE — 1101F PT FALLS ASSESS-DOCD LE1/YR: CPT | Mod: CPTII,,, | Performed by: INTERNAL MEDICINE

## 2018-09-06 PROCEDURE — 99999 PR PBB SHADOW E&M-EST. PATIENT-LVL IV: CPT | Mod: PBBFAC,,, | Performed by: INTERNAL MEDICINE

## 2018-09-06 PROCEDURE — 99214 OFFICE O/P EST MOD 30 MIN: CPT | Mod: PBBFAC | Performed by: INTERNAL MEDICINE

## 2018-09-06 PROCEDURE — 99213 OFFICE O/P EST LOW 20 MIN: CPT | Mod: S$PBB,,, | Performed by: INTERNAL MEDICINE

## 2018-09-06 NOTE — PROGRESS NOTES
[facility] Established Patient Visit    Reason for Visit:  The encounter diagnosis was C. difficile diarrhea.    PCP: Zeinab Meier    HPI:  This is a 89 y.o. female here for FMt followup.  Done at the end of July for recurrent hospitalizations from C diff.    Her daughter takes care of her and her  recently passed away from cancer, much of the visit was spent discussing the circumstances around that and her own experiences with C diff.    Mrs Adame has had semi formed stools since the transplant, not like when she had C diff and her daughter doesn't think she has it. AHs stopped the fiber and probiotics they were doing before.      ROS:  Unable to obtain from patient  Some urinary incontinence    PMHX:  has a past medical history of Alzheimer disease, Arthritis, B12 nutritional deficiency (8/6/2012), C. 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    PSHX:  has a past surgical history that includes Colectomy; Breast surgery; right ankle surgery; Dental surgery; Cataract extraction w/  intraocular lens implant (Bilateral, n/a); and COLONOSCOPY (N/A, 7/31/2018).    The patient's social and family histories were reviewed by me and updated in the appropriate section of the electronic medical record.    Review of patient's allergies indicates:   Allergen Reactions    Codeine      Other reaction(s): Unknown    Iodine      Other reaction(s): Unknown    Penicillins      Other reaction(s): Unknown       Current Outpatient Medications   Medication Sig  Dispense Refill    acetaminophen (TYLENOL) 325 MG tablet Take 325 mg by mouth daily as needed for Pain.      aspirin (ECOTRIN) 81 MG EC tablet Take 1 tablet (81 mg total) by mouth once daily.  0    atorvastatin (LIPITOR) 40 MG tablet TAKE 1 TABLET (40 MG TOTAL) BY MOUTH ONCE DAILY. 90 tablet 3    CALCIUM CITRATE/VITAMIN D3 (CITRACAL + D MAXIMUM ORAL) Take 1 tablet by mouth once daily.       cholecalciferol, vitamin D3, (VITAMIN D3) 1,000 unit capsule Take 1,000 Units by mouth once daily.        cyanocobalamin (VITAMIN B-12) 500 MCG tablet Take 500 mcg by mouth once daily.       donepezil (ARICEPT) 10 MG tablet Take 1 tablet (10 mg total) by mouth every evening. 90 tablet 3    levothyroxine (SYNTHROID) 100 MCG tablet 1 Tablet Oral every morning except 1.5 pills on Sundays. 90 tablet 3    multivitamin (ONE DAILY MULTIVITAMIN) per tablet Take 1 tablet by mouth once daily.      psyllium (METAMUCIL) powder Take by mouth once daily.      QUEtiapine (SEROQUEL) 25 MG Tab TAKE 1 TABLET (25 MG TOTAL) BY MOUTH NIGHTLY AS NEEDED. (Patient taking differently: Take 25 mg by mouth every evening. ) 90 tablet 3    sertraline (ZOLOFT) 50 MG tablet TAKE 1 TABLET (50 MG TOTAL) BY MOUTH ONCE DAILY. 90 tablet 3    sertraline HCl (SERTRALINE ORAL) Take 75 mg by mouth once daily.        No current facility-administered medications for this visit.          Objective Findings:    Vital Signs:  /82   Pulse 102   Ht 5' (1.524 m)   Wt 49 kg (108 lb)   BMI 21.09 kg/m²  Body mass index is 21.09 kg/m².    Physical Exam:  General Appearance: Well appearing in no acute distress  Head:   Normocephalic, without obvious abnormality  Eyes:    No scleral icterus, EOMI  Throat: Lips, mucosa, and tongue normal; teeth and gums normal  Lungs: CTA bilaterally in anterior and posterior fields, no wheezes, no crackles.  Heart:  Regular rate and rhythm, S1, S2 normal, no murmurs heard  Abdomen: Soft, non tender, non distended with positive  bowel sounds in all four quadrants. No hepatosplenomegaly, ascites, or mass  Extremities:    2+ pulses, no clubbing, cyanosis or edema  Skin: No rash  Neurologic: CN II-XII intact, no asterixis      Labs:  Lab Results   Component Value Date    WBC 13.23 (H) 08/02/2018    HGB 11.8 (L) 08/02/2018    HCT 36.8 (L) 08/02/2018     08/02/2018    CHOL 125 11/30/2016    TRIG 68 11/30/2016    HDL 41 11/30/2016    ALT 12 07/23/2018    AST 18 07/23/2018     08/02/2018    K 3.5 08/02/2018     08/02/2018    CREATININE 0.7 08/02/2018    BUN 19 08/02/2018    CO2 26 08/02/2018    TSH 0.951 06/22/2018    INR 1.1 07/23/2018    HGBA1C 5.3 05/26/2018       Endoscopy:   Colon 2018 - wnl    Imaging:      Assessment:    1. C. difficile diarrhea          Recommendations:  1. Can recheck stool if it is liquid enough. If negative can do a little imodium to help with diarrhea which would likely be a post infectious IBS  2. Can resume metamucil and florastor for now    No Follow-up on file.    Order summary:  Orders Placed This Encounter   Procedures    Clostridium difficile EIA    Stool culture       Thank you for allowing me to participate in the care of Arina Washington MD

## 2018-09-10 ENCOUNTER — PATIENT MESSAGE (OUTPATIENT)
Dept: GASTROENTEROLOGY | Facility: CLINIC | Age: 83
End: 2018-09-10

## 2018-09-10 ENCOUNTER — TELEPHONE (OUTPATIENT)
Dept: GASTROENTEROLOGY | Facility: CLINIC | Age: 83
End: 2018-09-10

## 2018-09-10 NOTE — TELEPHONE ENCOUNTER
Ma spoke pt daughter Obdulia inform her test results (per Dr. Washington )    Obdulia stated her mother stool are better solid stool she is very happy and to thank Dr. Washington very much!

## 2018-09-10 NOTE — TELEPHONE ENCOUNTER
"----- Message from Justyn Washington MD sent at 9/7/2018  2:09 PM CDT -----  Regarding: RE: Cancellation of Order # [id number]  Please let her daughter know that the stool sample was too solid to be tested for C diff, so it is ok to do the metamucil and other recommendations that we discussed at her appointment  ----- Message -----  From: Metrilo  Sent: 9/7/2018   2:08 PM  To: Justyn Washington MD  Subject: Cancellation of Order # [id number]                Order number [id number] for the procedure CLOSTRIDIUM DIFFICILE   [YII606] has been canceled by Metrilo [[id number]].   This procedure was ordered by Justyn Washington MD [[id number]] on Sep 7,   2018 for the patient Arina Adame [[id number]]. The reason for   cancellation was "None".      "

## 2018-09-20 ENCOUNTER — TELEPHONE (OUTPATIENT)
Dept: GASTROENTEROLOGY | Facility: CLINIC | Age: 83
End: 2018-09-20

## 2018-09-20 NOTE — TELEPHONE ENCOUNTER
Ma spoke to pt daughter Obdulia she stated pt has diarrhea with mucus. She wants to know what can she do to get this under control.     Pt daughter stated she don't know if it's C diff

## 2018-09-20 NOTE — TELEPHONE ENCOUNTER
Spoke with daughter  Some extra mucus but no other big changes  Asked her to increase the fiber and see how she tolerates  Will call us with issues

## 2018-09-20 NOTE — TELEPHONE ENCOUNTER
----- Message from Francoise Terrell sent at 9/20/2018  9:16 AM CDT -----  Contact: Daughter- Obdulia- [phone]  Felix- usman daughter believes she may have c diff again- diarrhea with mucus again- please contact Obdulia at [phone]

## 2018-09-26 ENCOUNTER — TELEPHONE (OUTPATIENT)
Dept: GASTROENTEROLOGY | Facility: CLINIC | Age: 83
End: 2018-09-26

## 2018-09-26 LAB
ACID FAST MOD KINY STN SPEC: NORMAL
MYCOBACTERIUM SPEC QL CULT: NORMAL

## 2018-09-26 NOTE — TELEPHONE ENCOUNTER
Ma spoke to pt daughter Obdulia stated the 10/1 appt should've been canceled.     Obdulia stated pt was seen by Dr. Washington already.     Obdulia stated she don't know why pt have to come back to see Dr. Washington and she don't want pt to be seen by Dr. Burciaga anymore.     Obdulia stated is there a diet the pt suppose to be on?

## 2018-09-26 NOTE — TELEPHONE ENCOUNTER
Ma contact pt daughter Obdulia left voicemail to inform pt per Dr. Washington message and appt was canceled. Left contact info where can be reached [phone].

## 2018-09-27 LAB
ACID FAST MOD KINY STN SPEC: NORMAL
MYCOBACTERIUM SPEC QL CULT: NORMAL

## 2018-09-29 LAB
ACID FAST MOD KINY STN SPEC: NORMAL
MYCOBACTERIUM SPEC QL CULT: NORMAL

## 2018-10-03 ENCOUNTER — TELEPHONE (OUTPATIENT)
Dept: INTERNAL MEDICINE | Facility: CLINIC | Age: 83
End: 2018-10-03

## 2018-10-03 NOTE — TELEPHONE ENCOUNTER
Gave office a call back letting them know we received the faxes and they were being returned today.

## 2018-10-03 NOTE — TELEPHONE ENCOUNTER
Called and spoke to Franny, let her know we faxed over the release of information to our department but to admit the patient into hospice she they need something like some office notes that state why this patient his headed into hospice. If you can think of anything that I can just easily print out she would really appreciate it.

## 2018-10-03 NOTE — TELEPHONE ENCOUNTER
----- Message from Kelly Navarro sent at 10/3/2018 12:28 PM CDT -----  Contact: [facility] [phone]  Caller is would like to confirm that pt Hospice forms were received. Caller states she faxed the form on 10/2. Please call and advise.

## 2018-10-03 NOTE — TELEPHONE ENCOUNTER
----- Message from Rosie Dsouza sent at 10/3/2018  3:06 PM CDT -----  Contact: Isabela from [facility] [phone]  Needs to get history, physical, progress notes and anything that has to do with pts dementia and need for hospice care. Please advise.    Fax [phone]

## 2019-01-15 ENCOUNTER — PES CALL (OUTPATIENT)
Dept: ADMINISTRATIVE | Facility: CLINIC | Age: 84
End: 2019-01-15

## 2019-09-25 NOTE — TELEPHONE ENCOUNTER
Can she quantify how many BMs she's having in a day? How many was she having prior to hospital? I'll reorder vancomycin for 10 days but I'm also going to reorder Cdiff culture. Please have her bring in stool sample. Cdiff is a bacteria that can form spores and so the first course of antibiotics might not have treated the spores. Nevertheless, I'll also will refer to ID since she feels the diarrhea is not better s/p 10 days of vanco.    Nurse to nurse report given to Kimmy CELIS

## 2021-02-26 NOTE — NURSING
Discharged to home , daughter left without discharge instructions,  at home unattended , Will continue to call to give verbal d/c instructions. Patient had own wheelchair , decided needed to leave , All equipment removed and saline .lock , Patient and daughter aware of follow up with MD   never

## 2023-10-28 NOTE — PROGRESS NOTES
7/31/18 pt has NP cough. No order for NTS contacted RN to get orders from MD to sux pt for sputum at this time. Nurse will call therapist back  with orders to sux.    Attending Attestation (For Attendings USE Only)...

## 2025-02-13 NOTE — DISCHARGE SUMMARY
"Subjective:      Patient ID: Florentino Gr [facility] Hospital Medicine  Discharge Summary      Patient Name: Arina Adame  MRN: [id number]  Admission Date: 7/23/2018  Hospital Length of Stay: 10 days  Discharge Date and Time:  08/02/2018 3:41 PM  Attending Physician: Washington Quintana MD   Discharging Provider: Ingrid Vallejo MD  Primary Care Provider: Zeinab Meier MD  [facility] Hospital Medicine Team: [facility]OS[facility] MED 4 Ingrid Vallejo MD    HPI:   Arina Adame is a 89 y.o. female has a past medical history of Alzheimer disease; C. difficile colitis; Carotid arterial disease; Cataract; Essential hypertension; Colon cancer; Hypothyroidism; impaired glucose tolerance; Osteoporosis, brought to the ED by her daughter today complaining of fever, diarrhea and vomiting.  At ED, Arina Adame was also hypotensive, tachycardic, and lethargic, CBC, CMP, troponin, BNP, urinalysis, blood/urinary culture, CT chest and Chest X-ray was ordered, concerning recurrently UTI, diff infection, and pneumonia.      Arina Adame has limited ability to give a full medical history.  Collateral information from her daughter was obtained. She was discharged from hospital of her last episode on June 26th, and relapsed right after she complete a home course of PO vancomycin for C.diff last Thursday. She was brought to the ED by EMS for fever. Daughter reports that she has been more confused at night for the past 2 nights. Today, she was much more fatigued than normal and would not get out of bed. This afternoon, daughter noticed she had a fever of >101 and called EMS. EMS noted pt to be febrile, tachycardic and hypotensive and administered 500cc of NS. On exam pt is rousable but disoriented. Daughter notes that she has a bed sore near her gluteal fold that has not healed.   Daughter noticed Arina Adame's change of bowel movement last Thursday when she changed her diaper.  The diarrhea has been watery, gold in color, mucousy with C. diff smell.  She became  increasingly lethargic.  This morning, Arina Adame was unable to get up from bed, did not take any medication or food, vomited once after taking some water.  She slept all day.  When daughter saw her in the late afternoon, Arina Adame was shivering, had a temperature of 101.9, with some rattle breathing sound.  She took her to the ED. She s/p 2 episodes of C-diff infections with UTI in the past.  Each episode lasted for about 7-9 days.          Procedure(s) (LRB):  COLONOSCOPY (N/A)      Hospital Course:   Patient transferred to hospital floor for further evaluation and management. ID consulted. Recommended to treat C. Diff infection with PO Vancomycin for at least 21 days. Cavitary lesion of lung was concerning for possible TB. TB ruled out 2/2 T-spot test negative, negative quant test, and negative sputum sample for AFB smear x 3. 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 Discharged home 8/2. Patient will need outpatient follow for cavitary lesion.      Review of Systems   Constitutional: Positive for activity change and fatigue. Negative for fever.   Respiratory: Negative for apnea, cough, chest tightness and shortness of breath.    Cardiovascular: Negative for chest pain and leg swelling.   Gastrointestinal: Negative for abdominal distention, abdominal pain, diarrhea, nausea and vomiting.   Musculoskeletal: Negative for arthralgias.   Skin: Negative for color change and pallor.   Neurological: Negative for dizziness and headaches.   Psychiatric/Behavioral: Positive for confusion. Negative for agitation.      Objective:      Vitals:    08/02/18 1518   BP: (!) 158/100   Pulse: 107   Resp: 16   Temp: 98 °F (36.7 °C)           Weight: 48.1 kg (106 lb 0.7 oz)  Body mass index is 19.4 kg/m².       Gross per 24 hour   Intake              250 ml   Output                0 ml   Net              250 ml      Physical Exam   Constitutional: No distress.   HENT:   Head: Normocephalic and atraumatic.    Mouth/Throat: No oropharyngeal exudate.   Eyes: Conjunctivae are normal. No scleral icterus.   Neck: Normal range of motion. Neck supple.   Prominence of right sterno-clavicular junction   Cardiovascular: Elevated heart rate, regular rhythm and normal heart sounds.      Pulmonary/Chest: Effort normal. No respiratory distress. She has no wheezes. She has no rales.   Abdominal: Soft. Bowel sounds are normal. She exhibits no distension. There is no tenderness.   Musculoskeletal: She exhibits no edema or tenderness.   Neurological: She is alert. She has normal strength. GCS eye subscore is 4. GCS verbal subscore is 5. GCS motor subscore is 6.   Skin: Skin is warm and dry. She is not diaphoretic.   Psychiatric:   Patient orientated to person, month, and [facility].   Nursing note and vitals reviewed.       Consults:   Consults         Status Ordering Provider     Case Management/  Once     Provider:  (Not yet assigned)    Acknowledged LEX HAYDEN     Inpatient consult to Gastroenterology  Once     Provider:  (Not yet assigned)    Completed TED LOZADA     Inpatient consult to Infectious Diseases  Once     Provider:  (Not yet assigned)    Completed LEX HAYDEN     Inpatient consult to Pulmonology  Once     Provider:  (Not yet assigned)    Completed TED LOZADA          No new Assessment & Plan notes have been filed under this hospital service since the last note was generated.  Service: Hospital Medicine    Final Active Diagnoses:    Diagnosis Date Noted POA    PRINCIPAL PROBLEM:  Clostridium difficile infection [B96.89] 05/28/2018 Yes    Sepsis [A41.9] 07/31/2018 Yes    ILD (interstitial lung disease) [J84.9] 07/26/2018 Yes    Delirium [R41.0] 07/25/2018 Yes    Fungal dermatitis [B36.9] 07/24/2018 Yes    Cavitary lesion of lung [J98.4] 07/24/2018 Yes    Alteration in skin integrity due to moisture [R23.9] 07/24/2018 Yes    Age-related physical debility [R54]  06/24/2018 Yes    Dementia [F03.90] 09/24/2014 Yes    CAD (coronary artery disease) [I25.10] 08/27/2013 Yes    Hypothyroidism [E03.9] 11/21/2012 Yes      Problems Resolved During this Admission:    Diagnosis Date Noted Date Resolved POA    Fall [W19.XXXA] 07/25/2018 07/26/2018 No    Sepsis [A41.9] 07/23/2018 07/28/2018 Yes    Diarrhea of presumed infectious origin [R19.7] 05/26/2018 07/25/2018 Yes       Discharged Condition: good    Disposition: Home-Health Care [facility]    Follow Up:  Follow-up Information     Zeinab Meier MD On 8/9/2018.    Specialty:  Internal Medicine  Why:  Dr. Meier is on maternity leave appt is with Dr. Ocasio 9:20 am  Contact information:  [address] [facility] [id number] [phone]                 Patient Instructions:   No discharge procedures on file.    Significant Diagnostic Studies: Labs:   CMP   Recent Labs  Lab 08/01/18  0643 08/02/18  0715    141   K 3.3* 3.5    108   CO2 26 26   * 103   BUN 9 19   CREATININE 0.7 0.7   CALCIUM 9.3 9.5   ANIONGAP 8 7*   ESTGFRAFRICA >60.0 >60.0   EGFRNONAA >60.0 >60.0    and CBC   Recent Labs  Lab 08/01/18  0643 08/02/18  0714   WBC 10.43 13.23*   HGB 12.2 11.8*   HCT 38.8 36.8*    315       Pending Diagnostic Studies:     None         Medications:  Reconciled Home Medications:      Medication List      CHANGE how you take these medications    QUEtiapine 25 MG Tab  Commonly known as:  SEROQUEL  TAKE 1 TABLET (25 MG TOTAL) BY MOUTH NIGHTLY AS NEEDED.  What changed:  · how much to take  · how to take this  · when to take this  · additional instructions        CONTINUE taking these medications    acetaminophen 325 MG tablet  Commonly known as:  TYLENOL  Take 325 mg by mouth daily as needed for Pain.     aspirin 81 MG EC tablet  Commonly known as:  ECOTRIN  Take 1 tablet (81 mg total) by mouth once daily.     atorvastatin 40 MG tablet  Commonly known as:  LIPITOR  TAKE 1 TABLET (40 MG TOTAL) BY MOUTH ONCE DAILY.      CITRACAL + D MAXIMUM ORAL  Take 1 tablet by mouth once daily.     donepezil 10 MG tablet  Commonly known as:  ARICEPT  Take 1 tablet (10 mg total) by mouth every evening.     levothyroxine 100 MCG tablet  Commonly known as:  SYNTHROID  1 Tablet Oral every morning except 1.5 pills on Sundays.     ONE DAILY MULTIVITAMIN per tablet  Generic drug:  multivitamin  Take 1 tablet by mouth once daily.     psyllium powder  Commonly known as:  METAMUCIL  Take by mouth once daily.     SERTRALINE ORAL  Take 75 mg by mouth once daily.     VITAMIN B-12 500 MCG tablet  Generic drug:  cyanocobalamin  Take 500 mcg by mouth once daily.     VITAMIN D3 1,000 unit capsule  Generic drug:  cholecalciferol (vitamin D3)  Take 1,000 Units by mouth once daily.        STOP taking these medications    FLORASTOR 250 mg capsule  Generic drug:  Saccharomyces boulardii            Indwelling Lines/Drains at time of discharge:   Lines/Drains/Airways          No matching active lines, drains, or airways          Time spent on the discharge of patient: 30 minutes  Patient was seen and examined on the date of discharge and determined to be suitable for discharge.         Ingrid Vallejo MD  Department of Hospital Medicine  [facility]